# Patient Record
Sex: MALE | Race: WHITE | NOT HISPANIC OR LATINO
[De-identification: names, ages, dates, MRNs, and addresses within clinical notes are randomized per-mention and may not be internally consistent; named-entity substitution may affect disease eponyms.]

---

## 2017-05-05 ENCOUNTER — APPOINTMENT (OUTPATIENT)
Dept: CARDIOLOGY | Facility: CLINIC | Age: 82
End: 2017-05-05

## 2018-04-03 ENCOUNTER — OTHER (OUTPATIENT)
Age: 83
End: 2018-04-03

## 2018-10-05 ENCOUNTER — INPATIENT (INPATIENT)
Facility: HOSPITAL | Age: 83
LOS: 0 days | Discharge: HOME | End: 2018-10-06
Attending: INTERNAL MEDICINE | Admitting: INTERNAL MEDICINE

## 2018-10-05 VITALS
DIASTOLIC BLOOD PRESSURE: 67 MMHG | HEART RATE: 62 BPM | TEMPERATURE: 98 F | OXYGEN SATURATION: 97 % | SYSTOLIC BLOOD PRESSURE: 130 MMHG | RESPIRATION RATE: 18 BRPM

## 2018-10-05 DIAGNOSIS — J34.2 DEVIATED NASAL SEPTUM: Chronic | ICD-10-CM

## 2018-10-05 DIAGNOSIS — Z90.49 ACQUIRED ABSENCE OF OTHER SPECIFIED PARTS OF DIGESTIVE TRACT: Chronic | ICD-10-CM

## 2018-10-05 DIAGNOSIS — Z98.89 OTHER SPECIFIED POSTPROCEDURAL STATES: Chronic | ICD-10-CM

## 2018-10-05 LAB
ALBUMIN SERPL ELPH-MCNC: 3.3 G/DL — LOW (ref 3.5–5.2)
ALP SERPL-CCNC: 70 U/L — SIGNIFICANT CHANGE UP (ref 30–115)
ALT FLD-CCNC: 20 U/L — SIGNIFICANT CHANGE UP (ref 0–41)
ANION GAP SERPL CALC-SCNC: 12 MMOL/L — SIGNIFICANT CHANGE UP (ref 7–14)
APTT BLD: 32.1 SEC — SIGNIFICANT CHANGE UP (ref 27–39.2)
AST SERPL-CCNC: 38 U/L — SIGNIFICANT CHANGE UP (ref 0–41)
BASOPHILS # BLD AUTO: 0.03 K/UL — SIGNIFICANT CHANGE UP (ref 0–0.2)
BASOPHILS NFR BLD AUTO: 0.6 % — SIGNIFICANT CHANGE UP (ref 0–1)
BILIRUB SERPL-MCNC: 0.7 MG/DL — SIGNIFICANT CHANGE UP (ref 0.2–1.2)
BUN SERPL-MCNC: 16 MG/DL — SIGNIFICANT CHANGE UP (ref 10–20)
CALCIUM SERPL-MCNC: 8.9 MG/DL — SIGNIFICANT CHANGE UP (ref 8.5–10.1)
CHLORIDE SERPL-SCNC: 108 MMOL/L — SIGNIFICANT CHANGE UP (ref 98–110)
CK SERPL-CCNC: 81 U/L — SIGNIFICANT CHANGE UP (ref 0–225)
CK SERPL-CCNC: 89 U/L — SIGNIFICANT CHANGE UP (ref 0–225)
CO2 SERPL-SCNC: 24 MMOL/L — SIGNIFICANT CHANGE UP (ref 17–32)
CREAT SERPL-MCNC: 1.1 MG/DL — SIGNIFICANT CHANGE UP (ref 0.7–1.5)
EOSINOPHIL # BLD AUTO: 0.09 K/UL — SIGNIFICANT CHANGE UP (ref 0–0.7)
EOSINOPHIL NFR BLD AUTO: 1.8 % — SIGNIFICANT CHANGE UP (ref 0–8)
GLUCOSE SERPL-MCNC: 129 MG/DL — HIGH (ref 70–99)
HCT VFR BLD CALC: 37.8 % — LOW (ref 42–52)
HGB BLD-MCNC: 11.7 G/DL — LOW (ref 14–18)
IMM GRANULOCYTES NFR BLD AUTO: 0.2 % — SIGNIFICANT CHANGE UP (ref 0.1–0.3)
INR BLD: 1.22 RATIO — SIGNIFICANT CHANGE UP (ref 0.65–1.3)
LYMPHOCYTES # BLD AUTO: 1 K/UL — LOW (ref 1.2–3.4)
LYMPHOCYTES # BLD AUTO: 19.8 % — LOW (ref 20.5–51.1)
MAGNESIUM SERPL-MCNC: 2 MG/DL — SIGNIFICANT CHANGE UP (ref 1.8–2.4)
MCHC RBC-ENTMCNC: 27.7 PG — SIGNIFICANT CHANGE UP (ref 27–31)
MCHC RBC-ENTMCNC: 31 G/DL — LOW (ref 32–37)
MCV RBC AUTO: 89.4 FL — SIGNIFICANT CHANGE UP (ref 80–94)
MONOCYTES # BLD AUTO: 0.58 K/UL — SIGNIFICANT CHANGE UP (ref 0.1–0.6)
MONOCYTES NFR BLD AUTO: 11.5 % — HIGH (ref 1.7–9.3)
NEUTROPHILS # BLD AUTO: 3.34 K/UL — SIGNIFICANT CHANGE UP (ref 1.4–6.5)
NEUTROPHILS NFR BLD AUTO: 66.1 % — SIGNIFICANT CHANGE UP (ref 42.2–75.2)
NRBC # BLD: 0 /100 WBCS — SIGNIFICANT CHANGE UP (ref 0–0)
NT-PROBNP SERPL-SCNC: 1265 PG/ML — HIGH (ref 0–300)
PLATELET # BLD AUTO: 162 K/UL — SIGNIFICANT CHANGE UP (ref 130–400)
POTASSIUM SERPL-MCNC: 5 MMOL/L — SIGNIFICANT CHANGE UP (ref 3.5–5)
POTASSIUM SERPL-SCNC: 5 MMOL/L — SIGNIFICANT CHANGE UP (ref 3.5–5)
PROT SERPL-MCNC: 5.7 G/DL — LOW (ref 6–8)
PROTHROM AB SERPL-ACNC: 13.1 SEC — HIGH (ref 9.95–12.87)
RBC # BLD: 4.23 M/UL — LOW (ref 4.7–6.1)
RBC # FLD: 15 % — HIGH (ref 11.5–14.5)
SODIUM SERPL-SCNC: 144 MMOL/L — SIGNIFICANT CHANGE UP (ref 135–146)
TROPONIN T SERPL-MCNC: 0.01 NG/ML — SIGNIFICANT CHANGE UP
TROPONIN T SERPL-MCNC: 0.02 NG/ML — HIGH
WBC # BLD: 5.05 K/UL — SIGNIFICANT CHANGE UP (ref 4.8–10.8)
WBC # FLD AUTO: 5.05 K/UL — SIGNIFICANT CHANGE UP (ref 4.8–10.8)

## 2018-10-05 RX ORDER — ASPIRIN/CALCIUM CARB/MAGNESIUM 324 MG
325 TABLET ORAL DAILY
Qty: 0 | Refills: 0 | Status: DISCONTINUED | OUTPATIENT
Start: 2018-10-05 | End: 2018-10-06

## 2018-10-05 RX ORDER — METHOCARBAMOL 500 MG/1
1000 TABLET, FILM COATED ORAL EVERY 8 HOURS
Qty: 0 | Refills: 0 | Status: DISCONTINUED | OUTPATIENT
Start: 2018-10-05 | End: 2018-10-06

## 2018-10-05 RX ORDER — FERROUS SULFATE 325(65) MG
325 TABLET ORAL DAILY
Qty: 0 | Refills: 0 | Status: DISCONTINUED | OUTPATIENT
Start: 2018-10-05 | End: 2018-10-06

## 2018-10-05 RX ORDER — METOPROLOL TARTRATE 50 MG
25 TABLET ORAL DAILY
Qty: 0 | Refills: 0 | Status: DISCONTINUED | OUTPATIENT
Start: 2018-10-05 | End: 2018-10-06

## 2018-10-05 RX ORDER — LANOLIN ALCOHOL/MO/W.PET/CERES
5 CREAM (GRAM) TOPICAL ONCE
Qty: 0 | Refills: 0 | Status: COMPLETED | OUTPATIENT
Start: 2018-10-05 | End: 2018-10-05

## 2018-10-05 RX ORDER — ENOXAPARIN SODIUM 100 MG/ML
40 INJECTION SUBCUTANEOUS DAILY
Qty: 0 | Refills: 0 | Status: DISCONTINUED | OUTPATIENT
Start: 2018-10-05 | End: 2018-10-06

## 2018-10-05 RX ORDER — DONEPEZIL HYDROCHLORIDE 10 MG/1
10 TABLET, FILM COATED ORAL AT BEDTIME
Qty: 0 | Refills: 0 | Status: DISCONTINUED | OUTPATIENT
Start: 2018-10-05 | End: 2018-10-06

## 2018-10-05 RX ORDER — SPIRONOLACTONE 25 MG/1
25 TABLET, FILM COATED ORAL DAILY
Qty: 0 | Refills: 0 | Status: DISCONTINUED | OUTPATIENT
Start: 2018-10-05 | End: 2018-10-06

## 2018-10-05 RX ORDER — LOSARTAN POTASSIUM 100 MG/1
100 TABLET, FILM COATED ORAL DAILY
Qty: 0 | Refills: 0 | Status: DISCONTINUED | OUTPATIENT
Start: 2018-10-05 | End: 2018-10-06

## 2018-10-05 RX ORDER — FUROSEMIDE 40 MG
40 TABLET ORAL ONCE
Qty: 0 | Refills: 0 | Status: COMPLETED | OUTPATIENT
Start: 2018-10-05 | End: 2018-10-05

## 2018-10-05 RX ORDER — MIRTAZAPINE 45 MG/1
7.5 TABLET, ORALLY DISINTEGRATING ORAL AT BEDTIME
Qty: 0 | Refills: 0 | Status: DISCONTINUED | OUTPATIENT
Start: 2018-10-05 | End: 2018-10-06

## 2018-10-05 RX ORDER — FUROSEMIDE 40 MG
40 TABLET ORAL DAILY
Qty: 0 | Refills: 0 | Status: DISCONTINUED | OUTPATIENT
Start: 2018-10-05 | End: 2018-10-06

## 2018-10-05 RX ORDER — OXYCODONE AND ACETAMINOPHEN 5; 325 MG/1; MG/1
2 TABLET ORAL EVERY 6 HOURS
Qty: 0 | Refills: 0 | Status: DISCONTINUED | OUTPATIENT
Start: 2018-10-05 | End: 2018-10-06

## 2018-10-05 RX ADMIN — Medication 40 MILLIGRAM(S): at 17:03

## 2018-10-05 RX ADMIN — DONEPEZIL HYDROCHLORIDE 10 MILLIGRAM(S): 10 TABLET, FILM COATED ORAL at 21:39

## 2018-10-05 RX ADMIN — METHOCARBAMOL 1000 MILLIGRAM(S): 500 TABLET, FILM COATED ORAL at 21:39

## 2018-10-05 RX ADMIN — Medication 5 MILLIGRAM(S): at 23:58

## 2018-10-05 RX ADMIN — MIRTAZAPINE 7.5 MILLIGRAM(S): 45 TABLET, ORALLY DISINTEGRATING ORAL at 21:35

## 2018-10-05 NOTE — H&P ADULT - NSHPPHYSICALEXAM_GEN_ALL_CORE
T(C): 36.4 (10-05-18 @ 12:21), Max: 36.4 (10-05-18 @ 12:21)  HR: 61 (10-05-18 @ 16:16) (61 - 62)  BP: 128/74 (10-05-18 @ 16:16) (128/74 - 130/67)  RR: 18 (10-05-18 @ 12:21) (18 - 18)  SpO2: 100% (10-05-18 @ 16:16) (97% - 100%)    PHYSICAL EXAM:  GENERAL: NAD, well-developed  HEAD:  Atraumatic, Normocephalic  EYES: EOMI, PERRLA, conjunctiva and sclera clear  NECK: Supple, No JVD  CHEST/LUNG: minimal left basal crackles.   HEART: Regular rate and rhythm; No murmurs, rubs, or gallops  ABDOMEN: Soft, Nontender, Nondistended; Bowel sounds present  EXTREMITIES:  2+ Peripheral Pulses, No clubbing, cyanosis, or edema, sequale of trauma on the lft forearm ( skin abrasion)  PSYCH: AAOx3  NEUROLOGY: non-focal  SKIN: No rashes or lesions

## 2018-10-05 NOTE — H&P ADULT - PSH
Deviated nasal septum    History of appendectomy    History of cholecystectomy    History of knee surgery

## 2018-10-05 NOTE — ED PROVIDER NOTE - PLAN OF CARE
Plan; Monitor, EKG, CXR, labs, pt reports will get copies of what was done at [place] for review, will continue to monitor and reassess.

## 2018-10-05 NOTE — H&P ADULT - ATTENDING COMMENTS
Pt seen and examined independently of resident, agree with above history, physical exam, assessment and plan  Addendum    1-chest pain  looks atypical  likely Musculoskeletal  r/o ACS  cardio Dr Kaur    2- A fib s/p watchman procedure  cont ASA and metoprolol    3-CHF exacernbation  start IV lasix  cont aldactone  Echo  daily I/O    4- liver cirrhosis  on lasix/aldactone    5- HTN  stable on current meds

## 2018-10-05 NOTE — ED PROVIDER NOTE - PHYSICAL EXAMINATION
Vital Signs: I have reviewed the initial vital signs.  Constitutional: WDWN in nad.  HEAD: No signs of basilar skull fracture.  Integumentary: No rash. No lacerations, abrasions, ecchymoses or swelling.  EYES: No periorbital swelling/ecchymoses. PERRL, EOM intact. No nystagmus.  ENT: MMM. No rhinorrhea/otorrhea. No septal hematoma. No mastoid ecchymoses.  NECK: Supple, non-tender, no spinous tenderness to neck. No palpable shelves or step-offs.  BACK: No spinous tenderness. No palpable shelves or step-offs.  Cardiovascular: RRR, radial pulses 2/4 b/l. No pain to palpation to chest wall.   Respiratory: BS present b/l, ctabl, no wheezing or crackles, good air exchange, poor resp effort and excursion, no accessory muscle use, no stridor. Pain to palpation to L anterior rib over the 45 ribs where pt reports he has a fracture. No crepitus. No Sub/q emphysema.  Gastrointestinal: BS present throughout all 4 quadrants, soft, nd, nt no rebound tenderness or guarding, no cvat.  Musculoskeletal: FROM, no edema, no hip pain to palpation. No short leg. No internal or external rotation of LE.  Neurologic: GCS 15. AAOx3, motor 5/5 and sensation intact throughout upper and lowe ext, CN II-XII intact, No facial droop or slurring of speech. (-) Pronator No dysmteria w. ftn or rapid alternating fine movements. No focal deficits. Vital Signs: I have reviewed the initial vital signs.  Constitutional: WDWN in nad.  HEAD: No signs of basilar skull fracture.  Integumentary: No rash. No lacerations, abrasions, ecchymoses or swelling.  EYES: No periorbital swelling/ecchymoses. PERRL, EOM intact. No nystagmus.  ENT: MMM. No rhinorrhea/otorrhea. No septal hematoma. No mastoid ecchymoses.  NECK: Supple, non-tender, no spinous tenderness to neck. No palpable shelves or step-offs.  BACK: No spinous tenderness. No palpable shelves or step-offs.  Cardiovascular: RRR, radial pulses 2/4 b/l. No pain to palpation to chest wall.   Respiratory: BS present b/l, crackles to lung bases, poor air exchange, poor resp effort and excursion, no accessory muscle use, no stridor. Pain to palpation to L anterior rib over the 45 ribs where pt reports he has a fracture. No crepitus. No Sub/q emphysema.  Gastrointestinal: BS present throughout all 4 quadrants, soft, nd, nt no rebound tenderness or guarding, no cvat.  Musculoskeletal: FROM, no edema, no hip pain to palpation. No short leg. No internal or external rotation of LE.  Neurologic: GCS 15. AAOx3, motor 5/5 and sensation intact throughout upper and lowe ext, CN II-XII intact, No facial droop or slurring of speech. (-) Pronator No dysmteria w. ftn or rapid alternating fine movements. No focal deficits.

## 2018-10-05 NOTE — H&P ADULT - NSHPREVIEWOFSYSTEMS_GEN_ALL_CORE
REVIEW OF SYSTEMS:    CONSTITUTIONAL: No weakness, fevers or chills  EYES/ENT: No visual changes;  No vertigo or throat pain   NECK: No pain or stiffness  RESPIRATORY: No cough, wheezing, hemoptysis; +  shortness of breath  CARDIOVASCULAR: + chest pain . no palpitations  GASTROINTESTINAL: No abdominal or epigastric pain. No nausea, vomiting, or hematemesis; No diarrhea or constipation. No melena or hematochezia.  GENITOURINARY: No dysuria, frequency or hematuria  NEUROLOGICAL: No numbness or weakness  SKIN: No itching, burning, rashes, or lesions   All other review of systems is negative unless indicated above.

## 2018-10-05 NOTE — ED ADULT NURSE NOTE - ED STAT RN HANDOFF DETAILS
pt resting in bed at this time no complaints of pain given melatonin to help aide sleep, safety and comfort maintained report given to arti wilson will cont to monitor until pt is brought to unit tele status maintained.

## 2018-10-05 NOTE — ED PROVIDER NOTE - NS ED ROS FT
(+) Chest pain. no loc,on baby aspiring, recalls all events prior to and after fall. (+) Chest pain.  denies fever, chills, n/v, sob, pleuritic chest pain, palpitations, diaphoresis, cough, chest wall pain, blurry vision/visual changes, neck pain/stiffness, back pain, abd pain,, hip pain, weakness, numbness/tingling, HA/LH/dizziness, lacerations, abrasions, ecchymoses, or swelling. GCS15. (+) Chest pain (+) SOB. no loc, recalls all events prior to and after fall.   denies fever, chills, n/v, pleuritic chest pain, palpitations, diaphoresis, cough, chest wall pain, blurry vision/visual changes, neck pain/stiffness, back pain, abd pain,, hip pain, weakness, numbness/tingling, HA/LH/dizziness, lacerations, abrasions, ecchymoses, or swelling. GCS15.

## 2018-10-05 NOTE — H&P ADULT - FAMILY HISTORY
Mother  Still living? No  Family history of diabetes mellitus, Age at diagnosis: Age Unknown     Sibling  Still living? No  Family history of throat cancer, Age at diagnosis: Age Unknown

## 2018-10-05 NOTE — H&P ADULT - NSHPLABSRESULTS_GEN_ALL_CORE
11.7   5.05  )-----------( 162      ( 05 Oct 2018 12:57 )             37.8   05 Oct 2018 12:57    144    |  108    |  16     ----------------------------<  129    5.0     |  24     |  1.1      Ca    8.9        05 Oct 2018 12:57  Mg     2.0       05 Oct 2018 12:57    TPro  5.7    /  Alb  3.3    /  TBili  0.7    /  DBili  x      /  AST  38     /  ALT  20     /  AlkPhos  70     05 Oct 2018 12:57    Serum Pro-Brain Natriuretic Peptide: 1265 pg/mL (10.05.18 @ 12:57)      < from: Xray Chest 1 View-PORTABLE IMMEDIATE (10.05.18 @ 14:45) >    Cardiomegaly. No acute pulmonary findings.    < end of copied text >    [facility]:    ct cervical spine: negative for any fracture    ct chest: interstitial lung disease, non specific  rt pleural effusion  trace pericardial effusion  possible non displaced fracture of the left 6 th rib    left shoulder xray: negative for fracture    JL 2016: ef 55-65%  rt Systolic heart failure  PFO 11.7   5.05  )-----------( 162      ( 05 Oct 2018 12:57 )             37.8   05 Oct 2018 12:57    144    |  108    |  16     ----------------------------<  129    5.0     |  24     |  1.1      Ca    8.9        05 Oct 2018 12:57  Mg     2.0       05 Oct 2018 12:57    TPro  5.7    /  Alb  3.3    /  TBili  0.7    /  DBili  x      /  AST  38     /  ALT  20     /  AlkPhos  70     05 Oct 2018 12:57    Serum Pro-Brain Natriuretic Peptide: 1265 pg/mL (10.05.18 @ 12:57)    ekg: paced rythm    Troponin T, Serum: 0.01: Hemolyzed. Interpret with caution ng/mL (10.05.18 @ 12:57)        < from: Xray Chest 1 View-PORTABLE IMMEDIATE (10.05.18 @ 14:45) >    Cardiomegaly. No acute pulmonary findings.    < end of copied text >    [facility] are [facility]:    ct cervical spine: negative for any fracture    ct chest: interstitial lung disease, non specific  rt pleural effusion  trace pericardial effusion  possible non displaced fracture of the left 6 th rib    left shoulder xray: negative for fracture    JL 2016: ef 55-65%  rt Systolic heart failure, trace pericardial effusion  PFO

## 2018-10-05 NOTE — CONSULT NOTE ADULT - ATTENDING COMMENTS
Patient seen and examined, case discussed with cardiology fellow. CAD, s/p ICD but clinically stable. Non-cardiac chest pain.    Recommend:  Continue treatment.  D/c telemetry.  Can d/c home with pain medications.

## 2018-10-05 NOTE — CONSULT NOTE ADULT - SUBJECTIVE AND OBJECTIVE BOX
HPI:  A 83 y/o m w/ pmhx of CAD s/p mutiple PCI , ischemic CM s/p PM/ AICD ,htn, DL, anxiety/depression, , afib s/p watchman implant, , liver cirrhosis with esophageal varices, history of pericardial effusion,   presents with bilateral belt like chest pain mainly on the left side radiating to b/l shoulders has been intermittent since september 9th s/p fall, but this morning woke up with pain at its worst, 7/10 in severity, at rest, stabbing  in character, worsened with breathing and movement ,  associated with sob. Patient  reports was in [place] on september 8th, was raining  slipped in [facility], fell on his left side ,mainly left side of the chest, seen in ed at [place] and had multiple ct scans done as well as imaging of L upper extremity, due to L shoulder pain, which were negative except for possible  L 6th rib fracture and trace pericardial effusion.   Patient denies any chest pain prior to the fall, is able to go 2 flights of stairs without SOB or chest pain, Patient denies any cough, no orthopnea or PND. No fever, chills, n/v, palpitations, diaphoresis, edema, calf pain/swelling/erythema, or weakness.  In the ED patient was given IV Lasix for assumed overload (05 Oct 2018 17:15)      PAST MEDICAL & SURGICAL HISTORY  Chronic right-sided heart failure  Depression  Liver cirrhosis, alcoholic  Presence of cardiac pacemaker  Borderline diabetes mellitus  HTN (hypertension)  Afib  History of cholecystectomy  History of appendectomy  History of knee surgery  Deviated nasal septum      FAMILY HISTORY:  FAMILY HISTORY:  Family history of throat cancer (Sibling)  Family history of diabetes mellitus (Mother)      SOCIAL HISTORY:  []smoker  []Alcohol  []Drug    ALLERGIES:  No Known Allergies      MEDICATIONS:  MEDICATIONS  (STANDING):  aspirin 325 milliGRAM(s) Oral daily  donepezil 10 milliGRAM(s) Oral at bedtime  enoxaparin Injectable 40 milliGRAM(s) SubCutaneous daily  ferrous    sulfate 325 milliGRAM(s) Oral daily  furosemide    Tablet 40 milliGRAM(s) Oral daily  losartan 100 milliGRAM(s) Oral daily  methocarbamol 1000 milliGRAM(s) Oral every 8 hours  metoprolol succinate ER 25 milliGRAM(s) Oral daily  mirtazapine 7.5 milliGRAM(s) Oral at bedtime  spironolactone 25 milliGRAM(s) Oral daily    MEDICATIONS  (PRN):  oxyCODONE    5 mG/acetaminophen 325 mG 2 Tablet(s) Oral every 6 hours PRN Severe Pain (7 - 10)      HOME MEDICATIONS:  Home Medications:  aspirin 325 mg oral tablet: 1 tab(s) orally once a day (05 Oct 2018 17:32)  donepezil 10 mg oral tablet: 1 tab(s) orally once a day (at bedtime) (05 Oct 2018 17:32)  ferrous fumarate 325 mg (106 mg elemental iron) oral tablet: 1 tab(s) orally once a day (05 Oct 2018 17:32)  losartan 100 mg oral tablet: 1 tab(s) orally once a day (05 Oct 2018 17:32)  methocarbamol 500 mg oral tablet: 2 tab(s) orally every 8 hours (05 Oct 2018 17:32)  Metoprolol Succinate ER 25 mg oral tablet, extended release: 1 tab(s) orally once a day (05 Oct 2018 17:32)  mirtazapine 7.5 mg oral tablet: 1 tab(s) orally once a day (05 Oct 2018 17:32)  sildenafil 100 mg oral tablet: 1 tab(s) orally once a day (at bedtime), As Needed (05 Oct 2018 17:32)  spironolactone 25 mg oral tablet: 1 tab(s) orally once a day (05 Oct 2018 17:32)      VITALS:   T(F): 98.3 (10-05 @ 21:46), Max: 98.3 (10-05 @ 21:46)  HR: 60 (10-05 @ 21:46) (60 - 62)  BP: 117/65 (10-05 @ 21:46) (117/65 - 130/67)  BP(mean): --  RR: 18 (10-05 @ 21:46) (18 - 18)  SpO2: 98% (10-05 @ 21:46) (97% - 100%)    I&O's Summary      REVIEW OF SYSTEMS:  CONSTITUTIONAL: No weakness, fevers or chills  EYES/ENT: No visual changes;  No vertigo or throat pain   NECK: see HPI  RESPIRATORY: see HPI  CARDIOVASCULAR: see HPI  GASTROINTESTINAL: see HPI  GENITOURINARY: No dysuria, frequency or hematuria  NEUROLOGICAL: No numbness or weakness      PHYSICAL EXAM:  NEURO: patient is awake , alert and oriented  GEN: Not in acute distress  NECK: no thyroid enlargement, no JVD  LUNGS: mild bilateral basal crackles  CARDIOVASCULAR: S1/S2 present, RRR , no murmurs or rubs  ABD: Soft, non-tender, non-distended, +BS  EXT: No LESLIE    LABS:                        11.7   5.05  )-----------( 162      ( 05 Oct 2018 12:57 )             37.8     10-05    144  |  108  |  16  ----------------------------<  129<H>  5.0   |  24  |  1.1    Ca    8.9      05 Oct 2018 12:57  Mg     2.0     10-05    TPro  5.7<L>  /  Alb  3.3<L>  /  TBili  0.7  /  DBili  x   /  AST  38  /  ALT  20  /  AlkPhos  70  10-05    PT/INR - ( 05 Oct 2018 12:57 )   PT: 13.10 sec;   INR: 1.22 ratio         PTT - ( 05 Oct 2018 12:57 )  PTT:32.1 sec  Creatine Kinase, Serum: 81 U/L (10-05-18 @ 20:21)  Troponin T, Serum: 0.02 ng/mL <H> (10-05-18 @ 20:21)  Creatine Kinase, Serum: 89 U/L (10-05-18 @ 12:57)  Troponin T, Serum: 0.01 ng/mL (10-05-18 @ 12:57)    CARDIAC MARKERS ( 05 Oct 2018 20:21 )  x     / 0.02 ng/mL / 81 U/L / x     / x      CARDIAC MARKERS ( 05 Oct 2018 12:57 )  x     / 0.01 ng/mL / 89 U/L / x     / x          Serum Pro-Brain Natriuretic Peptide: 1265 pg/mL (10-05-18 @ 12:57)      RADIOLOGY:  -CXR: < from: Xray Chest 1 View-PORTABLE IMMEDIATE (10.05.18 @ 14:45) >  Impression:      Cardiomegaly. No acute pulmonary findings.    < end of copied text >    -TTE:< from: Transthoracic Echocardiogram (03.03.16 @ 09:29) >  Conclusions:  1. Moderate mitral regurgitation.  2. Severely dilated left atrium.  LA volume index = 104  cc/m2.  3. Left ventricular enlargement.  4. Normal left ventricular systolic function. No segmental  wall motion abnormalities. Septal motion is consistent with  RV pacing.    < end of copied text >      ECG:  AV paced rythm

## 2018-10-05 NOTE — H&P ADULT - PMH
Afib    Borderline diabetes mellitus    Chronic right-sided heart failure    Depression    HTN (hypertension)    Liver cirrhosis, alcoholic    Presence of cardiac pacemaker

## 2018-10-05 NOTE — ED PROVIDER NOTE - OBJECTIVE STATEMENT
A 85 y/o m w/ pmhx of htn, dld, anxiety, chf, afib s/p watchman implant, AICD, presents with mid -sternal chest pain radiating to b/l shoulders has been intermittent since september 9th s/p fall, but this morning woke up with pain at its worst, there at rest, not with palpation. pt reports was in [place] on september 8th, was raining  slipped in [facility], seen in ed at [place] and head multiple ct scans done as well as imaging of L upper extremity, due to L shoulder pain, which were negative except for L 5th rib fracture. [facility] and has been told no intervention, pain control. pt reports chest pain is different from the rib pain he experienced as it was severe in onset and at rest. No fever, chills, n/v, sob, palpitations, diaphoresis, cough, edema, calf pain/swelling/erythema, or weakness. ex-smoker. A 85 y/o m w/ pmhx of htn, dld, anxiety, chf, afib s/p watchman implant, AICD, presents with mid -sternal chest pain radiating to b/l shoulders has been intermittent since september 9th s/p fall, but this morning woke up with pain at its worst, there at rest, not with palpation. associated with sob. pt reports was in [place] on september 8th, was raining  slipped in [facility], seen in ed at [place] and head multiple ct scans done as well as imaging of L upper extremity, due to L shoulder pain, which were negative except for L 5th rib fracture. [facility] and has been told no intervention, pain control. pt reports chest pain is different from the rib pain he experienced as it was severe in onset and at rest. No fever, chills, n/v, palpitations, diaphoresis, cough, edema, calf pain/swelling/erythema, or weakness. ex-smoker. A 83 y/o m w/ pmhx of htn, dld, anxiety, chf, afib s/p watchman implant, AICD, presents with mid -sternal chest pain radiating to b/l shoulders has been intermittent since september 9th s/p fall, but this morning woke up with pain at its worst, there at rest, not with palpation. associated with sob. pt reports was in [place] on september 8th, was raining  slipped in [facility], seen in ed at [place] and head multiple ct scans done as well as imaging of L upper extremity, due to L shoulder pain, which were negative except for L 5th rib fracture and trace pericardial effusion. [facility] and has been told no intervention, pain control. pt reports chest pain is different from the rib pain he experienced as it was severe in onset and at rest. No fever, chills, n/v, palpitations, diaphoresis, cough, edema, calf pain/swelling/erythema, or weakness. ex-smoker.

## 2018-10-05 NOTE — ED ADULT NURSE REASSESSMENT NOTE - NS ED NURSE REASSESS COMMENT FT1
Pt reassessed A.O times 4 Vs retaken stable on cardiac monitor denies no pain no SOB no N.V ambulate steady ,pt did eat 100% of dinner admitted to telemetry ,will continue to monitor .

## 2018-10-05 NOTE — ED PROVIDER NOTE - CARE PLAN
Assessment and plan of treatment:	Plan; Monitor, EKG, CXR, labs, pt reports will get copies of what was done at [place] for review, will continue to monitor and reassess. Principal Discharge DX:	Chest pain  Assessment and plan of treatment:	Plan; Monitor, EKG, CXR, labs, pt reports will get copies of what was done at [place] for review, will continue to monitor and reassess.  Secondary Diagnosis:	CHF (congestive heart failure)

## 2018-10-05 NOTE — ED PROVIDER NOTE - MEDICAL DECISION MAKING DETAILS
pt  and family at bedside aware of all labs and imaging, results discussed, given incentive spirometry, lasix given in ed, medical admitting team aware of pt and admission as well.

## 2018-10-05 NOTE — H&P ADULT - NSHPSOCIALHISTORY_GEN_ALL_CORE
x- smoker ( stopped 40 years ago)  history of alcohol abuse, social alcohol now  no history of drug abuse

## 2018-10-05 NOTE — CONSULT NOTE ADULT - ASSESSMENT
A 83 y/o m w/ pmhx of CAD s/p mutiple PCI , ischemic CM s/p PM/ AICDhtn, DL, anxiety/depression, , afib s/p watchman implant, , liver cirrhosis with esophageal varices, history of pericardial effusion,   presents with bilateral belt like chest pain mainly on the left side radiating to b/l shoulders has been intermittent since september 9th s/p fall, but this morning woke up with pain at its worst, 7/10 in severity, at rest, stabbing  in character, worsened with breathing and movement.    Non anginal chest pain : no evidence of ACS , r/o musceloskeletal pain  CAD s/p mutiple PCI  ICM s/p PM/AICD  Afib s/p watchman     admit to tele   check 2d echo to r/o pericardial effusion from the trauma  pain control   c/w ASA, BB  no evidence of volume overload , c/w home dose of lasix/aldactone ( for cirrhosis)  will follow

## 2018-10-05 NOTE — ED PROVIDER NOTE - PROGRESS NOTE DETAILS
pt has imaging done at [place], does not want to repeat it at this time, obtaining results for home to be reviewed, will continue to monitor and reassess. pt obtained copies of imagign done at [facility] included ct head, neck, chest and L shoulder xray, ct chest without contrast showed small pericardial effusion which pt was aware of, pleural effusions (pt with history of chf) and possible L sixth rib fracture (pt given incentive spirometry in ed as has not been using one) with proper use. imaging cd given to radiology tech to try to scan, copies of results scanned into chart by . pt obtained copies of imaging done at [place] included ct head, neck, chest and L shoulder xray, ct chest without contrast showed small pericardial effusion which pt was aware of, pleural effusions (pt with history of chf) and possible L sixth rib fracture (pt given incentive spirometry in ed as has not been using one) with proper use. imaging cd given to radiology tech to try to scan, copies of results scanned into chart by . imaging uploded. no chest pain at this time, pt took 325 mg of aspirin pta, xray and results reviewed, bnp 1265, in system last was 3/2/16 and was 1019, cxr cardiomegaly - lasix to be given, pt aware of plan for admission and agrees, will discuss with medical admitting team imaging uploded on [employer] accessions number [id number]. no chest pain at this time, pt took 325 mg of aspirin pta, xray and results reviewed, bnp 1265, in system last was 3/2/16 and was 1019, cxr cardiomegaly - lasix to be given, pt aware of plan for admission and agrees, will discuss with medical admitting team imaging uploaded on [employer] accessions number [id number]. no chest pain at this time, pt took 325 mg of aspirin pta, xray and results reviewed, bnp 1265, in system last was 3/2/16 and was 1019, cxr cardiomegaly - lasix to be given, pt aware of plan for admission and agrees, will discuss with medical admitting team ZULEYMA Giang aware of pt and admission to TELE. ZULEYMA Giang aware of pt, imaging and results done as outpt and scanned, and admission to TELE. Dr. Baires aware of pt.

## 2018-10-05 NOTE — H&P ADULT - HISTORY OF PRESENT ILLNESS
A 83 y/o m w/ pmhx of htn, dld, anxiety/depression, chf, afib s/p watchman implant, pace maker, liver cirrhosis with esophageal varices, history of pericardial effusion,   presents with bilateral belt like chest pain chest pain mainly on the left side radiating to b/l shoulders has been intermittent since september 9th s/p fall, but this morning woke up with pain at its worst, 7/10 in severity, there at rest, stabbing  in character, worsened with breathing and movement , partially relieved by NSAID cream,  not with palpation, associated with sob. Patient  reports was in [place] on september 8th, was raining  slipped in [facility], fell on his left side ,mainly left side of the chest, seen in ed at [place] and had multiple ct scans done as well as imaging of L upper extremity, due to L shoulder pain, which were negative except for possible  L 6th rib fracture and trace pericardial effusion. [facility] and has been told no intervention, pain control. pt reports chest pain is different from the rib pain he experienced as it was severe in onset and at rest. Patient denies any chest pain prior to the fall, is able to go 2 flights of stairs without SOB or chest pain, Patient denies any cough, no orthopnea or PND. No fever, chills, n/v, palpitations, diaphoresis, edema, calf pain/swelling/erythema, or weakness. Patient complains of weight loss around 60 pounds in the last year, associated with poor intake.  In the ED patient was given IV Lasix for assumed overload

## 2018-10-05 NOTE — H&P ADULT - ASSESSMENT
A 85 y/o m w/ pmhx of htn, dld, anxiety/depression, chf, afib s/p watchman implant, pace maker, liver cirrhosis with esophageal varices, history of pericardial effusion,   presents with bilateral belt like chest pain chest pain mainly on the left side radiating to b/l shoulders has been intermittent since september 9th s/p fall, but this morning woke up with pain at its worst. Pain worsens by movement and deep breath.    #chest pain/shoulder pain:  mostly musculoskeletal s/p fall  pain control/muscle relaxant  r/o other less likely causes given his risk factors  check 2nd set of cardiac enzymes    #SOB/RT pleural effusion/ pericardial effusion on outpatient CT scan:  looks mildly hypervolemic on exam  continue with Lasix and aldactone( patient is on this ratio cause of liver cirrhosis)  check 2 d echo  consult cardiology dr pierre  accurate intake/out put  daily body weight    #liver cirrhosis:   alcoholic/compensated  continue with Lasix and aldactone  endoscopy on 2016 showing varices /portal gastropathy  f/u with GI as out patient A 85 y/o m w/ pmhx of htn, dld, anxiety/depression, chf, afib s/p watchman implant, pace maker, liver cirrhosis with esophageal varices, history of pericardial effusion,   presents with bilateral belt like chest pain chest pain mainly on the left side radiating to b/l shoulders has been intermittent since september 9th s/p fall, but this morning woke up with pain at its worst. Pain worsens by movement and deep breath.    #chest pain/shoulder pain:  mostly musculoskeletal s/p fall  pain control/muscle relaxant  r/o other less likely causes given his risk factors  check 2nd set of cardiac enzymes    #SOB/RT pleural effusion/ pericardial effusion on outpatient CT scan:  looks mildly hypervolemic on exam  continue with Lasix and aldactone( patient is on this ratio cause of liver cirrhosis)  check 2 d echo  consult cardiology dr pierre  accurate intake/out put  daily body weight    #liver cirrhosis:   alcoholic/compensated  continue with Lasix and aldactone  endoscopy on 2016 showing varices /portal gastropathy  f/u with GI as out patient     #atrial fibrillation s/p watchman   continue with aspirin 325  rate controlled on metoprolol    #htn: blood pressure controlled   continue with losartan and metoprolol, Lasix and aldactone    #depression: continue with mirtazepine   stable now, no active signs of depression    #weight loss/decrease appetite:  check TSH  last colono 2014 single polyp/diverticulosis  ct chest negative for malignancy 2018    #DVT prophylaxis: Lovenox    #full code    #from home A 83 y/o m w/ pmhx of htn, dld, anxiety/depression, chf, afib s/p watchman implant, pace maker, liver cirrhosis with esophageal varices, history of pericardial effusion,   presents with bilateral belt like chest pain chest pain mainly on the left side radiating to b/l shoulders has been intermittent since september 9th s/p fall, but this morning woke up with pain at its worst. Pain worsens by movement and deep breath.    #chest pain/shoulder pain:  mostly musculoskeletal s/p fall  pain control/muscle relaxant  r/o other less likely causes given his risk factors  check 2nd set of cardiac enzymes    #SOB/RT pleural effusion/ pericardial effusion on outpatient CT scan:  looks mildly hypervolemic on exam  continue with Lasix and aldactone( patient is on this ratio cause of liver cirrhosis)  check 2 d echo  consult cardiology dr pierre  accurate intake/out put  daily body weight    #interstitial lung disease: stable, patient is saturating well at room air  f/u with pulmonary as outpatient    #liver cirrhosis:   alcoholic/compensated  continue with Lasix and aldactone  endoscopy on 2016 showing varices /portal gastropathy  f/u with GI as out patient     #atrial fibrillation s/p watchman   continue with aspirin 325  rate controlled on metoprolol    #htn: blood pressure controlled   continue with losartan and metoprolol, Lasix and aldactone    #depression: continue with mirtazepine   stable now, no active signs of depression    #weight loss/decrease appetite:  check TSH  last colono 2014 single polyp/diverticulosis  ct chest negative for malignancy 2018    #DVT prophylaxis: Lovenox    #full code    #from home

## 2018-10-06 ENCOUNTER — TRANSCRIPTION ENCOUNTER (OUTPATIENT)
Age: 83
End: 2018-10-06

## 2018-10-06 VITALS — WEIGHT: 191.36 LBS

## 2018-10-06 LAB
ANION GAP SERPL CALC-SCNC: 11 MMOL/L — SIGNIFICANT CHANGE UP (ref 7–14)
BUN SERPL-MCNC: 21 MG/DL — HIGH (ref 10–20)
CALCIUM SERPL-MCNC: 9.1 MG/DL — SIGNIFICANT CHANGE UP (ref 8.5–10.1)
CHLORIDE SERPL-SCNC: 104 MMOL/L — SIGNIFICANT CHANGE UP (ref 98–110)
CK SERPL-CCNC: 62 U/L — SIGNIFICANT CHANGE UP (ref 0–225)
CO2 SERPL-SCNC: 28 MMOL/L — SIGNIFICANT CHANGE UP (ref 17–32)
CREAT SERPL-MCNC: 1 MG/DL — SIGNIFICANT CHANGE UP (ref 0.7–1.5)
GLUCOSE SERPL-MCNC: 120 MG/DL — HIGH (ref 70–99)
HCT VFR BLD CALC: 36.1 % — LOW (ref 42–52)
HGB BLD-MCNC: 11.6 G/DL — LOW (ref 14–18)
MCHC RBC-ENTMCNC: 28.4 PG — SIGNIFICANT CHANGE UP (ref 27–31)
MCHC RBC-ENTMCNC: 32.1 G/DL — SIGNIFICANT CHANGE UP (ref 32–37)
MCV RBC AUTO: 88.3 FL — SIGNIFICANT CHANGE UP (ref 80–94)
NRBC # BLD: 0 /100 WBCS — SIGNIFICANT CHANGE UP (ref 0–0)
PLATELET # BLD AUTO: 164 K/UL — SIGNIFICANT CHANGE UP (ref 130–400)
POTASSIUM SERPL-MCNC: 4 MMOL/L — SIGNIFICANT CHANGE UP (ref 3.5–5)
POTASSIUM SERPL-SCNC: 4 MMOL/L — SIGNIFICANT CHANGE UP (ref 3.5–5)
RBC # BLD: 4.09 M/UL — LOW (ref 4.7–6.1)
RBC # FLD: 14.8 % — HIGH (ref 11.5–14.5)
SODIUM SERPL-SCNC: 143 MMOL/L — SIGNIFICANT CHANGE UP (ref 135–146)
TROPONIN T SERPL-MCNC: 0.02 NG/ML — HIGH
WBC # BLD: 4.82 K/UL — SIGNIFICANT CHANGE UP (ref 4.8–10.8)
WBC # FLD AUTO: 4.82 K/UL — SIGNIFICANT CHANGE UP (ref 4.8–10.8)

## 2018-10-06 RX ORDER — METHOCARBAMOL 500 MG/1
2 TABLET, FILM COATED ORAL
Qty: 30 | Refills: 0 | OUTPATIENT
Start: 2018-10-06 | End: 2018-10-10

## 2018-10-06 RX ORDER — INFLUENZA VIRUS VACCINE 15; 15; 15; 15 UG/.5ML; UG/.5ML; UG/.5ML; UG/.5ML
0.5 SUSPENSION INTRAMUSCULAR ONCE
Qty: 0 | Refills: 0 | Status: COMPLETED | OUTPATIENT
Start: 2018-10-06 | End: 2018-10-06

## 2018-10-06 RX ADMIN — METHOCARBAMOL 1000 MILLIGRAM(S): 500 TABLET, FILM COATED ORAL at 06:24

## 2018-10-06 RX ADMIN — LOSARTAN POTASSIUM 100 MILLIGRAM(S): 100 TABLET, FILM COATED ORAL at 06:23

## 2018-10-06 RX ADMIN — Medication 325 MILLIGRAM(S): at 12:02

## 2018-10-06 RX ADMIN — SPIRONOLACTONE 25 MILLIGRAM(S): 25 TABLET, FILM COATED ORAL at 06:23

## 2018-10-06 RX ADMIN — METHOCARBAMOL 1000 MILLIGRAM(S): 500 TABLET, FILM COATED ORAL at 13:41

## 2018-10-06 RX ADMIN — ENOXAPARIN SODIUM 40 MILLIGRAM(S): 100 INJECTION SUBCUTANEOUS at 12:01

## 2018-10-06 RX ADMIN — Medication 25 MILLIGRAM(S): at 06:23

## 2018-10-06 NOTE — DISCHARGE NOTE ADULT - ADDITIONAL INSTRUCTIONS
Please follow up with Dr. Crum with in two weeks of discharge from the hospital. Follow with Cardiologist Dr Perez with in one to two weeks of discharge.

## 2018-10-06 NOTE — DISCHARGE NOTE ADULT - PATIENT PORTAL LINK FT
You can access the [facility] Patient Portal, offered by [facility], by registering with the following website: [email or web address]

## 2018-10-06 NOTE — DISCHARGE NOTE ADULT - INSTRUCTIONS
Please Limit daily sodium intake to 1.5 grams daily or 1/2 tea spoon of salt. Avoid foods high in cholesterol. Please make a concerted effort to stay hydrated at all times.

## 2018-10-06 NOTE — DISCHARGE NOTE ADULT - PROVIDER TOKENS
TOKEN:'[id number]',FREE:[LAST:[Ana],FIRST:[Ramsey CHAMBERLAIN) Cardiologist],PHONE:[phone]],FAX:[(   [phone]    -],ADDRESS:[[address] [place] [address] [address] [place], [place] [id number]]]

## 2018-10-06 NOTE — DISCHARGE NOTE ADULT - NS AS DC FOLLOWUP STROKE INST
Influenza vaccination (VIS Pub Date: August 7, 2015)/Smoking Cessation/Stroke (includes: TIA/SAH/ICH/Ischemic Stroke)

## 2018-10-06 NOTE — CHART NOTE - NSCHARTNOTEFT_GEN_A_CORE
<<<RESIDENT DISCHARGE NOTE>>>     WILLIAM ARAIZA  MRN-[id number]  A 83 y/o m w/ pmhx of CAD s/p mutiple PCI , ischemic CM s/p PM/ AICDhtn, DL, anxiety/depression, , afib s/p watchman implant, , liver cirrhosis with esophageal varices, history of pericardial effusion,   presents with bilateral belt like chest pain mainly on the left side radiating to b/l shoulders has been intermittent since september 9th s/p fall, but this morning woke up with pain at its worst, 7/10 in severity, at rest, stabbing  in character, worsened with breathing and movement. CT chest possible trace pericardial effusion, rt pleural effusion and possible non displaced fracture of the left 6 th rib. He is now hemodynamically stable with out signs of infection. He has follow up appointments with his PCP and Cardiologist. Cleared by cardio for discharge.      VITAL SIGNS:  T(F): 96.7 (10-06-18 @ 13:18), Max: 98.7 (10-06-18 @ 06:36)  HR: 60 (10-06-18 @ 13:18)  BP: 91/57 (10-06-18 @ 13:18)  SpO2: 98% (10-05-18 @ 21:46)      PHYSICAL EXAMINATION:  General: NAD  Head & Neck: NORA  Pulmonary: CTA  Cardiovascular: Hemodynamically stable   Gastrointestinal/Abdomen & Pelvis: soft non tender  Neurologic/Motor: non focal    TEST RESULTS:                        11.6   4.82  )-----------( 164      ( 06 Oct 2018 06:52 )             36.1       10-06    143  |  104  |  21<H>  ----------------------------<  120<H>  4.0   |  28  |  1.0    Ca    9.1      06 Oct 2018 06:52  Mg     2.0     10-05    TPro  5.7<L>  /  Alb  3.3<L>  /  TBili  0.7  /  DBili  x   /  AST  38  /  ALT  20  /  AlkPhos  70  10-05      FINAL DISCHARGE INTERVIEW:  Resident(s) Present: (Name:_____Samina Sommer ________), RN Present: (Name:  ___Nataly Caraballo________)    DISCHARGE MEDICATION RECONCILIATION  reviewed with Attending (Name:_____Dr. العراقي______)    DISPOSITION:   [  X] Home,    [  ] Home with Visiting Nursing Services,   [    ]  SNF/ NH,    [   ] Acute Rehab (4A),   [   ] Other (Specify:_________)

## 2018-10-06 NOTE — PROGRESS NOTE ADULT - SUBJECTIVE AND OBJECTIVE BOX
INTERVAL HPI/OVERNIGHT EVENTS:  HPI  A 83 y/o m w/ pmhx of htn, dld, anxiety/depression, chf, afib s/p watchman implant, pace maker, liver cirrhosis with esophageal varices, history of pericardial effusion,   presents with bilateral belt like chest pain chest pain mainly on the left side radiating to b/l shoulders has been intermittent since september 9th s/p fall, but this morning woke up with pain at its worst, 7/10 in severity, there at rest, stabbing  in character, worsened with breathing and movement , partially relieved by NSAID cream,  not with palpation, associated with sob. Patient  reports was in [place] on september 8th, was raining  slipped in [facility], fell on his left side ,mainly left side of the chest, seen in ed at [facility] and had multiple ct scans done as well as imaging of L upper extremity, due to L shoulder pain, which were negative except for possible  L 6th rib fracture and trace pericardial effusion. [facility] and has been told no intervention, pain control. pt reports chest pain is different from the rib pain he experienced as it was severe in onset and at rest. Patient denies any chest pain prior to the fall, is able to go 2 flights of stairs without SOB or chest pain, Patient denies any cough, no orthopnea or PND. No fever, chills, n/v, palpitations, diaphoresis, edema, calf pain/swelling/erythema, or weakness. Patient complains of weight loss around 60 pounds in the last year, associated with poor intake.  In the ED patient was given IV Lasix for assumed overload    CC    Pt seen and examined for follow up of Chest pain and chf exacerbation  feels better now  no sob  chest pain better      REVIEW OF SYSTEMS:  CONSTITUTIONAL: No fever, weight loss, or fatigue  EYES: No eye pain, visual disturbances, or discharge  ENMT:  No difficulty hearing, tinnitus, vertigo; No sinus or throat pain  NECK: No pain or stiffness  BREASTS: No pain, masses, or nipple discharge  RESPIRATORY: No cough, wheezing, chills or hemoptysis; No shortness of breath  CARDIOVASCULAR: No chest pain, palpitations, dizziness, or leg swelling  GASTROINTESTINAL: No abdominal or epigastric pain.   GENITOURINARY: No dysuria, frequency, hematuria, or incontinence  NEUROLOGICAL: No headaches, memory loss, loss of strength, numbness, or tremors  SKIN: No itching, burning, rashes, or lesions   LYMPH NODES: No enlarged glands  ENDOCRINE: No heat or cold intolerance; No hair loss  MUSCULOSKELETAL: No joint pain or swelling; No muscle, back, or extremity pain  PSYCHIATRIC: No depression, anxiety, mood swings, or difficulty sleeping  HEME/LYMPH: No easy bruising, or bleeding gums  ALLERY AND IMMUNOLOGIC: No hives or eczema    VITALS:  T(F): 96.7, Max: 98.7 (10-06-18 @ 06:36)  HR: 60  BP: 91/57  RR: 18  SpO2: 98%    PHYSICAL EXAM:  GENERAL: NAD  HEAD:  Atraumatic, Normocephalic  EYES: EOMI, PERRLA, conjunctiva and sclera clear  ENMT: No tonsillar erythema, exudates, or enlargement; Moist mucous membranes, Good dentition, No lesions  NECK: Supple, No JVD, Normal thyroid  NERVOUS SYSTEM:  Alert & Oriented X3,   CHEST/LUNG: decraese breath sounds at bases  HEART: Regular rate and rhythm; No murmurs, rubs, or gallops  ABDOMEN: Soft, Nontender, Nondistended; Bowel sounds present  EXTREMITIES:  2+ Peripheral Pulses, No clubbing, cyanosis, or edema  LYMPH: No lymphadenopathy noted  SKIN: No rashes or lesions      LABS:    10-06    143  |  104  |  21<H>  ----------------------------<  120<H>  4.0   |  28  |  1.0    Ca    9.1      06 Oct 2018 06:52  Mg     2.0     10-05    TPro  5.7<L>  /  Alb  3.3<L>  /  TBili  0.7  /  DBili  x   /  AST  38  /  ALT  20  /  AlkPhos  70  10-05                          11.6   4.82  )-----------( 164      ( 06 Oct 2018 06:52 )             36.1           RADIOLOGY & ADDITIONAL TESTS:    Imaging Personally Reviewed:  [ ] YES  [ ] NO    MEDICATIONS:     MEDICATIONS  (STANDING):  aspirin 325 milliGRAM(s) Oral daily  donepezil 10 milliGRAM(s) Oral at bedtime  enoxaparin Injectable 40 milliGRAM(s) SubCutaneous daily  ferrous    sulfate 325 milliGRAM(s) Oral daily  furosemide    Tablet 40 milliGRAM(s) Oral daily  losartan 100 milliGRAM(s) Oral daily  methocarbamol 1000 milliGRAM(s) Oral every 8 hours  metoprolol succinate ER 25 milliGRAM(s) Oral daily  mirtazapine 7.5 milliGRAM(s) Oral at bedtime  spironolactone 25 milliGRAM(s) Oral daily    MEDICATIONS  (PRN):  oxyCODONE    5 mG/acetaminophen 325 mG 2 Tablet(s) Oral every 6 hours PRN Severe Pain (7 - 10)

## 2018-10-06 NOTE — DISCHARGE NOTE ADULT - MEDICATION SUMMARY - MEDICATIONS TO TAKE
I will START or STAY ON the medications listed below when I get home from the hospital:    sildenafil 100 mg oral tablet  -- 1 tab(s) by mouth once a day (at bedtime), As Needed  -- Indication: For Pulmonary HTN    spironolactone 25 mg oral tablet  -- 1 tab(s) by mouth once a day  -- Indication: For HTH    aspirin 325 mg oral tablet  -- 1 tab(s) by mouth once a day  -- Indication: For Heart Health     losartan 100 mg oral tablet  -- 1 tab(s) by mouth once a day  -- Indication: For HTN    mirtazapine 7.5 mg oral tablet  -- 1 tab(s) by mouth once a day  -- Indication: For Psychiatric Health     Metoprolol Succinate ER 25 mg oral tablet, extended release  -- 1 tab(s) by mouth once a day  -- Indication: For Afib    donepezil 10 mg oral tablet  -- 1 tab(s) by mouth once a day (at bedtime)  -- Indication: For Cognitive Health     furosemide 40 mg oral tablet  -- 1 tab(s) by mouth once a day  -- Indication: For HTN    ferrous fumarate 325 mg (106 mg elemental iron) oral tablet  -- 1 tab(s) by mouth once a day  -- Indication: For Anemia    methocarbamol 500 mg oral tablet  -- 2 tab(s) by mouth every 8 hours  -- Indication: For Back Pain

## 2018-10-06 NOTE — DISCHARGE NOTE ADULT - HOSPITAL COURSE
A 83 y/o m w/ pmhx of CAD s/p mutiple PCI , ischemic CM s/p PM/ AICDhtn, DL, anxiety/depression, , afib s/p watchman implant, , liver cirrhosis with esophageal varices, history of pericardial effusion,   presents with bilateral belt like chest pain mainly on the left side radiating to b/l shoulders has been intermittent since september 9th s/p fall, but this morning woke up with pain at its worst, 7/10 in severity, at rest, stabbing  in character, worsened with breathing and movement. CT chest possible trace pericardial effusion, rt pleural effusion and possible non displaced fracture of the left 6 th rib. He is now hemodynamically stable with out signs of infection. He has follow up appointments with his PCP and Cardiologist. Cleared by cardio for discharge.

## 2018-10-06 NOTE — PROGRESS NOTE ADULT - ASSESSMENT
1-chest pain/shoulder pain  looks atypical  likely Musculoskeletal  seen by cardio  cleared for discharge      2- A fib s/p watchman procedure  cont ASA and metoprolol    3-CHF exacernbation  switch to PO lasix now  walking with no sob  edema better  cont aldactone      4- liver cirrhosis  on lasix/aldactone    5- HTN  stable on current meds     get Left shoulder x ray  if negative than discharge to follow up as an out pt    discussed with House staff and Pt and his family    med rec reviewed with house staff  time spent on discharge 35 minutes

## 2018-10-06 NOTE — DISCHARGE NOTE ADULT - PLAN OF CARE
Evaluation and Therapy You were evaluated for chest pain after a fall. EKG, Chest X ray, Cardiac protiens all were in normal limits.  CT chest showed possible trace pericardial effusion. Cardiologist Dr. Mccoy  cleared you for discharge home with out any intervention at this time. Please seek medical attention if you experience sudden shortness of breath, increased chest highness, that is only relieved by siting down and worsened by exertion.  Please avoid Tylenol for pain. After the fall you were found to have possible non displaced fracture of the left 6 th rib on CT. Please follow up with your PCP as out patient for further care. Continue taking pain meds as needed. There with be no further inpatient intervention at this point.

## 2018-10-06 NOTE — DISCHARGE NOTE ADULT - CARE PLAN
Principal Discharge DX:	Chest pain  Goal:	Evaluation and Therapy  Assessment and plan of treatment:	You were evaluated for chest pain after a fall. EKG, Chest X ray, Cardiac protiens all were in normal limits.  CT chest showed possible trace pericardial effusion. Cardiologist Dr. Mccoy  cleared you for discharge home with out any intervention at this time. Please seek medical attention if you experience sudden shortness of breath, increased chest highness, that is only relieved by siting down and worsened by exertion.  Please avoid Tylenol for pain.  Secondary Diagnosis:	Fall  Goal:	Evaluation and Therapy  Assessment and plan of treatment:	After the fall you were found to have possible non displaced fracture of the left 6 th rib on CT. Please follow up with your PCP as out patient for further care. Continue taking pain meds as needed. There with be no further inpatient intervention at this point.

## 2018-10-12 DIAGNOSIS — Z83.3 FAMILY HISTORY OF DIABETES MELLITUS: ICD-10-CM

## 2018-10-12 DIAGNOSIS — R07.9 CHEST PAIN, UNSPECIFIED: ICD-10-CM

## 2018-10-12 DIAGNOSIS — F41.9 ANXIETY DISORDER, UNSPECIFIED: ICD-10-CM
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
## 2018-10-12 DIAGNOSIS — I11.0 HYPERTENSIVE HEART DISEASE WITH HEART FAILURE: ICD-10-CM

## 2018-10-12 DIAGNOSIS — Z95.810 PRESENCE OF AUTOMATIC (IMPLANTABLE) CARDIAC DEFIBRILLATOR: ICD-10-CM

## 2018-10-12 DIAGNOSIS — I25.5 ISCHEMIC CARDIOMYOPATHY: ICD-10-CM

## 2018-10-12 DIAGNOSIS — I48.91 UNSPECIFIED ATRIAL FIBRILLATION: ICD-10-CM

## 2018-10-12 DIAGNOSIS — J84.9 INTERSTITIAL PULMONARY DISEASE, UNSPECIFIED: ICD-10-CM

## 2018-10-12 DIAGNOSIS — E87.70 FLUID OVERLOAD, UNSPECIFIED: ICD-10-CM

## 2018-10-12 DIAGNOSIS — Z87.891 PERSONAL HISTORY OF NICOTINE DEPENDENCE: ICD-10-CM

## 2018-10-12 DIAGNOSIS — E78.5 HYPERLIPIDEMIA, UNSPECIFIED: ICD-10-CM

## 2018-10-12 DIAGNOSIS — Z28.21 IMMUNIZATION NOT CARRIED OUT BECAUSE OF PATIENT REFUSAL: ICD-10-CM

## 2018-10-12 DIAGNOSIS — Z79.82 LONG TERM (CURRENT) USE OF ASPIRIN: ICD-10-CM

## 2018-10-12 DIAGNOSIS — I25.10 ATHEROSCLEROTIC HEART DISEASE OF NATIVE CORONARY ARTERY WITHOUT ANGINA PECTORIS: ICD-10-CM

## 2018-10-12 DIAGNOSIS — R63.4 ABNORMAL WEIGHT LOSS: ICD-10-CM

## 2018-10-12 DIAGNOSIS — S22.32XA FRACTURE OF ONE RIB, LEFT SIDE, INITIAL ENCOUNTER FOR CLOSED FRACTURE: ICD-10-CM

## 2018-10-12 DIAGNOSIS — F32.9 MAJOR DEPRESSIVE DISORDER, SINGLE EPISODE, UNSPECIFIED: ICD-10-CM

## 2018-10-12 DIAGNOSIS — Z80.8 FAMILY HISTORY OF MALIGNANT NEOPLASM OF OTHER ORGANS OR SYSTEMS: ICD-10-CM

## 2018-10-12 DIAGNOSIS — K74.60 UNSPECIFIED CIRRHOSIS OF LIVER: ICD-10-CM

## 2018-10-12 DIAGNOSIS — Z95.5 PRESENCE OF CORONARY ANGIOPLASTY IMPLANT AND GRAFT: ICD-10-CM
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
## 2018-10-12 DIAGNOSIS — R07.89 OTHER CHEST PAIN: ICD-10-CM

## 2018-10-12 DIAGNOSIS — I50.812 CHRONIC RIGHT HEART FAILURE: ICD-10-CM

## 2018-10-12 DIAGNOSIS — Z79.01 LONG TERM (CURRENT) USE OF ANTICOAGULANTS: ICD-10-CM

## 2018-10-12 DIAGNOSIS — R73.03 PREDIABETES: ICD-10-CM

## 2018-10-24 PROBLEM — F32.9 MAJOR DEPRESSIVE DISORDER, SINGLE EPISODE, UNSPECIFIED: Chronic | Status: ACTIVE | Noted: 2018-10-05

## 2018-10-24 PROBLEM — I50.812 CHRONIC RIGHT HEART FAILURE: Chronic | Status: ACTIVE | Noted: 2018-10-05

## 2018-10-24 PROBLEM — K70.30 ALCOHOLIC CIRRHOSIS OF LIVER WITHOUT ASCITES: Chronic | Status: ACTIVE | Noted: 2018-10-05

## 2018-11-02 ENCOUNTER — APPOINTMENT (OUTPATIENT)
Dept: CARDIOLOGY | Facility: CLINIC | Age: 83
End: 2018-11-02

## 2018-11-02 VITALS
BODY MASS INDEX: 27.49 KG/M2 | HEART RATE: 60 BPM | WEIGHT: 192 LBS | DIASTOLIC BLOOD PRESSURE: 63 MMHG | HEIGHT: 70 IN | SYSTOLIC BLOOD PRESSURE: 112 MMHG

## 2018-11-02 RX ORDER — UBIDECARENONE 30 MG
30 CAPSULE ORAL DAILY
Refills: 0 | Status: ACTIVE | COMMUNITY

## 2019-01-23 ENCOUNTER — OUTPATIENT (OUTPATIENT)
Dept: OUTPATIENT SERVICES | Facility: HOSPITAL | Age: 84
LOS: 1 days | Discharge: HOME | End: 2019-01-23

## 2019-01-23 DIAGNOSIS — M25.551 PAIN IN RIGHT HIP: ICD-10-CM

## 2019-01-23 DIAGNOSIS — M79.673 PAIN IN UNSPECIFIED FOOT: ICD-10-CM

## 2019-01-23 DIAGNOSIS — J34.2 DEVIATED NASAL SEPTUM: Chronic | ICD-10-CM

## 2019-01-23 DIAGNOSIS — M54.5 LOW BACK PAIN: ICD-10-CM

## 2019-01-23 DIAGNOSIS — M25.552 PAIN IN LEFT HIP: ICD-10-CM

## 2019-01-23 DIAGNOSIS — Z90.49 ACQUIRED ABSENCE OF OTHER SPECIFIED PARTS OF DIGESTIVE TRACT: Chronic | ICD-10-CM

## 2019-01-23 DIAGNOSIS — Z98.89 OTHER SPECIFIED POSTPROCEDURAL STATES: Chronic | ICD-10-CM

## 2019-02-22 ENCOUNTER — APPOINTMENT (OUTPATIENT)
Dept: CARDIOLOGY | Facility: CLINIC | Age: 84
End: 2019-02-22

## 2019-02-22 VITALS — BODY MASS INDEX: 27.26 KG/M2 | WEIGHT: 190 LBS | SYSTOLIC BLOOD PRESSURE: 120 MMHG | DIASTOLIC BLOOD PRESSURE: 70 MMHG

## 2019-02-22 DIAGNOSIS — Z86.79 PERSONAL HISTORY OF OTHER DISEASES OF THE CIRCULATORY SYSTEM: ICD-10-CM

## 2019-02-22 DIAGNOSIS — Z95.810 PRESENCE OF AUTOMATIC (IMPLANTABLE) CARDIAC DEFIBRILLATOR: ICD-10-CM

## 2019-02-22 RX ORDER — METHOCARBAMOL 750 MG/1
750 TABLET, FILM COATED ORAL
Refills: 0 | Status: COMPLETED | COMMUNITY
End: 2019-02-22

## 2019-02-22 RX ORDER — TRAMADOL HYDROCHLORIDE 50 MG/1
50 TABLET, COATED ORAL DAILY
Refills: 0 | Status: COMPLETED | COMMUNITY
End: 2019-02-22

## 2019-02-22 NOTE — HISTORY OF PRESENT ILLNESS
[SOB] : dyspnea [Syncope] : no syncope [Erythema at Site] : no erythema at device site [Swelling at Site] : no swelling at device site [de-identified] : Patient c/o SOB for few months. Pt had  echo done that showed normal LV fucntion

## 2019-02-22 NOTE — REASON FOR VISIT
[FreeTextEntry1] : Scheduled report indicated no arrhythmias. Pacing (AP/)  at lower rate 60 bpm.  CP 1/1/18

## 2019-02-22 NOTE — PROCEDURE
[Complete Heart Block] : complete heart block [ICD] : Implantable cardioverter-defibrillator [VVIR] : VVIR [Charge Time: ___ sec] : charge time was [unfilled] seconds [Longevity: ___ months] : The estimated remaining battery life is [unfilled] months [Lead Imp:  ___ohms] : lead impedance was [unfilled] ohms [Sensing Amplitude ___mv] : sensing amplitude was [unfilled] mv [___V @] : [unfilled] V [___ ms] : [unfilled] ms [Pace ___ %] : Pace [unfilled]% [de-identified] : [facility]  [de-identified] : [id number] [de-identified] : 16 Jun 2010 [de-identified] : 60 [de-identified] : No episodes. Normal dual chamber ICD function. Patient monitor on and transmitting. RRT on and stable.

## 2019-02-22 NOTE — ASSESSMENT
[FreeTextEntry1] : SOB - ? etiology\par - iumpoved; normal EF\par \par AF permanent s/p watchman\par - cont ASA

## 2019-04-23 ENCOUNTER — INPATIENT (INPATIENT)
Facility: HOSPITAL | Age: 84
LOS: 5 days | Discharge: HOME | End: 2019-04-29
Attending: INTERNAL MEDICINE | Admitting: INTERNAL MEDICINE
Payer: MEDICARE

## 2019-04-23 VITALS
SYSTOLIC BLOOD PRESSURE: 134 MMHG | HEART RATE: 65 BPM | DIASTOLIC BLOOD PRESSURE: 68 MMHG | OXYGEN SATURATION: 97 % | TEMPERATURE: 98 F | RESPIRATION RATE: 18 BRPM

## 2019-04-23 DIAGNOSIS — J34.2 DEVIATED NASAL SEPTUM: Chronic | ICD-10-CM

## 2019-04-23 DIAGNOSIS — Z98.89 OTHER SPECIFIED POSTPROCEDURAL STATES: Chronic | ICD-10-CM

## 2019-04-23 DIAGNOSIS — Z90.49 ACQUIRED ABSENCE OF OTHER SPECIFIED PARTS OF DIGESTIVE TRACT: Chronic | ICD-10-CM

## 2019-04-23 LAB
ALBUMIN SERPL ELPH-MCNC: 3.6 G/DL — SIGNIFICANT CHANGE UP (ref 3.5–5.2)
ALP SERPL-CCNC: 63 U/L — SIGNIFICANT CHANGE UP (ref 30–115)
ALT FLD-CCNC: 19 U/L — SIGNIFICANT CHANGE UP (ref 0–41)
AMMONIA BLD-MCNC: 36 UMOL/L — SIGNIFICANT CHANGE UP (ref 11–55)
ANION GAP SERPL CALC-SCNC: 11 MMOL/L — SIGNIFICANT CHANGE UP (ref 7–14)
APPEARANCE UR: ABNORMAL
APTT BLD: 32.5 SEC — SIGNIFICANT CHANGE UP (ref 27–39.2)
AST SERPL-CCNC: 26 U/L — SIGNIFICANT CHANGE UP (ref 0–41)
BASOPHILS # BLD AUTO: 0.01 K/UL — SIGNIFICANT CHANGE UP (ref 0–0.2)
BASOPHILS NFR BLD AUTO: 0.2 % — SIGNIFICANT CHANGE UP (ref 0–1)
BILIRUB SERPL-MCNC: 1 MG/DL — SIGNIFICANT CHANGE UP (ref 0.2–1.2)
BILIRUB UR-MCNC: NEGATIVE — SIGNIFICANT CHANGE UP
BUN SERPL-MCNC: 19 MG/DL — SIGNIFICANT CHANGE UP (ref 10–20)
CALCIUM SERPL-MCNC: 9.4 MG/DL — SIGNIFICANT CHANGE UP (ref 8.5–10.1)
CHLORIDE SERPL-SCNC: 109 MMOL/L — SIGNIFICANT CHANGE UP (ref 98–110)
CO2 SERPL-SCNC: 20 MMOL/L — SIGNIFICANT CHANGE UP (ref 17–32)
COLOR SPEC: YELLOW — SIGNIFICANT CHANGE UP
CREAT SERPL-MCNC: 1.1 MG/DL — SIGNIFICANT CHANGE UP (ref 0.7–1.5)
DIFF PNL FLD: NEGATIVE — SIGNIFICANT CHANGE UP
EOSINOPHIL # BLD AUTO: 0.06 K/UL — SIGNIFICANT CHANGE UP (ref 0–0.7)
EOSINOPHIL NFR BLD AUTO: 1 % — SIGNIFICANT CHANGE UP (ref 0–8)
GLUCOSE SERPL-MCNC: 114 MG/DL — HIGH (ref 70–99)
GLUCOSE UR QL: NEGATIVE MG/DL — SIGNIFICANT CHANGE UP
HCT VFR BLD CALC: 31.4 % — LOW (ref 42–52)
HGB BLD-MCNC: 9.8 G/DL — LOW (ref 14–18)
IMM GRANULOCYTES NFR BLD AUTO: 0.7 % — HIGH (ref 0.1–0.3)
INR BLD: 1.21 RATIO — SIGNIFICANT CHANGE UP (ref 0.65–1.3)
KETONES UR-MCNC: NEGATIVE — SIGNIFICANT CHANGE UP
LACTATE SERPL-SCNC: 0.9 MMOL/L — SIGNIFICANT CHANGE UP (ref 0.5–2.2)
LEUKOCYTE ESTERASE UR-ACNC: NEGATIVE — SIGNIFICANT CHANGE UP
LYMPHOCYTES # BLD AUTO: 0.79 K/UL — LOW (ref 1.2–3.4)
LYMPHOCYTES # BLD AUTO: 13.3 % — LOW (ref 20.5–51.1)
MAGNESIUM SERPL-MCNC: 1.9 MG/DL — SIGNIFICANT CHANGE UP (ref 1.8–2.4)
MCHC RBC-ENTMCNC: 27.5 PG — SIGNIFICANT CHANGE UP (ref 27–31)
MCHC RBC-ENTMCNC: 31.2 G/DL — LOW (ref 32–37)
MCV RBC AUTO: 88 FL — SIGNIFICANT CHANGE UP (ref 80–94)
MONOCYTES # BLD AUTO: 0.82 K/UL — HIGH (ref 0.1–0.6)
MONOCYTES NFR BLD AUTO: 13.9 % — HIGH (ref 1.7–9.3)
NEUTROPHILS # BLD AUTO: 4.2 K/UL — SIGNIFICANT CHANGE UP (ref 1.4–6.5)
NEUTROPHILS NFR BLD AUTO: 70.9 % — SIGNIFICANT CHANGE UP (ref 42.2–75.2)
NITRITE UR-MCNC: NEGATIVE — SIGNIFICANT CHANGE UP
NRBC # BLD: 0 /100 WBCS — SIGNIFICANT CHANGE UP (ref 0–0)
PH UR: 6.5 — SIGNIFICANT CHANGE UP (ref 5–8)
PLATELET # BLD AUTO: 166 K/UL — SIGNIFICANT CHANGE UP (ref 130–400)
POTASSIUM SERPL-MCNC: 4.1 MMOL/L — SIGNIFICANT CHANGE UP (ref 3.5–5)
POTASSIUM SERPL-SCNC: 4.1 MMOL/L — SIGNIFICANT CHANGE UP (ref 3.5–5)
PROT SERPL-MCNC: 6.4 G/DL — SIGNIFICANT CHANGE UP (ref 6–8)
PROT UR-MCNC: ABNORMAL MG/DL
PROTHROM AB SERPL-ACNC: 13.9 SEC — HIGH (ref 9.95–12.87)
RBC # BLD: 3.57 M/UL — LOW (ref 4.7–6.1)
RBC # FLD: 15.3 % — HIGH (ref 11.5–14.5)
SODIUM SERPL-SCNC: 140 MMOL/L — SIGNIFICANT CHANGE UP (ref 135–146)
SP GR SPEC: 1.01 — SIGNIFICANT CHANGE UP (ref 1.01–1.03)
TROPONIN T SERPL-MCNC: <0.01 NG/ML — SIGNIFICANT CHANGE UP
UROBILINOGEN FLD QL: 0.2 MG/DL — SIGNIFICANT CHANGE UP (ref 0.2–0.2)
WBC # BLD: 5.92 K/UL — SIGNIFICANT CHANGE UP (ref 4.8–10.8)
WBC # FLD AUTO: 5.92 K/UL — SIGNIFICANT CHANGE UP (ref 4.8–10.8)

## 2019-04-23 PROCEDURE — 71046 X-RAY EXAM CHEST 2 VIEWS: CPT | Mod: 26

## 2019-04-23 PROCEDURE — 99285 EMERGENCY DEPT VISIT HI MDM: CPT

## 2019-04-23 NOTE — ED PROVIDER NOTE - CLINICAL SUMMARY MEDICAL DECISION MAKING FREE TEXT BOX
Pt presents with comprehension difficulties leading to confused conversation and responses to questions that started sometime >6 hours prior to arrival and eval.  Pt here with son.  Initial head CT with no ICH.  Evaluated by neurology and TELE was recommended.  Neuro will continue to follow.  Possible CVA.  Infectious workup for metabolic encephalopathy was unremarkable.

## 2019-04-23 NOTE — ED ADULT TRIAGE NOTE - CHIEF COMPLAINT QUOTE
as per wife,, patient is having periods of confusion since yesterday. is not making sense with what he is saying. patient is alert and oriented in triage.

## 2019-04-23 NOTE — ED ADULT NURSE NOTE - NURSING NEURO ORIENTATION
oriented to person, place and time/but making unusal statemnets and talking about things out of context

## 2019-04-23 NOTE — ED PROVIDER NOTE - OBJECTIVE STATEMENT
83 y/o M here because he has been confused and making unusual statements since waking up from a nap at 2pm.  Pt is here with his son who is giving the history. Pt is alert and oriented to person, place, and year, but is making statements such as, "the water on the right side of my body is different than the left side of my body" - which is not normal for him.  Pt was last acting at baseline around 11am when he went to lay down and sleep.  Pt denies any pain.  No recent illness.  PMH pacer/AICD, htn, hchol. 83 y/o M here because he has been confused and making unusual statements since waking up from a nap at 2pm.  Pt is here with his son who is giving the history. Pt is alert and oriented to person, place, and year, but is making statements such as, "the water on the right side of my body is different than the left side of my body" - which is not normal for him.  Pt was last acting at baseline around 11am when he went to lay down and sleep.  Pt denies any pain.  No recent illness.  PMH pacer/AICD due to h/o CHB, htn, hchol, h/o afib

## 2019-04-23 NOTE — ED PROVIDER NOTE - PROGRESS NOTE DETAILS
neurology consulted, d/w JOSE D Montanez. Neurology aware that all results are back.  Pt still pending neurology c/s. d/w JOSE D Montanez - recommend admission to TELE for continued care due to comprehension issues noted on her exam.

## 2019-04-23 NOTE — ED PROVIDER NOTE - PHYSICAL EXAMINATION
Physical Examination:   VITAL SIGNS: I have reviewed vital signs.  CONSTITUTIONAL: well appearing, NAD.   SKIN: Skin exam is warm and dry.  No rashes  HEAD: Normocephalic; atraumatic.  EYES: PERRL, EOM intact; conjunctiva and sclera clear.  ENT: No nasal discharge; airway clear.  NECK: Supple; non tender.  No nuchal rigidity.  CARD: S1, S2 reg  RESP: CTAB. No wheezes, rales or rhonchi.  ABD: soft; non-distended; non-tender  EXT: Normal ROM. No edema.  NEURO: Alert, oriented to person, place, year. +confused statements, but oriented. grossly unremarkable. No focal deficits. Ambulatory with steady gait.  PSYCH: Cooperative, appropriate.

## 2019-04-24 DIAGNOSIS — Z95.818 PRESENCE OF OTHER CARDIAC IMPLANTS AND GRAFTS: Chronic | ICD-10-CM

## 2019-04-24 DIAGNOSIS — Z95.810 PRESENCE OF AUTOMATIC (IMPLANTABLE) CARDIAC DEFIBRILLATOR: Chronic | ICD-10-CM

## 2019-04-24 LAB
AMMONIA BLD-MCNC: 30 UMOL/L — SIGNIFICANT CHANGE UP (ref 11–55)
BACTERIA # UR AUTO: ABNORMAL /HPF
EPI CELLS # UR: ABNORMAL /HPF
GLUCOSE BLDC GLUCOMTR-MCNC: 166 MG/DL — HIGH (ref 70–99)
TROPONIN T SERPL-MCNC: <0.01 NG/ML — SIGNIFICANT CHANGE UP

## 2019-04-24 PROCEDURE — 93010 ELECTROCARDIOGRAM REPORT: CPT

## 2019-04-24 PROCEDURE — 93306 TTE W/DOPPLER COMPLETE: CPT | Mod: 26

## 2019-04-24 PROCEDURE — 93880 EXTRACRANIAL BILAT STUDY: CPT | Mod: 26

## 2019-04-24 PROCEDURE — 70450 CT HEAD/BRAIN W/O DYE: CPT | Mod: 26

## 2019-04-24 RX ORDER — SPIRONOLACTONE 25 MG/1
1 TABLET, FILM COATED ORAL
Qty: 0 | Refills: 0 | COMMUNITY

## 2019-04-24 RX ORDER — ASPIRIN/CALCIUM CARB/MAGNESIUM 324 MG
325 TABLET ORAL DAILY
Qty: 0 | Refills: 0 | Status: DISCONTINUED | OUTPATIENT
Start: 2019-04-24 | End: 2019-04-29

## 2019-04-24 RX ORDER — ASPIRIN/CALCIUM CARB/MAGNESIUM 324 MG
325 TABLET ORAL ONCE
Qty: 0 | Refills: 0 | Status: COMPLETED | OUTPATIENT
Start: 2019-04-24 | End: 2019-04-24

## 2019-04-24 RX ORDER — ACETAMINOPHEN 500 MG
650 TABLET ORAL EVERY 6 HOURS
Qty: 0 | Refills: 0 | Status: DISCONTINUED | OUTPATIENT
Start: 2019-04-24 | End: 2019-04-29

## 2019-04-24 RX ORDER — MIRTAZAPINE 45 MG/1
1 TABLET, ORALLY DISINTEGRATING ORAL
Qty: 0 | Refills: 0 | COMMUNITY

## 2019-04-24 RX ORDER — SPIRONOLACTONE 25 MG/1
50 TABLET, FILM COATED ORAL DAILY
Qty: 0 | Refills: 0 | Status: DISCONTINUED | OUTPATIENT
Start: 2019-04-24 | End: 2019-04-29

## 2019-04-24 RX ORDER — METOPROLOL TARTRATE 50 MG
1 TABLET ORAL
Qty: 0 | Refills: 0 | COMMUNITY

## 2019-04-24 RX ORDER — FUROSEMIDE 40 MG
40 TABLET ORAL DAILY
Qty: 0 | Refills: 0 | Status: DISCONTINUED | OUTPATIENT
Start: 2019-04-24 | End: 2019-04-29

## 2019-04-24 RX ORDER — LOSARTAN POTASSIUM 100 MG/1
100 TABLET, FILM COATED ORAL DAILY
Qty: 0 | Refills: 0 | Status: DISCONTINUED | OUTPATIENT
Start: 2019-04-24 | End: 2019-04-29

## 2019-04-24 RX ORDER — METOPROLOL TARTRATE 50 MG
25 TABLET ORAL DAILY
Qty: 0 | Refills: 0 | Status: DISCONTINUED | OUTPATIENT
Start: 2019-04-24 | End: 2019-04-29

## 2019-04-24 RX ORDER — MIRTAZAPINE 45 MG/1
30 TABLET, ORALLY DISINTEGRATING ORAL AT BEDTIME
Qty: 0 | Refills: 0 | Status: DISCONTINUED | OUTPATIENT
Start: 2019-04-24 | End: 2019-04-24

## 2019-04-24 RX ORDER — ATORVASTATIN CALCIUM 80 MG/1
80 TABLET, FILM COATED ORAL AT BEDTIME
Qty: 0 | Refills: 0 | Status: DISCONTINUED | OUTPATIENT
Start: 2019-04-24 | End: 2019-04-29

## 2019-04-24 RX ORDER — FERROUS SULFATE 325(65) MG
325 TABLET ORAL DAILY
Qty: 0 | Refills: 0 | Status: DISCONTINUED | OUTPATIENT
Start: 2019-04-24 | End: 2019-04-29

## 2019-04-24 RX ORDER — ENOXAPARIN SODIUM 100 MG/ML
40 INJECTION SUBCUTANEOUS EVERY 24 HOURS
Qty: 0 | Refills: 0 | Status: DISCONTINUED | OUTPATIENT
Start: 2019-04-24 | End: 2019-04-29

## 2019-04-24 RX ORDER — CHLORHEXIDINE GLUCONATE 213 G/1000ML
1 SOLUTION TOPICAL
Qty: 0 | Refills: 0 | Status: DISCONTINUED | OUTPATIENT
Start: 2019-04-24 | End: 2019-04-29

## 2019-04-24 RX ORDER — DONEPEZIL HYDROCHLORIDE 10 MG/1
10 TABLET, FILM COATED ORAL AT BEDTIME
Qty: 0 | Refills: 0 | Status: DISCONTINUED | OUTPATIENT
Start: 2019-04-24 | End: 2019-04-24

## 2019-04-24 RX ADMIN — ATORVASTATIN CALCIUM 80 MILLIGRAM(S): 80 TABLET, FILM COATED ORAL at 21:35

## 2019-04-24 RX ADMIN — Medication 325 MILLIGRAM(S): at 03:21

## 2019-04-24 RX ADMIN — Medication 325 MILLIGRAM(S): at 13:19

## 2019-04-24 RX ADMIN — LOSARTAN POTASSIUM 100 MILLIGRAM(S): 100 TABLET, FILM COATED ORAL at 17:18

## 2019-04-24 RX ADMIN — SPIRONOLACTONE 50 MILLIGRAM(S): 25 TABLET, FILM COATED ORAL at 17:19

## 2019-04-24 RX ADMIN — ENOXAPARIN SODIUM 40 MILLIGRAM(S): 100 INJECTION SUBCUTANEOUS at 13:19

## 2019-04-24 RX ADMIN — Medication 40 MILLIGRAM(S): at 17:19

## 2019-04-24 RX ADMIN — Medication 25 MILLIGRAM(S): at 21:35

## 2019-04-24 NOTE — H&P ADULT - ATTENDING COMMENTS
pt seen and examined independently, I have read and agree with above exam and poa  wife at bedside, discussion at length  aicd/watchmens  alcoholic cirrhosis      confusion  tia/cva vs metabolic encephalopathy  tele  mri brain/mra  neuro   eeg  carotid duplex  2decho/bubble study  ?loop recorder

## 2019-04-24 NOTE — CONSULT NOTE ADULT - SUBJECTIVE AND OBJECTIVE BOX
CC: Confusion        HPI: Patient unable to give history , hx was obtained from son at bedside  85 y/o right handed male with PMH of PPM /AICD  HTN  , High chol, afib  presented for acute onset confusion since 3 pm yesterday. As per son patient was having a usual day except for few minor complaints , he then went to sleep at 11am and woke up at 3pm and was talking out of context , and was making unusual statements when asked questions. Patient is fully functional at baseline, he drives and takes care of all his daily routine activities. Here during interview patient is making statements  such as, "the water on the right side of my body is different than the left side of my body". Patient does answer some questions appropriately during interview but he has difficulty comprehending most of the time. Family denies any recent fall /trauma, n/v headache, focal weakness numbness dizziness or visual deficits.    Home Medications:  aspirin 325 mg oral tablet: q 24  donepezil 10 mg oral tablet: q hs  ferrous fumarate 325 mg (106 mg elemental iron) oral tablet: q 24  losartan 100 mg oral tablet: q24  methocarbamol 500 mg oral tablet: 2 tabs q 8  Metoprolol Succinate ER 25 mg oral tablet, extended release: q 24  mirtazapine 7.5 mg oral tablet: q 24  sildenafil 100 mg oral tablet: 1tab q 24  spironolactone 25 mg oral tablet: q 24    Social history  Remote h/o smoking  denies alcohol and drug use        Neuro exam  Orientation: oriented to person, has difficulty with his age and , Unable to tell me the place, cannot give history, able to repeat but jumbles up words , recognizes son and name him, he has poor attention and frequently interrupts me in between exam and interview, speech fluent with poor comprehension problems with multistep commands  Cranial Nerves: visual fields full to confrontation, pupils equal round and reactive to light, extraocular motion intact, facial sensation intact symmetrically, face symmetrical, hearing was intact bilaterally, tongue and palate midline, head turning and shoulder shrug symmetric and there was no tongue deviation with protrusion.   Motor: 5/5 throughout/ no drift             No abnormal muscle tone or bulk             There is not tremors or rigidity  Sensory exam: intact and symmetric  Coordination:. no dysmetria or limb ataxia, has problems with MANUEL  Deep tendon reflexes: 2+/4 UPPER                                   0/4 Patellar and ankle reflex  gait: deferred       NIHSS:  2   lOC 0   Questions 1   commands   0  vf o  gaze 0  facial 0  motor  0  sensory  0  speech 0  language  1 (receptive aphasia)  extinction  0    mRS ASELINE 0  mRs current 2    Allergies    No Known Allergies    Intolerances      MEDICATIONS  (STANDING):    MEDICATIONS  (PRN):      LABS:                        9.8    5.92  )-----------( 166      ( 2019 22:20 )             31.4         140  |  109  |  19  ----------------------------<  114<H>  4.1   |  20  |  1.1    Ca    9.4      2019 22:20  Mg     1.9         TPro  6.4  /  Alb  3.6  /  TBili  1.0  /  DBili  x   /  AST  26  /  ALT  19  /  AlkPhos  63      PT/INR - ( 2019 22:20 )   PT: 13.90 sec;   INR: 1.21 ratio         PTT - ( 2019 22:20 )  PTT:32.5 sec        Neuro Imaging:  < from: CT Head No Cont (19 @ 00:33) >  FINDINGS:    The ventricles and cerebral sulci are age-appropriate.    Chronic infarction of the leftfrontal lobe (series 3, image 19).    There is no evidence of acute intracranial hemorrhage, mass effect or   midline shift.    There are patchy subcortical white matter hypodensities consistent with   mild age-related chronic microvascular changes.    There is no fracture to the calvarium. Mastoid air cells are well aerated   bilaterally. Partially imaged right maxillary sinus mucous retention cyst.      IMPRESSION:    No CT evidence of acute intracranial pathology.     Chronic microvascular ischemic changes and chronic left frontal lobe   infarction.        EEG:     Echo:   Carotid Doppler: N/A  Telemetry: CC: Confusion        HPI: Patient unable to give history , hx was obtained from son at bedside  83 y/o right handed male with PMH of PPM /AICD  HTN  , High chol, afib  presented for acute onset confusion since 3 pm yesterday. As per son patient was having a usual day except for few minor complaints , he then went to sleep at 11am and woke up at 3pm and was talking out of context , and was making unusual statements when asked questions. Patient is fully functional at baseline, he drives and takes care of all his daily routine activities. Here during interview patient is making statements  such as, "the water on the right side of my body is different than the left side of my body". Patient does answer some questions appropriately during interview but he has difficulty comprehending most of the time. Family denies any recent fall /trauma, n/v headache, focal weakness numbness dizziness or visual deficits.    Home Medications:  aspirin 325 mg oral tablet: q 24  donepezil 10 mg oral tablet: q hs  ferrous fumarate 325 mg (106 mg elemental iron) oral tablet: q 24  losartan 100 mg oral tablet: q24  methocarbamol 500 mg oral tablet: 2 tabs q 8  Metoprolol Succinate ER 25 mg oral tablet, extended release: q 24  mirtazapine 7.5 mg oral tablet: q 24  sildenafil 100 mg oral tablet: 1tab q 24  spironolactone 25 mg oral tablet: q 24    Social history  Remote h/o smoking  denies alcohol and drug use        Neuro exam  Orientation: oriented to person, has difficulty with his age and , Unable to tell me the place, cannot give history, able to repeat but jumbles up words , recognizes son and names him, he has poor attention and frequently interrupts me in between exam and interview, speech fluent with poor comprehension problems with multistep commands  Cranial Nerves: visual fields full to confrontation, pupils equal round and reactive to light, extraocular motion intact, facial sensation intact symmetrically, face symmetrical, hearing was intact bilaterally, tongue and palate midline, head turning and shoulder shrug symmetric and there was no tongue deviation with protrusion.   Motor: 5/5 throughout/ no drift             No abnormal muscle tone or bulk             There is not tremors or rigidity  Sensory exam: intact and symmetric  Coordination:. no dysmetria or limb ataxia, has problems with MANUEL  Deep tendon reflexes: 2+/4 UPPER                                   0/4 Patellar and ankle reflex  gait: deferred       NIHSS:  2   lOC 0   Questions 1   commands   0  vf o  gaze 0  facial 0  motor  0  sensory  0  speech 0  language  1 (receptive aphasia)  extinction  0    mRS ASELINE 0  mRs current 2    Allergies    No Known Allergies    Intolerances      MEDICATIONS  (STANDING):    MEDICATIONS  (PRN):      LABS:                        9.8    5.92  )-----------( 166      ( 2019 22:20 )             31.4         140  |  109  |  19  ----------------------------<  114<H>  4.1   |  20  |  1.1    Ca    9.4      2019 22:20  Mg     1.9         TPro  6.4  /  Alb  3.6  /  TBili  1.0  /  DBili  x   /  AST  26  /  ALT  19  /  AlkPhos  63      PT/INR - ( 2019 22:20 )   PT: 13.90 sec;   INR: 1.21 ratio         PTT - ( 2019 22:20 )  PTT:32.5 sec        Neuro Imaging:  < from: CT Head No Cont (19 @ 00:33) >  FINDINGS:    The ventricles and cerebral sulci are age-appropriate.    Chronic infarction of the leftfrontal lobe (series 3, image 19).    There is no evidence of acute intracranial hemorrhage, mass effect or   midline shift.    There are patchy subcortical white matter hypodensities consistent with   mild age-related chronic microvascular changes.    There is no fracture to the calvarium. Mastoid air cells are well aerated   bilaterally. Partially imaged right maxillary sinus mucous retention cyst.      IMPRESSION:    No CT evidence of acute intracranial pathology.     Chronic microvascular ischemic changes and chronic left frontal lobe   infarction.        EEG:     Echo:   Carotid Doppler: N/A  Telemetry: CC: Confusion        HPI: Patient unable to give history , hx was obtained from son at bedside  85 y/o right handed male with PMH of PPM /AICD  HTN  , High chol, afib (not on AC) presented for acute onset confusion since 3 pm yesterday. As per son patient was having a usual day except for few minor complaints , he then went to sleep at 11am and woke up at 3pm and was talking out of context , and was making unusual statements when asked questions. Patient is fully functional at baseline, he drives and takes care of all his daily routine activities. Here during interview patient is making statements  such as, "the water on the right side of my body is different than the left side of my body". Patient does answer some questions appropriately during interview but he has difficulty comprehending most of the time. Family denies any recent fall /trauma, n/v headache, focal weakness numbness dizziness or visual deficits.    Home Medications:  aspirin 325 mg oral tablet: q 24  donepezil 10 mg oral tablet: q hs  ferrous fumarate 325 mg (106 mg elemental iron) oral tablet: q 24  losartan 100 mg oral tablet: q24  methocarbamol 500 mg oral tablet: 2 tabs q 8  Metoprolol Succinate ER 25 mg oral tablet, extended release: q 24  mirtazapine 7.5 mg oral tablet: q 24  sildenafil 100 mg oral tablet: 1tab q 24  spironolactone 25 mg oral tablet: q 24    Social history  Remote h/o smoking  denies alcohol and drug use        Neuro exam  Orientation: oriented to person, has difficulty with his age and , Unable to tell me the place, cannot give history, able to repeat but jumbles up words , recognizes son and names him, he has poor attention and frequently interrupts me in between exam and interview, speech fluent with poor comprehension problems with multistep commands  Cranial Nerves: visual fields full to confrontation, pupils equal round and reactive to light, extraocular motion intact, facial sensation intact symmetrically, face symmetrical, hearing was intact bilaterally, tongue and palate midline, head turning and shoulder shrug symmetric and there was no tongue deviation with protrusion.   Motor: 5/5 throughout/ no drift             No abnormal muscle tone or bulk             There is not tremors or rigidity  Sensory exam: intact and symmetric  Coordination:. no dysmetria or limb ataxia, has problems with MANUEL  Deep tendon reflexes: 2+/4 UPPER                                   0/4 Patellar and ankle reflex  gait: deferred       NIHSS:  2   lOC 0   Questions 1   commands   0  vf o  gaze 0  facial 0  motor  0  sensory  0  speech 0  language  1 (receptive aphasia)  extinction  0    mRS ASELINE 0  mRs current 2    Allergies    No Known Allergies    Intolerances      MEDICATIONS  (STANDING):    MEDICATIONS  (PRN):      LABS:                        9.8    5.92  )-----------( 166      ( 2019 22:20 )             31.4         140  |  109  |  19  ----------------------------<  114<H>  4.1   |  20  |  1.1    Ca    9.4      2019 22:20  Mg     1.9         TPro  6.4  /  Alb  3.6  /  TBili  1.0  /  DBili  x   /  AST  26  /  ALT  19  /  AlkPhos  63      PT/INR - ( 2019 22:20 )   PT: 13.90 sec;   INR: 1.21 ratio         PTT - ( 2019 22:20 )  PTT:32.5 sec        Neuro Imaging:  < from: CT Head No Cont (19 @ 00:33) >  FINDINGS:    The ventricles and cerebral sulci are age-appropriate.    Chronic infarction of the leftfrontal lobe (series 3, image 19).    There is no evidence of acute intracranial hemorrhage, mass effect or   midline shift.    There are patchy subcortical white matter hypodensities consistent with   mild age-related chronic microvascular changes.    There is no fracture to the calvarium. Mastoid air cells are well aerated   bilaterally. Partially imaged right maxillary sinus mucous retention cyst.      IMPRESSION:    No CT evidence of acute intracranial pathology.     Chronic microvascular ischemic changes and chronic left frontal lobe   infarction.        EEG:     Echo:   Carotid Doppler: N/A  Telemetry:

## 2019-04-24 NOTE — H&P ADULT - NSICDXPASTSURGICALHX_GEN_ALL_CORE_FT
PAST SURGICAL HISTORY:  AICD (automatic cardioverter/defibrillator) present     Deviated nasal septum     History of appendectomy     History of cholecystectomy     History of knee surgery     Presence of Watchman left atrial appendage closure device

## 2019-04-24 NOTE — H&P ADULT - NSICDXPASTMEDICALHX_GEN_ALL_CORE_FT
PAST MEDICAL HISTORY:  Afib     Borderline diabetes mellitus     Chronic right-sided heart failure     Depression     HTN (hypertension)     Liver cirrhosis, alcoholic     Mild dementia

## 2019-04-24 NOTE — H&P ADULT - NSHPSOCIALHISTORY_GEN_ALL_CORE
Marital Status:  (  x )    (   ) Single    (   )    (  )   Occupation: Previous  service   Lives with: (  ) alone  (  ) children   ( x ) spouse   (  ) parents  (  ) other  Recent Travel: none     Substance Use (street drugs): ( x ) never used  (  ) other:  Tobacco Usage:  (   ) never smoked   ( x  ) former smoker   (   ) current smoker: Quit 40 years ago, 20 pack year history   Alcohol Usage: previous history of alcohol abuse, stopped drinking ~6 years ago

## 2019-04-24 NOTE — SWALLOW BEDSIDE ASSESSMENT ADULT - SLP PERTINENT HISTORY OF CURRENT PROBLEM
84M with adm diagnosis of increased confusion. -CT for acute pathology. pmhx of A-Fib s/p Watchman and AICD (not on A/C) Cirrhosis (Previous EtOH absue, sober for years now), CVA without residual symptoms, DM II, HTN, Mild Dementia, PTSD

## 2019-04-24 NOTE — CONSULT NOTE ADULT - SUBJECTIVE AND OBJECTIVE BOX
HPI:  84M with pmhx of A-Fib s/p Watchman and AICD (not on A/C) Cirrhosis (Previous EtOH absue, sober for years now), CVA without residual symptoms, DM II, HTN, Mild Dementia, PTSD, Iron Deficiency anemia (no source identified after extensive work ups), Chronic Lower back pain secondary to Arthritis and DJD presents from home for increasing confusion and strange behavior. Patient was in his normal state of health prior yesterday. As per wife patient awoke and was mild confused but progressed throughout the day. Patient was also going to the bathroom frequently but not urinating, also stated the shower was not working correctly and arguing with sons. Patient was also having nonsensical speech. Son decided to call EMS to bring him to the hospital. Wife denied the patient had experienced CP, palpitations, n/v/d, fever, recent illness, LOC, or falls.   During Interview with patient remains confused. Will answer questions appropriately but often times will have tangential unrelated speech or difficulty comprehending. He was confused as to why he was in the hospital and wanted to leave during interview. Patient stated that he drove himself to the hospital and came because he had chest pain, left sided, radiating to left arm, not associated with SOB, palpitations, diaphoresis, n/v, or dizziness. He was unable to describe or rate the pain.    In Ed: T: 97.4        HR: 66          BP: 144/80          RR: 18, 98%  CTH Negative for acute changes   Neuro Consulted for Confusion but no code stroke called: NIHSS was 2 at presentation (2019 08:36)      PAST MEDICAL & SURGICAL HISTORY:  Mild dementia  Chronic right-sided heart failure  Depression  Liver cirrhosis, alcoholic  Borderline diabetes mellitus  HTN (hypertension)  Afib  Presence of Watchman left atrial appendage closure device  AICD (automatic cardioverter/defibrillator) present  History of cholecystectomy  History of appendectomy  History of knee surgery  Deviated nasal septum      Hospital Course:    TODAY'S SUBJECTIVE & REVIEW OF SYMPTOMS:     Constitutional WNL   Cardio WNL   Resp WNL   GI WNL  Heme WNL  Endo WNL  Skin WNL  MSK Weakness  Neuro WNL  Cognitive WNL  Psych WNL      MEDICATIONS  (STANDING):  aspirin 325 milliGRAM(s) Oral daily  atorvastatin 80 milliGRAM(s) Oral at bedtime  chlorhexidine 4% Liquid 1 Application(s) Topical <User Schedule>  enoxaparin Injectable 40 milliGRAM(s) SubCutaneous every 24 hours  ferrous    sulfate 325 milliGRAM(s) Oral daily  furosemide    Tablet 40 milliGRAM(s) Oral daily  losartan 100 milliGRAM(s) Oral daily  metoprolol succinate ER 25 milliGRAM(s) Oral daily  spironolactone 50 milliGRAM(s) Oral daily    MEDICATIONS  (PRN):  acetaminophen   Tablet .. 650 milliGRAM(s) Oral every 6 hours PRN Mild Pain (1 - 3)      FAMILY HISTORY:  Family history of throat cancer (Sibling)  Family history of diabetes mellitus (Mother)      Allergies    No Known Allergies    Intolerances        SOCIAL HISTORY:    [  ] Etoh  [  ] Smoking  [  ] Substance abuse     Home Environment:  [  ] Home Alone  [x  ] Lives with Family  [  ] Home Health Aid    Dwelling:  [  ] Apartment  [x  ] Private House  [  ] Adult Home  [  ] Skilled Nursing Facility      [  ] Short Term  [  ] Long Term  [x  ] Stairs       Elevator [  ]    FUNCTIONAL STATUS PTA: (Check all that apply)  Ambulation: [ x  ]Independent    [  ] Dependent     [  ] Non-Ambulatory  Assistive Device: [  ] SA Cane  [  ]  Q Cane  [  ] Walker  [  ]  Wheelchair  ADL : [x  ] Independent  [  ]  Dependent       Vital Signs Last 24 Hrs  T(C): 36.1 (2019 07:38), Max: 36.7 (2019 19:00)  T(F): 97 (2019 07:38), Max: 98 (2019 19:00)  HR: 80 (2019 07:38) (65 - 80)  BP: 140/78 (2019 07:38) (119/67 - 144/80)  BP(mean): --  RR: 18 (2019 07:38) (18 - 18)  SpO2: 98% (2019 07:38) (97% - 98%)      PHYSICAL EXAM: Alert & Oriented X3  GENERAL: NAD, well-groomed, well-developed  HEAD:  Atraumatic, Normocephalic  CHEST/LUNG: Clear   HEART: S1S2+  ABDOMEN: Soft, Nontender  EXTREMITIES:  no calf tenderness    NERVOUS SYSTEM:  Cranial Nerves 2-12 intact [  ] Abnormal  [  ]  ROM: WFL all extremities [x  ]  Abnormal [  ]  Motor Strength: WFL all extremities  [ x ]  Abnormal [  ]  Sensation: intact to light touch [x  ] Abnormal [  ]  Reflexes: Symmetric [  ]  Abnormal [  ]    FUNCTIONAL STATUS:  Bed Mobility: Independent [  ]  Supervision [  ]  Needs Assistance [x  ]  N/A [  ]  Transfers: Independent [  ]  Supervision [  ]  Needs Assistance [x  ]  N/A [  ]   Ambulation: Independent [  ]  Supervision [  ]  Needs Assistance [x  ]  N/A [  ]  ADL: Independent [  ] Requires Assistance [  ] N/A [  ]      LABS:                        9.8    5.92  )-----------( 166      ( 2019 22:20 )             31.4         140  |  109  |  19  ----------------------------<  114<H>  4.1   |  20  |  1.1    Ca    9.4      2019 22:20  Mg     1.9         TPro  6.4  /  Alb  3.6  /  TBili  1.0  /  DBili  x   /  AST  26  /  ALT  19  /  AlkPhos  63      PT/INR - ( 2019 22:20 )   PT: 13.90 sec;   INR: 1.21 ratio         PTT - ( 2019 22:20 )  PTT:32.5 sec  Urinalysis Basic - ( 2019 22:29 )    Color: Yellow / Appearance: Cloudy / S.010 / pH: x  Gluc: x / Ketone: Negative  / Bili: Negative / Urobili: 0.2 mg/dL   Blood: x / Protein: Trace mg/dL / Nitrite: Negative   Leuk Esterase: Negative / RBC: x / WBC x   Sq Epi: x / Non Sq Epi: Occasional /HPF / Bacteria: Few /HPF        RADIOLOGY & ADDITIONAL STUDIES:    Assesment:

## 2019-04-24 NOTE — SWALLOW BEDSIDE ASSESSMENT ADULT - COMMENTS
dysphagia evaluation conducted bedside. pt received alert, responsive, +confusion and tangential speech. normal breath patterns, on room air. pt's wife present during assessment. no c/o pain. +toleration with no overt s/s of aspiration vs penetration

## 2019-04-24 NOTE — H&P ADULT - NSHPLABSRESULTS_GEN_ALL_CORE
9.8    5.92  )-----------( 166      ( 2019 22:20 )             31.4           140  |  109  |  19  ----------------------------<  114<H>  4.1   |  20  |  1.1    Ca    9.4      2019 22:20  Mg     1.9         TPro  6.4  /  Alb  3.6  /  TBili  1.0  /  DBili  x   /  AST  26  /  ALT  19  /  AlkPhos  63                Urinalysis Basic - ( 2019 22:29 )    Color: Yellow / Appearance: Cloudy / S.010 / pH: x  Gluc: x / Ketone: Negative  / Bili: Negative / Urobili: 0.2 mg/dL   Blood: x / Protein: Trace mg/dL / Nitrite: Negative   Leuk Esterase: Negative / RBC: x / WBC x   Sq Epi: x / Non Sq Epi: Occasional /HPF / Bacteria: Few /HPF        PT/INR - ( 2019 22:20 )   PT: 13.90 sec;   INR: 1.21 ratio         PTT - ( 2019 22:20 )  PTT:32.5 sec    Lactate Trend   @ 22:20 Lactate:0.9       CARDIAC MARKERS ( 2019 22:20 )  x     / <0.01 ng/mL / x     / x     / x

## 2019-04-24 NOTE — CONSULT NOTE ADULT - ATTENDING COMMENTS
Patient seen yesterday in ER.  Neurologic exam does not show focality.  Suspect toxic metabolic encephalopathy. Patient seen yesterday in ER.  Neurologic exam does not show focality however acute confusion could be delerium on top of dementia.  Acute mental status changes also could be related to stroke not visible on CT.  Patient has pacemaker and cannot undergo MRI.  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 enlarged basilar tip.    Recommend routine EEG.  Recommend CT angiogram head and neck.  R/o metabolic causes of confusion.  Maximal medical therapy for stroke prevention - continue antiplatelet therapy and tx hyperlipidemia.

## 2019-04-24 NOTE — H&P ADULT - NSICDXFAMILYHX_GEN_ALL_CORE_FT
FAMILY HISTORY:  Mother  Still living? No  Family history of diabetes mellitus, Age at diagnosis: Age Unknown    Sibling  Still living? No  Family history of throat cancer, Age at diagnosis: Age Unknown

## 2019-04-24 NOTE — H&P ADULT - HISTORY OF PRESENT ILLNESS
84M with pmhx of A-Fib s/p Watchman and AICD (not on A/C) Cirrhosis (Previous EtOH absue, sober for years now), CVA without residual symptoms, DM II, HTN, Mild Dementia, PTSD, Iron Deficiency anemia (no source identified after extensive work ups), Chronic Lower back pain secondary to Arthritis and DJD presents from home for increasing confusion and strange behavior. Patient was in his normal state of health prior yesterday. As per wife patient awoke and was mild confused but progressed throughout the day. Patient was also going to the bathroom frequently but not urinating, also stated the shower was not working correctly and arguing with sons. Patient was also having nonsensical speech. Son decided to call EMS to bring him to the hospital. Wife denied the patient had experienced CP, palpitations, n/v/d, fever, recent illness, LOC, or falls.   During Interview with patient remains confused. Will answer questions appropriately but often times will have tangential unrelated speech or difficulty comprehending. He was confused as to why he was in the hospital and wanted to leave during interview. Patient stated that he drove himself to the hospital and came because he had chest pain, left sided, radiating to left arm, not associated with SOB, palpitations, diaphoresis, n/v, or dizziness. He was unable to describe or rate the pain.    In Ed: T: 97.4        HR: 66          BP: 144/80          RR: 18, 98%  CTH Negative for acute changes   Neuro Consulted for Confusion but no code stroke called: NIHSS was 2 at presentation

## 2019-04-24 NOTE — H&P ADULT - NSHPPHYSICALEXAM_GEN_ALL_CORE
GENERAL: NAD, elderly, thin, Non-toxic, stated age   HEAD:  Atraumatic, Normocephalic  EYES: EOMI, Sclera White   NECK: Supple, No JVD  CHEST/LUNG: Clear to auscultation bilaterally; No wheezing, rhonchi, or crackles  HEART: Regular rate and rhythm; s1, s2, No murmurs, rubs, or gallops  ABDOMEN: Soft, Nontender, Nondistended; Bowel sounds present, No rebound or guarding noted   EXTREMITIES:  No lower extremity edema or calf tenderness to palpation.  No clubbing or cyanosis  PSYCH: AAOxx2 (Name, location, date was incorrect). Tangential speech, difficulty comprehending, will follow commands but may frequently needed coaching   NEUROLOGY: CN II-XII intact, mild ataxia with L finger to nose, R side normal, no gross sensory deficits, no pronator drift, no asterixis  SKIN: No rashes or lesions

## 2019-04-24 NOTE — H&P ADULT - ASSESSMENT
84M with pmhx of A-Fib s/p Watchman and AICD (not on A/C) Cirrhosis (Previous EtOH abuse sober for years now), CVA without residual symptoms, DM II, HTN, Mild Dementia, PTSD, Iron Deficiency anemia (no source identified after extensive work ups), Chronic Lower back pain secondary to Arthritis and DJD presents from home for increasing confusion and strange behavior.       #Confusion: unclear etiology at this time (Stroke vs Toxic metabolic encephalopathy vs Progressive dementia )  Neuro Following: wants to r/o stroke and admit to tele for observation   Will check TSH, B12, Folate, and Ammonia (hx of cirrhosis)  Holding tramadol   UA negative, follow up urine and blood cultures   May need MRI brain to r/o Stroke  Continue with home , will start lipitor 80   REEG, carotid duplex, and Echo with bubble study   PT/Rehab   Speech and Swallow     #A-Fib: Currently ventricularly paced   No A/C s/p watchman   Cont metoprolol     #DM II: Diet controlled at home  Trend FSG will start basal bolus insulin if >180    #HTN: Cont losartan     #PTSD: Cont Mirtazepine     #JEREMY : no clinical evidence of bleeding  Continue Iron     #GI PPX: Not indicated  #DVT ppx: Lovenox  Full code   Diet: DASH carb consistent   Activity: As tolerated

## 2019-04-24 NOTE — PATIENT PROFILE ADULT - NSPROHMSYMPCOND_GEN_A_NUR
pt is poor historian and says he is fine and takes care of everything at home but is not specific to what

## 2019-04-25 LAB
ALBUMIN SERPL ELPH-MCNC: 3.5 G/DL — SIGNIFICANT CHANGE UP (ref 3.5–5.2)
ALP SERPL-CCNC: 59 U/L — SIGNIFICANT CHANGE UP (ref 30–115)
ALT FLD-CCNC: 16 U/L — SIGNIFICANT CHANGE UP (ref 0–41)
ANION GAP SERPL CALC-SCNC: 11 MMOL/L — SIGNIFICANT CHANGE UP (ref 7–14)
AST SERPL-CCNC: 27 U/L — SIGNIFICANT CHANGE UP (ref 0–41)
BASOPHILS # BLD AUTO: 0.01 K/UL — SIGNIFICANT CHANGE UP (ref 0–0.2)
BASOPHILS NFR BLD AUTO: 0.2 % — SIGNIFICANT CHANGE UP (ref 0–1)
BILIRUB SERPL-MCNC: 0.9 MG/DL — SIGNIFICANT CHANGE UP (ref 0.2–1.2)
BUN SERPL-MCNC: 20 MG/DL — SIGNIFICANT CHANGE UP (ref 10–20)
CALCIUM SERPL-MCNC: 8.9 MG/DL — SIGNIFICANT CHANGE UP (ref 8.5–10.1)
CHLORIDE SERPL-SCNC: 107 MMOL/L — SIGNIFICANT CHANGE UP (ref 98–110)
CO2 SERPL-SCNC: 23 MMOL/L — SIGNIFICANT CHANGE UP (ref 17–32)
CREAT SERPL-MCNC: 1.2 MG/DL — SIGNIFICANT CHANGE UP (ref 0.7–1.5)
CULTURE RESULTS: SIGNIFICANT CHANGE UP
EOSINOPHIL # BLD AUTO: 0.12 K/UL — SIGNIFICANT CHANGE UP (ref 0–0.7)
EOSINOPHIL NFR BLD AUTO: 2.3 % — SIGNIFICANT CHANGE UP (ref 0–8)
FOLATE SERPL-MCNC: 14.3 NG/ML — SIGNIFICANT CHANGE UP
GLUCOSE BLDC GLUCOMTR-MCNC: 146 MG/DL — HIGH (ref 70–99)
GLUCOSE BLDC GLUCOMTR-MCNC: 90 MG/DL — SIGNIFICANT CHANGE UP (ref 70–99)
GLUCOSE SERPL-MCNC: 89 MG/DL — SIGNIFICANT CHANGE UP (ref 70–99)
HCT VFR BLD CALC: 31.4 % — LOW (ref 42–52)
HGB BLD-MCNC: 9.6 G/DL — LOW (ref 14–18)
IMM GRANULOCYTES NFR BLD AUTO: 0.4 % — HIGH (ref 0.1–0.3)
LYMPHOCYTES # BLD AUTO: 0.49 K/UL — LOW (ref 1.2–3.4)
LYMPHOCYTES # BLD AUTO: 9.6 % — LOW (ref 20.5–51.1)
MAGNESIUM SERPL-MCNC: 1.8 MG/DL — SIGNIFICANT CHANGE UP (ref 1.8–2.4)
MCHC RBC-ENTMCNC: 26.8 PG — LOW (ref 27–31)
MCHC RBC-ENTMCNC: 30.6 G/DL — LOW (ref 32–37)
MCV RBC AUTO: 87.7 FL — SIGNIFICANT CHANGE UP (ref 80–94)
MONOCYTES # BLD AUTO: 0.75 K/UL — HIGH (ref 0.1–0.6)
MONOCYTES NFR BLD AUTO: 14.6 % — HIGH (ref 1.7–9.3)
NEUTROPHILS # BLD AUTO: 3.73 K/UL — SIGNIFICANT CHANGE UP (ref 1.4–6.5)
NEUTROPHILS NFR BLD AUTO: 72.9 % — SIGNIFICANT CHANGE UP (ref 42.2–75.2)
NRBC # BLD: 0 /100 WBCS — SIGNIFICANT CHANGE UP (ref 0–0)
PLATELET # BLD AUTO: 153 K/UL — SIGNIFICANT CHANGE UP (ref 130–400)
POTASSIUM SERPL-MCNC: 3.3 MMOL/L — LOW (ref 3.5–5)
POTASSIUM SERPL-SCNC: 3.3 MMOL/L — LOW (ref 3.5–5)
PROT SERPL-MCNC: 6 G/DL — SIGNIFICANT CHANGE UP (ref 6–8)
RBC # BLD: 3.58 M/UL — LOW (ref 4.7–6.1)
RBC # FLD: 15.4 % — HIGH (ref 11.5–14.5)
SODIUM SERPL-SCNC: 141 MMOL/L — SIGNIFICANT CHANGE UP (ref 135–146)
SPECIMEN SOURCE: SIGNIFICANT CHANGE UP
TSH SERPL-MCNC: 0.65 UIU/ML — SIGNIFICANT CHANGE UP (ref 0.27–4.2)
VIT B12 SERPL-MCNC: 1820 PG/ML — HIGH (ref 232–1245)
WBC # BLD: 5.12 K/UL — SIGNIFICANT CHANGE UP (ref 4.8–10.8)
WBC # FLD AUTO: 5.12 K/UL — SIGNIFICANT CHANGE UP (ref 4.8–10.8)

## 2019-04-25 RX ORDER — POTASSIUM CHLORIDE 20 MEQ
40 PACKET (EA) ORAL ONCE
Qty: 0 | Refills: 0 | Status: COMPLETED | OUTPATIENT
Start: 2019-04-25 | End: 2019-04-25

## 2019-04-25 RX ADMIN — Medication 40 MILLIGRAM(S): at 06:17

## 2019-04-25 RX ADMIN — LOSARTAN POTASSIUM 100 MILLIGRAM(S): 100 TABLET, FILM COATED ORAL at 06:17

## 2019-04-25 RX ADMIN — ATORVASTATIN CALCIUM 80 MILLIGRAM(S): 80 TABLET, FILM COATED ORAL at 22:07

## 2019-04-25 RX ADMIN — Medication 40 MILLIEQUIVALENT(S): at 12:54

## 2019-04-25 RX ADMIN — Medication 325 MILLIGRAM(S): at 12:55

## 2019-04-25 RX ADMIN — ENOXAPARIN SODIUM 40 MILLIGRAM(S): 100 INJECTION SUBCUTANEOUS at 12:55

## 2019-04-25 RX ADMIN — Medication 25 MILLIGRAM(S): at 06:16

## 2019-04-25 RX ADMIN — SPIRONOLACTONE 50 MILLIGRAM(S): 25 TABLET, FILM COATED ORAL at 06:16

## 2019-04-25 NOTE — PROGRESS NOTE ADULT - ASSESSMENT
WILLIAM ARAIZA 84y Male  MRN#: [id number]   CODE STATUS:full code       SUBJECTIVE  Patient is a 84y old Male who presents with a chief complaint of Confusion (2019 13:21)  Currently admitted to medicine with the primary diagnosis of AMS  Today is hospital day 1d, and this morning he is resting comfortably in bed and reports no overnight events.       OBJECTIVE  PAST MEDICAL & SURGICAL HISTORY  Mild dementia  Chronic right-sided heart failure  Depression  Liver cirrhosis, alcoholic  Borderline diabetes mellitus  HTN (hypertension)  Afib  Presence of Watchman left atrial appendage closure device  AICD (automatic cardioverter/defibrillator) present  History of cholecystectomy  History of appendectomy  History of knee surgery  Deviated nasal septum    ALLERGIES:  No Known Allergies    MEDICATIONS:  STANDING MEDICATIONS  aspirin 325 milliGRAM(s) Oral daily  atorvastatin 80 milliGRAM(s) Oral at bedtime  chlorhexidine 4% Liquid 1 Application(s) Topical <User Schedule>  enoxaparin Injectable 40 milliGRAM(s) SubCutaneous every 24 hours  ferrous    sulfate 325 milliGRAM(s) Oral daily  furosemide    Tablet 40 milliGRAM(s) Oral daily  losartan 100 milliGRAM(s) Oral daily  metoprolol succinate ER 25 milliGRAM(s) Oral daily  spironolactone 50 milliGRAM(s) Oral daily    PRN MEDICATIONS  acetaminophen   Tablet .. 650 milliGRAM(s) Oral every 6 hours PRN      VITAL SIGNS: Last 24 Hours  T(C): 36.1 (2019 13:08), Max: 36.3 (2019 23:00)  T(F): 96.9 (2019 13:08), Max: 97.3 (2019 23:00)  HR: 60 (2019 13:08) (60 - 66)  BP: 148/70 (2019 13:08) (148/70 - 167/78)  BP(mean): --  RR: 18 (2019 13:08) (17 - 18)  SpO2: 98% (2019 21:36) (98% - 98%)    LABS:                        9.6    5.12  )-----------( 153      ( 2019 06:18 )             31.4     04-    141  |  107  |  20  ----------------------------<  89  3.3<L>   |  23  |  1.2    Ca    8.9      2019 06:18  Mg     1.8         TPro  6.0  /  Alb  3.5  /  TBili  0.9  /  DBili  x   /  AST  27  /  ALT  16  /  AlkPhos  59      PT/INR - ( 2019 22:20 )   PT: 13.90 sec;   INR: 1.21 ratio         PTT - ( 2019 22:20 )  PTT:32.5 sec  Urinalysis Basic - ( 2019 22:29 )    Color: Yellow / Appearance: Cloudy / S.010 / pH: x  Gluc: x / Ketone: Negative  / Bili: Negative / Urobili: 0.2 mg/dL   Blood: x / Protein: Trace mg/dL / Nitrite: Negative   Leuk Esterase: Negative / RBC: x / WBC x   Sq Epi: x / Non Sq Epi: Occasional /HPF / Bacteria: Few /HPF        Troponin T, Serum: <0.01 ng/mL (19 @ 17:43)      Culture - Urine (collected 2019 22:29)  Source: .Urine Clean Catch (Midstream)  Final Report (2019 12:48):    <10,000 CFU/mL Normal Urogenital Mayelni      CARDIAC MARKERS ( 2019 17:43 )  x     / <0.01 ng/mL / x     / x     / x      CARDIAC MARKERS ( 2019 22:20 )  x     / <0.01 ng/mL / x     / x     / x        RADIOLOGY:    PHYSICAL EXAM:    GENERAL: NAD, elderly, thin, Non-toxic, stated age   	HEAD:  Atraumatic, Normocephalic  	EYES: EOMI, Sclera White   	NECK: Supple, No JVD  	CHEST/LUNG: Clear to auscultation bilaterally; No wheezing, rhonchi, or crackles  	HEART: Regular rate and rhythm; s1, s2, No murmurs, rubs, or gallops  	ABDOMEN: Soft, Nontender, Nondistended; Bowel sounds present, No rebound or guarding noted   	EXTREMITIES:  No lower extremity edema or calf tenderness to palpation.  No clubbing or cyanosis  	PSYCH: AAOxx2 (Name, location, date was incorrect). Tangential speech, difficulty comprehending, will follow commands but may frequently needed coaching   	NEUROLOGY: CN II-XII intact, mild ataxia with L finger to nose, R side normal, no gross sensory deficits, no pronator drift, no asterixis  SKIN: No rashes or lesions    ASSESSMENT & PLAN    84M with pmhx of A-Fib s/p Watchman and AICD (not on A/C) Cirrhosis (Previous EtOH abuse sober for years now), CVA without residual symptoms, DM II, HTN, Mild Dementia, PTSD, Iron Deficiency anemia (no source identified after extensive work ups), Chronic Lower back pain secondary to Arthritis and DJD presents from home for increasing confusion and strange behavior.       #Confusion: unclear etiology (Stroke vs Toxic metabolic encephalopathy vs Progressive dementia )  - neuro following; admit to tele for observation   Will check TSH, B12, Folate, and Ammonia (hx of cirrhosis)  Holding tramadol   UA negative, follow up urine and blood cultures   need MRI brain to r/o Stroke, needs to know wither PPM is MRI compatable or not.   Continue with home , lipitor 80 added  REEG was done, pending read   carotid duplex bilateral 40 % stenosis  Echo with bubble study unremarkable    PT/Rehab   Speech and Swallow eval    #A-Fib: Currently ventricularly paced   No A/C s/p watchman   Cont metoprolol     #DM II: Diet controlled at home  Trend FSG will start basal bolus insulin if >180    #HTN: Cont losartan     #PTSD: Cont Mirtazepine     #JEREMY : no clinical evidence of bleeding  Continue Iron     #GI PPX: Not indicated  #DVT ppx: Lovenox  Full code   Diet: DASH carb consistent   Activity: As tolerated

## 2019-04-25 NOTE — CHART NOTE - NSCHARTNOTEFT_GEN_A_CORE
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 enlarged basilar tip.  Recommend CT angiogram head and neck to r/o large vessel stenosis and basilar tip aneurysm.

## 2019-04-25 NOTE — PHYSICAL THERAPY INITIAL EVALUATION ADULT - GENERAL OBSERVATIONS, REHAB EVAL
10:35 Pt encountered semifowler in bed in NAD. As per Ayleen TORRES requiring assistance with hygiene prior to OOB. Baljinder PCA aware. Will f/u after that. Pt educated on goals for PT today and in agreement.
13:30 Pt on EEG monitor. Will f/u with PT.
15:05-15:23 Pt encountered sleeping in bed in NAD, spouse at bedside.

## 2019-04-26 ENCOUNTER — TRANSCRIPTION ENCOUNTER (OUTPATIENT)
Age: 84
End: 2019-04-26

## 2019-04-26 LAB
ANION GAP SERPL CALC-SCNC: 12 MMOL/L — SIGNIFICANT CHANGE UP (ref 7–14)
BASOPHILS # BLD AUTO: 0.01 K/UL — SIGNIFICANT CHANGE UP (ref 0–0.2)
BASOPHILS NFR BLD AUTO: 0.2 % — SIGNIFICANT CHANGE UP (ref 0–1)
BUN SERPL-MCNC: 18 MG/DL — SIGNIFICANT CHANGE UP (ref 10–20)
CALCIUM SERPL-MCNC: 9.4 MG/DL — SIGNIFICANT CHANGE UP (ref 8.5–10.1)
CHLORIDE SERPL-SCNC: 106 MMOL/L — SIGNIFICANT CHANGE UP (ref 98–110)
CO2 SERPL-SCNC: 24 MMOL/L — SIGNIFICANT CHANGE UP (ref 17–32)
CREAT SERPL-MCNC: 1.1 MG/DL — SIGNIFICANT CHANGE UP (ref 0.7–1.5)
EOSINOPHIL # BLD AUTO: 0.13 K/UL — SIGNIFICANT CHANGE UP (ref 0–0.7)
EOSINOPHIL NFR BLD AUTO: 2.6 % — SIGNIFICANT CHANGE UP (ref 0–8)
GLUCOSE BLDC GLUCOMTR-MCNC: 100 MG/DL — HIGH (ref 70–99)
GLUCOSE BLDC GLUCOMTR-MCNC: 118 MG/DL — HIGH (ref 70–99)
GLUCOSE BLDC GLUCOMTR-MCNC: 121 MG/DL — HIGH (ref 70–99)
GLUCOSE BLDC GLUCOMTR-MCNC: 143 MG/DL — HIGH (ref 70–99)
GLUCOSE BLDC GLUCOMTR-MCNC: 198 MG/DL — HIGH (ref 70–99)
GLUCOSE SERPL-MCNC: 109 MG/DL — HIGH (ref 70–99)
HCT VFR BLD CALC: 32.8 % — LOW (ref 42–52)
HGB BLD-MCNC: 10.2 G/DL — LOW (ref 14–18)
IMM GRANULOCYTES NFR BLD AUTO: 0.2 % — SIGNIFICANT CHANGE UP (ref 0.1–0.3)
LYMPHOCYTES # BLD AUTO: 0.73 K/UL — LOW (ref 1.2–3.4)
LYMPHOCYTES # BLD AUTO: 14.7 % — LOW (ref 20.5–51.1)
MCHC RBC-ENTMCNC: 26.8 PG — LOW (ref 27–31)
MCHC RBC-ENTMCNC: 31.1 G/DL — LOW (ref 32–37)
MCV RBC AUTO: 86.3 FL — SIGNIFICANT CHANGE UP (ref 80–94)
MONOCYTES # BLD AUTO: 0.84 K/UL — HIGH (ref 0.1–0.6)
MONOCYTES NFR BLD AUTO: 16.9 % — HIGH (ref 1.7–9.3)
NEUTROPHILS # BLD AUTO: 3.25 K/UL — SIGNIFICANT CHANGE UP (ref 1.4–6.5)
NEUTROPHILS NFR BLD AUTO: 65.4 % — SIGNIFICANT CHANGE UP (ref 42.2–75.2)
NRBC # BLD: 0 /100 WBCS — SIGNIFICANT CHANGE UP (ref 0–0)
PLATELET # BLD AUTO: 175 K/UL — SIGNIFICANT CHANGE UP (ref 130–400)
POTASSIUM SERPL-MCNC: 3.7 MMOL/L — SIGNIFICANT CHANGE UP (ref 3.5–5)
POTASSIUM SERPL-SCNC: 3.7 MMOL/L — SIGNIFICANT CHANGE UP (ref 3.5–5)
RBC # BLD: 3.8 M/UL — LOW (ref 4.7–6.1)
RBC # FLD: 15.4 % — HIGH (ref 11.5–14.5)
SODIUM SERPL-SCNC: 142 MMOL/L — SIGNIFICANT CHANGE UP (ref 135–146)
WBC # BLD: 4.97 K/UL — SIGNIFICANT CHANGE UP (ref 4.8–10.8)
WBC # FLD AUTO: 4.97 K/UL — SIGNIFICANT CHANGE UP (ref 4.8–10.8)

## 2019-04-26 PROCEDURE — 99222 1ST HOSP IP/OBS MODERATE 55: CPT

## 2019-04-26 PROCEDURE — 70498 CT ANGIOGRAPHY NECK: CPT | Mod: 26

## 2019-04-26 PROCEDURE — 70496 CT ANGIOGRAPHY HEAD: CPT | Mod: 26

## 2019-04-26 RX ORDER — CLOPIDOGREL BISULFATE 75 MG/1
75 TABLET, FILM COATED ORAL DAILY
Qty: 0 | Refills: 0 | Status: DISCONTINUED | OUTPATIENT
Start: 2019-04-26 | End: 2019-04-29

## 2019-04-26 RX ORDER — DONEPEZIL HYDROCHLORIDE 10 MG/1
10 TABLET, FILM COATED ORAL AT BEDTIME
Qty: 0 | Refills: 0 | Status: DISCONTINUED | OUTPATIENT
Start: 2019-04-26 | End: 2019-04-29

## 2019-04-26 RX ADMIN — CLOPIDOGREL BISULFATE 75 MILLIGRAM(S): 75 TABLET, FILM COATED ORAL at 18:45

## 2019-04-26 RX ADMIN — LOSARTAN POTASSIUM 100 MILLIGRAM(S): 100 TABLET, FILM COATED ORAL at 06:09

## 2019-04-26 RX ADMIN — Medication 25 MILLIGRAM(S): at 06:09

## 2019-04-26 RX ADMIN — Medication 325 MILLIGRAM(S): at 11:28

## 2019-04-26 RX ADMIN — ENOXAPARIN SODIUM 40 MILLIGRAM(S): 100 INJECTION SUBCUTANEOUS at 11:28

## 2019-04-26 RX ADMIN — CHLORHEXIDINE GLUCONATE 1 APPLICATION(S): 213 SOLUTION TOPICAL at 06:08

## 2019-04-26 RX ADMIN — ATORVASTATIN CALCIUM 80 MILLIGRAM(S): 80 TABLET, FILM COATED ORAL at 21:39

## 2019-04-26 RX ADMIN — DONEPEZIL HYDROCHLORIDE 10 MILLIGRAM(S): 10 TABLET, FILM COATED ORAL at 21:39

## 2019-04-26 RX ADMIN — Medication 325 MILLIGRAM(S): at 11:27

## 2019-04-26 RX ADMIN — Medication 40 MILLIGRAM(S): at 06:09

## 2019-04-26 RX ADMIN — SPIRONOLACTONE 50 MILLIGRAM(S): 25 TABLET, FILM COATED ORAL at 06:09

## 2019-04-26 NOTE — CONSULT NOTE ADULT - SUBJECTIVE AND OBJECTIVE BOX
Patient is a 84y old  Male who presents with a chief complaint of Confusion (26 Apr 2019 14:44)        HPI:  84M with pmhx of A-Fib s/p Watchman and AICD (not on A/C) Cirrhosis (Previous EtOH absue, sober for years now), CVA without residual symptoms, DM II, HTN, Mild Dementia, PTSD, Iron Deficiency anemia (no source identified after extensive work ups), Chronic Lower back pain secondary to Arthritis and DJD presents from home for increasing confusion and strange behavior. Patient was in his normal state of health prior yesterday. As per wife patient awoke and was mild confused but progressed throughout the day. Patient was also going to the bathroom frequently but not urinating, also stated the shower was not working correctly and arguing with sons. Patient was also having nonsensical speech. Son decided to call EMS to bring him to the hospital. Wife denied the patient had experienced CP, palpitations, n/v/d, fever, recent illness, LOC, or falls.   During Interview with patient remains confused. Will answer questions appropriately but often times will have tangential unrelated speech or difficulty comprehending. He was confused as to why he was in the hospital and wanted to leave during interview. Patient stated that he drove himself to the hospital and came because he had chest pain, left sided, radiating to left arm, not associated with SOB, palpitations, diaphoresis, n/v, or dizziness. He was unable to describe or rate the pain.  In Ed: T: 97.4        HR: 66          BP: 144/80          RR: 18, 98%  CTH Negative for acute changes   Neuro Consulted for Confusion but no code stroke called: NIHSS was 2 at presentation (24 Apr 2019 08:36)    Electrophysiology:  Pt is well known to Dr Do. Was recently seen in the office for reegular follow up on 4/22/19.  No complanins at that time  ICD was interrogated:  Device Check The patient is  pacemaker dependent.   Underlying Rhythm: complete heart block.   Device Type: Implantable cardioverter-defibrillator   Pacemaker/ICD : [employer]   Model: E110. Date of Implant: 16 Jun 2010.   Mode: VVIR. Rate: 60.   Battery Status: charge time was 9.8 sec seconds The estimated remaining battery life is 2 Years months.   Atrial: lead impedance was 539 ohms. sensing amplitude was A-Fib mv. Pacing Threshold: A-Fib V @.   Rt Ventricle:. lead impedance was 366 ohms. sensing amplitude was Paced @ 30 mv. Pacing Threshold: 1.4 V @ 0.5 ms.   Single Chamber: Pace V-Paced=99%%   Comments:. No episodes. Normal dual chamber ICD function. Patient monitor on and transmitting. RRT on and stable.     Patient was admitted to [facility] on 4/24 with above symptoms. CT head was negative, patient is awaiting  CTA.  Echo was positive for extensive valvular disease.  Summary:   1. Left ventricular ejection fraction, by visual estimation, is 45 to   50%.   2. Moderately increased LV wall thickness.   3. Moderately enlarged right ventricle.   4. Severe mitral valve regurgitation.   5. Moderate-severe tricuspid regurgitation.   6. Sclerotic aortic valve with decreased opening.   7. Estimated pulmonary artery systolic pressure is 61.1 mmHg assuming a   right atrial pressure of 10 mmHg, which is consistent with severe   pulmonary hypertension.   8. Mitral valve mean gradient is 1.8 mmHg consistent with normal mitral   stenosis.        REVIEW OF SYSTEMS    [ ] A ten-point review of systems was otherwise negative except as noted.  [ ] Due to altered mental status/intubation, subjective information were not able to be obtained from the patient. History was obtained, to the extent possible, from review of the chart and collateral sources of information.      PAST MEDICAL & SURGICAL HISTORY:  Mild dementia  Chronic right-sided heart failure  Depression  Liver cirrhosis, alcoholic  Borderline diabetes mellitus  HTN (hypertension)  Afib  Presence of Watchman left atrial appendage closure device  AICD (automatic cardioverter/defibrillator) present  History of cholecystectomy  History of appendectomy  History of knee surgery  Deviated nasal septum      Home Medications:  aspirin 325 mg oral tablet: 1 tab(s) orally once a day (24 Apr 2019 09:10)  donepezil 10 mg oral tablet: 1 tab(s) orally once a day (at bedtime) (24 Apr 2019 09:10)  ferrous fumarate 325 mg (106 mg elemental iron) oral tablet: 1 tab(s) orally once a day (24 Apr 2019 09:10)  losartan 100 mg oral tablet: 1 tab(s) orally once a day (24 Apr 2019 09:10)  metoprolol succinate 25 mg oral tablet, extended release: 1 tab(s) orally once a day (24 Apr 2019 09:10)  mirtazapine 30 mg oral tablet: 1 tab(s) orally once a day (at bedtime) (24 Apr 2019 09:10)  spironolactone 50 mg oral tablet: 1 tab(s) orally once a day (24 Apr 2019 09:10)  traMADol 50 mg oral tablet: 1 tab(s) orally every 6 hours, As Needed (24 Apr 2019 09:10)      Allergies:    No Known Allergies      PREVIOUS DIAGNOSTIC TESTING:      ECHO  FINDINGS:    STRESS  FINDINGS:    CATHETERIZATION  FINDINGS:    ELECTROPHYSIOLOGY STUDY  FINDINGS:    CAROTID ULTRASOUND:  FINDINGS    VENOUS DUPLEX SCAN:  FINDINGS:    CHEST CT PULMONARY ANGIO with IV Contrast:  FINDINGS:    FAMILY HISTORY:  Family history of throat cancer (Sibling)  Family history of diabetes mellitus (Mother)      SOCIAL HISTORY:    CIGARETTES:    ALCOHOL:      MEDICATIONS  (STANDING):  aspirin 325 milliGRAM(s) Oral daily  atorvastatin 80 milliGRAM(s) Oral at bedtime  chlorhexidine 4% Liquid 1 Application(s) Topical <User Schedule>  enoxaparin Injectable 40 milliGRAM(s) SubCutaneous every 24 hours  ferrous    sulfate 325 milliGRAM(s) Oral daily  furosemide    Tablet 40 milliGRAM(s) Oral daily  losartan 100 milliGRAM(s) Oral daily  metoprolol succinate ER 25 milliGRAM(s) Oral daily  spironolactone 50 milliGRAM(s) Oral daily    MEDICATIONS  (PRN):  acetaminophen   Tablet .. 650 milliGRAM(s) Oral every 6 hours PRN Mild Pain (1 - 3)      Vital Signs Last 24 Hrs  T(C): 36.8 (26 Apr 2019 13:14), Max: 36.8 (26 Apr 2019 13:14)  T(F): 98.3 (26 Apr 2019 13:14), Max: 98.3 (26 Apr 2019 13:14)  HR: 60 (26 Apr 2019 13:14) (60 - 60)  BP: 113/58 (26 Apr 2019 13:14) (113/58 - 131/69)  BP(mean): --  RR: 18 (26 Apr 2019 13:14) (18 - 18)  SpO2: --    PHYSICAL EXAM:    GENERAL: In no apparent distress, well nourished, and hydrated.  HEAD:  Atraumatic, Normocephalic  EYES: EOMI, PERRLA, conjunctiva and sclera clear  ENMT: No tonsillar erythema, exudates, or enlargements; ist mucous membranes, Good dentition, No lesions  NECK: Supple and normal thyroid.  No JVD or carotid bruit.  Carotid pulse is 2+ bilaterally.  HEART: Regular rate and rhythm; No murmurs, rubs, or gallops.  PULMONARY: Clear to auscultation and perfusion.  No rales, wheezing, or rhonchi bilaterally.  ABDOMEN: Soft, Nontender, Nondistended; Bowel sounds present  EXTREMITIES:  2+ Peripheral Pulses, No clubbing, cyanosis, or edema  LYMPH: No lymphadenopathy noted  NEUROLOGICAL: Grossly nonfocal      INTERPRETATION OF TELEMETRY:    ECG:    I&O's Detail    25 Apr 2019 07:01  -  26 Apr 2019 07:00  --------------------------------------------------------  IN:  Total IN: 0 mL    OUT:    Voided: 1025 mL  Total OUT: 1025 mL    Total NET: -1025 mL      26 Apr 2019 07:01  -  26 Apr 2019 16:16  --------------------------------------------------------  IN:  Total IN: 0 mL    OUT:    Voided: 3 mL  Total OUT: 3 mL    Total NET: -3 mL          LABS:                        10.2   4.97  )-----------( 175      ( 26 Apr 2019 06:22 )             32.8     04-26    142  |  106  |  18  ----------------------------<  109<H>  3.7   |  24  |  1.1    Ca    9.4      26 Apr 2019 06:22  Mg     1.8     04-25    TPro  6.0  /  Alb  3.5  /  TBili  0.9  /  DBili  x   /  AST  27  /  ALT  16  /  AlkPhos  59  04-25    CARDIAC MARKERS ( 24 Apr 2019 17:43 )  x     / <0.01 ng/mL / x     / x     / x              BNP  I&O's Detail    25 Apr 2019 07:01  -  26 Apr 2019 07:00  --------------------------------------------------------  IN:  Total IN: 0 mL    OUT:    Voided: 1025 mL  Total OUT: 1025 mL    Total NET: -1025 mL      26 Apr 2019 07:01  -  26 Apr 2019 16:16  --------------------------------------------------------  IN:  Total IN: 0 mL    OUT:    Voided: 3 mL  Total OUT: 3 mL    Total NET: -3 mL        Daily     Daily     RADIOLOGY & ADDITIONAL STUDIES: Patient is a 84y old  Male who presents with a chief complaint of Confusion (26 Apr 2019 14:44)        HPI:  84M with pmhx of A-Fib s/p Watchman and AICD (not on A/C) Cirrhosis (Previous EtOH absue, sober for years now), CVA without residual symptoms, DM II, HTN, Mild Dementia, PTSD, Iron Deficiency anemia (no source identified after extensive work ups), Chronic Lower back pain secondary to Arthritis and DJD presents from home for increasing confusion and strange behavior. Patient was in his normal state of health prior yesterday. As per wife patient awoke and was mild confused but progressed throughout the day. Patient was also going to the bathroom frequently but not urinating, also stated the shower was not working correctly and arguing with sons. Patient was also having nonsensical speech. Son decided to call EMS to bring him to the hospital. Wife denied the patient had experienced CP, palpitations, n/v/d, fever, recent illness, LOC, or falls.   During Interview with patient remains confused. Will answer questions appropriately but often times will have tangential unrelated speech or difficulty comprehending. He was confused as to why he was in the hospital and wanted to leave during interview. Patient stated that he drove himself to the hospital and came because he had chest pain, left sided, radiating to left arm, not associated with SOB, palpitations, diaphoresis, n/v, or dizziness. He was unable to describe or rate the pain.  In Ed: T: 97.4        HR: 66          BP: 144/80          RR: 18, 98%  CTH Negative for acute changes   Neuro Consulted for Confusion but no code stroke called: NIHSS was 2 at presentation (24 Apr 2019 08:36)    Electrophysiology:  Pt is well known to Dr Do. Was recently seen in the office for reegular follow up on 4/22/19.  No complanins at that time  ICD was interrogated:  Device Check The patient is  pacemaker dependent.   Underlying Rhythm: complete heart block.   Device Type: Implantable cardioverter-defibrillator   Pacemaker/ICD : [employer].   Model: E110. Date of Implant: 16 Jun 2010.   Mode: VVIR. Rate: 60.   Battery Status: charge time was 9.8 sec seconds The estimated remaining battery life is 2 Years months.   Atrial: lead impedance was 539 ohms. sensing amplitude was A-Fib mv. Pacing Threshold: A-Fib V @.   Rt Ventricle:. lead impedance was 366 ohms. sensing amplitude was Paced @ 30 mv. Pacing Threshold: 1.4 V @ 0.5 ms.   Single Chamber: Pace V-Paced=99%%   Comments:. No episodes. Normal dual chamber ICD function. Patient monitor on and transmitting. RRT on and stable.     Patient was admitted to [facility] on 4/24 with above symptoms. CT head was negative, patient is awaiting  CTA.  Echo was positive for extensive valvular disease.  Summary:   1. Left ventricular ejection fraction, by visual estimation, is 45 to   50%.   2. Moderately increased LV wall thickness.   3. Moderately enlarged right ventricle.   4. Severe mitral valve regurgitation.   5. Moderate-severe tricuspid regurgitation.   6. Sclerotic aortic valve with decreased opening.   7. Estimated pulmonary artery systolic pressure is 61.1 mmHg assuming a   right atrial pressure of 10 mmHg, which is consistent with severe   pulmonary hypertension.   8. Mitral valve mean gradient is 1.8 mmHg consistent with normal mitral   stenosis.    Since admission, symptoms resolved. Denies palpitations, lightheadedness (except above), SOB, chest discomfort      REVIEW OF SYSTEMS    [x] A ten-point review of systems was otherwise negative except as noted.        PAST MEDICAL & SURGICAL HISTORY:  Mild dementia  Chronic right-sided heart failure  Depression  Liver cirrhosis, alcoholic  Borderline diabetes mellitus  HTN (hypertension)  Afib  Presence of Watchman left atrial appendage closure device  AICD (automatic cardioverter/defibrillator) present  History of cholecystectomy  History of appendectomy  History of knee surgery  Deviated nasal septum      Home Medications:  aspirin 325 mg oral tablet: 1 tab(s) orally once a day (24 Apr 2019 09:10)  donepezil 10 mg oral tablet: 1 tab(s) orally once a day (at bedtime) (24 Apr 2019 09:10)  ferrous fumarate 325 mg (106 mg elemental iron) oral tablet: 1 tab(s) orally once a day (24 Apr 2019 09:10)  losartan 100 mg oral tablet: 1 tab(s) orally once a day (24 Apr 2019 09:10)  metoprolol succinate 25 mg oral tablet, extended release: 1 tab(s) orally once a day (24 Apr 2019 09:10)  mirtazapine 30 mg oral tablet: 1 tab(s) orally once a day (at bedtime) (24 Apr 2019 09:10)  spironolactone 50 mg oral tablet: 1 tab(s) orally once a day (24 Apr 2019 09:10)  traMADol 50 mg oral tablet: 1 tab(s) orally every 6 hours, As Needed (24 Apr 2019 09:10)      Allergies:    No Known Allergies      PREVIOUS DIAGNOSTIC TESTING:      ECHO  FINDINGS: as above    STRESS  FINDINGS:    CATHETERIZATION  FINDINGS:    ELECTROPHYSIOLOGY STUDY  FINDINGS:    CAROTID ULTRASOUND:  FINDINGS    VENOUS DUPLEX SCAN:  FINDINGS:    CHEST CT PULMONARY ANGIO with IV Contrast:  FINDINGS:    FAMILY HISTORY:  Family history of throat cancer (Sibling)  Family history of diabetes mellitus (Mother)      SOCIAL HISTORY:    CIGARETTES: d/c 20yrs ago    ALCOHOL: d/c 6yrs ago    MEDICATIONS  (STANDING):  aspirin 325 milliGRAM(s) Oral daily  atorvastatin 80 milliGRAM(s) Oral at bedtime  chlorhexidine 4% Liquid 1 Application(s) Topical <User Schedule>  enoxaparin Injectable 40 milliGRAM(s) SubCutaneous every 24 hours  ferrous    sulfate 325 milliGRAM(s) Oral daily  furosemide    Tablet 40 milliGRAM(s) Oral daily  losartan 100 milliGRAM(s) Oral daily  metoprolol succinate ER 25 milliGRAM(s) Oral daily  spironolactone 50 milliGRAM(s) Oral daily    MEDICATIONS  (PRN):  acetaminophen   Tablet .. 650 milliGRAM(s) Oral every 6 hours PRN Mild Pain (1 - 3)      Vital Signs Last 24 Hrs  T(C): 36.8 (26 Apr 2019 13:14), Max: 36.8 (26 Apr 2019 13:14)  T(F): 98.3 (26 Apr 2019 13:14), Max: 98.3 (26 Apr 2019 13:14)  HR: 60 (26 Apr 2019 13:14) (60 - 60)  BP: 113/58 (26 Apr 2019 13:14) (113/58 - 131/69)  BP(mean): --  RR: 18 (26 Apr 2019 13:14) (18 - 18)  SpO2: --    PHYSICAL EXAM:    GENERAL: In no apparent distress, well nourished, and hydrated.  HEAD:  Atraumatic, Normocephalic  EYES: EOMI, PERRLA, conjunctiva and sclera clear  NECK: Supple and normal thyroid.  No JVD or carotid bruit.  Carotid pulse is 2+ bilaterally.  HEART: Regular rate and rhythm; 3/6 SM at LSP to apex, No rubs, or gallops.  PULMONARY: Clear to auscultation and perfusion.  No rales, wheezing, or rhonchi bilaterally.  ABDOMEN: Soft, Nontender, Nondistended; Bowel sounds present  EXTREMITIES:  2+ Peripheral Pulses, No clubbing, cyanosis, or edema  LYMPH: No lymphadenopathy noted  NEUROLOGICAL: Grossly nonfocal      INTERPRETATION OF TELEMETRY:    ECG:  < from: 12 Lead ECG (04.24.19 @ 01:40) >  Diagnosis Line Ventricular-paced rhythm  Abnormal ECG    < end of copied text >      I&O's Detail    25 Apr 2019 07:01  -  26 Apr 2019 07:00  --------------------------------------------------------  IN:  Total IN: 0 mL    OUT:    Voided: 1025 mL  Total OUT: 1025 mL    Total NET: -1025 mL      26 Apr 2019 07:01  -  26 Apr 2019 16:16  --------------------------------------------------------  IN:  Total IN: 0 mL    OUT:    Voided: 3 mL  Total OUT: 3 mL    Total NET: -3 mL          LABS:                        10.2   4.97  )-----------( 175      ( 26 Apr 2019 06:22 )             32.8     04-26    142  |  106  |  18  ----------------------------<  109<H>  3.7   |  24  |  1.1    Ca    9.4      26 Apr 2019 06:22  Mg     1.8     04-25    TPro  6.0  /  Alb  3.5  /  TBili  0.9  /  DBili  x   /  AST  27  /  ALT  16  /  AlkPhos  59  04-25    CARDIAC MARKERS ( 24 Apr 2019 17:43 )  x     / <0.01 ng/mL / x     / x     / x              BNP  I&O's Detail    25 Apr 2019 07:01  -  26 Apr 2019 07:00  --------------------------------------------------------  IN:  Total IN: 0 mL    OUT:    Voided: 1025 mL  Total OUT: 1025 mL    Total NET: -1025 mL      26 Apr 2019 07:01  -  26 Apr 2019 16:16  --------------------------------------------------------  IN:  Total IN: 0 mL    OUT:    Voided: 3 mL  Total OUT: 3 mL    Total NET: -3 mL        Daily     Daily     < from: CT Head No Cont (04.24.19 @ 00:33) >  No CT evidence of acute intracranial pathology.     Chronic microvascular ischemic changes and chronic left frontal lobe   infarction.    < end of copied text >    < from: VA Duplex CarotidMassiel (04.24.19 @ 10:20) >  20-39% stenosis of the right internal carotid artery.    20-39% stenosis of the left internal carotid artery.    < end of copied text >    RADIOLOGY & ADDITIONAL STUDIES: Patient is a 84y old  Male who presents with a chief complaint of Confusion (26 Apr 2019 14:44)        HPI:  84M with pmhx of A-Fib s/p Watchman and AICD (not on A/C) Cirrhosis (Previous EtOH absue, sober for years now), CVA without residual symptoms, DM II, HTN, Mild Dementia, PTSD, Iron Deficiency anemia (no source identified after extensive work ups), Chronic Lower back pain secondary to Arthritis and DJD presents from home for increasing confusion and strange behavior. Patient was in his normal state of health prior yesterday. As per wife patient awoke and was mild confused but progressed throughout the day. Patient was also going to the bathroom frequently but not urinating, also stated the shower was not working correctly and arguing with sons. Patient was also having nonsensical speech. Son decided to call EMS to bring him to the hospital. Wife denied the patient had experienced CP, palpitations, n/v/d, fever, recent illness, LOC, or falls.   During Interview with patient remains confused. Will answer questions appropriately but often times will have tangential unrelated speech or difficulty comprehending. He was confused as to why he was in the hospital and wanted to leave during interview. Patient stated that he drove himself to the hospital and came because he had chest pain, left sided, radiating to left arm, not associated with SOB, palpitations, diaphoresis, n/v, or dizziness. He was unable to describe or rate the pain.  In Ed: T: 97.4        HR: 66          BP: 144/80          RR: 18, 98%  CTH Negative for acute changes   Neuro Consulted for Confusion but no code stroke called: NIHSS was 2 at presentation (24 Apr 2019 08:36)    Electrophysiology:  Pt is well known to Dr Do. Was recently seen in the office for reegular follow up on 4/22/19.  No complanins at that time  ICD was interrogated:  Device Check The patient is  pacemaker dependent.   Underlying Rhythm: complete heart block.   Device Type: Implantable cardioverter-defibrillator   Pacemaker/ICD : [employer].   Model: E110. Date of Implant: 16 Jun 2010.   Mode: VVIR. Rate: 60.   Battery Status: charge time was 9.8 sec seconds The estimated remaining battery life is 2 Years months.   Atrial: lead impedance was 539 ohms. sensing amplitude was A-Fib mv. Pacing Threshold: A-Fib V @.   Rt Ventricle:. lead impedance was 366 ohms. sensing amplitude was Paced @ 30 mv. Pacing Threshold: 1.4 V @ 0.5 ms.   Single Chamber: Pace V-Paced=99%%   Comments:. No episodes. Normal dual chamber ICD function. Patient monitor on and transmitting. RRT on and stable.     Patient was admitted to [facility] on 4/24 with above symptoms. CT head was negative, patient is awaiting  CTA.  Echo was positive for extensive valvular disease.  Summary:   1. Left ventricular ejection fraction, by visual estimation, is 45 to   50%.   2. Moderately increased LV wall thickness.   3. Moderately enlarged right ventricle.   4. Severe mitral valve regurgitation.   5. Moderate-severe tricuspid regurgitation.   6. Sclerotic aortic valve with decreased opening.   7. Estimated pulmonary artery systolic pressure is 61.1 mmHg assuming a   right atrial pressure of 10 mmHg, which is consistent with severe   pulmonary hypertension.   8. Mitral valve mean gradient is 1.8 mmHg consistent with normal mitral   stenosis.    Since admission, symptoms resolved. Denies palpitations, lightheadedness (except above), SOB, chest discomfort    Prior Echo  < from: Transthoracic Echocardiogram (03.03.16 @ 09:29) >  1. Moderate mitral regurgitation.  2. Severely dilated left atrium.  LA volume index = 104  cc/m2.  3. Left ventricular enlargement.  4. Normal left ventricular systolic function. No segmental  wall motion abnormalities. Septal motion is consistent with  RV pacing.  5. Severe right atrial enlargement.  6. Right ventricular enlargementwith decreased right  ventricular systolic function.  7. Estimated pulmonary artery systolic pressure equals 38  mm Hg, assuming right atrial pressure equals 8 mm Hg,  consistent with borderline pulmonary pressures.    < end of copied text >      REVIEW OF SYSTEMS    [x] A ten-point review of systems was otherwise negative except as noted.        PAST MEDICAL & SURGICAL HISTORY:  Mild dementia  Chronic right-sided heart failure  Depression  Liver cirrhosis, alcoholic  Borderline diabetes mellitus  HTN (hypertension)  Afib  Presence of Watchman left atrial appendage closure device  AICD (automatic cardioverter/defibrillator) present  History of cholecystectomy  History of appendectomy  History of knee surgery  Deviated nasal septum      Home Medications:  aspirin 325 mg oral tablet: 1 tab(s) orally once a day (24 Apr 2019 09:10)  donepezil 10 mg oral tablet: 1 tab(s) orally once a day (at bedtime) (24 Apr 2019 09:10)  ferrous fumarate 325 mg (106 mg elemental iron) oral tablet: 1 tab(s) orally once a day (24 Apr 2019 09:10)  losartan 100 mg oral tablet: 1 tab(s) orally once a day (24 Apr 2019 09:10)  metoprolol succinate 25 mg oral tablet, extended release: 1 tab(s) orally once a day (24 Apr 2019 09:10)  mirtazapine 30 mg oral tablet: 1 tab(s) orally once a day (at bedtime) (24 Apr 2019 09:10)  spironolactone 50 mg oral tablet: 1 tab(s) orally once a day (24 Apr 2019 09:10)  traMADol 50 mg oral tablet: 1 tab(s) orally every 6 hours, As Needed (24 Apr 2019 09:10)      Allergies:    No Known Allergies      PREVIOUS DIAGNOSTIC TESTING:      ECHO  FINDINGS: as above    STRESS  FINDINGS:    CATHETERIZATION  FINDINGS:    ELECTROPHYSIOLOGY STUDY  FINDINGS:    CAROTID ULTRASOUND:  FINDINGS    VENOUS DUPLEX SCAN:  FINDINGS:    CHEST CT PULMONARY ANGIO with IV Contrast:  FINDINGS:    FAMILY HISTORY:  Family history of throat cancer (Sibling)  Family history of diabetes mellitus (Mother)      SOCIAL HISTORY:    CIGARETTES: d/c 20yrs ago    ALCOHOL: d/c 6yrs ago    MEDICATIONS  (STANDING):  aspirin 325 milliGRAM(s) Oral daily  atorvastatin 80 milliGRAM(s) Oral at bedtime  chlorhexidine 4% Liquid 1 Application(s) Topical <User Schedule>  enoxaparin Injectable 40 milliGRAM(s) SubCutaneous every 24 hours  ferrous    sulfate 325 milliGRAM(s) Oral daily  furosemide    Tablet 40 milliGRAM(s) Oral daily  losartan 100 milliGRAM(s) Oral daily  metoprolol succinate ER 25 milliGRAM(s) Oral daily  spironolactone 50 milliGRAM(s) Oral daily    MEDICATIONS  (PRN):  acetaminophen   Tablet .. 650 milliGRAM(s) Oral every 6 hours PRN Mild Pain (1 - 3)      Vital Signs Last 24 Hrs  T(C): 36.8 (26 Apr 2019 13:14), Max: 36.8 (26 Apr 2019 13:14)  T(F): 98.3 (26 Apr 2019 13:14), Max: 98.3 (26 Apr 2019 13:14)  HR: 60 (26 Apr 2019 13:14) (60 - 60)  BP: 113/58 (26 Apr 2019 13:14) (113/58 - 131/69)  BP(mean): --  RR: 18 (26 Apr 2019 13:14) (18 - 18)  SpO2: --    PHYSICAL EXAM:    GENERAL: In no apparent distress, well nourished, and hydrated.  HEAD:  Atraumatic, Normocephalic  EYES: EOMI, PERRLA, conjunctiva and sclera clear  NECK: Supple and normal thyroid.  No JVD or carotid bruit.  Carotid pulse is 2+ bilaterally.  HEART: Regular rate and rhythm; 3/6 SM at LSP to apex, No rubs, or gallops.  PULMONARY: Clear to auscultation and perfusion.  No rales, wheezing, or rhonchi bilaterally.  ABDOMEN: Soft, Nontender, Nondistended; Bowel sounds present  EXTREMITIES:  2+ Peripheral Pulses, No clubbing, cyanosis, or edema  LYMPH: No lymphadenopathy noted  NEUROLOGICAL: Grossly nonfocal      INTERPRETATION OF TELEMETRY:    ECG:  < from: 12 Lead ECG (04.24.19 @ 01:40) >  Diagnosis Line Ventricular-paced rhythm  Abnormal ECG    < end of copied text >      I&O's Detail    25 Apr 2019 07:01  -  26 Apr 2019 07:00  --------------------------------------------------------  IN:  Total IN: 0 mL    OUT:    Voided: 1025 mL  Total OUT: 1025 mL    Total NET: -1025 mL      26 Apr 2019 07:01  -  26 Apr 2019 16:16  --------------------------------------------------------  IN:  Total IN: 0 mL    OUT:    Voided: 3 mL  Total OUT: 3 mL    Total NET: -3 mL          LABS:                        10.2   4.97  )-----------( 175      ( 26 Apr 2019 06:22 )             32.8     04-26    142  |  106  |  18  ----------------------------<  109<H>  3.7   |  24  |  1.1    Ca    9.4      26 Apr 2019 06:22  Mg     1.8     04-25    TPro  6.0  /  Alb  3.5  /  TBili  0.9  /  DBili  x   /  AST  27  /  ALT  16  /  AlkPhos  59  04-25    CARDIAC MARKERS ( 24 Apr 2019 17:43 )  x     / <0.01 ng/mL / x     / x     / x              BNP  I&O's Detail    25 Apr 2019 07:01  -  26 Apr 2019 07:00  --------------------------------------------------------  IN:  Total IN: 0 mL    OUT:    Voided: 1025 mL  Total OUT: 1025 mL    Total NET: -1025 mL      26 Apr 2019 07:01  -  26 Apr 2019 16:16  --------------------------------------------------------  IN:  Total IN: 0 mL    OUT:    Voided: 3 mL  Total OUT: 3 mL    Total NET: -3 mL        Daily     Daily     < from: CT Head No Cont (04.24.19 @ 00:33) >  No CT evidence of acute intracranial pathology.     Chronic microvascular ischemic changes and chronic left frontal lobe   infarction.    < end of copied text >    < from: VA Duplex Carotid, [place] (04.24.19 @ 10:20) >  20-39% stenosis of the right internal carotid artery.    20-39% stenosis of the left internal carotid artery.    < end of copied text >    RADIOLOGY & ADDITIONAL STUDIES:

## 2019-04-26 NOTE — DISCHARGE NOTE PROVIDER - NSDCCPCAREPLAN_GEN_ALL_CORE_FT
PRINCIPAL DISCHARGE DIAGNOSIS  Diagnosis: Neurological deficit present  Assessment and Plan of Treatment: Please continue medications as above, and follow up with Dr. Huston within 2 weeks of discharge.      SECONDARY DISCHARGE DIAGNOSES  Diagnosis: Diabetes mellitus  Assessment and Plan of Treatment: Please continue medications as above and eat a low sugar, low carbohydrate diet.    Diagnosis: Atrial fibrillation  Assessment and Plan of Treatment: Please follow upwith Dr. Do within 3-4 weeks. Please continue medications as abaove.

## 2019-04-26 NOTE — DISCHARGE NOTE PROVIDER - CARE PROVIDERS DIRECT ADDRESSES
[email or web address]

## 2019-04-26 NOTE — PROGRESS NOTE ADULT - ASSESSMENT
confusion  tia/cva vs metabolic encephalopathy    asa/statin  carotid duplex  eeg  ct angio  eps dr zuñiga  disc ussed with son

## 2019-04-26 NOTE — DISCHARGE NOTE PROVIDER - HOSPITAL COURSE
84M with pmhx of A-Fib s/p Watchman and AICD (not on A/C) Cirrhosis (Previous EtOH abuse sober for years now), CVA without residual symptoms, DM II, HTN, Mild Dementia, PTSD, Iron Deficiency anemia (no source identified after extensive work ups), Chronic Lower back pain secondary to Arthritis and DJD presents from home for increasing confusion and strange behavior.             #) Confusion possibly secondary to stroke    -tx to stroke unit per neuro.    -ppm not MRI compatible    -CT H chronic microvascular changes and chornic L frontal lobe infarction.     -f/u CT head repeat, CTA H&N    -TSH 0.65 , B12 1820 , Folate 14.3 , and Ammonia 30 (hx of cirrhosis) all wnl    -Holding tramadol     -UA negative, UCx and BCx negative    -Continue with home     -c/w plavis    -resume donepezil    -c/w lipitor 80    -REEG borderline generalized slowing.     -Echo with severe MR, severe pulm htn     -PT/Rehab     Speech and Swallow eval        #)A-Fib: Currently ventricularly paced     -No A/C s/p watchman     -Cont metoprolol     -f/u EP    -possible JL on Monday to r/o intracardiac thrombus        #)DM II: Diet controlled at home    -Hemoglobin A1C, Whole Blood: 6.4:        #)HTN    -Cont losartan         #)PTSD    -Cont Mirtazepine         #)JEREMY    - no clinical evidence of bleeding    -Continue Iron         #GI PPX: Not indicated    #DVT ppx: Lovenox    Full code     Diet: DASH carb consistent dysphagia 3    Activity: As tolerated 84M with pmhx of A-Fib s/p Watchman and AICD (not on A/C) Cirrhosis (Previous EtOH abuse sober for years now), CVA without residual symptoms, DM II, HTN, Mild Dementia, PTSD, Iron Deficiency anemia (no source identified after extensive work ups), Chronic Lower back pain secondary to Arthritis and DJD presents from home for increasing confusion and strange behavior. Confusion was possibly secondary to stroke. Patient was evaluated by neuro and transferred to stroke unit forworkup. His ppm was not MRI compatible. CT H showed chronic microvascular changes and chornic L frontal lobe infarction,and was repeated, along with CTA H&N. TSH 0.65 , B12 1820 , Folate 14.3 , and Ammonia 30 (hx of cirrhosis) all wnl. REEG borderline generalized slowing. Echo with severe MR, severe pulm htn. Due to hx of A-Fib s/p watchman, with possible new stroke,EP was consulted. 84M with pmhx of A-Fib s/p Watchman and AICD (not on A/C) Cirrhosis (Previous EtOH abuse sober for years now), CVA without residual symptoms, DM II, HTN, Mild Dementia, PTSD, Iron Deficiency anemia (no source identified after extensive work ups), Chronic Lower back pain secondary to Arthritis and DJD presents from home for increasing confusion and strange behavior. Confusion was possibly secondary to stroke. Patient was evaluated by neuro and transferred to stroke unit forworkup. His ppm was not MRI compatible. CT H showed chronic microvascular changes and chornic L frontal lobe infarction,and was repeated (stable), along with CTA H&N (normal). TSH 0.65 , B12 1820 , Folate 14.3 , and Ammonia 30 (hx of cirrhosis) all wnl. REEG borderline generalized slowing. Echo with severe MR, severe pulm htn. Due to hx of A-Fib s/p watchman, with possible new stroke,EP was consulted. Recommended JL to evaluate watchman, saw Watchman appendage occluder device in place. No evidence of thrombus or residual pilo-device flow. Cleared for discharge, will follow up with neurology and cardiology after discharge.

## 2019-04-26 NOTE — PROGRESS NOTE ADULT - SUBJECTIVE AND OBJECTIVE BOX
Patient was seen and examined. Spoke with RN. Chart reviewed.  comf, wife at bedside, extensive discussion,    No events overnight.  Vital Signs Last 24 Hrs  T(F): 97.1 (26 Apr 2019 05:15), Max: 97.9 (25 Apr 2019 21:47)  HR: 60 (26 Apr 2019 05:15) (60 - 60)  BP: 131/69 (26 Apr 2019 05:15) (130/69 - 148/70)  SpO2: --  MEDICATIONS  (STANDING):  aspirin 325 milliGRAM(s) Oral daily  atorvastatin 80 milliGRAM(s) Oral at bedtime  chlorhexidine 4% Liquid 1 Application(s) Topical <User Schedule>  enoxaparin Injectable 40 milliGRAM(s) SubCutaneous every 24 hours  ferrous    sulfate 325 milliGRAM(s) Oral daily  furosemide    Tablet 40 milliGRAM(s) Oral daily  losartan 100 milliGRAM(s) Oral daily  metoprolol succinate ER 25 milliGRAM(s) Oral daily  spironolactone 50 milliGRAM(s) Oral daily    MEDICATIONS  (PRN):  acetaminophen   Tablet .. 650 milliGRAM(s) Oral every 6 hours PRN Mild Pain (1 - 3)    Labs:                        10.2   4.97  )-----------( 175      ( 26 Apr 2019 06:22 )             32.8                         9.6    5.12  )-----------( 153      ( 25 Apr 2019 06:18 )             31.4     26 Apr 2019 06:22    142    |  106    |  18     ----------------------------<  109    3.7     |  24     |  1.1    25 Apr 2019 06:18    141    |  107    |  20     ----------------------------<  89     3.3     |  23     |  1.2      Ca    9.4        26 Apr 2019 06:22  Ca    8.9        25 Apr 2019 06:18  Mg     1.8       25 Apr 2019 06:18    TPro  6.0    /  Alb  3.5    /  TBili  0.9    /  DBili  x      /  AST  27     /  ALT  16     /  AlkPhos  59     25 Apr 2019 06:18          Culture - Blood (collected 24 Apr 2019 17:43)  Source: .Blood None  Preliminary Report (26 Apr 2019 02:04):    No growth to date.    Culture - Urine (collected 23 Apr 2019 22:29)  Source: .Urine Clean Catch (Midstream)  Final Report (25 Apr 2019 12:48):    <10,000 CFU/mL Normal Urogenital Mayelin        Radiology:    General: comfortable, NAD  Neurology: A&Ox1 nonfocal  Head:  Normocephalic, atraumatic  ENT:  Mucosa moist, no ulcerations  Neck:  Supple, no JVD,   Skin: no breakdowns (as per RN)  Resp: CTA B/L  CV: RRR, S1S2,   GI: Soft, NT, bowel sounds  MS: No edema, + peripheral pulses, FROM all 4 extremity

## 2019-04-26 NOTE — PROGRESS NOTE ADULT - SUBJECTIVE AND OBJECTIVE BOX
Neurology Progress Note    Interval History:    Pt seen today in f/u for acute stroke symptoms.  He has atrial fibrillation history and prior placement of watchman device.  CT head did not confirm acute stroke.  He had trouble with speech/language and some facial asymmetry.  Symptoms have improved but not fully resolved.    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 enlarged basilar tip.    Vital Signs Last 24 Hrs  T(C): 36.8 (26 Apr 2019 13:14), Max: 36.8 (26 Apr 2019 13:14)  T(F): 98.3 (26 Apr 2019 13:14), Max: 98.3 (26 Apr 2019 13:14)  HR: 60 (26 Apr 2019 13:14) (60 - 60)  BP: 113/58 (26 Apr 2019 13:14) (113/58 - 131/69)  BP(mean): --  RR: 18 (26 Apr 2019 13:14) (18 - 18)  SpO2: --    Neurological Exam:   Patient has mild memory loss and was previously treated with donepezil.  Mild facial asymmetry is present.  Patient did well for me today with naming, mild difficulty with scene description.  Extremities are strong.    Home Medications:  aspirin 325 mg oral tablet: 1 tab(s) orally once a day (24 Apr 2019 09:10)  donepezil 10 mg oral tablet: 1 tab(s) orally once a day (at bedtime) (24 Apr 2019 09:10)  ferrous fumarate 325 mg (106 mg elemental iron) oral tablet: 1 tab(s) orally once a day (24 Apr 2019 09:10)  losartan 100 mg oral tablet: 1 tab(s) orally once a day (24 Apr 2019 09:10)  metoprolol succinate 25 mg oral tablet, extended release: 1 tab(s) orally once a day (24 Apr 2019 09:10)  mirtazapine 30 mg oral tablet: 1 tab(s) orally once a day (at bedtime) (24 Apr 2019 09:10)  spironolactone 50 mg oral tablet: 1 tab(s) orally once a day (24 Apr 2019 09:10)  traMADol 50 mg oral tablet: 1 tab(s) orally every 6 hours, As Needed (24 Apr 2019 09:10)    MEDICATIONS  (STANDING):  aspirin 325 milliGRAM(s) Oral daily  atorvastatin 80 milliGRAM(s) Oral at bedtime  chlorhexidine 4% Liquid 1 Application(s) Topical <User Schedule>  enoxaparin Injectable 40 milliGRAM(s) SubCutaneous every 24 hours  ferrous    sulfate 325 milliGRAM(s) Oral daily  furosemide    Tablet 40 milliGRAM(s) Oral daily  losartan 100 milliGRAM(s) Oral daily  metoprolol succinate ER 25 milliGRAM(s) Oral daily  spironolactone 50 milliGRAM(s) Oral daily      Labs:  CBC Full  -  ( 26 Apr 2019 06:22 )  WBC Count : 4.97 K/uL  RBC Count : 3.80 M/uL  Hemoglobin : 10.2 g/dL  Hematocrit : 32.8 %  Platelet Count - Automated : 175 K/uL  Mean Cell Volume : 86.3 fL  Mean Cell Hemoglobin : 26.8 pg  Mean Cell Hemoglobin Concentration : 31.1 g/dL  Auto Neutrophil # : 3.25 K/uL  Auto Lymphocyte # : 0.73 K/uL  Auto Monocyte # : 0.84 K/uL  Auto Eosinophil # : 0.13 K/uL  Auto Basophil # : 0.01 K/uL  Auto Neutrophil % : 65.4 %  Auto Lymphocyte % : 14.7 %  Auto Monocyte % : 16.9 %  Auto Eosinophil % : 2.6 %  Auto Basophil % : 0.2 %    04-26    142  |  106  |  18  ----------------------------<  109<H>  3.7   |  24  |  1.1    Ca    9.4      26 Apr 2019 06:22  Mg     1.8     04-25    TPro  6.0  /  Alb  3.5  /  TBili  0.9  /  DBili  x   /  AST  27  /  ALT  16  /  AlkPhos  59  04-25    LIVER FUNCTIONS - ( 25 Apr 2019 06:18 )  Alb: 3.5 g/dL / Pro: 6.0 g/dL / ALK PHOS: 59 U/L / ALT: 16 U/L / AST: 27 U/L / GGT: x           < from: Transthoracic Echocardiogram (04.24.19 @ 11:14) >   1. Left ventricular ejection fraction, by visual estimation, is 45 to   50%.   2. Moderately increased LV wall thickness.   3. Moderately enlarged right ventricle.  4. Severe mitral valve regurgitation.   5. Moderate-severe tricuspid regurgitation.   6. Sclerotic aortic valve with decreased opening.   7. Estimated pulmonary artery systolic pressure is 61.1 mmHg assuming a   right atrial pressure of 10 mmHg, which is consistent with severe   pulmonary hypertension.   8. Mitral valve mean gradient is 1.8 mmHg consistent with normal mitral   stenosis.    < end of copied text >    < from: VA Duplex Carotid, Bilat (04.24.19 @ 10:20) >  The right vertebral arterial flow was antegrade.    The left vertebral arterial flow was antegrade.    Impression:    20-39% stenosis of the right internal carotid artery.    20-39% stenosis of the left internal carotid artery.    < end of copied text >      Assessment:  This is a 84y Male w/ h/o mild dementia experiencing acute stroke symptoms though no acute findings seen on head CT.  Due to pacemaker MRI not performed.  Has ? enlargement of basilar tip on head CT so needs f/u with CT angiogram head and neck.  Stroke may be embolic from aortic arch or even peripheral veins, though no bubble study  reported to confirm a shunt.  Intracranial thrombotic disease is also possible.    Plan:  #1.  Continue aspirin and plavix.  #2.  Resume donepezil 10 mg/day.  #3.  CT angiogram head and neck.  #4.  Echo with bubble study.  #5.  Speech therapy. Neurology Progress Note    Interval History:    Pt seen today in f/u for acute stroke symptoms.  He has atrial fibrillation history and prior placement of watchman device.  CT head did not confirm acute stroke.  He had trouble with speech/language and some facial asymmetry.  Symptoms have improved but not fully resolved.    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 enlarged basilar tip.    Vital Signs Last 24 Hrs  T(C): 36.8 (26 Apr 2019 13:14), Max: 36.8 (26 Apr 2019 13:14)  T(F): 98.3 (26 Apr 2019 13:14), Max: 98.3 (26 Apr 2019 13:14)  HR: 60 (26 Apr 2019 13:14) (60 - 60)  BP: 113/58 (26 Apr 2019 13:14) (113/58 - 131/69)  BP(mean): --  RR: 18 (26 Apr 2019 13:14) (18 - 18)  SpO2: --    Neurological Exam:   Patient has mild memory loss and was previously treated with donepezil.  +/- Mild facial asymmetry is present.  Patient did well for me today with naming, mild difficulty with scene description.  Extremities are strong.    Home Medications:  aspirin 325 mg oral tablet: 1 tab(s) orally once a day (24 Apr 2019 09:10)  donepezil 10 mg oral tablet: 1 tab(s) orally once a day (at bedtime) (24 Apr 2019 09:10)  ferrous fumarate 325 mg (106 mg elemental iron) oral tablet: 1 tab(s) orally once a day (24 Apr 2019 09:10)  losartan 100 mg oral tablet: 1 tab(s) orally once a day (24 Apr 2019 09:10)  metoprolol succinate 25 mg oral tablet, extended release: 1 tab(s) orally once a day (24 Apr 2019 09:10)  mirtazapine 30 mg oral tablet: 1 tab(s) orally once a day (at bedtime) (24 Apr 2019 09:10)  spironolactone 50 mg oral tablet: 1 tab(s) orally once a day (24 Apr 2019 09:10)  traMADol 50 mg oral tablet: 1 tab(s) orally every 6 hours, As Needed (24 Apr 2019 09:10)    MEDICATIONS  (STANDING):  aspirin 325 milliGRAM(s) Oral daily  atorvastatin 80 milliGRAM(s) Oral at bedtime  chlorhexidine 4% Liquid 1 Application(s) Topical <User Schedule>  enoxaparin Injectable 40 milliGRAM(s) SubCutaneous every 24 hours  ferrous    sulfate 325 milliGRAM(s) Oral daily  furosemide    Tablet 40 milliGRAM(s) Oral daily  losartan 100 milliGRAM(s) Oral daily  metoprolol succinate ER 25 milliGRAM(s) Oral daily  spironolactone 50 milliGRAM(s) Oral daily      Labs:  CBC Full  -  ( 26 Apr 2019 06:22 )  WBC Count : 4.97 K/uL  RBC Count : 3.80 M/uL  Hemoglobin : 10.2 g/dL  Hematocrit : 32.8 %  Platelet Count - Automated : 175 K/uL  Mean Cell Volume : 86.3 fL  Mean Cell Hemoglobin : 26.8 pg  Mean Cell Hemoglobin Concentration : 31.1 g/dL  Auto Neutrophil # : 3.25 K/uL  Auto Lymphocyte # : 0.73 K/uL  Auto Monocyte # : 0.84 K/uL  Auto Eosinophil # : 0.13 K/uL  Auto Basophil # : 0.01 K/uL  Auto Neutrophil % : 65.4 %  Auto Lymphocyte % : 14.7 %  Auto Monocyte % : 16.9 %  Auto Eosinophil % : 2.6 %  Auto Basophil % : 0.2 %    04-26    142  |  106  |  18  ----------------------------<  109<H>  3.7   |  24  |  1.1    Ca    9.4      26 Apr 2019 06:22  Mg     1.8     04-25    TPro  6.0  /  Alb  3.5  /  TBili  0.9  /  DBili  x   /  AST  27  /  ALT  16  /  AlkPhos  59  04-25    LIVER FUNCTIONS - ( 25 Apr 2019 06:18 )  Alb: 3.5 g/dL / Pro: 6.0 g/dL / ALK PHOS: 59 U/L / ALT: 16 U/L / AST: 27 U/L / GGT: x           < from: Transthoracic Echocardiogram (04.24.19 @ 11:14) >   1. Left ventricular ejection fraction, by visual estimation, is 45 to   50%.   2. Moderately increased LV wall thickness.   3. Moderately enlarged right ventricle.  4. Severe mitral valve regurgitation.   5. Moderate-severe tricuspid regurgitation.   6. Sclerotic aortic valve with decreased opening.   7. Estimated pulmonary artery systolic pressure is 61.1 mmHg assuming a   right atrial pressure of 10 mmHg, which is consistent with severe   pulmonary hypertension.   8. Mitral valve mean gradient is 1.8 mmHg consistent with normal mitral   stenosis.    < end of copied text >    < from: VA Duplex Carotid, Bilat (04.24.19 @ 10:20) >  The right vertebral arterial flow was antegrade.    The left vertebral arterial flow was antegrade.    Impression:    20-39% stenosis of the right internal carotid artery.    20-39% stenosis of the left internal carotid artery.    < end of copied text >    < from: EEG (04.25.19 @ 14:00) >    IMPRESSIONS  This is a borderline abnormal routine EEG due to the presence of   borderline generalized slowing      CLINICAL CORRELATION  Consistent feet borderline diffuse cerebral electrophysiological   dysfunction secondary to none specific cause    < end of copied text >    Assessment:  This is a 84y Male w/ h/o mild dementia experiencing acute stroke symptoms though no acute findings seen on head CT.  Due to pacemaker MRI not performed.  Has ? enlargement of basilar tip on head CT so needs f/u with CT angiogram head and neck.  Stroke may be embolic from aortic arch or even peripheral veins, though no bubble study  reported to confirm a shunt.  Intracranial thrombotic disease is also possible.  Another possibility would be medication side effect for example from ultram or restoril.    Plan:  #1.  Continue aspirin and plavix.  #2.  Resume donepezil 10 mg/day.  #3.  CT angiogram head and neck.  #4.  Echo with bubble study.  #5.  Speech therapy.

## 2019-04-26 NOTE — CONSULT NOTE ADULT - ASSESSMENT
83 y/o right handed male with PMH of PPM /AICD  HTN  , High chol, afib  presented for acute onset confusion since 3 pm yesterday. Patient is fully functional at baseline, he drives and takes care of all his daily routine activities. Patient noted to have receptive aphasia. NIHSS 2  mRs 2.     R/O   1. CVA  2. Toxic metabolic encephalopathy  3. seizure    Plan  Admit to telemetry  Repeat hct  Start ASA 81 MG and Lipitor 80 mg q 24  ECHO  Carotid duplex studies  REEG  lipid profile, Hbic  speech swallow eval  pt/rehab
IMPRESSION: Rehab  of gait dysfunction      PRECAUTIONS: [  ] Cardiac  [  ] Respiratory  [  ] Seizures [  ] Contact Isolation  [  ] Droplet Isolation  [  ] Other    Weight Bearing Status:     RECOMMENDATION:    Out of Bed to Chair     DVT/Decubiti Prophylaxis    REHAB PLAN:     [ x  ] Bedside P/T 3-5 times a week   [   ]   Bedside O/T  2-3 times a week             [   ] No Rehab Therapy Indicated                   [   ]  Speech Therapy   Conditioning/ROM                                    ADL  Bed Mobility                                               Conditioning/ROM  Transfers                                                     Bed Mobility  Sitting /Standing Balance                         Transfers                                        Gait Training                                               Sitting/Standing Balance  Stair Training [   ]Applicable                    Home equipment Eval                                                                        Splinting  [   ] Only      GOALS:   ADL   [   ]   Independent                    Transfers  [ x  ] Independent                          Ambulation  [  x ] Independent     [  x  ] With device                            [   ]  CG                                                         [   ]  CG                                                                  [   ] CG                            [    ] Min A                                                   [   ] Min A                                                              [   ] Min  A          DISCHARGE PLAN:   [   ]  Good candidate for Intensive Rehabilitation/Hospital based                                             Will tolerate 3hrs Intensive Rehab Daily                                       [    ]  Short Term Rehab in Skilled Nursing Facility                                       [  x  ]  Home with Outpatient or  services                                         [    ]  Possible Candidate for Intensive Hospital based Rehab
85yo Male with AFib, s/p Watchman placement, ICD, prior CVA, admitted with CVA symptoms    Plan:  Watchman in place, needs JL to confirm position  Con't ASA and plavix  Maintain electrolytes K>4.0 Mg >2.0  recent ICD interrogation above  ICD is NOT MRI compatible  con't management per neurology team  will follow  D/W attending

## 2019-04-26 NOTE — DISCHARGE NOTE PROVIDER - CARE PROVIDER_API CALL
Geovani Huston)  Neurology  [address], [address]  [place] [address]  Phone: [phone]  Fax: [phone]  Follow Up Time:     Boni Do; CHACORTA)  Cardiac Electrophysiology  [facility]  [place] [address]  Phone: [phone]  Fax: [phone]  Follow Up Time:

## 2019-04-26 NOTE — PROGRESS NOTE ADULT - ASSESSMENT
SUBJECTIVE:    Patient is a 84y old Male who presents with a chief complaint of Confusion (26 Apr 2019 16:15)  Currently admitted to medicine with the primary diagnosis of Neurological deficit present  Today is hospital day 2d. This morning he is resting comfortably in bed and reports no new issues or overnight events.   Pt notes resason for admission was urinary incontinence. Notes some dysuria. UA was negative.   PAST MEDICAL & SURGICAL HISTORY  Mild dementia  Chronic right-sided heart failure  Depression  Liver cirrhosis, alcoholic  Borderline diabetes mellitus  HTN (hypertension)  Afib  Presence of Watchman left atrial appendage closure device  AICD (automatic cardioverter/defibrillator) present  History of cholecystectomy  History of appendectomy  History of knee surgery  Deviated nasal septum    SOCIAL HISTORY:  Negative for smoking/alcohol/drug use.     ALLERGIES:  No Known Allergies    MEDICATIONS:  STANDING MEDICATIONS  aspirin 325 milliGRAM(s) Oral daily  atorvastatin 80 milliGRAM(s) Oral at bedtime  chlorhexidine 4% Liquid 1 Application(s) Topical <User Schedule>  enoxaparin Injectable 40 milliGRAM(s) SubCutaneous every 24 hours  ferrous    sulfate 325 milliGRAM(s) Oral daily  furosemide    Tablet 40 milliGRAM(s) Oral daily  losartan 100 milliGRAM(s) Oral daily  metoprolol succinate ER 25 milliGRAM(s) Oral daily  spironolactone 50 milliGRAM(s) Oral daily    PRN MEDICATIONS  acetaminophen   Tablet .. 650 milliGRAM(s) Oral every 6 hours PRN    VITALS:   T(F): 98.3  HR: 60  BP: 113/58  RR: 18  SpO2: --    LABS:                        10.2   4.97  )-----------( 175      ( 26 Apr 2019 06:22 )             32.8     04-26    142  |  106  |  18  ----------------------------<  109<H>  3.7   |  24  |  1.1    Ca    9.4      26 Apr 2019 06:22  Mg     1.8     04-25    TPro  6.0  /  Alb  3.5  /  TBili  0.9  /  DBili  x   /  AST  27  /  ALT  16  /  AlkPhos  59  04-25              Culture - Blood (collected 24 Apr 2019 17:43)  Source: .Blood None  Preliminary Report (26 Apr 2019 02:04):    No growth to date.    Culture - Urine (collected 23 Apr 2019 22:29)  Source: .Urine Clean Catch (Midstream)  Final Report (25 Apr 2019 12:48):    <10,000 CFU/mL Normal Urogenital Mayelin          RADIOLOGY:  < from: Transthoracic Echocardiogram (04.24.19 @ 11:14) >  Summary:   1. Left ventricular ejection fraction, by visual estimation, is 45 to   50%.   2. Moderately increased LV wall thickness.   3. Moderately enlarged right ventricle.  4. Severe mitral valve regurgitation.   5. Moderate-severe tricuspid regurgitation.   6. Sclerotic aortic valve with decreased opening.   7. Estimated pulmonary artery systolic pressure is 61.1 mmHg assuming a   right atrial pressure of 10 mmHg, which is consistent with severe   pulmonary hypertension.   8. Mitral valve mean gradient is 1.8 mmHg consistent with normal mitral   stenosis.    < end of copied text >  < from: VA Duplex Carotid, Bilat (04.24.19 @ 10:20) >    Impression:    20-39% stenosis of the right internal carotid artery.    20-39% stenosis of the left internal carotid artery.    < end of copied text >  	  < from: CT Head No Cont (04.24.19 @ 00:33) >    IMPRESSION:    No CT evidence of acute intracranial pathology.     Chronic microvascular ischemic changes and chronic left frontal lobe   infarction.        < end of copied text >    PHYSICAL EXAM:  GEN: No acute distress  LUNGS: Clear to auscultation bilaterally   HEART: Regular  ABD: Soft, non-tender, non-distended.  EXT: No edema legs  NEURO: AAOX3, NIHSS 0, No drift.       84M with pmhx of A-Fib s/p Watchman and AICD (not on A/C) Cirrhosis (Previous EtOH abuse sober for years now), CVA without residual symptoms, DM II, HTN, Mild Dementia, PTSD, Iron Deficiency anemia (no source identified after extensive work ups), Chronic Lower back pain secondary to Arthritis and DJD presents from home for increasing confusion and strange behavior.       #) Confusion possibly secondary to stroke  -tx to stroke unit per neuro.  -ppm not MRI compatible  -CT H chronic microvascular changes and chornic L frontal lobe infarction.   -f/u CT head repeat, CTA H&N  -TSH 0.65 , B12 1820 , Folate 14.3 , and Ammonia 30 (hx of cirrhosis) all wnl  -Holding tramadol   -UA negative, UCx and BCx negative  -Continue with home   -c/w plavis  -resume donepezil  -c/w lipitor 80  -REEG borderline generalized slowing.   -Echo with severe MR, severe pulm htn   -PT/Rehab   Speech and Swallow eval    #)A-Fib: Currently ventricularly paced   -No A/C s/p watchman   -Cont metoprolol   -f/u EP  -possible JL on Monday to r/o intracardiac thrombus    #)DM II: Diet controlled at home  -Hemoglobin A1C, Whole Blood: 6.4:    #)HTN  -Cont losartan     #)PTSD  -Cont Mirtazepine     #)JEREMY  - no clinical evidence of bleeding  -Continue Iron     #GI PPX: Not indicated  #DVT ppx: Lovenox  Full code   Diet: DASH carb consistent dysphagia 3  Activity: As tolerated

## 2019-04-27 LAB
ANION GAP SERPL CALC-SCNC: 13 MMOL/L — SIGNIFICANT CHANGE UP (ref 7–14)
BASOPHILS # BLD AUTO: 0.02 K/UL — SIGNIFICANT CHANGE UP (ref 0–0.2)
BASOPHILS NFR BLD AUTO: 0.4 % — SIGNIFICANT CHANGE UP (ref 0–1)
BUN SERPL-MCNC: 16 MG/DL — SIGNIFICANT CHANGE UP (ref 10–20)
CALCIUM SERPL-MCNC: 9.7 MG/DL — SIGNIFICANT CHANGE UP (ref 8.5–10.1)
CHLORIDE SERPL-SCNC: 104 MMOL/L — SIGNIFICANT CHANGE UP (ref 98–110)
CHOLEST SERPL-MCNC: 142 MG/DL — SIGNIFICANT CHANGE UP (ref 100–200)
CO2 SERPL-SCNC: 26 MMOL/L — SIGNIFICANT CHANGE UP (ref 17–32)
CREAT SERPL-MCNC: 1.2 MG/DL — SIGNIFICANT CHANGE UP (ref 0.7–1.5)
EOSINOPHIL # BLD AUTO: 0.22 K/UL — SIGNIFICANT CHANGE UP (ref 0–0.7)
EOSINOPHIL NFR BLD AUTO: 4.1 % — SIGNIFICANT CHANGE UP (ref 0–8)
GLUCOSE BLDC GLUCOMTR-MCNC: 104 MG/DL — HIGH (ref 70–99)
GLUCOSE BLDC GLUCOMTR-MCNC: 114 MG/DL — HIGH (ref 70–99)
GLUCOSE BLDC GLUCOMTR-MCNC: 125 MG/DL — HIGH (ref 70–99)
GLUCOSE BLDC GLUCOMTR-MCNC: 167 MG/DL — HIGH (ref 70–99)
GLUCOSE SERPL-MCNC: 102 MG/DL — HIGH (ref 70–99)
HCT VFR BLD CALC: 37.5 % — LOW (ref 42–52)
HDLC SERPL-MCNC: 55 MG/DL — SIGNIFICANT CHANGE UP
HGB BLD-MCNC: 11.5 G/DL — LOW (ref 14–18)
IMM GRANULOCYTES NFR BLD AUTO: 0.4 % — HIGH (ref 0.1–0.3)
LIPID PNL WITH DIRECT LDL SERPL: 85 MG/DL — SIGNIFICANT CHANGE UP (ref 4–129)
LYMPHOCYTES # BLD AUTO: 0.85 K/UL — LOW (ref 1.2–3.4)
LYMPHOCYTES # BLD AUTO: 15.7 % — LOW (ref 20.5–51.1)
MCHC RBC-ENTMCNC: 27.1 PG — SIGNIFICANT CHANGE UP (ref 27–31)
MCHC RBC-ENTMCNC: 30.7 G/DL — LOW (ref 32–37)
MCV RBC AUTO: 88.2 FL — SIGNIFICANT CHANGE UP (ref 80–94)
MONOCYTES # BLD AUTO: 0.78 K/UL — HIGH (ref 0.1–0.6)
MONOCYTES NFR BLD AUTO: 14.4 % — HIGH (ref 1.7–9.3)
NEUTROPHILS # BLD AUTO: 3.51 K/UL — SIGNIFICANT CHANGE UP (ref 1.4–6.5)
NEUTROPHILS NFR BLD AUTO: 65 % — SIGNIFICANT CHANGE UP (ref 42.2–75.2)
NRBC # BLD: 0 /100 WBCS — SIGNIFICANT CHANGE UP (ref 0–0)
PLATELET # BLD AUTO: 202 K/UL — SIGNIFICANT CHANGE UP (ref 130–400)
POTASSIUM SERPL-MCNC: 4.4 MMOL/L — SIGNIFICANT CHANGE UP (ref 3.5–5)
POTASSIUM SERPL-SCNC: 4.4 MMOL/L — SIGNIFICANT CHANGE UP (ref 3.5–5)
RBC # BLD: 4.25 M/UL — LOW (ref 4.7–6.1)
RBC # FLD: 15.6 % — HIGH (ref 11.5–14.5)
SODIUM SERPL-SCNC: 143 MMOL/L — SIGNIFICANT CHANGE UP (ref 135–146)
TOTAL CHOLESTEROL/HDL RATIO MEASUREMENT: 2.6 RATIO — LOW (ref 4–5.5)
TRIGL SERPL-MCNC: 51 MG/DL — SIGNIFICANT CHANGE UP (ref 10–149)
WBC # BLD: 5.4 K/UL — SIGNIFICANT CHANGE UP (ref 4.8–10.8)
WBC # FLD AUTO: 5.4 K/UL — SIGNIFICANT CHANGE UP (ref 4.8–10.8)

## 2019-04-27 RX ORDER — LOSARTAN POTASSIUM 100 MG/1
1 TABLET, FILM COATED ORAL
Qty: 0 | Refills: 0 | COMMUNITY

## 2019-04-27 RX ORDER — TRAMADOL HYDROCHLORIDE 50 MG/1
1 TABLET ORAL
Qty: 0 | Refills: 0 | COMMUNITY

## 2019-04-27 RX ORDER — CLOPIDOGREL BISULFATE 75 MG/1
1 TABLET, FILM COATED ORAL
Qty: 0 | Refills: 0 | DISCHARGE
Start: 2019-04-27

## 2019-04-27 RX ORDER — ATORVASTATIN CALCIUM 80 MG/1
1 TABLET, FILM COATED ORAL
Qty: 0 | Refills: 0 | COMMUNITY
Start: 2019-04-27

## 2019-04-27 RX ORDER — LOSARTAN POTASSIUM 100 MG/1
1 TABLET, FILM COATED ORAL
Qty: 0 | Refills: 0 | DISCHARGE
Start: 2019-04-27

## 2019-04-27 RX ADMIN — Medication 25 MILLIGRAM(S): at 06:43

## 2019-04-27 RX ADMIN — CHLORHEXIDINE GLUCONATE 1 APPLICATION(S): 213 SOLUTION TOPICAL at 06:43

## 2019-04-27 RX ADMIN — LOSARTAN POTASSIUM 100 MILLIGRAM(S): 100 TABLET, FILM COATED ORAL at 06:44

## 2019-04-27 RX ADMIN — Medication 325 MILLIGRAM(S): at 12:17

## 2019-04-27 RX ADMIN — CLOPIDOGREL BISULFATE 75 MILLIGRAM(S): 75 TABLET, FILM COATED ORAL at 12:17

## 2019-04-27 RX ADMIN — Medication 40 MILLIGRAM(S): at 06:44

## 2019-04-27 RX ADMIN — ATORVASTATIN CALCIUM 80 MILLIGRAM(S): 80 TABLET, FILM COATED ORAL at 21:03

## 2019-04-27 RX ADMIN — DONEPEZIL HYDROCHLORIDE 10 MILLIGRAM(S): 10 TABLET, FILM COATED ORAL at 21:03

## 2019-04-27 RX ADMIN — SPIRONOLACTONE 50 MILLIGRAM(S): 25 TABLET, FILM COATED ORAL at 06:44

## 2019-04-27 RX ADMIN — ENOXAPARIN SODIUM 40 MILLIGRAM(S): 100 INJECTION SUBCUTANEOUS at 14:56

## 2019-04-27 NOTE — PROGRESS NOTE ADULT - ASSESSMENT
ImP: This is a 84y Male w/ h/o mild dementia experiencing acute confusion and facial asymetry -  Due to pacemaker MRI not performed> Question if these symptoms have a cardia - embolic cause        Plan: TTE on monday  Continue Frequent Neurochecks   continue ASA, lipitor ImP: This is a 84y Male w/ h/o mild dementia experiencing acute confusion and facial asymetry -  Due to pacemaker MRI not performed> Question if these symptoms have a cardia - embolic cause    Plan:   JL on monday  Continue Neurochecks Q4 hr   continue ASA, lipitor   repeat HCT today since unable to get MRI  continue stroke unit monitoring for now

## 2019-04-27 NOTE — PROGRESS NOTE ADULT - SUBJECTIVE AND OBJECTIVE BOX
Patient was seen and examined. Spoke with RN. Chart reviewed.    No events overnight.  Vital Signs Last 24 Hrs  T(F): 97.1 (27 Apr 2019 11:13), Max: 98.3 (26 Apr 2019 21:47)  HR: 60 (27 Apr 2019 11:13) (60 - 60)  BP: 131/64 (27 Apr 2019 11:13) (109/63 - 132/68)  SpO2: 99% (27 Apr 2019 11:13) (97% - 99%)  MEDICATIONS  (STANDING):  aspirin 325 milliGRAM(s) Oral daily  atorvastatin 80 milliGRAM(s) Oral at bedtime  chlorhexidine 4% Liquid 1 Application(s) Topical <User Schedule>  clopidogrel Tablet 75 milliGRAM(s) Oral daily  donepezil 10 milliGRAM(s) Oral at bedtime  enoxaparin Injectable 40 milliGRAM(s) SubCutaneous every 24 hours  ferrous    sulfate 325 milliGRAM(s) Oral daily  furosemide    Tablet 40 milliGRAM(s) Oral daily  losartan 100 milliGRAM(s) Oral daily  metoprolol succinate ER 25 milliGRAM(s) Oral daily  spironolactone 50 milliGRAM(s) Oral daily    MEDICATIONS  (PRN):  acetaminophen   Tablet .. 650 milliGRAM(s) Oral every 6 hours PRN Mild Pain (1 - 3)    Labs:                        11.5   5.40  )-----------( 202      ( 27 Apr 2019 08:07 )             37.5                         10.2   4.97  )-----------( 175      ( 26 Apr 2019 06:22 )             32.8     27 Apr 2019 08:07    143    |  104    |  16     ----------------------------<  102    4.4     |  26     |  1.2    26 Apr 2019 06:22    142    |  106    |  18     ----------------------------<  109    3.7     |  24     |  1.1      Ca    9.7        27 Apr 2019 08:07  Ca    9.4        26 Apr 2019 06:22            Culture - Blood (collected 24 Apr 2019 17:43)  Source: .Blood None  Preliminary Report (26 Apr 2019 02:04):    No growth to date.        Radiology:    General: comfortable, NAD  Neurology: A&Ox3, nonfocal  Head:  Normocephalic, atraumatic  ENT:  Mucosa moist, no ulcerations  Neck:  Supple, no JVD,   Resp: CTA B/L  CV: RRR, S1S2,   GI: Soft, NT, bowel sounds  MS: No edema, + peripheral pulses, FROM all 4 extremity

## 2019-04-27 NOTE — PROGRESS NOTE ADULT - ASSESSMENT
confusion  tia/cva vs metabolic encephalopathy    asa/statin  carotid duplex  eeg  ct angio  eps dr zuñiga  discussed with son

## 2019-04-27 NOTE — PROGRESS NOTE ADULT - SUBJECTIVE AND OBJECTIVE BOX
Neurology Follow up note      Name  WILLIAM ARAIZA    HPI:  84M with pmhx of A-Fib s/p Watchman and AICD (not on A/C) Cirrhosis (Previous EtOH absue, sober for years now), CVA without residual symptoms, DM II, HTN, Mild Dementia, PTSD, Iron Deficiency anemia (no source identified after extensive work ups), Chronic Lower back pain secondary to Arthritis and DJD presents from home for increasing confusion and strange behavior. Patient was in his normal state of health prior yesterday. As per wife patient awoke and was mild confused but progressed throughout the day. Patient was also going to the bathroom frequently but not urinating, also stated the shower was not working correctly and arguing with sons. Patient was also having nonsensical speech. Son decided to call EMS to bring him to the hospital. Wife denied the patient had experienced CP, palpitations, n/v/d, fever, recent illness, LOC, or falls.   During Interview with patient remains confused. Will answer questions appropriately but often times will have tangential unrelated speech or difficulty comprehending. He was confused as to why he was in the hospital and wanted to leave during interview. Patient stated that he drove himself to the hospital and came because he had chest pain, left sided, radiating to left arm, not associated with SOB, palpitations, diaphoresis, n/v, or dizziness. He was unable to describe or rate the pain.    In Ed: T: 97.4        HR: 66          BP: 144/80          RR: 18, 98%  CTH Negative for acute changes   Neuro Consulted for Confusion but no code stroke called: NIHSS was 2 at presentation (24 Apr 2019 08:36)      Interval History - No issues overnight.          Vital Signs Last 24 Hrs  T(C): 35.9 (27 Apr 2019 05:41), Max: 36.8 (26 Apr 2019 13:14)  T(F): 96.7 (27 Apr 2019 05:41), Max: 98.3 (26 Apr 2019 13:14)  HR: 60 (27 Apr 2019 05:41) (60 - 60)  BP: 109/63 (27 Apr 2019 05:41) (109/63 - 131/72)  BP(mean): --  RR: 18 (27 Apr 2019 05:41) (18 - 18)  SpO2: 97% (27 Apr 2019 01:48) (97% - 97%)          Neurological Exam:   Mental status: Awake Alert Speech intact    Mild facial but smile is symetric ? nasolabial flattening mild  SEPULVEDA X 4   Sensory; grossly intact                    Medications  acetaminophen   Tablet .. 650 milliGRAM(s) Oral every 6 hours PRN  aspirin 325 milliGRAM(s) Oral daily  atorvastatin 80 milliGRAM(s) Oral at bedtime  chlorhexidine 4% Liquid 1 Application(s) Topical <User Schedule>  clopidogrel Tablet 75 milliGRAM(s) Oral daily  donepezil 10 milliGRAM(s) Oral at bedtime  enoxaparin Injectable 40 milliGRAM(s) SubCutaneous every 24 hours  ferrous    sulfate 325 milliGRAM(s) Oral daily  furosemide    Tablet 40 milliGRAM(s) Oral daily  losartan 100 milliGRAM(s) Oral daily  metoprolol succinate ER 25 milliGRAM(s) Oral daily  spironolactone 50 milliGRAM(s) Oral daily      Lab      Radiology Neurology Follow up note    Interval History - No issues overnight.  No complaints.  Asymptomatic.  Pt anxious to go home.  Saw Cardiology yesterday recommended JL for Watchman evaluation on Monday.      Vital Signs Last 24 Hrs  T(C): 35.9 (27 Apr 2019 05:41), Max: 36.8 (26 Apr 2019 13:14)  T(F): 96.7 (27 Apr 2019 05:41), Max: 98.3 (26 Apr 2019 13:14)  HR: 60 (27 Apr 2019 05:41) (60 - 60)  BP: 109/63 (27 Apr 2019 05:41) (109/63 - 131/72)  BP(mean): --  RR: 18 (27 Apr 2019 05:41) (18 - 18)  SpO2: 97% (27 Apr 2019 01:48) (97% - 97%)    Neurological Exam:   Mental status: Awake Alert Speech intact    Mild facial but smile is symetric ? nasolabial flattening mild  SEPULVEDA X 4, 5/5 no drift  Sensory; grossly intact    NIHSS 0    Medications  acetaminophen   Tablet .. 650 milliGRAM(s) Oral every 6 hours PRN  aspirin 325 milliGRAM(s) Oral daily  atorvastatin 80 milliGRAM(s) Oral at bedtime  chlorhexidine 4% Liquid 1 Application(s) Topical <User Schedule>  clopidogrel Tablet 75 milliGRAM(s) Oral daily  donepezil 10 milliGRAM(s) Oral at bedtime  enoxaparin Injectable 40 milliGRAM(s) SubCutaneous every 24 hours  ferrous    sulfate 325 milliGRAM(s) Oral daily  furosemide    Tablet 40 milliGRAM(s) Oral daily  losartan 100 milliGRAM(s) Oral daily  metoprolol succinate ER 25 milliGRAM(s) Oral daily  spironolactone 50 milliGRAM(s) Oral daily    < from: CT Angio Neck w/ IV Cont (04.26.19 @ 18:40) >  IMPRESSION:     No evidence of major vascular stenosis or occlusion.     DEBBIE CHAVEZ M.D., ATTENDING RADIOLOGIST  This document has been electronically signed. Apr 27 2019  6:58AM      < end of copied text >

## 2019-04-28 LAB
ANION GAP SERPL CALC-SCNC: 12 MMOL/L — SIGNIFICANT CHANGE UP (ref 7–14)
BASOPHILS # BLD AUTO: 0.02 K/UL — SIGNIFICANT CHANGE UP (ref 0–0.2)
BASOPHILS NFR BLD AUTO: 0.4 % — SIGNIFICANT CHANGE UP (ref 0–1)
BUN SERPL-MCNC: 23 MG/DL — HIGH (ref 10–20)
CALCIUM SERPL-MCNC: 9.9 MG/DL — SIGNIFICANT CHANGE UP (ref 8.5–10.1)
CHLORIDE SERPL-SCNC: 103 MMOL/L — SIGNIFICANT CHANGE UP (ref 98–110)
CO2 SERPL-SCNC: 23 MMOL/L — SIGNIFICANT CHANGE UP (ref 17–32)
CREAT SERPL-MCNC: 1.2 MG/DL — SIGNIFICANT CHANGE UP (ref 0.7–1.5)
EOSINOPHIL # BLD AUTO: 0.23 K/UL — SIGNIFICANT CHANGE UP (ref 0–0.7)
EOSINOPHIL NFR BLD AUTO: 4.1 % — SIGNIFICANT CHANGE UP (ref 0–8)
GLUCOSE BLDC GLUCOMTR-MCNC: 105 MG/DL — HIGH (ref 70–99)
GLUCOSE BLDC GLUCOMTR-MCNC: 122 MG/DL — HIGH (ref 70–99)
GLUCOSE BLDC GLUCOMTR-MCNC: 127 MG/DL — HIGH (ref 70–99)
GLUCOSE BLDC GLUCOMTR-MCNC: 142 MG/DL — HIGH (ref 70–99)
GLUCOSE SERPL-MCNC: 111 MG/DL — HIGH (ref 70–99)
HCT VFR BLD CALC: 34.1 % — LOW (ref 42–52)
HGB BLD-MCNC: 10.6 G/DL — LOW (ref 14–18)
IMM GRANULOCYTES NFR BLD AUTO: 0.4 % — HIGH (ref 0.1–0.3)
LYMPHOCYTES # BLD AUTO: 0.98 K/UL — LOW (ref 1.2–3.4)
LYMPHOCYTES # BLD AUTO: 17.4 % — LOW (ref 20.5–51.1)
MCHC RBC-ENTMCNC: 27 PG — SIGNIFICANT CHANGE UP (ref 27–31)
MCHC RBC-ENTMCNC: 31.1 G/DL — LOW (ref 32–37)
MCV RBC AUTO: 87 FL — SIGNIFICANT CHANGE UP (ref 80–94)
MONOCYTES # BLD AUTO: 0.78 K/UL — HIGH (ref 0.1–0.6)
MONOCYTES NFR BLD AUTO: 13.8 % — HIGH (ref 1.7–9.3)
NEUTROPHILS # BLD AUTO: 3.61 K/UL — SIGNIFICANT CHANGE UP (ref 1.4–6.5)
NEUTROPHILS NFR BLD AUTO: 63.9 % — SIGNIFICANT CHANGE UP (ref 42.2–75.2)
NRBC # BLD: 0 /100 WBCS — SIGNIFICANT CHANGE UP (ref 0–0)
PLATELET # BLD AUTO: 199 K/UL — SIGNIFICANT CHANGE UP (ref 130–400)
POTASSIUM SERPL-MCNC: 4.2 MMOL/L — SIGNIFICANT CHANGE UP (ref 3.5–5)
POTASSIUM SERPL-SCNC: 4.2 MMOL/L — SIGNIFICANT CHANGE UP (ref 3.5–5)
RBC # BLD: 3.92 M/UL — LOW (ref 4.7–6.1)
RBC # FLD: 15.8 % — HIGH (ref 11.5–14.5)
SODIUM SERPL-SCNC: 138 MMOL/L — SIGNIFICANT CHANGE UP (ref 135–146)
WBC # BLD: 5.64 K/UL — SIGNIFICANT CHANGE UP (ref 4.8–10.8)
WBC # FLD AUTO: 5.64 K/UL — SIGNIFICANT CHANGE UP (ref 4.8–10.8)

## 2019-04-28 PROCEDURE — 70450 CT HEAD/BRAIN W/O DYE: CPT | Mod: 26

## 2019-04-28 RX ORDER — SENNA PLUS 8.6 MG/1
2 TABLET ORAL AT BEDTIME
Qty: 0 | Refills: 0 | Status: DISCONTINUED | OUTPATIENT
Start: 2019-04-28 | End: 2019-04-29

## 2019-04-28 RX ORDER — DOCUSATE SODIUM 100 MG
100 CAPSULE ORAL THREE TIMES A DAY
Qty: 0 | Refills: 0 | Status: DISCONTINUED | OUTPATIENT
Start: 2019-04-28 | End: 2019-04-29

## 2019-04-28 RX ORDER — LANOLIN ALCOHOL/MO/W.PET/CERES
5 CREAM (GRAM) TOPICAL AT BEDTIME
Qty: 0 | Refills: 0 | Status: DISCONTINUED | OUTPATIENT
Start: 2019-04-28 | End: 2019-04-29

## 2019-04-28 RX ADMIN — Medication 5 MILLIGRAM(S): at 00:31

## 2019-04-28 RX ADMIN — Medication 40 MILLIGRAM(S): at 05:54

## 2019-04-28 RX ADMIN — ENOXAPARIN SODIUM 40 MILLIGRAM(S): 100 INJECTION SUBCUTANEOUS at 14:01

## 2019-04-28 RX ADMIN — SENNA PLUS 2 TABLET(S): 8.6 TABLET ORAL at 18:04

## 2019-04-28 RX ADMIN — Medication 100 MILLIGRAM(S): at 21:44

## 2019-04-28 RX ADMIN — CLOPIDOGREL BISULFATE 75 MILLIGRAM(S): 75 TABLET, FILM COATED ORAL at 11:28

## 2019-04-28 RX ADMIN — Medication 325 MILLIGRAM(S): at 11:27

## 2019-04-28 RX ADMIN — Medication 25 MILLIGRAM(S): at 05:54

## 2019-04-28 RX ADMIN — Medication 325 MILLIGRAM(S): at 11:28

## 2019-04-28 RX ADMIN — ATORVASTATIN CALCIUM 80 MILLIGRAM(S): 80 TABLET, FILM COATED ORAL at 21:44

## 2019-04-28 RX ADMIN — CHLORHEXIDINE GLUCONATE 1 APPLICATION(S): 213 SOLUTION TOPICAL at 05:54

## 2019-04-28 RX ADMIN — DONEPEZIL HYDROCHLORIDE 10 MILLIGRAM(S): 10 TABLET, FILM COATED ORAL at 21:44

## 2019-04-28 RX ADMIN — SPIRONOLACTONE 50 MILLIGRAM(S): 25 TABLET, FILM COATED ORAL at 11:29

## 2019-04-28 RX ADMIN — Medication 5 MILLIGRAM(S): at 21:45

## 2019-04-28 RX ADMIN — Medication 100 MILLIGRAM(S): at 14:01

## 2019-04-28 NOTE — PROGRESS NOTE ADULT - ASSESSMENT
>>> confusion resolved --- ct head negative chronic infarct   >>> atrial fibrillation paced --  >>>constipation   PLAN....   (JL monday )  fleet enema   resident notes read and aware of plan of care   d/w RN   >>>Mild dementia  >>>Chronic right-sided heart failure  >>>Depression  >>>Liver cirrhosis, alcoholic  >>>Presence of cardiac pacemaker  >>>AICD (automatic cardioverter/defibrillator) present  >>>Borderline diabetes mellitus  >>>HTN (hypertension)  >>>Afib  >>> Presence of Watchman left atrial appendage closure device  >>>AICD (automatic cardioverter/defibrillator) present  >>>AICD present, double chamber

## 2019-04-28 NOTE — PROGRESS NOTE ADULT - ASSESSMENT
This is a 84y Male w/ h/o mild dementia with acute confusion and facial asymetry which have resolved. Due to pacemaker MRI not performed.  Question if these symptoms have a cardia - embolic cause    Plan:   JL on monday  Continue Neurochecks Q4 hr   continue ASA, lipitor   repeat HCT today since unable to get MRI  continue stroke unit monitoring for now      Neuroattending note will follow This is a 84y Male w/ h/o mild dementia with acute confusion and facial asymetry which have resolved. Due to pacemaker MRI not performed.  Question if these symptoms have a cardioembolic cause with prior Watchman placement off anticoagulation.      Plan:   JL on monday  Continue Neurochecks Q4 hr   continue ASA, lipitor   continue stroke unit monitoring for now

## 2019-04-28 NOTE — PROGRESS NOTE ADULT - SUBJECTIVE AND OBJECTIVE BOX
WILLIAM ARAIZA    Chief Complaint:    Handed    HPI:  84M with pmhx of A-Fib s/p Watchman and AICD (not on A/C) Cirrhosis (Previous EtOH absue, sober for years now), CVA without residual symptoms, DM II, HTN, Mild Dementia, PTSD, Iron Deficiency anemia (no source identified after extensive work ups), Chronic Lower back pain secondary to Arthritis and DJD presents from home for increasing confusion and strange behavior. Patient was in his normal state of health prior yesterday. As per wife patient awoke and was mild confused but progressed throughout the day. Patient was also going to the bathroom frequently but not urinating, also stated the shower was not working correctly and arguing with sons. Patient was also having nonsensical speech. Son decided to call EMS to bring him to the hospital. Wife denied the patient had experienced CP, palpitations, n/v/d, fever, recent illness, LOC, or falls.   During Interview with patient remains confused. Will answer questions appropriately but often times will have tangential unrelated speech or difficulty comprehending. He was confused as to why he was in the hospital and wanted to leave during interview. Patient stated that he drove himself to the hospital and came because he had chest pain, left sided, radiating to left arm, not associated with SOB, palpitations, diaphoresis, n/v, or dizziness. He was unable to describe or rate the pain.    In Ed: T: 97.4        HR: 66          BP: 144/80          RR: 18, 98%  CTH Negative for acute changes   Neuro Consulted for Confusion but no code stroke called: NIHSS was 2 at presentation (24 Apr 2019 08:36)    Interval history: patient is in a stroke unit, he is aaox3, answers questions , follows commands. No acute overnight events, no events on tele. Awaiting cardiology for JL on Monday.      Relevant PMH:  [] Prior ischemic stroke/TIA  [] Afib  []CAD  []HTN  []DLD  []DM []PVD []Obesity [] Sedintary lifestyle []CHF  []CHERYLE  []Cancer Hx     Social History: [] Smoking []  Drug Use: []   Alcohol Use:   [] Other:      Possible Location of Stroke:    Possible Cause of Stroke:    Relevant Cerebral Imaging:    Relevant Cervicocerebral Imaging:  CT Angio Neck w/ IV Cont:   EXAM:  CT ANGIO NECK (W)AW IC        EXAM:  CT ANGIO BRAIN (W)AW IC            PROCEDURE DATE:  04/26/2019            INTERPRETATION:  Clinical History / Reason for exam: Possible stroke.   Altered mental status.    Technique: CT angiogram of the head and neck. CTA of the head and neck   was performed following the intravenous administration of 100 cc Optiray   320 (0 cc discarded) with coronal, sagittal and multiple 3-D MIP and   volume rendered reformats.    Comparison: None    Findings:     NECK:  The visualized aortic arch is widely patent. The great vessel origins are   widely patent.    The common, internal and external carotid arteries are patent.    The vertebral arteries are patent, dominant on the left.    HEAD:  The distal segments of the internal carotid arteries are patent. The   anterior and middle cerebral arteries are patent.    The distal segments of the vertebral arteries are patent. The basilar   artery is patent. The posterior cerebral arteries are patent.    OTHER:  Partially included right pleural effusion. Left chest wall pacemaker.   Right maxillary sinus polyp or retention cyst.     IMPRESSION:     No evidence of major vascular stenosis or occlusion.                   DEBBIE CHAVEZ M.D., ATTENDING RADIOLOGIST  This document has been electronically signed. Apr 27 2019  6:58AM             (04-26-19 @ 18:40)    CT Angio Head w/ IV Cont:   EXAM:  CT ANGIO NECK (W)AW IC        EXAM:  CT ANGIO BRAIN (W)AW IC            PROCEDURE DATE:  04/26/2019            INTERPRETATION:  Clinical History / Reason for exam: Possible stroke.   Altered mental status.    Technique: CT angiogram of the head and neck. CTA of the head and neck   was performed following the intravenous administration of 100 cc Optiray   320 (0 cc discarded) with coronal, sagittal and multiple 3-D MIP and   volume rendered reformats.    Comparison: None    Findings:     NECK:  The visualized aortic arch is widely patent. The great vessel origins are   widely patent.    The common, internal and external carotid arteries are patent.    The vertebral arteries are patent, dominant on the left.    HEAD:  The distal segments of the internal carotid arteries are patent. The   anterior and middle cerebral arteries are patent.    The distal segments of the vertebral arteries are patent. The basilar   artery is patent. The posterior cerebral arteries are patent.    OTHER:  Partially included right pleural effusion. Left chest wall pacemaker.   Right maxillary sinus polyp or retention cyst.     IMPRESSION:     No evidence of major vascular stenosis or occlusion.         DEBBIE CHAVEZ M.D., ATTENDING RADIOLOGIST  This document has been electronically signed. Apr 27 2019  6:58AM             (04-26-19 @ 18:22)        Relevant blood tests:  Direct LDL: 85 mg/dL [4 - 129] (04-27-19 @ 08:07)      Relevant cardiac rhythm monitoring:    Relevant Cardiac Structure:(TTE/JL +/-):[]No intracardiac thrombus/[] no vegetation/[]no [place]ia/EF:      Home Medications:  aspirin 325 mg oral tablet: 1 tab(s) orally once a day (24 Apr 2019 09:10)  atorvastatin 80 mg oral tablet: 1 tab(s) orally once a day (at bedtime) (27 Apr 2019 05:54)  clopidogrel 75 mg oral tablet: 1 tab(s) orally once a day (27 Apr 2019 05:54)  donepezil 10 mg oral tablet: 1 tab(s) orally once a day (at bedtime) (24 Apr 2019 09:10)  ferrous fumarate 325 mg (106 mg elemental iron) oral tablet: 1 tab(s) orally once a day (24 Apr 2019 09:10)  losartan 100 mg oral tablet: 1 tab(s) orally once a day (27 Apr 2019 05:54)  metoprolol succinate 25 mg oral tablet, extended release: 1 tab(s) orally once a day (24 Apr 2019 09:10)  mirtazapine 30 mg oral tablet: 1 tab(s) orally once a day (at bedtime) (24 Apr 2019 09:10)  spironolactone 50 mg oral tablet: 1 tab(s) orally once a day (24 Apr 2019 09:10)      MEDICATIONS  (STANDING):  aspirin 325 milliGRAM(s) Oral daily  atorvastatin 80 milliGRAM(s) Oral at bedtime  chlorhexidine 4% Liquid 1 Application(s) Topical <User Schedule>  clopidogrel Tablet 75 milliGRAM(s) Oral daily  donepezil 10 milliGRAM(s) Oral at bedtime  enoxaparin Injectable 40 milliGRAM(s) SubCutaneous every 24 hours  ferrous    sulfate 325 milliGRAM(s) Oral daily  furosemide    Tablet 40 milliGRAM(s) Oral daily  losartan 100 milliGRAM(s) Oral daily  melatonin 5 milliGRAM(s) Oral at bedtime  metoprolol succinate ER 25 milliGRAM(s) Oral daily  spironolactone 50 milliGRAM(s) Oral daily      PT/OT/Speech/Rehab/S&Swr:    Exam:    Vital Signs Last 24 Hrs  T(C): 36.3 (28 Apr 2019 05:25), Max: 36.5 (27 Apr 2019 18:27)  T(F): 97.3 (28 Apr 2019 05:25), Max: 97.7 (27 Apr 2019 18:27)  HR: 60 (28 Apr 2019 05:25) (60 - 60)  BP: 100/53 (28 Apr 2019 05:25) (100/53 - 132/68)  BP(mean): --  RR: 18 (28 Apr 2019 05:25) (18 - 20)  SpO2: 99% (27 Apr 2019 11:13) (99% - 99%)    NIHSS      LOC:       1a:  0   1b(Questions):  0         1c(Instructions):  0           Best Gaze:0  Visual:0  Motor:                 RUE:0     RLE:0     LUE: 0  LLE: 0    FACE:  0   Limb Ataxia:0  Sensory: 0      Language:   0    Dysarthria:   0       Extinction and Inattention:0    NIHSS on admission:          NIHSS yesterday:          NIHSS today:  0           m-RS:3 WILLIAM ARAIZA    Chief Complaint:    Handed    HPI:  84M with pmhx of A-Fib s/p Watchman and AICD (not on A/C) Cirrhosis (Previous EtOH absue, sober for years now), CVA without residual symptoms, DM II, HTN, Mild Dementia, PTSD, Iron Deficiency anemia (no source identified after extensive work ups), Chronic Lower back pain secondary to Arthritis and DJD presents from home for increasing confusion and strange behavior. Patient was in his normal state of health prior yesterday. As per wife patient awoke and was mild confused but progressed throughout the day. Patient was also going to the bathroom frequently but not urinating, also stated the shower was not working correctly and arguing with sons. Patient was also having nonsensical speech. Son decided to call EMS to bring him to the hospital. Wife denied the patient had experienced CP, palpitations, n/v/d, fever, recent illness, LOC, or falls.   During Interview with patient remains confused. Will answer questions appropriately but often times will have tangential unrelated speech or difficulty comprehending. He was confused as to why he was in the hospital and wanted to leave during interview. Patient stated that he drove himself to the hospital and came because he had chest pain, left sided, radiating to left arm, not associated with SOB, palpitations, diaphoresis, n/v, or dizziness. He was unable to describe or rate the pain.    In Ed: T: 97.4        HR: 66          BP: 144/80          RR: 18, 98%  CTH Negative for acute changes   Neuro Consulted for Confusion but no code stroke called: NIHSS was 2 at presentation (24 Apr 2019 08:36)    Interval history: patient is in a stroke unit, he is aaox3, answers questions , follows commands. No acute overnight events, no events on tele. Awaiting cardiology for JL on Monday.      Relevant PMH:  [] Prior ischemic stroke/TIA  [] Afib  []CAD  []HTN  []DLD  []DM []PVD []Obesity [] Sedintary lifestyle []CHF  []CHERYLE  []Cancer Hx     Social History: [] Smoking []  Drug Use: []   Alcohol Use:   [] Other:      Possible Location of Stroke:    Possible Cause of Stroke:    Relevant Cerebral Imaging:    Relevant Cervicocerebral Imaging:  CT Angio Neck w/ IV Cont:   EXAM:  CT ANGIO NECK (W)AW IC        EXAM:  CT ANGIO BRAIN (W)AW IC            PROCEDURE DATE:  04/26/2019            INTERPRETATION:  Clinical History / Reason for exam: Possible stroke.   Altered mental status.    Technique: CT angiogram of the head and neck. CTA of the head and neck   was performed following the intravenous administration of 100 cc Optiray   320 (0 cc discarded) with coronal, sagittal and multiple 3-D MIP and   volume rendered reformats.    Comparison: None    Findings:     NECK:  The visualized aortic arch is widely patent. The great vessel origins are   widely patent.    The common, internal and external carotid arteries are patent.    The vertebral arteries are patent, dominant on the left.    HEAD:  The distal segments of the internal carotid arteries are patent. The   anterior and middle cerebral arteries are patent.    The distal segments of the vertebral arteries are patent. The basilar   artery is patent. The posterior cerebral arteries are patent.    OTHER:  Partially included right pleural effusion. Left chest wall pacemaker.   Right maxillary sinus polyp or retention cyst.     IMPRESSION:     No evidence of major vascular stenosis or occlusion.                   DEBBIE CHAVEZ M.D., ATTENDING RADIOLOGIST  This document has been electronically signed. Apr 27 2019  6:58AM             (04-26-19 @ 18:40)    CT Angio Head w/ IV Cont:   EXAM:  CT ANGIO NECK (W)AW IC        EXAM:  CT ANGIO BRAIN (W)AW IC            PROCEDURE DATE:  04/26/2019            INTERPRETATION:  Clinical History / Reason for exam: Possible stroke.   Altered mental status.    Technique: CT angiogram of the head and neck. CTA of the head and neck   was performed following the intravenous administration of 100 cc Optiray   320 (0 cc discarded) with coronal, sagittal and multiple 3-D MIP and   volume rendered reformats.    Comparison: None    Findings:     NECK:  The visualized aortic arch is widely patent. The great vessel origins are   widely patent.    The common, internal and external carotid arteries are patent.    The vertebral arteries are patent, dominant on the left.    HEAD:  The distal segments of the internal carotid arteries are patent. The   anterior and middle cerebral arteries are patent.    The distal segments of the vertebral arteries are patent. The basilar   artery is patent. The posterior cerebral arteries are patent.    OTHER:  Partially included right pleural effusion. Left chest wall pacemaker.   Right maxillary sinus polyp or retention cyst.     IMPRESSION:     No evidence of major vascular stenosis or occlusion.         DEBBIE CHAVEZ M.D., ATTENDING RADIOLOGIST  This document has been electronically signed. Apr 27 2019  6:58AM       (04-26-19 @ 18:22)    < from: CT Head No Cont (04.28.19 @ 10:23) >  IMPRESSION:     1.  No evidence of acute intracranial hemorrhage or large territory   infarct. Stable exam since 4/24/2019.    2.  Stable chronic infarct in the left frontal lobe and mild chronic   microvascular changes.    DEBBIE CHAVEZ M.D., ATTENDING RADIOLOGIST  This document has been electronically signed. Apr 28 2019 11:31AM    < end of copied text >        Relevant blood tests:  Direct LDL: 85 mg/dL [4 - 129] (04-27-19 @ 08:07)      Relevant cardiac rhythm monitoring:    Relevant Cardiac Structure:(TTE/JL +/-):[]No intracardiac thrombus/[] no vegetation/[]no [place]ia/EF:      Home Medications:  aspirin 325 mg oral tablet: 1 tab(s) orally once a day (24 Apr 2019 09:10)  atorvastatin 80 mg oral tablet: 1 tab(s) orally once a day (at bedtime) (27 Apr 2019 05:54)  clopidogrel 75 mg oral tablet: 1 tab(s) orally once a day (27 Apr 2019 05:54)  donepezil 10 mg oral tablet: 1 tab(s) orally once a day (at bedtime) (24 Apr 2019 09:10)  ferrous fumarate 325 mg (106 mg elemental iron) oral tablet: 1 tab(s) orally once a day (24 Apr 2019 09:10)  losartan 100 mg oral tablet: 1 tab(s) orally once a day (27 Apr 2019 05:54)  metoprolol succinate 25 mg oral tablet, extended release: 1 tab(s) orally once a day (24 Apr 2019 09:10)  mirtazapine 30 mg oral tablet: 1 tab(s) orally once a day (at bedtime) (24 Apr 2019 09:10)  spironolactone 50 mg oral tablet: 1 tab(s) orally once a day (24 Apr 2019 09:10)      MEDICATIONS  (STANDING):  aspirin 325 milliGRAM(s) Oral daily  atorvastatin 80 milliGRAM(s) Oral at bedtime  chlorhexidine 4% Liquid 1 Application(s) Topical <User Schedule>  clopidogrel Tablet 75 milliGRAM(s) Oral daily  donepezil 10 milliGRAM(s) Oral at bedtime  enoxaparin Injectable 40 milliGRAM(s) SubCutaneous every 24 hours  ferrous    sulfate 325 milliGRAM(s) Oral daily  furosemide    Tablet 40 milliGRAM(s) Oral daily  losartan 100 milliGRAM(s) Oral daily  melatonin 5 milliGRAM(s) Oral at bedtime  metoprolol succinate ER 25 milliGRAM(s) Oral daily  spironolactone 50 milliGRAM(s) Oral daily      PT/OT/Speech/Rehab/S&Swr:    Exam:    Vital Signs Last 24 Hrs  T(C): 36.3 (28 Apr 2019 05:25), Max: 36.5 (27 Apr 2019 18:27)  T(F): 97.3 (28 Apr 2019 05:25), Max: 97.7 (27 Apr 2019 18:27)  HR: 60 (28 Apr 2019 05:25) (60 - 60)  BP: 100/53 (28 Apr 2019 05:25) (100/53 - 132/68)  BP(mean): --  RR: 18 (28 Apr 2019 05:25) (18 - 20)  SpO2: 99% (27 Apr 2019 11:13) (99% - 99%)    NIHSS      LOC:       1a:  0   1b(Questions):  0         1c(Instructions):  0           Best Gaze:0  Visual:0  Motor:                 RUE:0     RLE:0     LUE: 0  LLE: 0    FACE:  0   Limb Ataxia:0  Sensory: 0      Language:   0    Dysarthria:   0       Extinction and Inattention:0    NIHSS on admission:          NIHSS yesterday:          NIHSS today:  0           m-RS:3

## 2019-04-28 NOTE — PROGRESS NOTE ADULT - SUBJECTIVE AND OBJECTIVE BOX
SUBJECTIVE:    Patient is a 84y old Male who presents with a chief complaint of Confusion (28 Apr 2019 08:05)    Currently admitted to medicine with the primary diagnosis of Neurological deficit present     Today is hospital day 4d. This morning he is resting comfortably in bed  feeling well'  JL Monday  sitting in chair        PAST MEDICAL & SURGICAL HISTORY  Mild dementia  Chronic right-sided heart failure  Depression  Liver cirrhosis, alcoholic  Borderline diabetes mellitus  HTN (hypertension)  Afib  Presence of Watchman left atrial appendage closure device  AICD (automatic cardioverter/defibrillator) present  History of cholecystectomy  History of appendectomy  History of knee surgery  Deviated nasal septum    SOCIAL HISTORY:  Negative for smoking/alcohol/drug use.     ALLERGIES:  No Known Allergies    MEDICATIONS:  STANDING MEDICATIONS  aspirin 325 milliGRAM(s) Oral daily  atorvastatin 80 milliGRAM(s) Oral at bedtime  chlorhexidine 4% Liquid 1 Application(s) Topical <User Schedule>  clopidogrel Tablet 75 milliGRAM(s) Oral daily  donepezil 10 milliGRAM(s) Oral at bedtime  enoxaparin Injectable 40 milliGRAM(s) SubCutaneous every 24 hours  ferrous    sulfate 325 milliGRAM(s) Oral daily  furosemide    Tablet 40 milliGRAM(s) Oral daily  losartan 100 milliGRAM(s) Oral daily  melatonin 5 milliGRAM(s) Oral at bedtime  metoprolol succinate ER 25 milliGRAM(s) Oral daily  spironolactone 50 milliGRAM(s) Oral daily    PRN MEDICATIONS  acetaminophen   Tablet .. 650 milliGRAM(s) Oral every 6 hours PRN    VITALS:   T(F): 97.3  HR: 60  BP: 100/53  RR: 18  SpO2: 99%    LABS:                        10.6   5.64  )-----------( 199      ( 28 Apr 2019 04:38 )             34.1     04-28    138  |  103  |  23<H>  ----------------------------<  111<H>  4.2   |  23  |  1.2    Ca    9.9      28 Apr 2019 04:38                    RADIOLOGY:    PHYSICAL EXAM:  GEN: No acute distress  LUNGS: Clear to auscultation bilaterally   HEART: Regular  ABD: Soft, non-tender, non-distended.  EXT: NC/NC/NE/2+PP/SEPULVEDA/Skin Intact.   NEURO: AAOX3    Intravenous access:   NG tube:   Blanco Catheter:

## 2019-04-29 ENCOUNTER — TRANSCRIPTION ENCOUNTER (OUTPATIENT)
Age: 84
End: 2019-04-29

## 2019-04-29 VITALS
HEART RATE: 60 BPM | RESPIRATION RATE: 18 BRPM | SYSTOLIC BLOOD PRESSURE: 109 MMHG | DIASTOLIC BLOOD PRESSURE: 68 MMHG | TEMPERATURE: 97 F

## 2019-04-29 LAB
ANION GAP SERPL CALC-SCNC: 12 MMOL/L — SIGNIFICANT CHANGE UP (ref 7–14)
BASOPHILS # BLD AUTO: 0.03 K/UL — SIGNIFICANT CHANGE UP (ref 0–0.2)
BASOPHILS NFR BLD AUTO: 0.6 % — SIGNIFICANT CHANGE UP (ref 0–1)
BUN SERPL-MCNC: 25 MG/DL — HIGH (ref 10–20)
CALCIUM SERPL-MCNC: 9.4 MG/DL — SIGNIFICANT CHANGE UP (ref 8.5–10.1)
CHLORIDE SERPL-SCNC: 105 MMOL/L — SIGNIFICANT CHANGE UP (ref 98–110)
CO2 SERPL-SCNC: 23 MMOL/L — SIGNIFICANT CHANGE UP (ref 17–32)
CREAT SERPL-MCNC: 1.2 MG/DL — SIGNIFICANT CHANGE UP (ref 0.7–1.5)
EOSINOPHIL # BLD AUTO: 0.25 K/UL — SIGNIFICANT CHANGE UP (ref 0–0.7)
EOSINOPHIL NFR BLD AUTO: 5 % — SIGNIFICANT CHANGE UP (ref 0–8)
ESTIMATED AVERAGE GLUCOSE: 123 MG/DL — HIGH (ref 68–114)
GLUCOSE BLDC GLUCOMTR-MCNC: 105 MG/DL — HIGH (ref 70–99)
GLUCOSE SERPL-MCNC: 109 MG/DL — HIGH (ref 70–99)
HBA1C BLD-MCNC: 5.9 % — HIGH (ref 4–5.6)
HCT VFR BLD CALC: 34.5 % — LOW (ref 42–52)
HGB BLD-MCNC: 10.5 G/DL — LOW (ref 14–18)
IMM GRANULOCYTES NFR BLD AUTO: 0.6 % — HIGH (ref 0.1–0.3)
LYMPHOCYTES # BLD AUTO: 0.89 K/UL — LOW (ref 1.2–3.4)
LYMPHOCYTES # BLD AUTO: 17.9 % — LOW (ref 20.5–51.1)
MCHC RBC-ENTMCNC: 26.5 PG — LOW (ref 27–31)
MCHC RBC-ENTMCNC: 30.4 G/DL — LOW (ref 32–37)
MCV RBC AUTO: 87.1 FL — SIGNIFICANT CHANGE UP (ref 80–94)
MONOCYTES # BLD AUTO: 0.7 K/UL — HIGH (ref 0.1–0.6)
MONOCYTES NFR BLD AUTO: 14.1 % — HIGH (ref 1.7–9.3)
NEUTROPHILS # BLD AUTO: 3.08 K/UL — SIGNIFICANT CHANGE UP (ref 1.4–6.5)
NEUTROPHILS NFR BLD AUTO: 61.8 % — SIGNIFICANT CHANGE UP (ref 42.2–75.2)
NRBC # BLD: 0 /100 WBCS — SIGNIFICANT CHANGE UP (ref 0–0)
PLATELET # BLD AUTO: 199 K/UL — SIGNIFICANT CHANGE UP (ref 130–400)
POTASSIUM SERPL-MCNC: 4 MMOL/L — SIGNIFICANT CHANGE UP (ref 3.5–5)
POTASSIUM SERPL-SCNC: 4 MMOL/L — SIGNIFICANT CHANGE UP (ref 3.5–5)
RBC # BLD: 3.96 M/UL — LOW (ref 4.7–6.1)
RBC # FLD: 15.5 % — HIGH (ref 11.5–14.5)
SODIUM SERPL-SCNC: 140 MMOL/L — SIGNIFICANT CHANGE UP (ref 135–146)
WBC # BLD: 4.98 K/UL — SIGNIFICANT CHANGE UP (ref 4.8–10.8)
WBC # FLD AUTO: 4.98 K/UL — SIGNIFICANT CHANGE UP (ref 4.8–10.8)

## 2019-04-29 PROCEDURE — 93312 ECHO TRANSESOPHAGEAL: CPT | Mod: 26

## 2019-04-29 RX ORDER — ATORVASTATIN CALCIUM 80 MG/1
1 TABLET, FILM COATED ORAL
Qty: 30 | Refills: 0
Start: 2019-04-29 | End: 2019-05-28

## 2019-04-29 RX ADMIN — LOSARTAN POTASSIUM 100 MILLIGRAM(S): 100 TABLET, FILM COATED ORAL at 11:50

## 2019-04-29 RX ADMIN — Medication 25 MILLIGRAM(S): at 06:02

## 2019-04-29 RX ADMIN — CHLORHEXIDINE GLUCONATE 1 APPLICATION(S): 213 SOLUTION TOPICAL at 06:03

## 2019-04-29 RX ADMIN — Medication 40 MILLIGRAM(S): at 06:02

## 2019-04-29 RX ADMIN — SPIRONOLACTONE 50 MILLIGRAM(S): 25 TABLET, FILM COATED ORAL at 11:53

## 2019-04-29 RX ADMIN — Medication 325 MILLIGRAM(S): at 11:45

## 2019-04-29 RX ADMIN — CLOPIDOGREL BISULFATE 75 MILLIGRAM(S): 75 TABLET, FILM COATED ORAL at 11:45

## 2019-04-29 RX ADMIN — ENOXAPARIN SODIUM 40 MILLIGRAM(S): 100 INJECTION SUBCUTANEOUS at 13:42

## 2019-04-29 RX ADMIN — Medication 100 MILLIGRAM(S): at 06:03

## 2019-04-29 NOTE — OCCUPATIONAL THERAPY INITIAL EVALUATION ADULT - PLANNED THERAPY INTERVENTIONS, OT EVAL
transfer training/strengthening/ADL retraining/IADL retraining/balance training/bed mobility training

## 2019-04-29 NOTE — PRE-ANESTHESIA EVALUATION ADULT - NSANTHOSAYNRD_GEN_A_CORE
No. CHERYLE screening performed.  STOP BANG Legend: 0-2 = LOW Risk; 3-4 = INTERMEDIATE Risk; 5-8 = HIGH Risk

## 2019-04-29 NOTE — PROGRESS NOTE ADULT - ATTENDING COMMENTS
Patient seen and examined and case discussed on multidisciplinary rounds this AM.  Agree with above except as noted.  Patient having JL this morning.  No new complaints  NIHSS 0   mrankin 0    Plan as above

## 2019-04-29 NOTE — PROGRESS NOTE ADULT - ASSESSMENT
This is a 84y Male w/ h/o mild dementia with acute confusion and facial asymetry which have resolved. Due to pacemaker MRI not performed.  Question if these symptoms have a cardioembolic cause with prior Watchman placement off anticoagulation.      Plan:   JL on today  Continue Neurochecks Q4 hr   continue ASA, lipitor   Likely transfer to rehab post procedure This is a 84y Male w/ h/o mild dementia with acute confusion and facial asymetry which have resolved. Due to pacemaker MRI not performed.  Question if these symptoms have a cardioembolic cause with prior Watchman placement off anticoagulation.      Plan:   JL on today  Continue Neurochecks Q4 hr   continue ASA, plavix and lipitor   Likely transfer to rehab post procedure  No further stroke workup

## 2019-04-29 NOTE — DISCHARGE NOTE NURSING/CASE MANAGEMENT/SOCIAL WORK - NSDCDPATPORTLINK_GEN_ALL_CORE
You can access the [facility] Portal, offered by [facility], by registering with the following website: [email or web address]

## 2019-04-29 NOTE — PROCEDURE NOTE - GENERAL PROCEDURE DETAILS
Findings:  Watchman appendage occluder device in place. No evidence of thrombus or residual pilo-device flow. See full report for details.

## 2019-04-29 NOTE — PROGRESS NOTE ADULT - SUBJECTIVE AND OBJECTIVE BOX
Neurology Follow up note      Name  WILLIAM ARAIZA    HPI:  84M with pmhx of A-Fib s/p Watchman and AICD (not on A/C) Cirrhosis (Previous EtOH absue, sober for years now), CVA without residual symptoms, DM II, HTN, Mild Dementia, PTSD, Iron Deficiency anemia (no source identified after extensive work ups), Chronic Lower back pain secondary to Arthritis and DJD presents from home for increasing confusion and strange behavior. Patient was in his normal state of health prior yesterday. As per wife patient awoke and was mild confused but progressed throughout the day. Patient was also going to the bathroom frequently but not urinating, also stated the shower was not working correctly and arguing with sons. Patient was also having nonsensical speech. Son decided to call EMS to bring him to the hospital. Wife denied the patient had experienced CP, palpitations, n/v/d, fever, recent illness, LOC, or falls.   During Interview with patient remains confused. Will answer questions appropriately but often times will have tangential unrelated speech or difficulty comprehending. He was confused as to why he was in the hospital and wanted to leave during interview. Patient stated that he drove himself to the hospital and came because he had chest pain, left sided, radiating to left arm, not associated with SOB, palpitations, diaphoresis, n/v, or dizziness. He was unable to describe or rate the pain.    In Ed: T: 97.4        HR: 66          BP: 144/80          RR: 18, 98%  CTH Negative for acute changes   Neuro Consulted for Confusion but no code stroke called: NIHSS was 2 at presentation (24 Apr 2019 08:36)      Interval History - No issues overnight  awaiting JL today- NPO over midnight.          Vital Signs Last 24 Hrs  T(C): 35.6 (29 Apr 2019 05:12), Max: 36.5 (28 Apr 2019 18:34)  T(F): 96.1 (29 Apr 2019 05:12), Max: 97.7 (28 Apr 2019 18:34)  HR: 61 (29 Apr 2019 05:12) (60 - 61)  BP: 103/60 (29 Apr 2019 05:12) (100/59 - 119/68)  BP(mean): --  RR: 18 (29 Apr 2019 05:12) (16 - 20)  SpO2: 97% (28 Apr 2019 21:49) (97% - 97%)          Neurological Exam:   Mental status: Awakens Alert Orientedx 3  PEERLA Speech intact  motor SEPULVEDA X 4  Sensory; grossly intact  Sensory; grossly intact                Medications  acetaminophen   Tablet .. 650 milliGRAM(s) Oral every 6 hours PRN  aspirin 325 milliGRAM(s) Oral daily  atorvastatin 80 milliGRAM(s) Oral at bedtime  chlorhexidine 4% Liquid 1 Application(s) Topical <User Schedule>  clopidogrel Tablet 75 milliGRAM(s) Oral daily  docusate sodium 100 milliGRAM(s) Oral three times a day  donepezil 10 milliGRAM(s) Oral at bedtime  enoxaparin Injectable 40 milliGRAM(s) SubCutaneous every 24 hours  ferrous    sulfate 325 milliGRAM(s) Oral daily  furosemide    Tablet 40 milliGRAM(s) Oral daily  losartan 100 milliGRAM(s) Oral daily  melatonin 5 milliGRAM(s) Oral at bedtime  metoprolol succinate ER 25 milliGRAM(s) Oral daily  senna 2 Tablet(s) Oral at bedtime  spironolactone 50 milliGRAM(s) Oral daily      Lab      Radiology

## 2019-04-29 NOTE — DISCHARGE NOTE NURSING/CASE MANAGEMENT/SOCIAL WORK - NSDCPEPTSTRK_GEN_ALL_CORE
Stroke support groups for patients, families, and friends/Call 911 for stroke/Need for follow up after discharge/Stroke warning signs and symptoms/Signs and symptoms of stroke/Stroke education booklet/Prescribed medications/Risk factors for stroke

## 2019-04-29 NOTE — CHART NOTE - NSCHARTNOTEFT_GEN_A_CORE
<<<RESIDENT DISCHARGE NOTE>>>     WILLIAM ARAIZA  MRN-[id number]    VITAL SIGNS:  T(F): 97.3 (04-29-19 @ 14:23), Max: 97.7 (04-28-19 @ 18:34)  HR: 60 (04-29-19 @ 14:23)  BP: 109/68 (04-29-19 @ 14:23)  SpO2: 97% (04-28-19 @ 21:49)  Weight (kg): 84 (04-29-19 @ 08:05)  BMI (kg/m2): 28.2 (04-29-19 @ 08:05)    PHYSICAL EXAMINATION:  GEN: No acute distress  LUNGS: Clear to auscultation bilaterally   HEART: irregularly irregular  ABD: Soft, non-tender, non-distended. No suprapubic tenderness  EXT: No edema legs. Full ROM b/l   NEURO: AAOX3    TEST RESULTS:                        10.5   4.98  )-----------( 199      ( 29 Apr 2019 05:24 )             34.5       04-29    140  |  105  |  25<H>  ----------------------------<  109<H>  4.0   |  23  |  1.2    Ca    9.4      29 Apr 2019 05:24        FINAL DISCHARGE INTERVIEW:  Resident(s) Present: (Name:____Mariam_________)    DISCHARGE MEDICATION RECONCILIATION  reviewed with Attending (Name:_____Cece______)    DISPOSITION:   [ x ] Home,    [  ] Home with Visiting Nursing Services,   [    ]  SNF/ NH,    [   ] Acute Rehab (4A),   [   ] Other (Specify:_________)

## 2019-04-29 NOTE — CHART NOTE - NSCHARTNOTEFT_GEN_A_CORE
Pre-procedure Assessment:  Patient seen and examined. I agree with the history and physical which I have reviewed and noted any changes below.  04-29-19 @ 08:40

## 2019-04-29 NOTE — PROGRESS NOTE ADULT - SUBJECTIVE AND OBJECTIVE BOX
Patient was seen and examined. Spoke with RN. Chart reviewed.    No events overnight.  Vital Signs Last 24 Hrs  T(F): 97.3 (29 Apr 2019 14:23), Max: 97.7 (28 Apr 2019 18:34)  HR: 60 (29 Apr 2019 14:23) (60 - 61)  BP: 109/68 (29 Apr 2019 14:23) (102/62 - 127/70)  SpO2: 97% (28 Apr 2019 21:49) (97% - 97%)  MEDICATIONS  (STANDING):  aspirin 325 milliGRAM(s) Oral daily  atorvastatin 80 milliGRAM(s) Oral at bedtime  chlorhexidine 4% Liquid 1 Application(s) Topical <User Schedule>  clopidogrel Tablet 75 milliGRAM(s) Oral daily  docusate sodium 100 milliGRAM(s) Oral three times a day  donepezil 10 milliGRAM(s) Oral at bedtime  enoxaparin Injectable 40 milliGRAM(s) SubCutaneous every 24 hours  ferrous    sulfate 325 milliGRAM(s) Oral daily  furosemide    Tablet 40 milliGRAM(s) Oral daily  losartan 100 milliGRAM(s) Oral daily  melatonin 5 milliGRAM(s) Oral at bedtime  metoprolol succinate ER 25 milliGRAM(s) Oral daily  senna 2 Tablet(s) Oral at bedtime  spironolactone 50 milliGRAM(s) Oral daily    MEDICATIONS  (PRN):  acetaminophen   Tablet .. 650 milliGRAM(s) Oral every 6 hours PRN Mild Pain (1 - 3)    Labs:                        10.5   4.98  )-----------( 199      ( 29 Apr 2019 05:24 )             34.5                         10.6   5.64  )-----------( 199      ( 28 Apr 2019 04:38 )             34.1     29 Apr 2019 05:24    140    |  105    |  25     ----------------------------<  109    4.0     |  23     |  1.2    28 Apr 2019 04:38    138    |  103    |  23     ----------------------------<  111    4.2     |  23     |  1.2      Ca    9.4        29 Apr 2019 05:24  Ca    9.9        28 Apr 2019 04:38              Radiology:    General: comfortable, NAD  Neurology: A&Ox3, nonfocal  Head:  Normocephalic, atraumatic  ENT:  Mucosa moist, no ulcerations  Neck:  Supple, no JVD,   Skin: no breakdown  Resp: CTA B/L  CV: RRR, S1S2,   GI: Soft, NT, bowel sounds  MS: No edema, + peripheral pulses, FROM all 4 extremity

## 2019-04-30 LAB
CULTURE RESULTS: SIGNIFICANT CHANGE UP
SPECIMEN SOURCE: SIGNIFICANT CHANGE UP

## 2019-05-08 DIAGNOSIS — M19.90 UNSPECIFIED OSTEOARTHRITIS, UNSPECIFIED SITE: ICD-10-CM

## 2019-05-08 DIAGNOSIS — I48.91 UNSPECIFIED ATRIAL FIBRILLATION: ICD-10-CM

## 2019-05-08 DIAGNOSIS — R41.0 DISORIENTATION, UNSPECIFIED: ICD-10-CM

## 2019-05-08 DIAGNOSIS — M54.9 DORSALGIA, UNSPECIFIED: ICD-10-CM

## 2019-05-08 DIAGNOSIS — I11.0 HYPERTENSIVE HEART DISEASE WITH HEART FAILURE: ICD-10-CM

## 2019-05-08 DIAGNOSIS — K70.30 ALCOHOLIC CIRRHOSIS OF LIVER WITHOUT ASCITES: ICD-10-CM

## 2019-05-08 DIAGNOSIS — Z86.73 PERSONAL HISTORY OF TRANSIENT ISCHEMIC ATTACK (TIA), AND CEREBRAL INFARCTION WITHOUT RESIDUAL DEFICITS: ICD-10-CM

## 2019-05-08 DIAGNOSIS — Z90.49 ACQUIRED ABSENCE OF OTHER SPECIFIED PARTS OF DIGESTIVE TRACT: ICD-10-CM

## 2019-05-08 DIAGNOSIS — D50.9 IRON DEFICIENCY ANEMIA, UNSPECIFIED: ICD-10-CM

## 2019-05-08 DIAGNOSIS — Z95.810 PRESENCE OF AUTOMATIC (IMPLANTABLE) CARDIAC DEFIBRILLATOR: ICD-10-CM

## 2019-05-08 DIAGNOSIS — R56.9 UNSPECIFIED CONVULSIONS: ICD-10-CM

## 2019-05-08 DIAGNOSIS — F43.10 POST-TRAUMATIC STRESS DISORDER, UNSPECIFIED: ICD-10-CM

## 2019-05-08 DIAGNOSIS — G89.29 OTHER CHRONIC PAIN: ICD-10-CM

## 2019-05-08 DIAGNOSIS — E11.9 TYPE 2 DIABETES MELLITUS WITHOUT COMPLICATIONS: ICD-10-CM

## 2019-05-08 DIAGNOSIS — K59.00 CONSTIPATION, UNSPECIFIED: ICD-10-CM

## 2019-05-08 DIAGNOSIS — F32.9 MAJOR DEPRESSIVE DISORDER, SINGLE EPISODE, UNSPECIFIED: ICD-10-CM

## 2019-05-08 DIAGNOSIS — R29.702 NIHSS SCORE 2: ICD-10-CM

## 2019-05-08 DIAGNOSIS — F03.90 UNSPECIFIED DEMENTIA WITHOUT BEHAVIORAL DISTURBANCE: ICD-10-CM

## 2019-05-08 DIAGNOSIS — I50.812 CHRONIC RIGHT HEART FAILURE: ICD-10-CM

## 2019-05-08 DIAGNOSIS — Z79.82 LONG TERM (CURRENT) USE OF ASPIRIN: ICD-10-CM

## 2019-05-08 DIAGNOSIS — I63.9 CEREBRAL INFARCTION, UNSPECIFIED: ICD-10-CM

## 2019-05-08 DIAGNOSIS — Z87.891 PERSONAL HISTORY OF NICOTINE DEPENDENCE: ICD-10-CM

## 2019-05-08 DIAGNOSIS — I34.0 NONRHEUMATIC MITRAL (VALVE) INSUFFICIENCY: ICD-10-CM

## 2019-05-08 DIAGNOSIS — I27.20 PULMONARY HYPERTENSION, UNSPECIFIED: ICD-10-CM

## 2019-05-08 DIAGNOSIS — Z95.0 PRESENCE OF CARDIAC PACEMAKER: ICD-10-CM

## 2019-05-08 DIAGNOSIS — R29.818 OTHER SYMPTOMS AND SIGNS INVOLVING THE NERVOUS SYSTEM: ICD-10-CM

## 2019-05-08 DIAGNOSIS — I44.2 ATRIOVENTRICULAR BLOCK, COMPLETE: ICD-10-CM

## 2019-05-30 PROBLEM — F03.90 UNSPECIFIED DEMENTIA WITHOUT BEHAVIORAL DISTURBANCE: Chronic | Status: ACTIVE | Noted: 2019-04-24

## 2019-06-03 ENCOUNTER — APPOINTMENT (OUTPATIENT)
Dept: NEUROLOGY | Facility: CLINIC | Age: 84
End: 2019-06-03
Payer: MEDICARE

## 2019-06-03 VITALS
WEIGHT: 195 LBS | SYSTOLIC BLOOD PRESSURE: 135 MMHG | HEIGHT: 68 IN | DIASTOLIC BLOOD PRESSURE: 75 MMHG | HEART RATE: 65 BPM | BODY MASS INDEX: 29.55 KG/M2

## 2019-06-03 DIAGNOSIS — Z83.3 FAMILY HISTORY OF DIABETES MELLITUS: ICD-10-CM

## 2019-06-03 DIAGNOSIS — Z87.891 PERSONAL HISTORY OF NICOTINE DEPENDENCE: ICD-10-CM

## 2019-06-03 DIAGNOSIS — Z82.49 FAMILY HISTORY OF ISCHEMIC HEART DISEASE AND OTHER DISEASES OF THE CIRCULATORY SYSTEM: ICD-10-CM

## 2019-06-03 DIAGNOSIS — Z78.9 OTHER SPECIFIED HEALTH STATUS: ICD-10-CM

## 2019-06-03 PROCEDURE — 99214 OFFICE O/P EST MOD 30 MIN: CPT

## 2019-06-03 NOTE — HISTORY OF PRESENT ILLNESS
[FreeTextEntry1] : 84M with pmhx of A-Fib s/p Watchman and AICD (not on A/C) Cirrhosis (Previous EtOH absue, sober for years now), CVA without residual symptoms, DM II, HTN, Mild Dementia, PTSD, Iron Deficiency anemia (no source identified after extensive work ups), Chronic Lower back pain secondary to Arthritis and DJD presented to [facility] from home for increasing confusion and strange behavior. Patient was in his normal state of health 4/22/2019 and then was brought by wife to ED. As per wife patient awoke and was mild confused but progressed throughout the day. Patient was also going to the bathroom frequently but not urinating, also stated the shower was not working correctly and arguing with sons. Patient was also having nonsensical speech. Son decided to call EMS to bring him to the hospital. Wife denied the patient had experienced CP, palpitations, n/v/d, fever, recent illness, LOC, or falls.  Prior to this he was started on remeron and the dose was titrated up over the preceding week.\par \par Had an old stroke seen on CTH in ED which is similar to CTH from 2015.\par Had routine EEG in hospital which showed borderline slow PDR but otherwise no focal findings.  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

## 2019-06-03 NOTE — DISCUSSION/SUMMARY
[FreeTextEntry1] : Mr. Elizabeth is here for follow up of episode of confusion.  He was admitted for the stroke work up given finding of an old stroke.  Work up for stroke was negative.  Episode suspicious for medication side effect (remeron) vs. complex partial seizure.  More likely related to medication\par 1. Should continue antiplatelet and low dose statin\par 2. Can followup here as needed.\par 3. No further workup for this event is needed

## 2019-06-03 NOTE — PHYSICAL EXAM
[FreeTextEntry1] : Orientation: oriented to person, oriented to place and oriented to time. \par Attention: normal concentrating ability and visual attention was not decreased. \par Language: no difficulty naming common objects, no difficulty repeating a phrase, no difficulty writing a sentence, fluency intact, comprehension intact and reading intact. \par Fund of knowledge: displays adequate knowledge of personal past history. \par Cranial Nerves: visual acuity intact bilaterally, visual fields full to confrontation, pupils equal round and reactive to light, extraocular motion intact, facial sensation intact symmetrically, face symmetrical, hearing was intact bilaterally, tongue and palate midline, head turning and shoulder shrug symmetric and there was no tongue deviation with protrusion. \par Motor: muscle tone was normal in all four extremities, muscle strength was normal in all four extremities and normal bulk in all four extremities. \par Sensory exam: light touch, PP, Vibration was intact. except absent vibration in left knee but preserved and equal  in left and right ankle\par Coordination:. normal gait. balance was intact. there was no past-pointing. no tremor present. \par Deep tendon reflexes: \par 1+ in UE and 0 in LE\par Plantar responses normal on the right, normal on the left.\par \par NIHSS 0\par mrankin 0\par   \par

## 2019-06-13 ENCOUNTER — APPOINTMENT (OUTPATIENT)
Dept: CARDIOLOGY | Facility: CLINIC | Age: 84
End: 2019-06-13
Payer: MEDICARE

## 2019-06-13 VITALS — SYSTOLIC BLOOD PRESSURE: 120 MMHG | DIASTOLIC BLOOD PRESSURE: 70 MMHG

## 2019-06-13 DIAGNOSIS — E78.00 PURE HYPERCHOLESTEROLEMIA, UNSPECIFIED: ICD-10-CM

## 2019-06-13 PROCEDURE — 93282 PRGRMG EVAL IMPLANTABLE DFB: CPT

## 2019-06-13 PROCEDURE — 99213 OFFICE O/P EST LOW 20 MIN: CPT | Mod: 25

## 2019-06-18 NOTE — ASSESSMENT
[FreeTextEntry1] : SOB - ? etiology\par - improved; normal EF\par \par AF permanent s/p watchman\par - cont ASA

## 2019-06-18 NOTE — PROCEDURE
[ICD] : Implantable cardioverter-defibrillator [VVIR] : VVIR [Impedance: ___ Ohms] : current cell impedance is [unfilled] Ohms [Charge Time: ___ sec] : charge time was [unfilled] seconds [Longevity: ___ months] : The estimated remaining battery life is [unfilled] months [Lead Imp:  ___ohms] : lead impedance was [unfilled] ohms [Sensing Amplitude ___mv] : sensing amplitude was [unfilled] mv [___V @] : [unfilled] V [Pace ___ %] : Pace [unfilled]% [___ ms] : [unfilled] ms [de-identified] : V814 [de-identified] : 3rd Degree HB [de-identified] : [facility] [de-identified] : 60 [de-identified] : 06/16/2010 [de-identified] : No episodes. No changes made. Normal device function. Home monitor transmitting.

## 2019-06-18 NOTE — PHYSICAL EXAM
[General Appearance - Well Developed] : well developed [Well Groomed] : well groomed [Normal Appearance] : normal appearance [General Appearance - Well Nourished] : well nourished [No Deformities] : no deformities [General Appearance - In No Acute Distress] : no acute distress [Heart Rate And Rhythm] : heart rate and rhythm were normal [Murmurs] : no murmurs present [Heart Sounds] : normal S1 and S2 [Auscultation Breath Sounds / Voice Sounds] : lungs were clear to auscultation bilaterally [Exaggerated Use Of Accessory Muscles For Inspiration] : no accessory muscle use [Respiration, Rhythm And Depth] : normal respiratory rhythm and effort [Clean] : clean [Well-Healed] : well-healed [Dry] : dry [Abdomen Tenderness] : non-tender [Abdomen Soft] : soft [Nail Clubbing] : no clubbing of the fingernails [Abdomen Mass (___ Cm)] : no abdominal mass palpated [Petechial Hemorrhages (___cm)] : no petechial hemorrhages [Cyanosis, Localized] : no localized cyanosis [] : no ischemic changes

## 2019-06-18 NOTE — HISTORY OF PRESENT ILLNESS
[SOB] : no dyspnea [Syncope] : no syncope [Erythema at Site] : no erythema at device site [de-identified] : Patient c/o SOB for few months. Pt had  echo done that showed normal LV function\par \par Patient was admitted to hospital but  did not have TIA/CV [Swelling at Site] : no swelling at device site

## 2019-07-10 NOTE — ED ADULT NURSE NOTE - NS ED NURSE REPORT GIVEN DT
----- Message from Dayanna Mosher sent at 7/10/2019 12:10 PM CDT -----  Contact: Beryl pt  Type: Needs Medical Advice    Who Called:  Beryl  Best Call Back Number: [phone]  Additional Information: Pls call pt regarding orders for blood work before her surgery    
Left vm for patient parent to call back in order to discuss  
06-Oct-2018 00:22

## 2019-10-03 ENCOUNTER — APPOINTMENT (OUTPATIENT)
Dept: CARDIOLOGY | Facility: CLINIC | Age: 84
End: 2019-10-03
Payer: MEDICARE

## 2019-10-03 PROCEDURE — 93296 REM INTERROG EVL PM/IDS: CPT

## 2019-10-03 PROCEDURE — 93295 DEV INTERROG REMOTE 1/2/MLT: CPT

## 2019-12-12 ENCOUNTER — APPOINTMENT (OUTPATIENT)
Dept: CARDIOLOGY | Facility: CLINIC | Age: 84
End: 2019-12-12
Payer: MEDICARE

## 2019-12-12 VITALS
WEIGHT: 195 LBS | SYSTOLIC BLOOD PRESSURE: 110 MMHG | BODY MASS INDEX: 29.55 KG/M2 | DIASTOLIC BLOOD PRESSURE: 68 MMHG | HEIGHT: 68 IN | HEART RATE: 53 BPM

## 2019-12-12 PROCEDURE — 99213 OFFICE O/P EST LOW 20 MIN: CPT | Mod: 25

## 2019-12-12 PROCEDURE — 93282 PRGRMG EVAL IMPLANTABLE DFB: CPT

## 2019-12-23 RX ORDER — ATORVASTATIN CALCIUM 80 MG/1
80 TABLET, FILM COATED ORAL
Qty: 30 | Refills: 0 | Status: ACTIVE | COMMUNITY
Start: 2019-04-29

## 2019-12-23 NOTE — PHYSICAL EXAM
[Well Groomed] : well groomed [Normal Appearance] : normal appearance [General Appearance - Well Developed] : well developed [No Deformities] : no deformities [General Appearance - Well Nourished] : well nourished [Heart Rate And Rhythm] : heart rate and rhythm were normal [General Appearance - In No Acute Distress] : no acute distress [Heart Sounds] : normal S1 and S2 [Murmurs] : no murmurs present [Respiration, Rhythm And Depth] : normal respiratory rhythm and effort [Exaggerated Use Of Accessory Muscles For Inspiration] : no accessory muscle use [Clean] : clean [Auscultation Breath Sounds / Voice Sounds] : lungs were clear to auscultation bilaterally [Well-Healed] : well-healed [Abdomen Soft] : soft [Dry] : dry [Abdomen Tenderness] : non-tender [Nail Clubbing] : no clubbing of the fingernails [Abdomen Mass (___ Cm)] : no abdominal mass palpated [] : no ischemic changes [Cyanosis, Localized] : no localized cyanosis [Petechial Hemorrhages (___cm)] : no petechial hemorrhages

## 2019-12-23 NOTE — PROCEDURE
[Complete Heart Block] : complete heart block [ICD] : Implantable cardioverter-defibrillator [Lead Imp:  ___ohms] : lead impedance was [unfilled] ohms [Longevity: ___ months] : The estimated remaining battery life is [unfilled] months [VVIR] : VVIR [___V @] : [unfilled] V [Sensing Amplitude ___mv] : sensing amplitude was [unfilled] mv [Pace ___ %] : Pace [unfilled]% [___ ms] : [unfilled] ms [None] : none [de-identified] : T038 [de-identified] : [place] [de-identified] : 60 [de-identified] : [id number] [de-identified] : 06/16/2010 [de-identified] : NO EPISODES\par NORMAL DEVICE FUNCTION

## 2019-12-23 NOTE — HISTORY OF PRESENT ILLNESS
[SOB] : dyspnea [Syncope] : no syncope [Erythema at Site] : no erythema at device site [Swelling at Site] : no swelling at device site [de-identified] : Patient c/o SOB for few months. Pt had  echo done that showed normal LV fucntion

## 2020-01-01 ENCOUNTER — INPATIENT (INPATIENT)
Facility: HOSPITAL | Age: 85
LOS: 19 days | End: 2020-12-27
Attending: STUDENT IN AN ORGANIZED HEALTH CARE EDUCATION/TRAINING PROGRAM | Admitting: STUDENT IN AN ORGANIZED HEALTH CARE EDUCATION/TRAINING PROGRAM
Payer: MEDICARE

## 2020-01-01 ENCOUNTER — APPOINTMENT (OUTPATIENT)
Dept: CARDIOLOGY | Facility: CLINIC | Age: 85
End: 2020-01-01

## 2020-01-01 ENCOUNTER — APPOINTMENT (OUTPATIENT)
Dept: CARDIOLOGY | Facility: CLINIC | Age: 85
End: 2020-01-01
Payer: MEDICARE

## 2020-01-01 VITALS
TEMPERATURE: 97.3 F | WEIGHT: 182 LBS | DIASTOLIC BLOOD PRESSURE: 60 MMHG | BODY MASS INDEX: 27.58 KG/M2 | SYSTOLIC BLOOD PRESSURE: 120 MMHG | HEIGHT: 68 IN

## 2020-01-01 VITALS
DIASTOLIC BLOOD PRESSURE: 62 MMHG | TEMPERATURE: 97 F | RESPIRATION RATE: 18 BRPM | HEIGHT: 68 IN | HEART RATE: 58 BPM | SYSTOLIC BLOOD PRESSURE: 105 MMHG | OXYGEN SATURATION: 86 %

## 2020-01-01 VITALS
DIASTOLIC BLOOD PRESSURE: 28 MMHG | HEART RATE: 60 BPM | TEMPERATURE: 92 F | SYSTOLIC BLOOD PRESSURE: 61 MMHG | RESPIRATION RATE: 16 BRPM | OXYGEN SATURATION: 84 %

## 2020-01-01 DIAGNOSIS — I48.91 UNSPECIFIED ATRIAL FIBRILLATION: ICD-10-CM

## 2020-01-01 DIAGNOSIS — Z95.810 PRESENCE OF AUTOMATIC (IMPLANTABLE) CARDIAC DEFIBRILLATOR: Chronic | ICD-10-CM

## 2020-01-01 DIAGNOSIS — Z90.49 ACQUIRED ABSENCE OF OTHER SPECIFIED PARTS OF DIGESTIVE TRACT: Chronic | ICD-10-CM

## 2020-01-01 DIAGNOSIS — Z98.89 OTHER SPECIFIED POSTPROCEDURAL STATES: Chronic | ICD-10-CM

## 2020-01-01 DIAGNOSIS — I63.9 CEREBRAL INFARCTION, UNSPECIFIED: ICD-10-CM

## 2020-01-01 DIAGNOSIS — I10 ESSENTIAL (PRIMARY) HYPERTENSION: ICD-10-CM

## 2020-01-01 DIAGNOSIS — Z95.818 PRESENCE OF OTHER CARDIAC IMPLANTS AND GRAFTS: Chronic | ICD-10-CM

## 2020-01-01 DIAGNOSIS — I50.22 CHRONIC SYSTOLIC (CONGESTIVE) HEART FAILURE: ICD-10-CM

## 2020-01-01 DIAGNOSIS — R00.1 BRADYCARDIA, UNSPECIFIED: ICD-10-CM

## 2020-01-01 DIAGNOSIS — J34.2 DEVIATED NASAL SEPTUM: Chronic | ICD-10-CM

## 2020-01-01 LAB
24R-OH-CALCIDIOL SERPL-MCNC: 34 NG/ML — SIGNIFICANT CHANGE UP (ref 30–80)
A-TUMOR NECROSIS FACT SERPL-MCNC: 13.5 PG/ML — HIGH
ALBUMIN FLD-MCNC: 1.2 G/DL — SIGNIFICANT CHANGE UP
ALBUMIN SERPL ELPH-MCNC: 2 G/DL — LOW (ref 3.5–5.2)
ALBUMIN SERPL ELPH-MCNC: 2.2 G/DL — LOW (ref 3.5–5.2)
ALBUMIN SERPL ELPH-MCNC: 2.3 G/DL — LOW (ref 3.5–5.2)
ALBUMIN SERPL ELPH-MCNC: 2.4 G/DL — LOW (ref 3.5–5.2)
ALBUMIN SERPL ELPH-MCNC: 2.5 G/DL — LOW (ref 3.5–5.2)
ALBUMIN SERPL ELPH-MCNC: 2.5 G/DL — LOW (ref 3.5–5.2)
ALBUMIN SERPL ELPH-MCNC: 2.6 G/DL — LOW (ref 3.5–5.2)
ALBUMIN SERPL ELPH-MCNC: 2.7 G/DL — LOW (ref 3.5–5.2)
ALBUMIN SERPL ELPH-MCNC: 2.9 G/DL — LOW (ref 3.5–5.2)
ALBUMIN SERPL ELPH-MCNC: 2.9 G/DL — LOW (ref 3.5–5.2)
ALBUMIN SERPL ELPH-MCNC: 3.2 G/DL — LOW (ref 3.5–5.2)
ALBUMIN SERPL ELPH-MCNC: 3.3 G/DL — LOW (ref 3.5–5.2)
ALBUMIN SERPL ELPH-MCNC: 3.5 G/DL — SIGNIFICANT CHANGE UP (ref 3.5–5.2)
ALP SERPL-CCNC: 63 U/L — SIGNIFICANT CHANGE UP (ref 30–115)
ALP SERPL-CCNC: 64 U/L — SIGNIFICANT CHANGE UP (ref 30–115)
ALP SERPL-CCNC: 66 U/L — SIGNIFICANT CHANGE UP (ref 30–115)
ALP SERPL-CCNC: 67 U/L — SIGNIFICANT CHANGE UP (ref 30–115)
ALP SERPL-CCNC: 72 U/L — SIGNIFICANT CHANGE UP (ref 30–115)
ALP SERPL-CCNC: 73 U/L — SIGNIFICANT CHANGE UP (ref 30–115)
ALP SERPL-CCNC: 74 U/L — SIGNIFICANT CHANGE UP (ref 30–115)
ALP SERPL-CCNC: 76 U/L — SIGNIFICANT CHANGE UP (ref 30–115)
ALP SERPL-CCNC: 76 U/L — SIGNIFICANT CHANGE UP (ref 30–115)
ALP SERPL-CCNC: 77 U/L — SIGNIFICANT CHANGE UP (ref 30–115)
ALP SERPL-CCNC: 79 U/L — SIGNIFICANT CHANGE UP (ref 30–115)
ALP SERPL-CCNC: 79 U/L — SIGNIFICANT CHANGE UP (ref 30–115)
ALP SERPL-CCNC: 82 U/L — SIGNIFICANT CHANGE UP (ref 30–115)
ALP SERPL-CCNC: 83 U/L — SIGNIFICANT CHANGE UP (ref 30–115)
ALP SERPL-CCNC: 90 U/L — SIGNIFICANT CHANGE UP (ref 30–115)
ALT FLD-CCNC: 14 U/L — SIGNIFICANT CHANGE UP (ref 0–41)
ALT FLD-CCNC: 155 U/L — HIGH (ref 0–41)
ALT FLD-CCNC: 21 U/L — SIGNIFICANT CHANGE UP (ref 0–41)
ALT FLD-CCNC: 21 U/L — SIGNIFICANT CHANGE UP (ref 0–41)
ALT FLD-CCNC: 22 U/L — SIGNIFICANT CHANGE UP (ref 0–41)
ALT FLD-CCNC: 22 U/L — SIGNIFICANT CHANGE UP (ref 0–41)
ALT FLD-CCNC: 23 U/L — SIGNIFICANT CHANGE UP (ref 0–41)
ALT FLD-CCNC: 24 U/L — SIGNIFICANT CHANGE UP (ref 0–41)
ALT FLD-CCNC: 25 U/L — SIGNIFICANT CHANGE UP (ref 0–41)
ALT FLD-CCNC: 26 U/L — SIGNIFICANT CHANGE UP (ref 0–41)
ALT FLD-CCNC: 27 U/L — SIGNIFICANT CHANGE UP (ref 0–41)
ALT FLD-CCNC: 28 U/L — SIGNIFICANT CHANGE UP (ref 0–41)
ANION GAP SERPL CALC-SCNC: 10 MMOL/L — SIGNIFICANT CHANGE UP (ref 7–14)
ANION GAP SERPL CALC-SCNC: 10 MMOL/L — SIGNIFICANT CHANGE UP (ref 7–14)
ANION GAP SERPL CALC-SCNC: 11 MMOL/L — SIGNIFICANT CHANGE UP (ref 7–14)
ANION GAP SERPL CALC-SCNC: 12 MMOL/L — SIGNIFICANT CHANGE UP (ref 7–14)
ANION GAP SERPL CALC-SCNC: 12 MMOL/L — SIGNIFICANT CHANGE UP (ref 7–14)
ANION GAP SERPL CALC-SCNC: 13 MMOL/L — SIGNIFICANT CHANGE UP (ref 7–14)
ANION GAP SERPL CALC-SCNC: 14 MMOL/L — SIGNIFICANT CHANGE UP (ref 7–14)
ANION GAP SERPL CALC-SCNC: 22 MMOL/L — HIGH (ref 7–14)
ANION GAP SERPL CALC-SCNC: 22 MMOL/L — HIGH (ref 7–14)
ANION GAP SERPL CALC-SCNC: 3 MMOL/L — LOW (ref 7–14)
ANION GAP SERPL CALC-SCNC: 5 MMOL/L — LOW (ref 7–14)
ANION GAP SERPL CALC-SCNC: 6 MMOL/L — LOW (ref 7–14)
ANION GAP SERPL CALC-SCNC: 7 MMOL/L — SIGNIFICANT CHANGE UP (ref 7–14)
ANION GAP SERPL CALC-SCNC: 7 MMOL/L — SIGNIFICANT CHANGE UP (ref 7–14)
ANION GAP SERPL CALC-SCNC: 8 MMOL/L — SIGNIFICANT CHANGE UP (ref 7–14)
ANION GAP SERPL CALC-SCNC: 9 MMOL/L — SIGNIFICANT CHANGE UP (ref 7–14)
APPEARANCE UR: ABNORMAL
APTT BLD: 32.4 SEC — SIGNIFICANT CHANGE UP (ref 27–39.2)
APTT BLD: 33.8 SEC — SIGNIFICANT CHANGE UP (ref 27–39.2)
APTT BLD: 34.5 SEC — SIGNIFICANT CHANGE UP (ref 27–39.2)
APTT BLD: 35.2 SEC — SIGNIFICANT CHANGE UP (ref 27–39.2)
APTT BLD: 36.6 SEC — SIGNIFICANT CHANGE UP (ref 27–39.2)
APTT BLD: 38.1 SEC — SIGNIFICANT CHANGE UP (ref 27–39.2)
APTT BLD: 38.6 SEC — SIGNIFICANT CHANGE UP (ref 27–39.2)
APTT BLD: 39.9 SEC — HIGH (ref 27–39.2)
APTT BLD: 44.2 SEC — HIGH (ref 27–39.2)
AST SERPL-CCNC: 20 U/L — SIGNIFICANT CHANGE UP (ref 0–41)
AST SERPL-CCNC: 20 U/L — SIGNIFICANT CHANGE UP (ref 0–41)
AST SERPL-CCNC: 21 U/L — SIGNIFICANT CHANGE UP (ref 0–41)
AST SERPL-CCNC: 22 U/L — SIGNIFICANT CHANGE UP (ref 0–41)
AST SERPL-CCNC: 22 U/L — SIGNIFICANT CHANGE UP (ref 0–41)
AST SERPL-CCNC: 23 U/L — SIGNIFICANT CHANGE UP (ref 0–41)
AST SERPL-CCNC: 23 U/L — SIGNIFICANT CHANGE UP (ref 0–41)
AST SERPL-CCNC: 25 U/L — SIGNIFICANT CHANGE UP (ref 0–41)
AST SERPL-CCNC: 29 U/L — SIGNIFICANT CHANGE UP (ref 0–41)
AST SERPL-CCNC: 30 U/L — SIGNIFICANT CHANGE UP (ref 0–41)
AST SERPL-CCNC: 31 U/L — SIGNIFICANT CHANGE UP (ref 0–41)
AST SERPL-CCNC: 33 U/L — SIGNIFICANT CHANGE UP (ref 0–41)
AST SERPL-CCNC: 344 U/L — HIGH (ref 0–41)
AST SERPL-CCNC: 35 U/L — SIGNIFICANT CHANGE UP (ref 0–41)
AST SERPL-CCNC: 37 U/L — SIGNIFICANT CHANGE UP (ref 0–41)
AST SERPL-CCNC: 37 U/L — SIGNIFICANT CHANGE UP (ref 0–41)
AST SERPL-CCNC: 38 U/L — SIGNIFICANT CHANGE UP (ref 0–41)
AST SERPL-CCNC: 39 U/L — SIGNIFICANT CHANGE UP (ref 0–41)
AST SERPL-CCNC: 45 U/L — HIGH (ref 0–41)
AST SERPL-CCNC: 50 U/L — HIGH (ref 0–41)
B PERT IGG+IGM PNL SER: CLEAR — SIGNIFICANT CHANGE UP
BACTERIA # UR AUTO: NEGATIVE — SIGNIFICANT CHANGE UP
BASE EXCESS BLDA CALC-SCNC: 10.6 MMOL/L — HIGH (ref -2–2)
BASE EXCESS BLDA CALC-SCNC: 10.8 MMOL/L — HIGH (ref -2–2)
BASE EXCESS BLDA CALC-SCNC: 10.8 MMOL/L — HIGH (ref -2–2)
BASE EXCESS BLDA CALC-SCNC: 11.3 MMOL/L — HIGH (ref -2–2)
BASE EXCESS BLDA CALC-SCNC: 11.8 MMOL/L — HIGH (ref -2–2)
BASE EXCESS BLDA CALC-SCNC: 7.8 MMOL/L — HIGH (ref -2–2)
BASE EXCESS BLDA CALC-SCNC: 9.1 MMOL/L — HIGH (ref -2–2)
BASE EXCESS BLDV CALC-SCNC: -0.8 MMOL/L — SIGNIFICANT CHANGE UP (ref -2–2)
BASOPHILS # BLD AUTO: 0 K/UL — SIGNIFICANT CHANGE UP (ref 0–0.2)
BASOPHILS # BLD AUTO: 0.01 K/UL — SIGNIFICANT CHANGE UP (ref 0–0.2)
BASOPHILS # BLD AUTO: 0.07 K/UL — SIGNIFICANT CHANGE UP (ref 0–0.2)
BASOPHILS NFR BLD AUTO: 0 % — SIGNIFICANT CHANGE UP (ref 0–1)
BASOPHILS NFR BLD AUTO: 0.1 % — SIGNIFICANT CHANGE UP (ref 0–1)
BASOPHILS NFR BLD AUTO: 0.3 % — SIGNIFICANT CHANGE UP (ref 0–1)
BILIRUB SERPL-MCNC: 0.8 MG/DL — SIGNIFICANT CHANGE UP (ref 0.2–1.2)
BILIRUB SERPL-MCNC: 0.9 MG/DL — SIGNIFICANT CHANGE UP (ref 0.2–1.2)
BILIRUB SERPL-MCNC: 0.9 MG/DL — SIGNIFICANT CHANGE UP (ref 0.2–1.2)
BILIRUB SERPL-MCNC: 1.9 MG/DL — HIGH (ref 0.2–1.2)
BILIRUB SERPL-MCNC: 1.9 MG/DL — HIGH (ref 0.2–1.2)
BILIRUB SERPL-MCNC: 2 MG/DL — HIGH (ref 0.2–1.2)
BILIRUB SERPL-MCNC: 2.4 MG/DL — HIGH (ref 0.2–1.2)
BILIRUB SERPL-MCNC: 2.4 MG/DL — HIGH (ref 0.2–1.2)
BILIRUB SERPL-MCNC: 2.5 MG/DL — HIGH (ref 0.2–1.2)
BILIRUB SERPL-MCNC: 2.5 MG/DL — HIGH (ref 0.2–1.2)
BILIRUB SERPL-MCNC: 2.6 MG/DL — HIGH (ref 0.2–1.2)
BILIRUB SERPL-MCNC: 2.6 MG/DL — HIGH (ref 0.2–1.2)
BILIRUB SERPL-MCNC: 2.7 MG/DL — HIGH (ref 0.2–1.2)
BILIRUB SERPL-MCNC: 2.9 MG/DL — HIGH (ref 0.2–1.2)
BILIRUB SERPL-MCNC: 3 MG/DL — HIGH (ref 0.2–1.2)
BILIRUB SERPL-MCNC: 3.7 MG/DL — HIGH (ref 0.2–1.2)
BILIRUB SERPL-MCNC: 3.8 MG/DL — HIGH (ref 0.2–1.2)
BILIRUB SERPL-MCNC: 4.5 MG/DL — HIGH (ref 0.2–1.2)
BILIRUB UR-MCNC: NEGATIVE — SIGNIFICANT CHANGE UP
BLD GP AB SCN SERPL QL: SIGNIFICANT CHANGE UP
BUN SERPL-MCNC: 22 MG/DL — HIGH (ref 10–20)
BUN SERPL-MCNC: 23 MG/DL — HIGH (ref 10–20)
BUN SERPL-MCNC: 24 MG/DL — HIGH (ref 10–20)
BUN SERPL-MCNC: 30 MG/DL — HIGH (ref 10–20)
BUN SERPL-MCNC: 31 MG/DL — HIGH (ref 10–20)
BUN SERPL-MCNC: 34 MG/DL — HIGH (ref 10–20)
BUN SERPL-MCNC: 35 MG/DL — HIGH (ref 10–20)
BUN SERPL-MCNC: 38 MG/DL — HIGH (ref 10–20)
BUN SERPL-MCNC: 39 MG/DL — HIGH (ref 10–20)
BUN SERPL-MCNC: 42 MG/DL — HIGH (ref 10–20)
BUN SERPL-MCNC: 44 MG/DL — HIGH (ref 10–20)
BUN SERPL-MCNC: 44 MG/DL — HIGH (ref 10–20)
BUN SERPL-MCNC: 45 MG/DL — HIGH (ref 10–20)
BUN SERPL-MCNC: 45 MG/DL — HIGH (ref 10–20)
BUN SERPL-MCNC: 47 MG/DL — HIGH (ref 10–20)
BUN SERPL-MCNC: 48 MG/DL — HIGH (ref 10–20)
BUN SERPL-MCNC: 48 MG/DL — HIGH (ref 10–20)
BUN SERPL-MCNC: 50 MG/DL — HIGH (ref 10–20)
BUN SERPL-MCNC: 51 MG/DL — HIGH (ref 10–20)
BUN SERPL-MCNC: 52 MG/DL — HIGH (ref 10–20)
BUN SERPL-MCNC: 52 MG/DL — HIGH (ref 10–20)
BUN SERPL-MCNC: 56 MG/DL — HIGH (ref 10–20)
BUN SERPL-MCNC: 56 MG/DL — HIGH (ref 10–20)
BUN SERPL-MCNC: 61 MG/DL — CRITICAL HIGH (ref 10–20)
BUN SERPL-MCNC: 61 MG/DL — CRITICAL HIGH (ref 10–20)
BUN SERPL-MCNC: 74 MG/DL — CRITICAL HIGH (ref 10–20)
BUN SERPL-MCNC: 75 MG/DL — CRITICAL HIGH (ref 10–20)
BUN SERPL-MCNC: 96 MG/DL — CRITICAL HIGH (ref 10–20)
BUN SERPL-MCNC: 97 MG/DL — CRITICAL HIGH (ref 10–20)
CA-I SERPL-SCNC: 1.25 MMOL/L — SIGNIFICANT CHANGE UP (ref 1.12–1.3)
CALCIUM SERPL-MCNC: 10.1 MG/DL — SIGNIFICANT CHANGE UP (ref 8.5–10.1)
CALCIUM SERPL-MCNC: 8 MG/DL — LOW (ref 8.5–10.1)
CALCIUM SERPL-MCNC: 8 MG/DL — LOW (ref 8.5–10.1)
CALCIUM SERPL-MCNC: 8.1 MG/DL — LOW (ref 8.5–10.1)
CALCIUM SERPL-MCNC: 8.1 MG/DL — LOW (ref 8.5–10.1)
CALCIUM SERPL-MCNC: 8.3 MG/DL — LOW (ref 8.5–10.1)
CALCIUM SERPL-MCNC: 8.4 MG/DL — LOW (ref 8.5–10.1)
CALCIUM SERPL-MCNC: 8.6 MG/DL — SIGNIFICANT CHANGE UP (ref 8.5–10.1)
CALCIUM SERPL-MCNC: 8.7 MG/DL — SIGNIFICANT CHANGE UP (ref 8.5–10.1)
CALCIUM SERPL-MCNC: 8.8 MG/DL — SIGNIFICANT CHANGE UP (ref 8.5–10.1)
CALCIUM SERPL-MCNC: 8.9 MG/DL — SIGNIFICANT CHANGE UP (ref 8.5–10.1)
CALCIUM SERPL-MCNC: 9.1 MG/DL — SIGNIFICANT CHANGE UP (ref 8.5–10.1)
CALCIUM SERPL-MCNC: 9.2 MG/DL — SIGNIFICANT CHANGE UP (ref 8.5–10.1)
CALCIUM SERPL-MCNC: 9.2 MG/DL — SIGNIFICANT CHANGE UP (ref 8.5–10.1)
CALCIUM SERPL-MCNC: 9.3 MG/DL — SIGNIFICANT CHANGE UP (ref 8.5–10.1)
CALCIUM SERPL-MCNC: 9.7 MG/DL — SIGNIFICANT CHANGE UP (ref 8.5–10.1)
CHLORIDE SERPL-SCNC: 104 MMOL/L — SIGNIFICANT CHANGE UP (ref 98–110)
CHLORIDE SERPL-SCNC: 106 MMOL/L — SIGNIFICANT CHANGE UP (ref 98–110)
CHLORIDE SERPL-SCNC: 107 MMOL/L — SIGNIFICANT CHANGE UP (ref 98–110)
CHLORIDE SERPL-SCNC: 108 MMOL/L — SIGNIFICANT CHANGE UP (ref 98–110)
CHLORIDE SERPL-SCNC: 109 MMOL/L — SIGNIFICANT CHANGE UP (ref 98–110)
CHLORIDE SERPL-SCNC: 110 MMOL/L — SIGNIFICANT CHANGE UP (ref 98–110)
CHLORIDE SERPL-SCNC: 111 MMOL/L — HIGH (ref 98–110)
CHLORIDE SERPL-SCNC: 112 MMOL/L — HIGH (ref 98–110)
CHLORIDE SERPL-SCNC: 113 MMOL/L — HIGH (ref 98–110)
CHOLEST SERPL-MCNC: 86 MG/DL — SIGNIFICANT CHANGE UP
CK MB CFR SERPL CALC: 5.1 NG/ML — SIGNIFICANT CHANGE UP (ref 0.6–6.3)
CK SERPL-CCNC: 82 U/L — SIGNIFICANT CHANGE UP (ref 0–225)
CO2 SERPL-SCNC: 16 MMOL/L — LOW (ref 17–32)
CO2 SERPL-SCNC: 18 MMOL/L — SIGNIFICANT CHANGE UP (ref 17–32)
CO2 SERPL-SCNC: 18 MMOL/L — SIGNIFICANT CHANGE UP (ref 17–32)
CO2 SERPL-SCNC: 20 MMOL/L — SIGNIFICANT CHANGE UP (ref 17–32)
CO2 SERPL-SCNC: 23 MMOL/L — SIGNIFICANT CHANGE UP (ref 17–32)
CO2 SERPL-SCNC: 23 MMOL/L — SIGNIFICANT CHANGE UP (ref 17–32)
CO2 SERPL-SCNC: 24 MMOL/L — SIGNIFICANT CHANGE UP (ref 17–32)
CO2 SERPL-SCNC: 24 MMOL/L — SIGNIFICANT CHANGE UP (ref 17–32)
CO2 SERPL-SCNC: 25 MMOL/L — SIGNIFICANT CHANGE UP (ref 17–32)
CO2 SERPL-SCNC: 26 MMOL/L — SIGNIFICANT CHANGE UP (ref 17–32)
CO2 SERPL-SCNC: 27 MMOL/L — SIGNIFICANT CHANGE UP (ref 17–32)
CO2 SERPL-SCNC: 28 MMOL/L — SIGNIFICANT CHANGE UP (ref 17–32)
CO2 SERPL-SCNC: 29 MMOL/L — SIGNIFICANT CHANGE UP (ref 17–32)
CO2 SERPL-SCNC: 31 MMOL/L — SIGNIFICANT CHANGE UP (ref 17–32)
CO2 SERPL-SCNC: 32 MMOL/L — SIGNIFICANT CHANGE UP (ref 17–32)
CO2 SERPL-SCNC: 33 MMOL/L — HIGH (ref 17–32)
CO2 SERPL-SCNC: 35 MMOL/L — HIGH (ref 17–32)
COLOR FLD: YELLOW — SIGNIFICANT CHANGE UP
COLOR SPEC: YELLOW — SIGNIFICANT CHANGE UP
CREAT SERPL-MCNC: 0.8 MG/DL — SIGNIFICANT CHANGE UP (ref 0.7–1.5)
CREAT SERPL-MCNC: 0.9 MG/DL — SIGNIFICANT CHANGE UP (ref 0.7–1.5)
CREAT SERPL-MCNC: 1 MG/DL — SIGNIFICANT CHANGE UP (ref 0.7–1.5)
CREAT SERPL-MCNC: 1.1 MG/DL — SIGNIFICANT CHANGE UP (ref 0.7–1.5)
CREAT SERPL-MCNC: 1.2 MG/DL — SIGNIFICANT CHANGE UP (ref 0.7–1.5)
CREAT SERPL-MCNC: 1.3 MG/DL — SIGNIFICANT CHANGE UP (ref 0.7–1.5)
CREAT SERPL-MCNC: 1.4 MG/DL — SIGNIFICANT CHANGE UP (ref 0.7–1.5)
CREAT SERPL-MCNC: 1.5 MG/DL — SIGNIFICANT CHANGE UP (ref 0.7–1.5)
CREAT SERPL-MCNC: 1.5 MG/DL — SIGNIFICANT CHANGE UP (ref 0.7–1.5)
CREAT SERPL-MCNC: 3 MG/DL — HIGH (ref 0.7–1.5)
CREAT SERPL-MCNC: 3 MG/DL — HIGH (ref 0.7–1.5)
CRP SERPL-MCNC: 0.61 MG/DL — HIGH (ref 0–0.4)
CRP SERPL-MCNC: 0.62 MG/DL — HIGH (ref 0–0.4)
CRP SERPL-MCNC: 0.69 MG/DL — HIGH (ref 0–0.4)
CRP SERPL-MCNC: 0.76 MG/DL — HIGH (ref 0–0.4)
CRP SERPL-MCNC: 1.39 MG/DL — HIGH (ref 0–0.4)
CRP SERPL-MCNC: 1.7 MG/DL — HIGH (ref 0–0.4)
CRP SERPL-MCNC: 1.92 MG/DL — HIGH (ref 0–0.4)
CULTURE RESULTS: SIGNIFICANT CHANGE UP
CULTURE RESULTS: SIGNIFICANT CHANGE UP
D DIMER BLD IA.RAPID-MCNC: 456 NG/ML DDU — HIGH (ref 0–230)
D DIMER BLD IA.RAPID-MCNC: 517 NG/ML DDU — HIGH (ref 0–230)
D DIMER BLD IA.RAPID-MCNC: 555 NG/ML DDU — HIGH (ref 0–230)
D DIMER BLD IA.RAPID-MCNC: 587 NG/ML DDU — HIGH (ref 0–230)
D DIMER BLD IA.RAPID-MCNC: 695 NG/ML DDU — HIGH (ref 0–230)
D DIMER BLD IA.RAPID-MCNC: 785 NG/ML DDU — HIGH (ref 0–230)
D DIMER BLD IA.RAPID-MCNC: 807 NG/ML DDU — HIGH (ref 0–230)
DIFF PNL FLD: NEGATIVE — SIGNIFICANT CHANGE UP
EOSINOPHIL # BLD AUTO: 0 K/UL — SIGNIFICANT CHANGE UP (ref 0–0.7)
EOSINOPHIL # BLD AUTO: 0.01 K/UL — SIGNIFICANT CHANGE UP (ref 0–0.7)
EOSINOPHIL # BLD AUTO: 0.01 K/UL — SIGNIFICANT CHANGE UP (ref 0–0.7)
EOSINOPHIL # BLD AUTO: 0.02 K/UL — SIGNIFICANT CHANGE UP (ref 0–0.7)
EOSINOPHIL # BLD AUTO: 0.02 K/UL — SIGNIFICANT CHANGE UP (ref 0–0.7)
EOSINOPHIL NFR BLD AUTO: 0 % — SIGNIFICANT CHANGE UP (ref 0–8)
EOSINOPHIL NFR BLD AUTO: 0.1 % — SIGNIFICANT CHANGE UP (ref 0–8)
EOSINOPHIL NFR BLD AUTO: 0.2 % — SIGNIFICANT CHANGE UP (ref 0–8)
EPI CELLS # UR: 0 /HPF — SIGNIFICANT CHANGE UP (ref 0–5)
FERRITIN SERPL-MCNC: 101 NG/ML — SIGNIFICANT CHANGE UP (ref 30–400)
FERRITIN SERPL-MCNC: 107 NG/ML — SIGNIFICANT CHANGE UP (ref 30–400)
FERRITIN SERPL-MCNC: 187 NG/ML — SIGNIFICANT CHANGE UP (ref 30–400)
FERRITIN SERPL-MCNC: 54 NG/ML — SIGNIFICANT CHANGE UP (ref 30–400)
FERRITIN SERPL-MCNC: 56 NG/ML — SIGNIFICANT CHANGE UP (ref 30–400)
FERRITIN SERPL-MCNC: 81 NG/ML — SIGNIFICANT CHANGE UP (ref 30–400)
FERRITIN SERPL-MCNC: 98 NG/ML — SIGNIFICANT CHANGE UP (ref 30–400)
FIBRINOGEN PPP-MCNC: 337 MG/DL — SIGNIFICANT CHANGE UP (ref 204.4–570.6)
FLU A RESULT: NEGATIVE — SIGNIFICANT CHANGE UP
FLU A RESULT: NEGATIVE — SIGNIFICANT CHANGE UP
FLUAV AG NPH QL: NEGATIVE — SIGNIFICANT CHANGE UP
FLUBV AG NPH QL: NEGATIVE — SIGNIFICANT CHANGE UP
FLUID INTAKE SUBSTANCE CLASS: SIGNIFICANT CHANGE UP
FLUID SEGMENTED GRANULOCYTES: SIGNIFICANT CHANGE UP %
GAS PNL BLDA: SIGNIFICANT CHANGE UP
GAS PNL BLDV: 144 MMOL/L — SIGNIFICANT CHANGE UP (ref 136–145)
GAS PNL BLDV: SIGNIFICANT CHANGE UP
GLUCOSE BLDC GLUCOMTR-MCNC: 113 MG/DL — HIGH (ref 70–99)
GLUCOSE BLDC GLUCOMTR-MCNC: 123 MG/DL — HIGH (ref 70–99)
GLUCOSE BLDC GLUCOMTR-MCNC: 124 MG/DL — HIGH (ref 70–99)
GLUCOSE BLDC GLUCOMTR-MCNC: 125 MG/DL — HIGH (ref 70–99)
GLUCOSE BLDC GLUCOMTR-MCNC: 129 MG/DL — HIGH (ref 70–99)
GLUCOSE BLDC GLUCOMTR-MCNC: 130 MG/DL — HIGH (ref 70–99)
GLUCOSE BLDC GLUCOMTR-MCNC: 131 MG/DL — HIGH (ref 70–99)
GLUCOSE BLDC GLUCOMTR-MCNC: 136 MG/DL — HIGH (ref 70–99)
GLUCOSE BLDC GLUCOMTR-MCNC: 138 MG/DL — HIGH (ref 70–99)
GLUCOSE BLDC GLUCOMTR-MCNC: 139 MG/DL — HIGH (ref 70–99)
GLUCOSE BLDC GLUCOMTR-MCNC: 141 MG/DL — HIGH (ref 70–99)
GLUCOSE BLDC GLUCOMTR-MCNC: 141 MG/DL — HIGH (ref 70–99)
GLUCOSE BLDC GLUCOMTR-MCNC: 145 MG/DL — HIGH (ref 70–99)
GLUCOSE BLDC GLUCOMTR-MCNC: 150 MG/DL — HIGH (ref 70–99)
GLUCOSE BLDC GLUCOMTR-MCNC: 151 MG/DL — HIGH (ref 70–99)
GLUCOSE BLDC GLUCOMTR-MCNC: 153 MG/DL — HIGH (ref 70–99)
GLUCOSE BLDC GLUCOMTR-MCNC: 156 MG/DL — HIGH (ref 70–99)
GLUCOSE BLDC GLUCOMTR-MCNC: 157 MG/DL — HIGH (ref 70–99)
GLUCOSE BLDC GLUCOMTR-MCNC: 158 MG/DL — HIGH (ref 70–99)
GLUCOSE BLDC GLUCOMTR-MCNC: 159 MG/DL — HIGH (ref 70–99)
GLUCOSE BLDC GLUCOMTR-MCNC: 162 MG/DL — HIGH (ref 70–99)
GLUCOSE BLDC GLUCOMTR-MCNC: 169 MG/DL — HIGH (ref 70–99)
GLUCOSE BLDC GLUCOMTR-MCNC: 184 MG/DL — HIGH (ref 70–99)
GLUCOSE BLDC GLUCOMTR-MCNC: 191 MG/DL — HIGH (ref 70–99)
GLUCOSE BLDC GLUCOMTR-MCNC: 192 MG/DL — HIGH (ref 70–99)
GLUCOSE BLDC GLUCOMTR-MCNC: 207 MG/DL — HIGH (ref 70–99)
GLUCOSE BLDC GLUCOMTR-MCNC: 81 MG/DL — SIGNIFICANT CHANGE UP (ref 70–99)
GLUCOSE FLD-MCNC: 131 MG/DL — SIGNIFICANT CHANGE UP
GLUCOSE SERPL-MCNC: 110 MG/DL — HIGH (ref 70–99)
GLUCOSE SERPL-MCNC: 111 MG/DL — HIGH (ref 70–99)
GLUCOSE SERPL-MCNC: 118 MG/DL — HIGH (ref 70–99)
GLUCOSE SERPL-MCNC: 120 MG/DL — HIGH (ref 70–99)
GLUCOSE SERPL-MCNC: 126 MG/DL — HIGH (ref 70–99)
GLUCOSE SERPL-MCNC: 130 MG/DL — HIGH (ref 70–99)
GLUCOSE SERPL-MCNC: 132 MG/DL — HIGH (ref 70–99)
GLUCOSE SERPL-MCNC: 138 MG/DL — HIGH (ref 70–99)
GLUCOSE SERPL-MCNC: 139 MG/DL — HIGH (ref 70–99)
GLUCOSE SERPL-MCNC: 140 MG/DL — HIGH (ref 70–99)
GLUCOSE SERPL-MCNC: 142 MG/DL — HIGH (ref 70–99)
GLUCOSE SERPL-MCNC: 144 MG/DL — HIGH (ref 70–99)
GLUCOSE SERPL-MCNC: 145 MG/DL — HIGH (ref 70–99)
GLUCOSE SERPL-MCNC: 145 MG/DL — HIGH (ref 70–99)
GLUCOSE SERPL-MCNC: 151 MG/DL — HIGH (ref 70–99)
GLUCOSE SERPL-MCNC: 159 MG/DL — HIGH (ref 70–99)
GLUCOSE SERPL-MCNC: 159 MG/DL — HIGH (ref 70–99)
GLUCOSE SERPL-MCNC: 160 MG/DL — HIGH (ref 70–99)
GLUCOSE SERPL-MCNC: 176 MG/DL — HIGH (ref 70–99)
GLUCOSE SERPL-MCNC: 183 MG/DL — HIGH (ref 70–99)
GLUCOSE SERPL-MCNC: 183 MG/DL — HIGH (ref 70–99)
GLUCOSE SERPL-MCNC: 184 MG/DL — HIGH (ref 70–99)
GLUCOSE SERPL-MCNC: 190 MG/DL — HIGH (ref 70–99)
GLUCOSE SERPL-MCNC: 194 MG/DL — HIGH (ref 70–99)
GLUCOSE SERPL-MCNC: 202 MG/DL — HIGH (ref 70–99)
GLUCOSE SERPL-MCNC: 203 MG/DL — HIGH (ref 70–99)
GLUCOSE SERPL-MCNC: 204 MG/DL — HIGH (ref 70–99)
GLUCOSE SERPL-MCNC: 265 MG/DL — HIGH (ref 70–99)
GLUCOSE SERPL-MCNC: 275 MG/DL — HIGH (ref 70–99)
GLUCOSE SERPL-MCNC: 95 MG/DL — SIGNIFICANT CHANGE UP (ref 70–99)
GLUCOSE SERPL-MCNC: 97 MG/DL — SIGNIFICANT CHANGE UP (ref 70–99)
GLUCOSE UR QL: NEGATIVE — SIGNIFICANT CHANGE UP
GRAM STN FLD: SIGNIFICANT CHANGE UP
HCO3 BLDA-SCNC: 33 MMOL/L — HIGH (ref 21–29)
HCO3 BLDA-SCNC: 35 MMOL/L — HIGH (ref 21–29)
HCO3 BLDA-SCNC: 35 MMOL/L — HIGH (ref 21–29)
HCO3 BLDA-SCNC: 36 MMOL/L — HIGH (ref 21–29)
HCO3 BLDV-SCNC: 26 MMOL/L — SIGNIFICANT CHANGE UP (ref 22–29)
HCT VFR BLD CALC: 28.9 % — LOW (ref 42–52)
HCT VFR BLD CALC: 29.4 % — LOW (ref 42–52)
HCT VFR BLD CALC: 29.9 % — LOW (ref 42–52)
HCT VFR BLD CALC: 30.8 % — LOW (ref 42–52)
HCT VFR BLD CALC: 31.1 % — LOW (ref 42–52)
HCT VFR BLD CALC: 31.3 % — LOW (ref 42–52)
HCT VFR BLD CALC: 31.7 % — LOW (ref 42–52)
HCT VFR BLD CALC: 32.8 % — LOW (ref 42–52)
HCT VFR BLD CALC: 33 % — LOW (ref 42–52)
HCT VFR BLD CALC: 33.2 % — LOW (ref 42–52)
HCT VFR BLD CALC: 33.6 % — LOW (ref 42–52)
HCT VFR BLD CALC: 34 % — LOW (ref 42–52)
HCT VFR BLD CALC: 34.1 % — LOW (ref 42–52)
HCT VFR BLD CALC: 34.3 % — LOW (ref 42–52)
HCT VFR BLD CALC: 34.5 % — LOW (ref 42–52)
HCT VFR BLD CALC: 34.7 % — LOW (ref 42–52)
HCT VFR BLD CALC: 34.9 % — LOW (ref 42–52)
HCT VFR BLD CALC: 35 % — LOW (ref 42–52)
HCT VFR BLD CALC: 35.1 % — LOW (ref 42–52)
HCT VFR BLD CALC: 36.6 % — LOW (ref 42–52)
HCT VFR BLD CALC: 37 % — LOW (ref 42–52)
HCT VFR BLD CALC: 38.2 % — LOW (ref 42–52)
HCT VFR BLDA CALC: 34.2 % — SIGNIFICANT CHANGE UP (ref 34–44)
HDLC SERPL-MCNC: 26 MG/DL — LOW
HEPARIN-PF4 AB RESULT: <0.6 U/ML — SIGNIFICANT CHANGE UP (ref 0–0.9)
HEPARIN-PF4 AB RESULT: <0.6 U/ML — SIGNIFICANT CHANGE UP (ref 0–0.9)
HGB BLD CALC-MCNC: 11.2 G/DL — LOW (ref 14–18)
HGB BLD-MCNC: 10 G/DL — LOW (ref 14–18)
HGB BLD-MCNC: 10.3 G/DL — LOW (ref 14–18)
HGB BLD-MCNC: 10.3 G/DL — LOW (ref 14–18)
HGB BLD-MCNC: 10.4 G/DL — LOW (ref 14–18)
HGB BLD-MCNC: 10.4 G/DL — LOW (ref 14–18)
HGB BLD-MCNC: 10.5 G/DL — LOW (ref 14–18)
HGB BLD-MCNC: 10.6 G/DL — LOW (ref 14–18)
HGB BLD-MCNC: 10.6 G/DL — LOW (ref 14–18)
HGB BLD-MCNC: 10.7 G/DL — LOW (ref 14–18)
HGB BLD-MCNC: 11 G/DL — LOW (ref 14–18)
HGB BLD-MCNC: 11.2 G/DL — LOW (ref 14–18)
HGB BLD-MCNC: 8.6 G/DL — LOW (ref 14–18)
HGB BLD-MCNC: 9.1 G/DL — LOW (ref 14–18)
HGB BLD-MCNC: 9.1 G/DL — LOW (ref 14–18)
HGB BLD-MCNC: 9.2 G/DL — LOW (ref 14–18)
HGB BLD-MCNC: 9.3 G/DL — LOW (ref 14–18)
HGB BLD-MCNC: 9.5 G/DL — LOW (ref 14–18)
HGB BLD-MCNC: 9.7 G/DL — LOW (ref 14–18)
HGB BLD-MCNC: 9.8 G/DL — LOW (ref 14–18)
HGB BLD-MCNC: 9.8 G/DL — LOW (ref 14–18)
HOROWITZ INDEX BLDA+IHG-RTO: 100 — SIGNIFICANT CHANGE UP
HOROWITZ INDEX BLDA+IHG-RTO: 40 — SIGNIFICANT CHANGE UP
HOROWITZ INDEX BLDA+IHG-RTO: 40 — SIGNIFICANT CHANGE UP
HOROWITZ INDEX BLDA+IHG-RTO: 50 — SIGNIFICANT CHANGE UP
HOROWITZ INDEX BLDA+IHG-RTO: 50 — SIGNIFICANT CHANGE UP
HYALINE CASTS # UR AUTO: 5 /LPF — SIGNIFICANT CHANGE UP (ref 0–7)
IGA FLD-MCNC: 680 MG/DL — HIGH (ref 84–499)
IGG FLD-MCNC: 1724 MG/DL — HIGH (ref 610–1660)
IGM SERPL-MCNC: 116 MG/DL — SIGNIFICANT CHANGE UP (ref 35–242)
IL10 SERPL-MCNC: 5.5 PG/ML — HIGH
IL12 SERPL-MCNC: <1.9 PG/ML — SIGNIFICANT CHANGE UP
IL13 SERPL-MCNC: 13.7 PG/ML — HIGH
IL17A SERPL-MCNC: <1.4 PG/ML — SIGNIFICANT CHANGE UP
IL2 SERPL-MCNC: 3 PG/ML — HIGH
IL2 SERPL-MCNC: 6429.1 PG/ML — HIGH (ref 175.3–858.2)
IL4 SERPL-MCNC: <2.2 PG/ML — SIGNIFICANT CHANGE UP
IL6 SERPL-MCNC: 27.3 PG/ML — HIGH
IL8 SERPL-MCNC: 62.8 PG/ML — HIGH
IMM GRANULOCYTES NFR BLD AUTO: 0.4 % — HIGH (ref 0.1–0.3)
IMM GRANULOCYTES NFR BLD AUTO: 0.5 % — HIGH (ref 0.1–0.3)
IMM GRANULOCYTES NFR BLD AUTO: 0.6 % — HIGH (ref 0.1–0.3)
IMM GRANULOCYTES NFR BLD AUTO: 0.7 % — HIGH (ref 0.1–0.3)
IMM GRANULOCYTES NFR BLD AUTO: 0.7 % — HIGH (ref 0.1–0.3)
IMM GRANULOCYTES NFR BLD AUTO: 0.9 % — HIGH (ref 0.1–0.3)
IMM GRANULOCYTES NFR BLD AUTO: 1 % — HIGH (ref 0.1–0.3)
IMM GRANULOCYTES NFR BLD AUTO: 1 % — HIGH (ref 0.1–0.3)
IMM GRANULOCYTES NFR BLD AUTO: 1.5 % — HIGH (ref 0.1–0.3)
INR BLD: 1.27 RATIO — SIGNIFICANT CHANGE UP (ref 0.65–1.3)
INR BLD: 1.3 RATIO — SIGNIFICANT CHANGE UP (ref 0.65–1.3)
INR BLD: 1.31 RATIO — HIGH (ref 0.65–1.3)
INR BLD: 1.32 RATIO — HIGH (ref 0.65–1.3)
INR BLD: 1.34 RATIO — HIGH (ref 0.65–1.3)
INR BLD: 1.34 RATIO — HIGH (ref 0.65–1.3)
INR BLD: 1.36 RATIO — HIGH (ref 0.65–1.3)
INR BLD: 1.43 RATIO — HIGH (ref 0.65–1.3)
INR BLD: 1.49 RATIO — HIGH (ref 0.65–1.3)
INTERFERON GAMMA, BLOOD: <4.2 PG/ML — SIGNIFICANT CHANGE UP
INTERLEUKIN 1 BETA: <6.5 PG/ML — SIGNIFICANT CHANGE UP
INTERLEUKIN 5: <2.1 PG/ML — SIGNIFICANT CHANGE UP
KAPPA LC SER QL IFE: 15.05 MG/DL — HIGH (ref 0.33–1.94)
KAPPA/LAMBDA FREE LIGHT CHAIN RATIO, SERUM: 2.28 RATIO — HIGH (ref 0.26–1.65)
KETONES UR-MCNC: NEGATIVE — SIGNIFICANT CHANGE UP
LACTATE BLDV-MCNC: 1.6 MMOL/L — SIGNIFICANT CHANGE UP (ref 0.5–1.6)
LACTATE SERPL-SCNC: 1.7 MMOL/L — SIGNIFICANT CHANGE UP (ref 0.7–2)
LAMBDA LC SER QL IFE: 6.61 MG/DL — HIGH (ref 0.57–2.63)
LDH SERPL L TO P-CCNC: 107 U/L — SIGNIFICANT CHANGE UP
LDH SERPL L TO P-CCNC: 280 U/L — HIGH (ref 50–242)
LDH SERPL L TO P-CCNC: 289 U/L — HIGH (ref 50–242)
LDH SERPL L TO P-CCNC: 298 U/L — HIGH (ref 50–242)
LDH SERPL L TO P-CCNC: 322 U/L — HIGH (ref 50–242)
LDH SERPL L TO P-CCNC: 337 U/L — HIGH (ref 50–242)
LEUKOCYTE ESTERASE UR-ACNC: NEGATIVE — SIGNIFICANT CHANGE UP
LIDOCAIN IGE QN: 196 U/L — HIGH (ref 7–60)
LIPID PNL WITH DIRECT LDL SERPL: 41 MG/DL — SIGNIFICANT CHANGE UP
LYMPHOCYTES # BLD AUTO: 0.12 K/UL — LOW (ref 1.2–3.4)
LYMPHOCYTES # BLD AUTO: 0.13 K/UL — LOW (ref 1.2–3.4)
LYMPHOCYTES # BLD AUTO: 0.14 K/UL — LOW (ref 1.2–3.4)
LYMPHOCYTES # BLD AUTO: 0.14 K/UL — LOW (ref 1.2–3.4)
LYMPHOCYTES # BLD AUTO: 0.16 K/UL — LOW (ref 1.2–3.4)
LYMPHOCYTES # BLD AUTO: 0.17 K/UL — LOW (ref 1.2–3.4)
LYMPHOCYTES # BLD AUTO: 0.17 K/UL — LOW (ref 1.2–3.4)
LYMPHOCYTES # BLD AUTO: 0.19 K/UL — LOW (ref 1.2–3.4)
LYMPHOCYTES # BLD AUTO: 0.19 K/UL — LOW (ref 1.2–3.4)
LYMPHOCYTES # BLD AUTO: 0.2 K/UL — LOW (ref 1.2–3.4)
LYMPHOCYTES # BLD AUTO: 0.2 K/UL — LOW (ref 1.2–3.4)
LYMPHOCYTES # BLD AUTO: 0.21 K/UL — LOW (ref 1.2–3.4)
LYMPHOCYTES # BLD AUTO: 0.22 K/UL — LOW (ref 1.2–3.4)
LYMPHOCYTES # BLD AUTO: 0.25 K/UL — LOW (ref 1.2–3.4)
LYMPHOCYTES # BLD AUTO: 0.25 K/UL — LOW (ref 1.2–3.4)
LYMPHOCYTES # BLD AUTO: 0.26 K/UL — LOW (ref 1.2–3.4)
LYMPHOCYTES # BLD AUTO: 0.29 K/UL — LOW (ref 1.2–3.4)
LYMPHOCYTES # BLD AUTO: 0.35 K/UL — LOW (ref 1.2–3.4)
LYMPHOCYTES # BLD AUTO: 0.44 K/UL — LOW (ref 1.2–3.4)
LYMPHOCYTES # BLD AUTO: 0.9 % — LOW (ref 20.5–51.1)
LYMPHOCYTES # BLD AUTO: 1.6 % — LOW (ref 20.5–51.1)
LYMPHOCYTES # BLD AUTO: 1.7 % — LOW (ref 20.5–51.1)
LYMPHOCYTES # BLD AUTO: 1.8 % — LOW (ref 20.5–51.1)
LYMPHOCYTES # BLD AUTO: 1.9 % — LOW (ref 20.5–51.1)
LYMPHOCYTES # BLD AUTO: 2 % — LOW (ref 20.5–51.1)
LYMPHOCYTES # BLD AUTO: 2.1 % — LOW (ref 20.5–51.1)
LYMPHOCYTES # BLD AUTO: 2.2 % — LOW (ref 20.5–51.1)
LYMPHOCYTES # BLD AUTO: 2.2 % — LOW (ref 20.5–51.1)
LYMPHOCYTES # BLD AUTO: 2.6 % — LOW (ref 20.5–51.1)
LYMPHOCYTES # BLD AUTO: 3.1 % — LOW (ref 20.5–51.1)
LYMPHOCYTES # BLD AUTO: 6.4 % — LOW (ref 20.5–51.1)
LYMPHOCYTES # BLD AUTO: 7.1 % — LOW (ref 20.5–51.1)
LYMPHOCYTES # FLD: SIGNIFICANT CHANGE UP
MAGNESIUM SERPL-MCNC: 1.8 MG/DL — SIGNIFICANT CHANGE UP (ref 1.8–2.4)
MAGNESIUM SERPL-MCNC: 1.8 MG/DL — SIGNIFICANT CHANGE UP (ref 1.8–2.4)
MAGNESIUM SERPL-MCNC: 1.9 MG/DL — SIGNIFICANT CHANGE UP (ref 1.8–2.4)
MAGNESIUM SERPL-MCNC: 2 MG/DL — SIGNIFICANT CHANGE UP (ref 1.8–2.4)
MAGNESIUM SERPL-MCNC: 2.1 MG/DL — SIGNIFICANT CHANGE UP (ref 1.8–2.4)
MAGNESIUM SERPL-MCNC: 2.1 MG/DL — SIGNIFICANT CHANGE UP (ref 1.8–2.4)
MAGNESIUM SERPL-MCNC: 2.2 MG/DL — SIGNIFICANT CHANGE UP (ref 1.8–2.4)
MAGNESIUM SERPL-MCNC: 2.4 MG/DL — SIGNIFICANT CHANGE UP (ref 1.8–2.4)
MAGNESIUM SERPL-MCNC: 3.1 MG/DL — CRITICAL HIGH (ref 1.8–2.4)
MCHC RBC-ENTMCNC: 27 G/DL — LOW (ref 32–37)
MCHC RBC-ENTMCNC: 27.6 PG — SIGNIFICANT CHANGE UP (ref 27–31)
MCHC RBC-ENTMCNC: 27.8 PG — SIGNIFICANT CHANGE UP (ref 27–31)
MCHC RBC-ENTMCNC: 27.9 PG — SIGNIFICANT CHANGE UP (ref 27–31)
MCHC RBC-ENTMCNC: 28 PG — SIGNIFICANT CHANGE UP (ref 27–31)
MCHC RBC-ENTMCNC: 28.1 PG — SIGNIFICANT CHANGE UP (ref 27–31)
MCHC RBC-ENTMCNC: 28.3 PG — SIGNIFICANT CHANGE UP (ref 27–31)
MCHC RBC-ENTMCNC: 28.3 PG — SIGNIFICANT CHANGE UP (ref 27–31)
MCHC RBC-ENTMCNC: 28.4 PG — SIGNIFICANT CHANGE UP (ref 27–31)
MCHC RBC-ENTMCNC: 28.4 PG — SIGNIFICANT CHANGE UP (ref 27–31)
MCHC RBC-ENTMCNC: 28.5 PG — SIGNIFICANT CHANGE UP (ref 27–31)
MCHC RBC-ENTMCNC: 28.6 PG — SIGNIFICANT CHANGE UP (ref 27–31)
MCHC RBC-ENTMCNC: 28.6 PG — SIGNIFICANT CHANGE UP (ref 27–31)
MCHC RBC-ENTMCNC: 29 G/DL — LOW (ref 32–37)
MCHC RBC-ENTMCNC: 29 PG — SIGNIFICANT CHANGE UP (ref 27–31)
MCHC RBC-ENTMCNC: 29 PG — SIGNIFICANT CHANGE UP (ref 27–31)
MCHC RBC-ENTMCNC: 29.3 G/DL — LOW (ref 32–37)
MCHC RBC-ENTMCNC: 29.4 G/DL — LOW (ref 32–37)
MCHC RBC-ENTMCNC: 29.5 G/DL — LOW (ref 32–37)
MCHC RBC-ENTMCNC: 29.6 G/DL — LOW (ref 32–37)
MCHC RBC-ENTMCNC: 29.7 G/DL — LOW (ref 32–37)
MCHC RBC-ENTMCNC: 29.8 G/DL — LOW (ref 32–37)
MCHC RBC-ENTMCNC: 29.8 G/DL — LOW (ref 32–37)
MCHC RBC-ENTMCNC: 29.9 G/DL — LOW (ref 32–37)
MCHC RBC-ENTMCNC: 30 G/DL — LOW (ref 32–37)
MCHC RBC-ENTMCNC: 30 G/DL — LOW (ref 32–37)
MCHC RBC-ENTMCNC: 30.2 G/DL — LOW (ref 32–37)
MCHC RBC-ENTMCNC: 30.4 G/DL — LOW (ref 32–37)
MCHC RBC-ENTMCNC: 30.4 G/DL — LOW (ref 32–37)
MCHC RBC-ENTMCNC: 30.5 G/DL — LOW (ref 32–37)
MCHC RBC-ENTMCNC: 30.5 G/DL — LOW (ref 32–37)
MCHC RBC-ENTMCNC: 30.6 G/DL — LOW (ref 32–37)
MCHC RBC-ENTMCNC: 30.7 G/DL — LOW (ref 32–37)
MCHC RBC-ENTMCNC: 31 G/DL — LOW (ref 32–37)
MCV RBC AUTO: 107.5 FL — HIGH (ref 80–94)
MCV RBC AUTO: 91.4 FL — SIGNIFICANT CHANGE UP (ref 80–94)
MCV RBC AUTO: 91.6 FL — SIGNIFICANT CHANGE UP (ref 80–94)
MCV RBC AUTO: 91.7 FL — SIGNIFICANT CHANGE UP (ref 80–94)
MCV RBC AUTO: 91.9 FL — SIGNIFICANT CHANGE UP (ref 80–94)
MCV RBC AUTO: 92 FL — SIGNIFICANT CHANGE UP (ref 80–94)
MCV RBC AUTO: 92.1 FL — SIGNIFICANT CHANGE UP (ref 80–94)
MCV RBC AUTO: 92.5 FL — SIGNIFICANT CHANGE UP (ref 80–94)
MCV RBC AUTO: 93.4 FL — SIGNIFICANT CHANGE UP (ref 80–94)
MCV RBC AUTO: 93.7 FL — SIGNIFICANT CHANGE UP (ref 80–94)
MCV RBC AUTO: 94 FL — SIGNIFICANT CHANGE UP (ref 80–94)
MCV RBC AUTO: 94.3 FL — HIGH (ref 80–94)
MCV RBC AUTO: 94.5 FL — HIGH (ref 80–94)
MCV RBC AUTO: 94.5 FL — HIGH (ref 80–94)
MCV RBC AUTO: 94.8 FL — HIGH (ref 80–94)
MCV RBC AUTO: 95 FL — HIGH (ref 80–94)
MCV RBC AUTO: 95.4 FL — HIGH (ref 80–94)
MCV RBC AUTO: 95.5 FL — HIGH (ref 80–94)
MCV RBC AUTO: 95.5 FL — HIGH (ref 80–94)
MCV RBC AUTO: 97.1 FL — HIGH (ref 80–94)
MCV RBC AUTO: 97.3 FL — HIGH (ref 80–94)
MCV RBC AUTO: 97.5 FL — HIGH (ref 80–94)
MESOTHL CELL # FLD: SIGNIFICANT CHANGE UP %
MONOCYTES # BLD AUTO: 0.07 K/UL — LOW (ref 0.1–0.6)
MONOCYTES # BLD AUTO: 0.11 K/UL — SIGNIFICANT CHANGE UP (ref 0.1–0.6)
MONOCYTES # BLD AUTO: 0.82 K/UL — HIGH (ref 0.1–0.6)
MONOCYTES # BLD AUTO: 0.83 K/UL — HIGH (ref 0.1–0.6)
MONOCYTES # BLD AUTO: 0.84 K/UL — HIGH (ref 0.1–0.6)
MONOCYTES # BLD AUTO: 0.92 K/UL — HIGH (ref 0.1–0.6)
MONOCYTES # BLD AUTO: 0.92 K/UL — HIGH (ref 0.1–0.6)
MONOCYTES # BLD AUTO: 0.93 K/UL — HIGH (ref 0.1–0.6)
MONOCYTES # BLD AUTO: 0.95 K/UL — HIGH (ref 0.1–0.6)
MONOCYTES # BLD AUTO: 0.97 K/UL — HIGH (ref 0.1–0.6)
MONOCYTES # BLD AUTO: 1.04 K/UL — HIGH (ref 0.1–0.6)
MONOCYTES # BLD AUTO: 1.1 K/UL — HIGH (ref 0.1–0.6)
MONOCYTES # BLD AUTO: 1.14 K/UL — HIGH (ref 0.1–0.6)
MONOCYTES # BLD AUTO: 1.15 K/UL — HIGH (ref 0.1–0.6)
MONOCYTES # BLD AUTO: 1.15 K/UL — HIGH (ref 0.1–0.6)
MONOCYTES # BLD AUTO: 1.21 K/UL — HIGH (ref 0.1–0.6)
MONOCYTES # BLD AUTO: 1.32 K/UL — HIGH (ref 0.1–0.6)
MONOCYTES # BLD AUTO: 1.35 K/UL — HIGH (ref 0.1–0.6)
MONOCYTES # BLD AUTO: 1.37 K/UL — HIGH (ref 0.1–0.6)
MONOCYTES NFR BLD AUTO: 10.4 % — HIGH (ref 1.7–9.3)
MONOCYTES NFR BLD AUTO: 10.4 % — HIGH (ref 1.7–9.3)
MONOCYTES NFR BLD AUTO: 11.2 % — HIGH (ref 1.7–9.3)
MONOCYTES NFR BLD AUTO: 12.1 % — HIGH (ref 1.7–9.3)
MONOCYTES NFR BLD AUTO: 12.1 % — HIGH (ref 1.7–9.3)
MONOCYTES NFR BLD AUTO: 12.3 % — HIGH (ref 1.7–9.3)
MONOCYTES NFR BLD AUTO: 13.2 % — HIGH (ref 1.7–9.3)
MONOCYTES NFR BLD AUTO: 13.4 % — HIGH (ref 1.7–9.3)
MONOCYTES NFR BLD AUTO: 14.1 % — HIGH (ref 1.7–9.3)
MONOCYTES NFR BLD AUTO: 2.6 % — SIGNIFICANT CHANGE UP (ref 1.7–9.3)
MONOCYTES NFR BLD AUTO: 5.1 % — SIGNIFICANT CHANGE UP (ref 1.7–9.3)
MONOCYTES NFR BLD AUTO: 5.3 % — SIGNIFICANT CHANGE UP (ref 1.7–9.3)
MONOCYTES NFR BLD AUTO: 5.7 % — SIGNIFICANT CHANGE UP (ref 1.7–9.3)
MONOCYTES NFR BLD AUTO: 5.9 % — SIGNIFICANT CHANGE UP (ref 1.7–9.3)
MONOCYTES NFR BLD AUTO: 7.3 % — SIGNIFICANT CHANGE UP (ref 1.7–9.3)
MONOCYTES NFR BLD AUTO: 8.7 % — SIGNIFICANT CHANGE UP (ref 1.7–9.3)
MONOCYTES NFR BLD AUTO: 9.6 % — HIGH (ref 1.7–9.3)
MONOCYTES NFR BLD AUTO: 9.7 % — HIGH (ref 1.7–9.3)
MONOCYTES NFR BLD AUTO: 9.7 % — HIGH (ref 1.7–9.3)
MONOS+MACROS # FLD: SIGNIFICANT CHANGE UP %
MRSA PCR RESULT.: NEGATIVE — SIGNIFICANT CHANGE UP
NEUTROPHILS # BLD AUTO: 1.64 K/UL — SIGNIFICANT CHANGE UP (ref 1.4–6.5)
NEUTROPHILS # BLD AUTO: 10.15 K/UL — HIGH (ref 1.4–6.5)
NEUTROPHILS # BLD AUTO: 10.34 K/UL — HIGH (ref 1.4–6.5)
NEUTROPHILS # BLD AUTO: 12.79 K/UL — HIGH (ref 1.4–6.5)
NEUTROPHILS # BLD AUTO: 14.88 K/UL — HIGH (ref 1.4–6.5)
NEUTROPHILS # BLD AUTO: 16.18 K/UL — HIGH (ref 1.4–6.5)
NEUTROPHILS # BLD AUTO: 2.43 K/UL — SIGNIFICANT CHANGE UP (ref 1.4–6.5)
NEUTROPHILS # BLD AUTO: 20.51 K/UL — HIGH (ref 1.4–6.5)
NEUTROPHILS # BLD AUTO: 5.21 K/UL — SIGNIFICANT CHANGE UP (ref 1.4–6.5)
NEUTROPHILS # BLD AUTO: 6.74 K/UL — HIGH (ref 1.4–6.5)
NEUTROPHILS # BLD AUTO: 7.59 K/UL — HIGH (ref 1.4–6.5)
NEUTROPHILS # BLD AUTO: 7.6 K/UL — HIGH (ref 1.4–6.5)
NEUTROPHILS # BLD AUTO: 7.61 K/UL — HIGH (ref 1.4–6.5)
NEUTROPHILS # BLD AUTO: 7.97 K/UL — HIGH (ref 1.4–6.5)
NEUTROPHILS # BLD AUTO: 8.12 K/UL — HIGH (ref 1.4–6.5)
NEUTROPHILS # BLD AUTO: 8.25 K/UL — HIGH (ref 1.4–6.5)
NEUTROPHILS # BLD AUTO: 8.52 K/UL — HIGH (ref 1.4–6.5)
NEUTROPHILS # BLD AUTO: 9.57 K/UL — HIGH (ref 1.4–6.5)
NEUTROPHILS # BLD AUTO: 9.66 K/UL — HIGH (ref 1.4–6.5)
NEUTROPHILS NFR BLD AUTO: 83.1 % — HIGH (ref 42.2–75.2)
NEUTROPHILS NFR BLD AUTO: 83.6 % — HIGH (ref 42.2–75.2)
NEUTROPHILS NFR BLD AUTO: 84 % — HIGH (ref 42.2–75.2)
NEUTROPHILS NFR BLD AUTO: 85.2 % — HIGH (ref 42.2–75.2)
NEUTROPHILS NFR BLD AUTO: 85.4 % — HIGH (ref 42.2–75.2)
NEUTROPHILS NFR BLD AUTO: 85.7 % — HIGH (ref 42.2–75.2)
NEUTROPHILS NFR BLD AUTO: 86.1 % — HIGH (ref 42.2–75.2)
NEUTROPHILS NFR BLD AUTO: 86.8 % — HIGH (ref 42.2–75.2)
NEUTROPHILS NFR BLD AUTO: 86.8 % — HIGH (ref 42.2–75.2)
NEUTROPHILS NFR BLD AUTO: 87 % — HIGH (ref 42.2–75.2)
NEUTROPHILS NFR BLD AUTO: 87.2 % — HIGH (ref 42.2–75.2)
NEUTROPHILS NFR BLD AUTO: 87.2 % — HIGH (ref 42.2–75.2)
NEUTROPHILS NFR BLD AUTO: 87.7 % — HIGH (ref 42.2–75.2)
NEUTROPHILS NFR BLD AUTO: 88.5 % — HIGH (ref 42.2–75.2)
NEUTROPHILS NFR BLD AUTO: 88.9 % — HIGH (ref 42.2–75.2)
NEUTROPHILS NFR BLD AUTO: 89.8 % — HIGH (ref 42.2–75.2)
NEUTROPHILS NFR BLD AUTO: 91.1 % — HIGH (ref 42.2–75.2)
NEUTROPHILS NFR BLD AUTO: 91.9 % — HIGH (ref 42.2–75.2)
NEUTROPHILS NFR BLD AUTO: 92.3 % — HIGH (ref 42.2–75.2)
NITRITE UR-MCNC: NEGATIVE — SIGNIFICANT CHANGE UP
NON HDL CHOLESTEROL: 60 MG/DL — SIGNIFICANT CHANGE UP
NRBC # BLD: 0 /100 WBCS — SIGNIFICANT CHANGE UP (ref 0–0)
NRBC # BLD: 1 /100 WBCS — HIGH (ref 0–0)
NT-PROBNP SERPL-SCNC: 2707 PG/ML — HIGH (ref 0–300)
NT-PROBNP SERPL-SCNC: 4581 PG/ML — HIGH (ref 0–300)
NT-PROBNP SERPL-SCNC: 5587 PG/ML — HIGH (ref 0–300)
NT-PROBNP SERPL-SCNC: HIGH PG/ML (ref 0–300)
PCO2 BLDA: 44 MMHG — HIGH (ref 38–42)
PCO2 BLDA: 45 MMHG — HIGH (ref 38–42)
PCO2 BLDA: 46 MMHG — HIGH (ref 38–42)
PCO2 BLDA: 48 MMHG — HIGH (ref 38–42)
PCO2 BLDA: 50 MMHG — HIGH (ref 38–42)
PCO2 BLDA: 52 MMHG — HIGH (ref 38–42)
PCO2 BLDA: 53 MMHG — HIGH (ref 38–42)
PCO2 BLDV: 55 MMHG — HIGH (ref 41–51)
PF4 HEPARIN CMPLX AB SER-ACNC: NEGATIVE — SIGNIFICANT CHANGE UP
PF4 HEPARIN CMPLX AB SER-ACNC: NEGATIVE — SIGNIFICANT CHANGE UP
PH BLDA: 7.43 — HIGH (ref 7.38–7.42)
PH BLDA: 7.44 — HIGH (ref 7.38–7.42)
PH BLDA: 7.44 — HIGH (ref 7.38–7.42)
PH BLDA: 7.48 — HIGH (ref 7.38–7.42)
PH BLDA: 7.5 — HIGH (ref 7.38–7.42)
PH BLDA: 7.51 — HIGH (ref 7.38–7.42)
PH BLDA: 7.51 — HIGH (ref 7.38–7.42)
PH BLDV: 7.29 — SIGNIFICANT CHANGE UP (ref 7.26–7.43)
PH FLD: 7.7 — SIGNIFICANT CHANGE UP
PH UR: 5.5 — SIGNIFICANT CHANGE UP (ref 5–8)
PHOSPHATE SERPL-MCNC: 1.6 MG/DL — LOW (ref 2.1–4.9)
PHOSPHATE SERPL-MCNC: 1.7 MG/DL — LOW (ref 2.1–4.9)
PHOSPHATE SERPL-MCNC: 11.2 MG/DL — HIGH (ref 2.1–4.9)
PHOSPHATE SERPL-MCNC: 2.3 MG/DL — SIGNIFICANT CHANGE UP (ref 2.1–4.9)
PHOSPHATE SERPL-MCNC: 2.9 MG/DL — SIGNIFICANT CHANGE UP (ref 2.1–4.9)
PHOSPHATE SERPL-MCNC: 3.7 MG/DL — SIGNIFICANT CHANGE UP (ref 2.1–4.9)
PHOSPHATE SERPL-MCNC: 3.8 MG/DL — SIGNIFICANT CHANGE UP (ref 2.1–4.9)
PHOSPHATE SERPL-MCNC: 4.1 MG/DL — SIGNIFICANT CHANGE UP (ref 2.1–4.9)
PLATELET # BLD AUTO: 100 K/UL — LOW (ref 130–400)
PLATELET # BLD AUTO: 103 K/UL — LOW (ref 130–400)
PLATELET # BLD AUTO: 103 K/UL — LOW (ref 130–400)
PLATELET # BLD AUTO: 108 K/UL — LOW (ref 130–400)
PLATELET # BLD AUTO: 111 K/UL — LOW (ref 130–400)
PLATELET # BLD AUTO: 111 K/UL — LOW (ref 130–400)
PLATELET # BLD AUTO: 116 K/UL — LOW (ref 130–400)
PLATELET # BLD AUTO: 122 K/UL — LOW (ref 130–400)
PLATELET # BLD AUTO: 125 K/UL — LOW (ref 130–400)
PLATELET # BLD AUTO: 40 K/UL — LOW (ref 130–400)
PLATELET # BLD AUTO: 43 K/UL — LOW (ref 130–400)
PLATELET # BLD AUTO: 48 K/UL — LOW (ref 130–400)
PLATELET # BLD AUTO: 51 K/UL — LOW (ref 130–400)
PLATELET # BLD AUTO: 54 K/UL — LOW (ref 130–400)
PLATELET # BLD AUTO: 57 K/UL — LOW (ref 130–400)
PLATELET # BLD AUTO: 68 K/UL — LOW (ref 130–400)
PLATELET # BLD AUTO: 69 K/UL — LOW (ref 130–400)
PLATELET # BLD AUTO: 78 K/UL — LOW (ref 130–400)
PLATELET # BLD AUTO: 93 K/UL — LOW (ref 130–400)
PLATELET # BLD AUTO: 95 K/UL — LOW (ref 130–400)
PLATELET # BLD AUTO: 96 K/UL — LOW (ref 130–400)
PLATELET # BLD AUTO: 99 K/UL — LOW (ref 130–400)
PO2 BLDA: 120 MMHG — HIGH (ref 78–95)
PO2 BLDA: 188 MMHG — HIGH (ref 78–95)
PO2 BLDA: 79 MMHG — SIGNIFICANT CHANGE UP (ref 78–95)
PO2 BLDA: 82 MMHG — SIGNIFICANT CHANGE UP (ref 78–95)
PO2 BLDA: 83 MMHG — SIGNIFICANT CHANGE UP (ref 78–95)
PO2 BLDA: 96 MMHG — HIGH (ref 78–95)
PO2 BLDA: 96 MMHG — HIGH (ref 78–95)
PO2 BLDV: 36 MMHG — SIGNIFICANT CHANGE UP (ref 20–40)
POTASSIUM BLDV-SCNC: 3.9 MMOL/L — SIGNIFICANT CHANGE UP (ref 3.3–5.6)
POTASSIUM SERPL-MCNC: 3.4 MMOL/L — LOW (ref 3.5–5)
POTASSIUM SERPL-MCNC: 3.4 MMOL/L — LOW (ref 3.5–5)
POTASSIUM SERPL-MCNC: 3.5 MMOL/L — SIGNIFICANT CHANGE UP (ref 3.5–5)
POTASSIUM SERPL-MCNC: 3.7 MMOL/L — SIGNIFICANT CHANGE UP (ref 3.5–5)
POTASSIUM SERPL-MCNC: 3.8 MMOL/L — SIGNIFICANT CHANGE UP (ref 3.5–5)
POTASSIUM SERPL-MCNC: 3.9 MMOL/L — SIGNIFICANT CHANGE UP (ref 3.5–5)
POTASSIUM SERPL-MCNC: 4 MMOL/L — SIGNIFICANT CHANGE UP (ref 3.5–5)
POTASSIUM SERPL-MCNC: 4 MMOL/L — SIGNIFICANT CHANGE UP (ref 3.5–5)
POTASSIUM SERPL-MCNC: 4.1 MMOL/L — SIGNIFICANT CHANGE UP (ref 3.5–5)
POTASSIUM SERPL-MCNC: 4.2 MMOL/L — SIGNIFICANT CHANGE UP (ref 3.5–5)
POTASSIUM SERPL-MCNC: 4.2 MMOL/L — SIGNIFICANT CHANGE UP (ref 3.5–5)
POTASSIUM SERPL-MCNC: 4.4 MMOL/L — SIGNIFICANT CHANGE UP (ref 3.5–5)
POTASSIUM SERPL-MCNC: 4.5 MMOL/L — SIGNIFICANT CHANGE UP (ref 3.5–5)
POTASSIUM SERPL-MCNC: 4.7 MMOL/L — SIGNIFICANT CHANGE UP (ref 3.5–5)
POTASSIUM SERPL-MCNC: 4.7 MMOL/L — SIGNIFICANT CHANGE UP (ref 3.5–5)
POTASSIUM SERPL-MCNC: 4.9 MMOL/L — SIGNIFICANT CHANGE UP (ref 3.5–5)
POTASSIUM SERPL-MCNC: 6.6 MMOL/L — CRITICAL HIGH (ref 3.5–5)
POTASSIUM SERPL-MCNC: 6.6 MMOL/L — CRITICAL HIGH (ref 3.5–5)
POTASSIUM SERPL-SCNC: 3.4 MMOL/L — LOW (ref 3.5–5)
POTASSIUM SERPL-SCNC: 3.4 MMOL/L — LOW (ref 3.5–5)
POTASSIUM SERPL-SCNC: 3.5 MMOL/L — SIGNIFICANT CHANGE UP (ref 3.5–5)
POTASSIUM SERPL-SCNC: 3.7 MMOL/L — SIGNIFICANT CHANGE UP (ref 3.5–5)
POTASSIUM SERPL-SCNC: 3.8 MMOL/L — SIGNIFICANT CHANGE UP (ref 3.5–5)
POTASSIUM SERPL-SCNC: 3.9 MMOL/L — SIGNIFICANT CHANGE UP (ref 3.5–5)
POTASSIUM SERPL-SCNC: 4 MMOL/L — SIGNIFICANT CHANGE UP (ref 3.5–5)
POTASSIUM SERPL-SCNC: 4 MMOL/L — SIGNIFICANT CHANGE UP (ref 3.5–5)
POTASSIUM SERPL-SCNC: 4.1 MMOL/L — SIGNIFICANT CHANGE UP (ref 3.5–5)
POTASSIUM SERPL-SCNC: 4.2 MMOL/L — SIGNIFICANT CHANGE UP (ref 3.5–5)
POTASSIUM SERPL-SCNC: 4.2 MMOL/L — SIGNIFICANT CHANGE UP (ref 3.5–5)
POTASSIUM SERPL-SCNC: 4.4 MMOL/L — SIGNIFICANT CHANGE UP (ref 3.5–5)
POTASSIUM SERPL-SCNC: 4.5 MMOL/L — SIGNIFICANT CHANGE UP (ref 3.5–5)
POTASSIUM SERPL-SCNC: 4.7 MMOL/L — SIGNIFICANT CHANGE UP (ref 3.5–5)
POTASSIUM SERPL-SCNC: 4.7 MMOL/L — SIGNIFICANT CHANGE UP (ref 3.5–5)
POTASSIUM SERPL-SCNC: 4.9 MMOL/L — SIGNIFICANT CHANGE UP (ref 3.5–5)
POTASSIUM SERPL-SCNC: 6.6 MMOL/L — CRITICAL HIGH (ref 3.5–5)
POTASSIUM SERPL-SCNC: 6.6 MMOL/L — CRITICAL HIGH (ref 3.5–5)
PROCALCITONIN SERPL-MCNC: 0.06 NG/ML — SIGNIFICANT CHANGE UP (ref 0.02–0.1)
PROCALCITONIN SERPL-MCNC: 0.08 NG/ML — SIGNIFICANT CHANGE UP (ref 0.02–0.1)
PROCALCITONIN SERPL-MCNC: 0.08 NG/ML — SIGNIFICANT CHANGE UP (ref 0.02–0.1)
PROCALCITONIN SERPL-MCNC: 0.16 NG/ML — HIGH (ref 0.02–0.1)
PROT FLD-MCNC: 2.5 G/DL — SIGNIFICANT CHANGE UP
PROT SERPL-MCNC: 5.2 G/DL — LOW (ref 6–8)
PROT SERPL-MCNC: 5.3 G/DL — LOW (ref 6–8)
PROT SERPL-MCNC: 5.4 G/DL — LOW (ref 6–8)
PROT SERPL-MCNC: 5.5 G/DL — LOW (ref 6–8)
PROT SERPL-MCNC: 5.5 G/DL — LOW (ref 6–8)
PROT SERPL-MCNC: 5.6 G/DL — LOW (ref 6–8)
PROT SERPL-MCNC: 5.6 G/DL — LOW (ref 6–8)
PROT SERPL-MCNC: 5.7 G/DL — LOW (ref 6–8)
PROT SERPL-MCNC: 5.8 G/DL — LOW (ref 6–8)
PROT SERPL-MCNC: 5.8 G/DL — LOW (ref 6–8)
PROT SERPL-MCNC: 5.9 G/DL — LOW (ref 6–8)
PROT SERPL-MCNC: 5.9 G/DL — LOW (ref 6–8)
PROT SERPL-MCNC: 6 G/DL — SIGNIFICANT CHANGE UP (ref 6–8)
PROT SERPL-MCNC: 6.1 G/DL — SIGNIFICANT CHANGE UP (ref 6–8)
PROT SERPL-MCNC: 6.1 G/DL — SIGNIFICANT CHANGE UP (ref 6–8)
PROT SERPL-MCNC: 6.2 G/DL — SIGNIFICANT CHANGE UP (ref 6–8)
PROT SERPL-MCNC: 6.3 G/DL — SIGNIFICANT CHANGE UP (ref 6–8)
PROT SERPL-MCNC: 6.6 G/DL — SIGNIFICANT CHANGE UP (ref 6–8)
PROT UR-MCNC: SIGNIFICANT CHANGE UP
PROTHROM AB SERPL-ACNC: 14.6 SEC — HIGH (ref 9.95–12.87)
PROTHROM AB SERPL-ACNC: 14.9 SEC — HIGH (ref 9.95–12.87)
PROTHROM AB SERPL-ACNC: 15.1 SEC — HIGH (ref 9.95–12.87)
PROTHROM AB SERPL-ACNC: 15.2 SEC — HIGH (ref 9.95–12.87)
PROTHROM AB SERPL-ACNC: 15.4 SEC — HIGH (ref 9.95–12.87)
PROTHROM AB SERPL-ACNC: 15.4 SEC — HIGH (ref 9.95–12.87)
PROTHROM AB SERPL-ACNC: 15.6 SEC — HIGH (ref 9.95–12.87)
PROTHROM AB SERPL-ACNC: 16.5 SEC — HIGH (ref 9.95–12.87)
PROTHROM AB SERPL-ACNC: 17.1 SEC — HIGH (ref 9.95–12.87)
RBC # BLD: 2.97 M/UL — LOW (ref 4.7–6.1)
RBC # BLD: 3.18 M/UL — LOW (ref 4.7–6.1)
RBC # BLD: 3.21 M/UL — LOW (ref 4.7–6.1)
RBC # BLD: 3.21 M/UL — LOW (ref 4.7–6.1)
RBC # BLD: 3.26 M/UL — LOW (ref 4.7–6.1)
RBC # BLD: 3.29 M/UL — LOW (ref 4.7–6.1)
RBC # BLD: 3.32 M/UL — LOW (ref 4.7–6.1)
RBC # BLD: 3.35 M/UL — LOW (ref 4.7–6.1)
RBC # BLD: 3.46 M/UL — LOW (ref 4.7–6.1)
RBC # BLD: 3.46 M/UL — LOW (ref 4.7–6.1)
RBC # BLD: 3.52 M/UL — LOW (ref 4.7–6.1)
RBC # BLD: 3.52 M/UL — LOW (ref 4.7–6.1)
RBC # BLD: 3.6 M/UL — LOW (ref 4.7–6.1)
RBC # BLD: 3.7 M/UL — LOW (ref 4.7–6.1)
RBC # BLD: 3.71 M/UL — LOW (ref 4.7–6.1)
RBC # BLD: 3.72 M/UL — LOW (ref 4.7–6.1)
RBC # BLD: 3.77 M/UL — LOW (ref 4.7–6.1)
RBC # BLD: 3.77 M/UL — LOW (ref 4.7–6.1)
RBC # BLD: 3.8 M/UL — LOW (ref 4.7–6.1)
RBC # BLD: 3.82 M/UL — LOW (ref 4.7–6.1)
RBC # BLD: 3.95 M/UL — LOW (ref 4.7–6.1)
RBC # BLD: 4.02 M/UL — LOW (ref 4.7–6.1)
RBC # FLD: 16.5 % — HIGH (ref 11.5–14.5)
RBC # FLD: 16.6 % — HIGH (ref 11.5–14.5)
RBC # FLD: 16.6 % — HIGH (ref 11.5–14.5)
RBC # FLD: 16.7 % — HIGH (ref 11.5–14.5)
RBC # FLD: 16.8 % — HIGH (ref 11.5–14.5)
RBC # FLD: 17 % — HIGH (ref 11.5–14.5)
RBC # FLD: 17 % — HIGH (ref 11.5–14.5)
RBC # FLD: 17.1 % — HIGH (ref 11.5–14.5)
RBC # FLD: 17.1 % — HIGH (ref 11.5–14.5)
RBC # FLD: 17.2 % — HIGH (ref 11.5–14.5)
RBC # FLD: 17.3 % — HIGH (ref 11.5–14.5)
RBC # FLD: 17.4 % — HIGH (ref 11.5–14.5)
RBC # FLD: 17.8 % — HIGH (ref 11.5–14.5)
RBC # FLD: 18 % — HIGH (ref 11.5–14.5)
RBC # FLD: 18.5 % — HIGH (ref 11.5–14.5)
RBC # FLD: 18.9 % — HIGH (ref 11.5–14.5)
RBC # FLD: 20.2 % — HIGH (ref 11.5–14.5)
RBC CASTS # UR COMP ASSIST: 3 /HPF — SIGNIFICANT CHANGE UP (ref 0–4)
RCV VOL RI: 0 /UL — SIGNIFICANT CHANGE UP (ref 0–0)
RSV RESULT: NEGATIVE — SIGNIFICANT CHANGE UP
RSV RNA RESP QL NAA+PROBE: NEGATIVE — SIGNIFICANT CHANGE UP
SAO2 % BLDA: 96 % — SIGNIFICANT CHANGE UP (ref 92–96)
SAO2 % BLDA: 98 % — HIGH (ref 92–96)
SAO2 % BLDA: 98 % — HIGH (ref 92–96)
SAO2 % BLDA: 99 % — HIGH (ref 92–96)
SAO2 % BLDA: 99 % — HIGH (ref 92–96)
SAO2 % BLDV: 63 % — SIGNIFICANT CHANGE UP
SARS-COV-2 RNA SPEC QL NAA+PROBE: DETECTED
SODIUM SERPL-SCNC: 138 MMOL/L — SIGNIFICANT CHANGE UP (ref 135–146)
SODIUM SERPL-SCNC: 139 MMOL/L — SIGNIFICANT CHANGE UP (ref 135–146)
SODIUM SERPL-SCNC: 140 MMOL/L — SIGNIFICANT CHANGE UP (ref 135–146)
SODIUM SERPL-SCNC: 140 MMOL/L — SIGNIFICANT CHANGE UP (ref 135–146)
SODIUM SERPL-SCNC: 141 MMOL/L — SIGNIFICANT CHANGE UP (ref 135–146)
SODIUM SERPL-SCNC: 141 MMOL/L — SIGNIFICANT CHANGE UP (ref 135–146)
SODIUM SERPL-SCNC: 142 MMOL/L — SIGNIFICANT CHANGE UP (ref 135–146)
SODIUM SERPL-SCNC: 144 MMOL/L — SIGNIFICANT CHANGE UP (ref 135–146)
SODIUM SERPL-SCNC: 144 MMOL/L — SIGNIFICANT CHANGE UP (ref 135–146)
SODIUM SERPL-SCNC: 145 MMOL/L — SIGNIFICANT CHANGE UP (ref 135–146)
SODIUM SERPL-SCNC: 145 MMOL/L — SIGNIFICANT CHANGE UP (ref 135–146)
SODIUM SERPL-SCNC: 146 MMOL/L — SIGNIFICANT CHANGE UP (ref 135–146)
SODIUM SERPL-SCNC: 147 MMOL/L — HIGH (ref 135–146)
SODIUM SERPL-SCNC: 148 MMOL/L — HIGH (ref 135–146)
SODIUM SERPL-SCNC: 149 MMOL/L — HIGH (ref 135–146)
SODIUM SERPL-SCNC: 150 MMOL/L — HIGH (ref 135–146)
SODIUM SERPL-SCNC: 151 MMOL/L — HIGH (ref 135–146)
SODIUM SERPL-SCNC: 152 MMOL/L — HIGH (ref 135–146)
SODIUM SERPL-SCNC: 153 MMOL/L — HIGH (ref 135–146)
SP GR SPEC: 1.02 — SIGNIFICANT CHANGE UP (ref 1.01–1.03)
SPECIMEN SOURCE: SIGNIFICANT CHANGE UP
TOTAL NUCLEATED CELL COUNT, BODY FLUID: 156 /UL — SIGNIFICANT CHANGE UP
TRIGL SERPL-MCNC: 104 MG/DL — SIGNIFICANT CHANGE UP
TRIGL SERPL-MCNC: 89 MG/DL — SIGNIFICANT CHANGE UP
TROPONIN T SERPL-MCNC: 0.01 NG/ML — SIGNIFICANT CHANGE UP
TROPONIN T SERPL-MCNC: 0.03 NG/ML — CRITICAL HIGH
TROPONIN T SERPL-MCNC: 0.04 NG/ML — CRITICAL HIGH
TUBE TYPE: SIGNIFICANT CHANGE UP
UROBILINOGEN FLD QL: ABNORMAL
WBC # BLD: 1.88 K/UL — LOW (ref 4.8–10.8)
WBC # BLD: 11.01 K/UL — HIGH (ref 4.8–10.8)
WBC # BLD: 11.34 K/UL — HIGH (ref 4.8–10.8)
WBC # BLD: 11.79 K/UL — HIGH (ref 4.8–10.8)
WBC # BLD: 11.86 K/UL — HIGH (ref 4.8–10.8)
WBC # BLD: 11.93 K/UL — HIGH (ref 4.8–10.8)
WBC # BLD: 14.23 K/UL — HIGH (ref 4.8–10.8)
WBC # BLD: 16.12 K/UL — HIGH (ref 4.8–10.8)
WBC # BLD: 17.6 K/UL — HIGH (ref 4.8–10.8)
WBC # BLD: 2.69 K/UL — LOW (ref 4.8–10.8)
WBC # BLD: 22.5 K/UL — HIGH (ref 4.8–10.8)
WBC # BLD: 5.41 K/UL — SIGNIFICANT CHANGE UP (ref 4.8–10.8)
WBC # BLD: 6.23 K/UL — SIGNIFICANT CHANGE UP (ref 4.8–10.8)
WBC # BLD: 6.73 K/UL — SIGNIFICANT CHANGE UP (ref 4.8–10.8)
WBC # BLD: 7.86 K/UL — SIGNIFICANT CHANGE UP (ref 4.8–10.8)
WBC # BLD: 8.65 K/UL — SIGNIFICANT CHANGE UP (ref 4.8–10.8)
WBC # BLD: 8.91 K/UL — SIGNIFICANT CHANGE UP (ref 4.8–10.8)
WBC # BLD: 9.05 K/UL — SIGNIFICANT CHANGE UP (ref 4.8–10.8)
WBC # BLD: 9.36 K/UL — SIGNIFICANT CHANGE UP (ref 4.8–10.8)
WBC # BLD: 9.5 K/UL — SIGNIFICANT CHANGE UP (ref 4.8–10.8)
WBC # BLD: 9.58 K/UL — SIGNIFICANT CHANGE UP (ref 4.8–10.8)
WBC # BLD: 9.59 K/UL — SIGNIFICANT CHANGE UP (ref 4.8–10.8)
WBC # FLD AUTO: 1.88 K/UL — LOW (ref 4.8–10.8)
WBC # FLD AUTO: 11.01 K/UL — HIGH (ref 4.8–10.8)
WBC # FLD AUTO: 11.34 K/UL — HIGH (ref 4.8–10.8)
WBC # FLD AUTO: 11.79 K/UL — HIGH (ref 4.8–10.8)
WBC # FLD AUTO: 11.86 K/UL — HIGH (ref 4.8–10.8)
WBC # FLD AUTO: 11.93 K/UL — HIGH (ref 4.8–10.8)
WBC # FLD AUTO: 14.23 K/UL — HIGH (ref 4.8–10.8)
WBC # FLD AUTO: 16.12 K/UL — HIGH (ref 4.8–10.8)
WBC # FLD AUTO: 17.6 K/UL — HIGH (ref 4.8–10.8)
WBC # FLD AUTO: 2.69 K/UL — LOW (ref 4.8–10.8)
WBC # FLD AUTO: 22.5 K/UL — HIGH (ref 4.8–10.8)
WBC # FLD AUTO: 5.41 K/UL — SIGNIFICANT CHANGE UP (ref 4.8–10.8)
WBC # FLD AUTO: 6.23 K/UL — SIGNIFICANT CHANGE UP (ref 4.8–10.8)
WBC # FLD AUTO: 6.73 K/UL — SIGNIFICANT CHANGE UP (ref 4.8–10.8)
WBC # FLD AUTO: 7.86 K/UL — SIGNIFICANT CHANGE UP (ref 4.8–10.8)
WBC # FLD AUTO: 8.65 K/UL — SIGNIFICANT CHANGE UP (ref 4.8–10.8)
WBC # FLD AUTO: 8.91 K/UL — SIGNIFICANT CHANGE UP (ref 4.8–10.8)
WBC # FLD AUTO: 9.05 K/UL — SIGNIFICANT CHANGE UP (ref 4.8–10.8)
WBC # FLD AUTO: 9.36 K/UL — SIGNIFICANT CHANGE UP (ref 4.8–10.8)
WBC # FLD AUTO: 9.5 K/UL — SIGNIFICANT CHANGE UP (ref 4.8–10.8)
WBC # FLD AUTO: 9.58 K/UL — SIGNIFICANT CHANGE UP (ref 4.8–10.8)
WBC # FLD AUTO: 9.59 K/UL — SIGNIFICANT CHANGE UP (ref 4.8–10.8)
WBC UR QL: 1 /HPF — SIGNIFICANT CHANGE UP (ref 0–5)

## 2020-01-01 PROCEDURE — 71045 X-RAY EXAM CHEST 1 VIEW: CPT | Mod: 26

## 2020-01-01 PROCEDURE — 99232 SBSQ HOSP IP/OBS MODERATE 35: CPT

## 2020-01-01 PROCEDURE — 93280 PM DEVICE PROGR EVAL DUAL: CPT

## 2020-01-01 PROCEDURE — 99222 1ST HOSP IP/OBS MODERATE 55: CPT

## 2020-01-01 PROCEDURE — 99233 SBSQ HOSP IP/OBS HIGH 50: CPT

## 2020-01-01 PROCEDURE — 93010 ELECTROCARDIOGRAM REPORT: CPT

## 2020-01-01 PROCEDURE — 93295 DEV INTERROG REMOTE 1/2/MLT: CPT

## 2020-01-01 PROCEDURE — 93296 REM INTERROG EVL PM/IDS: CPT

## 2020-01-01 PROCEDURE — 71045 X-RAY EXAM CHEST 1 VIEW: CPT | Mod: 26,77

## 2020-01-01 PROCEDURE — 99213 OFFICE O/P EST LOW 20 MIN: CPT | Mod: 25

## 2020-01-01 PROCEDURE — 93970 EXTREMITY STUDY: CPT | Mod: 26

## 2020-01-01 PROCEDURE — 99358 PROLONG SERVICE W/O CONTACT: CPT

## 2020-01-01 PROCEDURE — 74177 CT ABD & PELVIS W/CONTRAST: CPT | Mod: 26

## 2020-01-01 PROCEDURE — 99221 1ST HOSP IP/OBS SF/LOW 40: CPT

## 2020-01-01 PROCEDURE — 71045 X-RAY EXAM CHEST 1 VIEW: CPT | Mod: 26,76

## 2020-01-01 PROCEDURE — 99239 HOSP IP/OBS DSCHRG MGMT >30: CPT

## 2020-01-01 PROCEDURE — 93306 TTE W/DOPPLER COMPLETE: CPT | Mod: 26

## 2020-01-01 PROCEDURE — 99497 ADVNCD CARE PLAN 30 MIN: CPT | Mod: 25

## 2020-01-01 PROCEDURE — 99285 EMERGENCY DEPT VISIT HI MDM: CPT

## 2020-01-01 PROCEDURE — 36573 INSJ PICC RS&I 5 YR+: CPT

## 2020-01-01 PROCEDURE — 99223 1ST HOSP IP/OBS HIGH 75: CPT

## 2020-01-01 RX ORDER — ENOXAPARIN SODIUM 100 MG/ML
40 INJECTION SUBCUTANEOUS DAILY
Refills: 0 | Status: DISCONTINUED | OUTPATIENT
Start: 2020-01-01 | End: 2020-01-01

## 2020-01-01 RX ORDER — MORPHINE SULFATE 50 MG/1
1 CAPSULE, EXTENDED RELEASE ORAL
Qty: 100 | Refills: 0 | Status: DISCONTINUED | OUTPATIENT
Start: 2020-01-01 | End: 2020-01-01

## 2020-01-01 RX ORDER — NOREPINEPHRINE BITARTRATE/D5W 8 MG/250ML
0.05 PLASTIC BAG, INJECTION (ML) INTRAVENOUS
Qty: 8 | Refills: 0 | Status: DISCONTINUED | OUTPATIENT
Start: 2020-01-01 | End: 2020-01-01

## 2020-01-01 RX ORDER — MIDAZOLAM HYDROCHLORIDE 1 MG/ML
0.02 INJECTION, SOLUTION INTRAMUSCULAR; INTRAVENOUS
Qty: 100 | Refills: 0 | Status: DISCONTINUED | OUTPATIENT
Start: 2020-01-01 | End: 2020-01-01

## 2020-01-01 RX ORDER — FAMOTIDINE 10 MG/ML
20 INJECTION INTRAVENOUS EVERY 12 HOURS
Refills: 0 | Status: DISCONTINUED | OUTPATIENT
Start: 2020-01-01 | End: 2020-01-01

## 2020-01-01 RX ORDER — IPRATROPIUM BROMIDE 0.2 MG/ML
1 SOLUTION, NON-ORAL INHALATION EVERY 6 HOURS
Refills: 0 | Status: DISCONTINUED | OUTPATIENT
Start: 2020-01-01 | End: 2020-01-01

## 2020-01-01 RX ORDER — SPIRONOLACTONE 50 MG/1
50 TABLET ORAL TWICE DAILY
Refills: 0 | Status: ACTIVE | COMMUNITY

## 2020-01-01 RX ORDER — MORPHINE SULFATE 50 MG/1
2 CAPSULE, EXTENDED RELEASE ORAL EVERY 6 HOURS
Refills: 0 | Status: DISCONTINUED | OUTPATIENT
Start: 2020-01-01 | End: 2020-01-01

## 2020-01-01 RX ORDER — SPIRONOLACTONE 25 MG/1
25 TABLET ORAL TWICE DAILY
Refills: 0 | Status: COMPLETED | COMMUNITY
End: 2020-01-01

## 2020-01-01 RX ORDER — DEXAMETHASONE 0.5 MG/5ML
4 ELIXIR ORAL DAILY
Refills: 0 | Status: COMPLETED | OUTPATIENT
Start: 2020-01-01 | End: 2020-01-01

## 2020-01-01 RX ORDER — METOPROLOL TARTRATE 50 MG
2.5 TABLET ORAL EVERY 12 HOURS
Refills: 0 | Status: DISCONTINUED | OUTPATIENT
Start: 2020-01-01 | End: 2020-01-01

## 2020-01-01 RX ORDER — FOLIC ACID 0.8 MG
1 TABLET ORAL DAILY
Refills: 0 | Status: DISCONTINUED | OUTPATIENT
Start: 2020-01-01 | End: 2020-01-01

## 2020-01-01 RX ORDER — METOPROLOL TARTRATE 50 MG
12.5 TABLET ORAL EVERY 12 HOURS
Refills: 0 | Status: DISCONTINUED | OUTPATIENT
Start: 2020-01-01 | End: 2020-01-01

## 2020-01-01 RX ORDER — ELECTROLYTE SOLUTION,INJ
1 VIAL (ML) INTRAVENOUS
Refills: 0 | Status: DISCONTINUED | OUTPATIENT
Start: 2020-01-01 | End: 2020-01-01

## 2020-01-01 RX ORDER — METHOCARBAMOL 750 MG/1
750 TABLET, FILM COATED ORAL EVERY 8 HOURS
Refills: 0 | Status: ACTIVE | COMMUNITY

## 2020-01-01 RX ORDER — FUROSEMIDE 40 MG
40 TABLET ORAL DAILY
Refills: 0 | Status: DISCONTINUED | OUTPATIENT
Start: 2020-01-01 | End: 2020-01-01

## 2020-01-01 RX ORDER — METOPROLOL TARTRATE 50 MG
12.5 TABLET ORAL
Refills: 0 | Status: DISCONTINUED | OUTPATIENT
Start: 2020-01-01 | End: 2020-01-01

## 2020-01-01 RX ORDER — FUROSEMIDE 40 MG
40 TABLET ORAL EVERY 12 HOURS
Refills: 0 | Status: DISCONTINUED | OUTPATIENT
Start: 2020-01-01 | End: 2020-01-01

## 2020-01-01 RX ORDER — SODIUM CHLORIDE 9 MG/ML
1000 INJECTION, SOLUTION INTRAVENOUS
Refills: 0 | Status: DISCONTINUED | OUTPATIENT
Start: 2020-01-01 | End: 2020-01-01

## 2020-01-01 RX ORDER — I.V. FAT EMULSION 20 G/100ML
0.68 EMULSION INTRAVENOUS
Qty: 50 | Refills: 0 | Status: DISCONTINUED | OUTPATIENT
Start: 2020-01-01 | End: 2020-01-01

## 2020-01-01 RX ORDER — FENTANYL CITRATE 50 UG/ML
0.5 INJECTION INTRAVENOUS
Qty: 2500 | Refills: 0 | Status: DISCONTINUED | OUTPATIENT
Start: 2020-01-01 | End: 2020-01-01

## 2020-01-01 RX ORDER — POLYETHYLENE GLYCOL 3350 17 G/17G
17 POWDER, FOR SOLUTION ORAL DAILY
Refills: 0 | Status: DISCONTINUED | OUTPATIENT
Start: 2020-01-01 | End: 2020-01-01

## 2020-01-01 RX ORDER — ASPIRIN/CALCIUM CARB/MAGNESIUM 324 MG
1 TABLET ORAL
Qty: 0 | Refills: 0 | DISCHARGE

## 2020-01-01 RX ORDER — UBIDECARENONE/VIT E ACET 100MG-5
CAPSULE ORAL
Refills: 0 | Status: ACTIVE | COMMUNITY

## 2020-01-01 RX ORDER — FUROSEMIDE 40 MG
40 TABLET ORAL ONCE
Refills: 0 | Status: COMPLETED | OUTPATIENT
Start: 2020-01-01 | End: 2020-01-01

## 2020-01-01 RX ORDER — DEXAMETHASONE 0.5 MG/5ML
6 ELIXIR ORAL DAILY
Refills: 0 | Status: DISCONTINUED | OUTPATIENT
Start: 2020-01-01 | End: 2020-01-01

## 2020-01-01 RX ORDER — POTASSIUM PHOSPHATE, MONOBASIC POTASSIUM PHOSPHATE, DIBASIC 236; 224 MG/ML; MG/ML
15 INJECTION, SOLUTION INTRAVENOUS ONCE
Refills: 0 | Status: COMPLETED | OUTPATIENT
Start: 2020-01-01 | End: 2020-01-01

## 2020-01-01 RX ORDER — THIAMINE MONONITRATE (VIT B1) 100 MG
250 TABLET ORAL DAILY
Refills: 0 | Status: DISCONTINUED | OUTPATIENT
Start: 2020-01-01 | End: 2020-01-01

## 2020-01-01 RX ORDER — SODIUM CHLORIDE 9 MG/ML
1000 INJECTION INTRAMUSCULAR; INTRAVENOUS; SUBCUTANEOUS ONCE
Refills: 0 | Status: COMPLETED | OUTPATIENT
Start: 2020-01-01 | End: 2020-01-01

## 2020-01-01 RX ORDER — FUROSEMIDE 40 MG
40 TABLET ORAL
Refills: 0 | Status: DISCONTINUED | OUTPATIENT
Start: 2020-01-01 | End: 2020-01-01

## 2020-01-01 RX ORDER — MAGNESIUM SULFATE 500 MG/ML
1 VIAL (ML) INJECTION ONCE
Refills: 0 | Status: COMPLETED | OUTPATIENT
Start: 2020-01-01 | End: 2020-01-01

## 2020-01-01 RX ORDER — METOPROLOL SUCCINATE 25 MG/1
25 TABLET, EXTENDED RELEASE ORAL DAILY
Refills: 3 | Status: ACTIVE | COMMUNITY

## 2020-01-01 RX ORDER — MORPHINE SULFATE 50 MG/1
2 CAPSULE, EXTENDED RELEASE ORAL EVERY 4 HOURS
Refills: 0 | Status: DISCONTINUED | OUTPATIENT
Start: 2020-01-01 | End: 2020-01-01

## 2020-01-01 RX ORDER — DONEPEZIL HYDROCHLORIDE 10 MG/1
10 TABLET ORAL DAILY
Refills: 0 | Status: COMPLETED | COMMUNITY
End: 2020-01-01

## 2020-01-01 RX ORDER — FAMOTIDINE 10 MG/ML
20 INJECTION INTRAVENOUS
Refills: 0 | Status: DISCONTINUED | OUTPATIENT
Start: 2020-01-01 | End: 2020-01-01

## 2020-01-01 RX ORDER — FUROSEMIDE 40 MG
20 TABLET ORAL ONCE
Refills: 0 | Status: COMPLETED | OUTPATIENT
Start: 2020-01-01 | End: 2020-01-01

## 2020-01-01 RX ORDER — ALBUTEROL 90 UG/1
2 AEROSOL, METERED ORAL EVERY 6 HOURS
Refills: 0 | Status: DISCONTINUED | OUTPATIENT
Start: 2020-01-01 | End: 2020-01-01

## 2020-01-01 RX ORDER — SODIUM CHLORIDE 9 MG/ML
250 INJECTION, SOLUTION INTRAVENOUS
Refills: 0 | Status: DISCONTINUED | OUTPATIENT
Start: 2020-01-01 | End: 2020-01-01

## 2020-01-01 RX ORDER — METOPROLOL TARTRATE 50 MG
1 TABLET ORAL
Qty: 0 | Refills: 0 | DISCHARGE

## 2020-01-01 RX ORDER — MIRTAZAPINE 7.5 MG/1
7.5 TABLET, FILM COATED ORAL
Refills: 0 | Status: COMPLETED | COMMUNITY
End: 2020-01-01

## 2020-01-01 RX ORDER — HALOPERIDOL DECANOATE 100 MG/ML
0.5 INJECTION INTRAMUSCULAR ONCE
Refills: 0 | Status: COMPLETED | OUTPATIENT
Start: 2020-01-01 | End: 2020-01-01

## 2020-01-01 RX ORDER — DEXMEDETOMIDINE HYDROCHLORIDE IN 0.9% SODIUM CHLORIDE 4 UG/ML
0.2 INJECTION INTRAVENOUS
Qty: 200 | Refills: 0 | Status: DISCONTINUED | OUTPATIENT
Start: 2020-01-01 | End: 2020-01-01

## 2020-01-01 RX ORDER — CEFEPIME 1 G/1
INJECTION, POWDER, FOR SOLUTION INTRAMUSCULAR; INTRAVENOUS
Refills: 0 | Status: DISCONTINUED | OUTPATIENT
Start: 2020-01-01 | End: 2020-01-01

## 2020-01-01 RX ORDER — SILDENAFIL CITRATE 100 MG/1
100 TABLET, FILM COATED ORAL
Qty: 20 | Refills: 5 | Status: ACTIVE | COMMUNITY

## 2020-01-01 RX ORDER — SPIRONOLACTONE 25 MG/1
1 TABLET, FILM COATED ORAL
Qty: 0 | Refills: 0 | DISCHARGE

## 2020-01-01 RX ORDER — REMDESIVIR 5 MG/ML
INJECTION INTRAVENOUS
Refills: 0 | Status: DISCONTINUED | OUTPATIENT
Start: 2020-01-01 | End: 2020-01-01

## 2020-01-01 RX ORDER — CHLORHEXIDINE GLUCONATE 213 G/1000ML
15 SOLUTION TOPICAL
Refills: 0 | Status: DISCONTINUED | OUTPATIENT
Start: 2020-01-01 | End: 2020-01-01

## 2020-01-01 RX ORDER — TRAZODONE HYDROCHLORIDE 50 MG/1
50 TABLET ORAL EVERY 6 HOURS
Refills: 0 | Status: ACTIVE | COMMUNITY

## 2020-01-01 RX ORDER — ATORVASTATIN CALCIUM 80 MG/1
80 TABLET, FILM COATED ORAL AT BEDTIME
Refills: 0 | Status: DISCONTINUED | OUTPATIENT
Start: 2020-01-01 | End: 2020-01-01

## 2020-01-01 RX ORDER — CEFEPIME 1 G/1
1000 INJECTION, POWDER, FOR SOLUTION INTRAMUSCULAR; INTRAVENOUS EVERY 12 HOURS
Refills: 0 | Status: DISCONTINUED | OUTPATIENT
Start: 2020-01-01 | End: 2020-01-01

## 2020-01-01 RX ORDER — FONDAPARINUX SODIUM 2.5 MG/.5ML
2.5 INJECTION, SOLUTION SUBCUTANEOUS DAILY
Refills: 0 | Status: DISCONTINUED | OUTPATIENT
Start: 2020-01-01 | End: 2020-01-01

## 2020-01-01 RX ORDER — MORPHINE SULFATE 50 MG/1
2 CAPSULE, EXTENDED RELEASE ORAL ONCE
Refills: 0 | Status: DISCONTINUED | OUTPATIENT
Start: 2020-01-01 | End: 2020-01-01

## 2020-01-01 RX ORDER — THIAMINE MONONITRATE (VIT B1) 100 MG
100 TABLET ORAL DAILY
Refills: 0 | Status: COMPLETED | OUTPATIENT
Start: 2020-01-01 | End: 2020-01-01

## 2020-01-01 RX ORDER — MORPHINE SULFATE 50 MG/1
4 CAPSULE, EXTENDED RELEASE ORAL EVERY 4 HOURS
Refills: 0 | Status: DISCONTINUED | OUTPATIENT
Start: 2020-01-01 | End: 2020-01-01

## 2020-01-01 RX ORDER — FUROSEMIDE 40 MG/1
40 TABLET ORAL DAILY
Qty: 30 | Refills: 4 | Status: ACTIVE | COMMUNITY

## 2020-01-01 RX ORDER — DEXMEDETOMIDINE HYDROCHLORIDE IN 0.9% SODIUM CHLORIDE 4 UG/ML
0.2 INJECTION INTRAVENOUS
Qty: 400 | Refills: 0 | Status: DISCONTINUED | OUTPATIENT
Start: 2020-01-01 | End: 2020-01-01

## 2020-01-01 RX ORDER — METOPROLOL TARTRATE 50 MG
25 TABLET ORAL DAILY
Refills: 0 | Status: DISCONTINUED | OUTPATIENT
Start: 2020-01-01 | End: 2020-01-01

## 2020-01-01 RX ORDER — HALOPERIDOL DECANOATE 100 MG/ML
2 INJECTION INTRAMUSCULAR ONCE
Refills: 0 | Status: COMPLETED | OUTPATIENT
Start: 2020-01-01 | End: 2020-01-01

## 2020-01-01 RX ORDER — OMEGA-3/DHA/EPA/FISH OIL 300-1000MG
1000 CAPSULE ORAL
Refills: 0 | Status: ACTIVE | COMMUNITY

## 2020-01-01 RX ORDER — ACETAZOLAMIDE 250 MG/1
500 TABLET ORAL ONCE
Refills: 0 | Status: COMPLETED | OUTPATIENT
Start: 2020-01-01 | End: 2020-01-01

## 2020-01-01 RX ORDER — REMDESIVIR 5 MG/ML
200 INJECTION INTRAVENOUS EVERY 24 HOURS
Refills: 0 | Status: COMPLETED | OUTPATIENT
Start: 2020-01-01 | End: 2020-01-01

## 2020-01-01 RX ORDER — DONEPEZIL HYDROCHLORIDE 10 MG/1
10 TABLET, FILM COATED ORAL AT BEDTIME
Refills: 0 | Status: DISCONTINUED | OUTPATIENT
Start: 2020-01-01 | End: 2020-01-01

## 2020-01-01 RX ORDER — CLOPIDOGREL BISULFATE 75 MG/1
75 TABLET, FILM COATED ORAL DAILY
Refills: 0 | Status: DISCONTINUED | OUTPATIENT
Start: 2020-01-01 | End: 2020-01-01

## 2020-01-01 RX ORDER — DEXAMETHASONE 0.5 MG/5ML
2 ELIXIR ORAL DAILY
Refills: 0 | Status: COMPLETED | OUTPATIENT
Start: 2020-01-01 | End: 2020-01-01

## 2020-01-01 RX ORDER — FUROSEMIDE 40 MG
60 TABLET ORAL ONCE
Refills: 0 | Status: COMPLETED | OUTPATIENT
Start: 2020-01-01 | End: 2020-01-01

## 2020-01-01 RX ORDER — DEXMEDETOMIDINE HYDROCHLORIDE IN 0.9% SODIUM CHLORIDE 4 UG/ML
0.03 INJECTION INTRAVENOUS
Qty: 200 | Refills: 0 | Status: DISCONTINUED | OUTPATIENT
Start: 2020-01-01 | End: 2020-01-01

## 2020-01-01 RX ORDER — POTASSIUM CHLORIDE 20 MEQ
20 PACKET (EA) ORAL
Refills: 0 | Status: COMPLETED | OUTPATIENT
Start: 2020-01-01 | End: 2020-01-01

## 2020-01-01 RX ORDER — ASPIRIN/CALCIUM CARB/MAGNESIUM 324 MG
325 TABLET ORAL DAILY
Refills: 0 | Status: DISCONTINUED | OUTPATIENT
Start: 2020-01-01 | End: 2020-01-01

## 2020-01-01 RX ORDER — ACETAMINOPHEN 500 MG
650 TABLET ORAL EVERY 6 HOURS
Refills: 0 | Status: DISCONTINUED | OUTPATIENT
Start: 2020-01-01 | End: 2020-01-01

## 2020-01-01 RX ORDER — SENNA PLUS 8.6 MG/1
2 TABLET ORAL AT BEDTIME
Refills: 0 | Status: DISCONTINUED | OUTPATIENT
Start: 2020-01-01 | End: 2020-01-01

## 2020-01-01 RX ORDER — PANTOPRAZOLE SODIUM 20 MG/1
40 TABLET, DELAYED RELEASE ORAL DAILY
Refills: 0 | Status: DISCONTINUED | OUTPATIENT
Start: 2020-01-01 | End: 2020-01-01

## 2020-01-01 RX ORDER — DONEPEZIL HYDROCHLORIDE 10 MG/1
1 TABLET, FILM COATED ORAL
Qty: 0 | Refills: 0 | DISCHARGE

## 2020-01-01 RX ORDER — CHLORHEXIDINE GLUCONATE 213 G/1000ML
1 SOLUTION TOPICAL
Refills: 0 | Status: DISCONTINUED | OUTPATIENT
Start: 2020-01-01 | End: 2020-01-01

## 2020-01-01 RX ORDER — FERROUS FUMARATE 350(115)MG
1 TABLET ORAL
Qty: 0 | Refills: 0 | DISCHARGE

## 2020-01-01 RX ORDER — MIRTAZAPINE 45 MG/1
1 TABLET, ORALLY DISINTEGRATING ORAL
Qty: 0 | Refills: 0 | DISCHARGE

## 2020-01-01 RX ORDER — MORPHINE SULFATE 50 MG/1
5 CAPSULE, EXTENDED RELEASE ORAL
Refills: 0 | Status: DISCONTINUED | OUTPATIENT
Start: 2020-01-01 | End: 2020-01-01

## 2020-01-01 RX ADMIN — SODIUM CHLORIDE 50 MILLILITER(S): 9 INJECTION, SOLUTION INTRAVENOUS at 05:38

## 2020-01-01 RX ADMIN — ALBUTEROL 2 PUFF(S): 90 AEROSOL, METERED ORAL at 12:08

## 2020-01-01 RX ADMIN — Medication 40 MILLIGRAM(S): at 06:45

## 2020-01-01 RX ADMIN — CEFEPIME 100 MILLIGRAM(S): 1 INJECTION, POWDER, FOR SOLUTION INTRAMUSCULAR; INTRAVENOUS at 06:07

## 2020-01-01 RX ADMIN — CEFEPIME 100 MILLIGRAM(S): 1 INJECTION, POWDER, FOR SOLUTION INTRAMUSCULAR; INTRAVENOUS at 17:39

## 2020-01-01 RX ADMIN — CLOPIDOGREL BISULFATE 75 MILLIGRAM(S): 75 TABLET, FILM COATED ORAL at 13:22

## 2020-01-01 RX ADMIN — Medication 2.5 MILLIGRAM(S): at 05:17

## 2020-01-01 RX ADMIN — Medication 40 MILLIGRAM(S): at 06:00

## 2020-01-01 RX ADMIN — CEFEPIME 100 MILLIGRAM(S): 1 INJECTION, POWDER, FOR SOLUTION INTRAMUSCULAR; INTRAVENOUS at 05:52

## 2020-01-01 RX ADMIN — Medication 6 MILLIGRAM(S): at 05:16

## 2020-01-01 RX ADMIN — Medication 25 MILLIGRAM(S): at 06:17

## 2020-01-01 RX ADMIN — Medication 6 MILLIGRAM(S): at 05:52

## 2020-01-01 RX ADMIN — Medication 40 MILLIGRAM(S): at 09:27

## 2020-01-01 RX ADMIN — SODIUM CHLORIDE 60 MILLILITER(S): 9 INJECTION, SOLUTION INTRAVENOUS at 11:15

## 2020-01-01 RX ADMIN — CEFEPIME 100 MILLIGRAM(S): 1 INJECTION, POWDER, FOR SOLUTION INTRAMUSCULAR; INTRAVENOUS at 05:02

## 2020-01-01 RX ADMIN — FAMOTIDINE 20 MILLIGRAM(S): 10 INJECTION INTRAVENOUS at 17:30

## 2020-01-01 RX ADMIN — Medication 600 MILLIGRAM(S): at 02:08

## 2020-01-01 RX ADMIN — FAMOTIDINE 20 MILLIGRAM(S): 10 INJECTION INTRAVENOUS at 18:55

## 2020-01-01 RX ADMIN — CHLORHEXIDINE GLUCONATE 15 MILLILITER(S): 213 SOLUTION TOPICAL at 05:40

## 2020-01-01 RX ADMIN — FAMOTIDINE 20 MILLIGRAM(S): 10 INJECTION INTRAVENOUS at 17:33

## 2020-01-01 RX ADMIN — ENOXAPARIN SODIUM 40 MILLIGRAM(S): 100 INJECTION SUBCUTANEOUS at 11:54

## 2020-01-01 RX ADMIN — FENTANYL CITRATE 4 MICROGRAM(S)/KG/HR: 50 INJECTION INTRAVENOUS at 13:38

## 2020-01-01 RX ADMIN — MORPHINE SULFATE 2 MILLIGRAM(S): 50 CAPSULE, EXTENDED RELEASE ORAL at 12:14

## 2020-01-01 RX ADMIN — FAMOTIDINE 20 MILLIGRAM(S): 10 INJECTION INTRAVENOUS at 06:01

## 2020-01-01 RX ADMIN — Medication 6 MILLIGRAM(S): at 01:10

## 2020-01-01 RX ADMIN — CEFEPIME 100 MILLIGRAM(S): 1 INJECTION, POWDER, FOR SOLUTION INTRAMUSCULAR; INTRAVENOUS at 17:09

## 2020-01-01 RX ADMIN — SODIUM CHLORIDE 60 MILLILITER(S): 9 INJECTION, SOLUTION INTRAVENOUS at 06:33

## 2020-01-01 RX ADMIN — Medication 0.5 MILLIGRAM(S): at 09:47

## 2020-01-01 RX ADMIN — CHLORHEXIDINE GLUCONATE 1 APPLICATION(S): 213 SOLUTION TOPICAL at 06:12

## 2020-01-01 RX ADMIN — FAMOTIDINE 20 MILLIGRAM(S): 10 INJECTION INTRAVENOUS at 17:20

## 2020-01-01 RX ADMIN — Medication 6 MILLIGRAM(S): at 06:23

## 2020-01-01 RX ADMIN — CHLORHEXIDINE GLUCONATE 15 MILLILITER(S): 213 SOLUTION TOPICAL at 17:23

## 2020-01-01 RX ADMIN — MORPHINE SULFATE 2 MILLIGRAM(S): 50 CAPSULE, EXTENDED RELEASE ORAL at 01:45

## 2020-01-01 RX ADMIN — Medication 1 MILLIGRAM(S): at 13:59

## 2020-01-01 RX ADMIN — CEFEPIME 100 MILLIGRAM(S): 1 INJECTION, POWDER, FOR SOLUTION INTRAMUSCULAR; INTRAVENOUS at 06:24

## 2020-01-01 RX ADMIN — MORPHINE SULFATE 2 MILLIGRAM(S): 50 CAPSULE, EXTENDED RELEASE ORAL at 01:26

## 2020-01-01 RX ADMIN — Medication 600 MILLIGRAM(S): at 17:39

## 2020-01-01 RX ADMIN — ENOXAPARIN SODIUM 40 MILLIGRAM(S): 100 INJECTION SUBCUTANEOUS at 12:05

## 2020-01-01 RX ADMIN — FAMOTIDINE 20 MILLIGRAM(S): 10 INJECTION INTRAVENOUS at 05:28

## 2020-01-01 RX ADMIN — Medication 6 MILLIGRAM(S): at 06:17

## 2020-01-01 RX ADMIN — CEFEPIME 100 MILLIGRAM(S): 1 INJECTION, POWDER, FOR SOLUTION INTRAMUSCULAR; INTRAVENOUS at 17:28

## 2020-01-01 RX ADMIN — Medication 600 MILLIGRAM(S): at 06:00

## 2020-01-01 RX ADMIN — FAMOTIDINE 20 MILLIGRAM(S): 10 INJECTION INTRAVENOUS at 05:16

## 2020-01-01 RX ADMIN — Medication 1 PUFF(S): at 03:30

## 2020-01-01 RX ADMIN — FAMOTIDINE 20 MILLIGRAM(S): 10 INJECTION INTRAVENOUS at 06:17

## 2020-01-01 RX ADMIN — Medication 40 MILLIGRAM(S): at 05:15

## 2020-01-01 RX ADMIN — Medication 1 PUFF(S): at 14:02

## 2020-01-01 RX ADMIN — ENOXAPARIN SODIUM 40 MILLIGRAM(S): 100 INJECTION SUBCUTANEOUS at 11:48

## 2020-01-01 RX ADMIN — ALBUTEROL 2 PUFF(S): 90 AEROSOL, METERED ORAL at 20:37

## 2020-01-01 RX ADMIN — ALBUTEROL 2 PUFF(S): 90 AEROSOL, METERED ORAL at 23:45

## 2020-01-01 RX ADMIN — CEFEPIME 100 MILLIGRAM(S): 1 INJECTION, POWDER, FOR SOLUTION INTRAMUSCULAR; INTRAVENOUS at 21:01

## 2020-01-01 RX ADMIN — FONDAPARINUX SODIUM 2.5 MILLIGRAM(S): 2.5 INJECTION, SOLUTION SUBCUTANEOUS at 11:40

## 2020-01-01 RX ADMIN — CHLORHEXIDINE GLUCONATE 1 APPLICATION(S): 213 SOLUTION TOPICAL at 05:37

## 2020-01-01 RX ADMIN — FAMOTIDINE 20 MILLIGRAM(S): 10 INJECTION INTRAVENOUS at 05:39

## 2020-01-01 RX ADMIN — ALBUTEROL 2 PUFF(S): 90 AEROSOL, METERED ORAL at 05:38

## 2020-01-01 RX ADMIN — ALBUTEROL 2 PUFF(S): 90 AEROSOL, METERED ORAL at 22:03

## 2020-01-01 RX ADMIN — Medication 40 MILLIGRAM(S): at 17:08

## 2020-01-01 RX ADMIN — CHLORHEXIDINE GLUCONATE 15 MILLILITER(S): 213 SOLUTION TOPICAL at 17:24

## 2020-01-01 RX ADMIN — Medication 1 PUFF(S): at 14:04

## 2020-01-01 RX ADMIN — FAMOTIDINE 20 MILLIGRAM(S): 10 INJECTION INTRAVENOUS at 05:51

## 2020-01-01 RX ADMIN — ALBUTEROL 2 PUFF(S): 90 AEROSOL, METERED ORAL at 07:32

## 2020-01-01 RX ADMIN — FAMOTIDINE 20 MILLIGRAM(S): 10 INJECTION INTRAVENOUS at 06:23

## 2020-01-01 RX ADMIN — ATORVASTATIN CALCIUM 80 MILLIGRAM(S): 80 TABLET, FILM COATED ORAL at 22:25

## 2020-01-01 RX ADMIN — DONEPEZIL HYDROCHLORIDE 10 MILLIGRAM(S): 10 TABLET, FILM COATED ORAL at 21:01

## 2020-01-01 RX ADMIN — DONEPEZIL HYDROCHLORIDE 10 MILLIGRAM(S): 10 TABLET, FILM COATED ORAL at 21:45

## 2020-01-01 RX ADMIN — Medication 6 MILLIGRAM(S): at 05:03

## 2020-01-01 RX ADMIN — FAMOTIDINE 20 MILLIGRAM(S): 10 INJECTION INTRAVENOUS at 17:29

## 2020-01-01 RX ADMIN — Medication 2.5 MILLIGRAM(S): at 05:39

## 2020-01-01 RX ADMIN — Medication 6 MILLIGRAM(S): at 06:00

## 2020-01-01 RX ADMIN — Medication 100 MILLIGRAM(S): at 11:04

## 2020-01-01 RX ADMIN — Medication 40 MILLIGRAM(S): at 17:30

## 2020-01-01 RX ADMIN — CHLORHEXIDINE GLUCONATE 15 MILLILITER(S): 213 SOLUTION TOPICAL at 05:24

## 2020-01-01 RX ADMIN — HALOPERIDOL DECANOATE 0.5 MILLIGRAM(S): 100 INJECTION INTRAMUSCULAR at 23:02

## 2020-01-01 RX ADMIN — ALBUTEROL 2 PUFF(S): 90 AEROSOL, METERED ORAL at 21:59

## 2020-01-01 RX ADMIN — ALBUTEROL 2 PUFF(S): 90 AEROSOL, METERED ORAL at 01:10

## 2020-01-01 RX ADMIN — ENOXAPARIN SODIUM 40 MILLIGRAM(S): 100 INJECTION SUBCUTANEOUS at 11:04

## 2020-01-01 RX ADMIN — ATORVASTATIN CALCIUM 80 MILLIGRAM(S): 80 TABLET, FILM COATED ORAL at 21:25

## 2020-01-01 RX ADMIN — Medication 325 MILLIGRAM(S): at 12:05

## 2020-01-01 RX ADMIN — MORPHINE SULFATE 2 MILLIGRAM(S): 50 CAPSULE, EXTENDED RELEASE ORAL at 00:10

## 2020-01-01 RX ADMIN — Medication 325 MILLIGRAM(S): at 12:34

## 2020-01-01 RX ADMIN — Medication 4 MILLIGRAM(S): at 05:16

## 2020-01-01 RX ADMIN — Medication 60 MILLIGRAM(S): at 08:00

## 2020-01-01 RX ADMIN — CHLORHEXIDINE GLUCONATE 1 APPLICATION(S): 213 SOLUTION TOPICAL at 05:28

## 2020-01-01 RX ADMIN — ATORVASTATIN CALCIUM 80 MILLIGRAM(S): 80 TABLET, FILM COATED ORAL at 21:34

## 2020-01-01 RX ADMIN — Medication 7.5 MICROGRAM(S)/KG/MIN: at 22:46

## 2020-01-01 RX ADMIN — Medication 1 PUFF(S): at 23:45

## 2020-01-01 RX ADMIN — ENOXAPARIN SODIUM 40 MILLIGRAM(S): 100 INJECTION SUBCUTANEOUS at 11:20

## 2020-01-01 RX ADMIN — Medication 1 PUFF(S): at 11:00

## 2020-01-01 RX ADMIN — FAMOTIDINE 20 MILLIGRAM(S): 10 INJECTION INTRAVENOUS at 05:40

## 2020-01-01 RX ADMIN — Medication 1 PUFF(S): at 21:59

## 2020-01-01 RX ADMIN — CEFEPIME 100 MILLIGRAM(S): 1 INJECTION, POWDER, FOR SOLUTION INTRAMUSCULAR; INTRAVENOUS at 17:33

## 2020-01-01 RX ADMIN — ALBUTEROL 2 PUFF(S): 90 AEROSOL, METERED ORAL at 08:00

## 2020-01-01 RX ADMIN — I.V. FAT EMULSION 20.8 GM/KG/DAY: 20 EMULSION INTRAVENOUS at 22:06

## 2020-01-01 RX ADMIN — Medication 100 MILLIGRAM(S): at 11:36

## 2020-01-01 RX ADMIN — ATORVASTATIN CALCIUM 80 MILLIGRAM(S): 80 TABLET, FILM COATED ORAL at 21:54

## 2020-01-01 RX ADMIN — ALBUTEROL 2 PUFF(S): 90 AEROSOL, METERED ORAL at 20:52

## 2020-01-01 RX ADMIN — Medication 1 MILLIGRAM(S): at 12:05

## 2020-01-01 RX ADMIN — Medication 40 MILLIGRAM(S): at 10:33

## 2020-01-01 RX ADMIN — Medication 40 MILLIGRAM(S): at 05:25

## 2020-01-01 RX ADMIN — Medication 2.5 MILLIGRAM(S): at 17:21

## 2020-01-01 RX ADMIN — FAMOTIDINE 20 MILLIGRAM(S): 10 INJECTION INTRAVENOUS at 06:13

## 2020-01-01 RX ADMIN — ENOXAPARIN SODIUM 40 MILLIGRAM(S): 100 INJECTION SUBCUTANEOUS at 13:22

## 2020-01-01 RX ADMIN — ALBUTEROL 2 PUFF(S): 90 AEROSOL, METERED ORAL at 08:34

## 2020-01-01 RX ADMIN — CHLORHEXIDINE GLUCONATE 1 APPLICATION(S): 213 SOLUTION TOPICAL at 05:16

## 2020-01-01 RX ADMIN — ACETAZOLAMIDE 110 MILLIGRAM(S): 250 TABLET ORAL at 12:43

## 2020-01-01 RX ADMIN — FAMOTIDINE 20 MILLIGRAM(S): 10 INJECTION INTRAVENOUS at 05:15

## 2020-01-01 RX ADMIN — PANTOPRAZOLE SODIUM 40 MILLIGRAM(S): 20 TABLET, DELAYED RELEASE ORAL at 11:46

## 2020-01-01 RX ADMIN — CHLORHEXIDINE GLUCONATE 1 APPLICATION(S): 213 SOLUTION TOPICAL at 05:25

## 2020-01-01 RX ADMIN — REMDESIVIR 500 MILLIGRAM(S): 5 INJECTION INTRAVENOUS at 06:17

## 2020-01-01 RX ADMIN — FAMOTIDINE 20 MILLIGRAM(S): 10 INJECTION INTRAVENOUS at 18:12

## 2020-01-01 RX ADMIN — DEXMEDETOMIDINE HYDROCHLORIDE IN 0.9% SODIUM CHLORIDE 3.69 MICROGRAM(S)/KG/HR: 4 INJECTION INTRAVENOUS at 05:02

## 2020-01-01 RX ADMIN — FAMOTIDINE 20 MILLIGRAM(S): 10 INJECTION INTRAVENOUS at 17:02

## 2020-01-01 RX ADMIN — FAMOTIDINE 20 MILLIGRAM(S): 10 INJECTION INTRAVENOUS at 17:39

## 2020-01-01 RX ADMIN — ALBUTEROL 2 PUFF(S): 90 AEROSOL, METERED ORAL at 11:30

## 2020-01-01 RX ADMIN — CEFEPIME 100 MILLIGRAM(S): 1 INJECTION, POWDER, FOR SOLUTION INTRAMUSCULAR; INTRAVENOUS at 17:23

## 2020-01-01 RX ADMIN — Medication 2 MILLIGRAM(S): at 03:25

## 2020-01-01 RX ADMIN — Medication 4 MILLIGRAM(S): at 05:29

## 2020-01-01 RX ADMIN — SODIUM CHLORIDE 50 MILLILITER(S): 9 INJECTION, SOLUTION INTRAVENOUS at 20:49

## 2020-01-01 RX ADMIN — Medication 25 MILLIGRAM(S): at 06:45

## 2020-01-01 RX ADMIN — Medication 1 MILLIGRAM(S): at 13:21

## 2020-01-01 RX ADMIN — Medication 325 MILLIGRAM(S): at 11:48

## 2020-01-01 RX ADMIN — Medication 1 MILLIGRAM(S): at 11:05

## 2020-01-01 RX ADMIN — ENOXAPARIN SODIUM 40 MILLIGRAM(S): 100 INJECTION SUBCUTANEOUS at 11:34

## 2020-01-01 RX ADMIN — Medication 1 EACH: at 22:06

## 2020-01-01 RX ADMIN — ALBUTEROL 2 PUFF(S): 90 AEROSOL, METERED ORAL at 14:20

## 2020-01-01 RX ADMIN — Medication 1 EACH: at 21:33

## 2020-01-01 RX ADMIN — FAMOTIDINE 20 MILLIGRAM(S): 10 INJECTION INTRAVENOUS at 17:09

## 2020-01-01 RX ADMIN — Medication 1 PUFF(S): at 08:59

## 2020-01-01 RX ADMIN — HALOPERIDOL DECANOATE 2 MILLIGRAM(S): 100 INJECTION INTRAMUSCULAR at 15:15

## 2020-01-01 RX ADMIN — CHLORHEXIDINE GLUCONATE 15 MILLILITER(S): 213 SOLUTION TOPICAL at 17:28

## 2020-01-01 RX ADMIN — CHLORHEXIDINE GLUCONATE 1 APPLICATION(S): 213 SOLUTION TOPICAL at 06:45

## 2020-01-01 RX ADMIN — FAMOTIDINE 20 MILLIGRAM(S): 10 INJECTION INTRAVENOUS at 17:23

## 2020-01-01 RX ADMIN — CEFEPIME 100 MILLIGRAM(S): 1 INJECTION, POWDER, FOR SOLUTION INTRAMUSCULAR; INTRAVENOUS at 05:15

## 2020-01-01 RX ADMIN — FAMOTIDINE 20 MILLIGRAM(S): 10 INJECTION INTRAVENOUS at 17:27

## 2020-01-01 RX ADMIN — CHLORHEXIDINE GLUCONATE 1 APPLICATION(S): 213 SOLUTION TOPICAL at 05:55

## 2020-01-01 RX ADMIN — Medication 1 PUFF(S): at 22:02

## 2020-01-01 RX ADMIN — DEXMEDETOMIDINE HYDROCHLORIDE IN 0.9% SODIUM CHLORIDE 3.69 MICROGRAM(S)/KG/HR: 4 INJECTION INTRAVENOUS at 03:05

## 2020-01-01 RX ADMIN — ALBUTEROL 2 PUFF(S): 90 AEROSOL, METERED ORAL at 14:04

## 2020-01-01 RX ADMIN — MORPHINE SULFATE 2 MILLIGRAM(S): 50 CAPSULE, EXTENDED RELEASE ORAL at 12:09

## 2020-01-01 RX ADMIN — Medication 2.5 MILLIGRAM(S): at 17:02

## 2020-01-01 RX ADMIN — Medication 2.5 MILLIGRAM(S): at 06:14

## 2020-01-01 RX ADMIN — ENOXAPARIN SODIUM 40 MILLIGRAM(S): 100 INJECTION SUBCUTANEOUS at 11:47

## 2020-01-01 RX ADMIN — Medication 40 MILLIGRAM(S): at 06:26

## 2020-01-01 RX ADMIN — FAMOTIDINE 20 MILLIGRAM(S): 10 INJECTION INTRAVENOUS at 05:25

## 2020-01-01 RX ADMIN — ENOXAPARIN SODIUM 40 MILLIGRAM(S): 100 INJECTION SUBCUTANEOUS at 12:51

## 2020-01-01 RX ADMIN — Medication 325 MILLIGRAM(S): at 12:23

## 2020-01-01 RX ADMIN — FONDAPARINUX SODIUM 2.5 MILLIGRAM(S): 2.5 INJECTION, SOLUTION SUBCUTANEOUS at 12:09

## 2020-01-01 RX ADMIN — Medication 2 MILLIGRAM(S): at 06:13

## 2020-01-01 RX ADMIN — POTASSIUM PHOSPHATE, MONOBASIC POTASSIUM PHOSPHATE, DIBASIC 41.67 MILLIMOLE(S): 236; 224 INJECTION, SOLUTION INTRAVENOUS at 14:42

## 2020-01-01 RX ADMIN — ALBUTEROL 2 PUFF(S): 90 AEROSOL, METERED ORAL at 14:02

## 2020-01-01 RX ADMIN — ALBUTEROL 2 PUFF(S): 90 AEROSOL, METERED ORAL at 08:15

## 2020-01-01 RX ADMIN — Medication 100 GRAM(S): at 04:01

## 2020-01-01 RX ADMIN — Medication 325 MILLIGRAM(S): at 13:21

## 2020-01-01 RX ADMIN — ENOXAPARIN SODIUM 40 MILLIGRAM(S): 100 INJECTION SUBCUTANEOUS at 13:37

## 2020-01-01 RX ADMIN — Medication 1 PUFF(S): at 20:38

## 2020-01-01 RX ADMIN — CHLORHEXIDINE GLUCONATE 1 APPLICATION(S): 213 SOLUTION TOPICAL at 05:04

## 2020-01-01 RX ADMIN — ATORVASTATIN CALCIUM 80 MILLIGRAM(S): 80 TABLET, FILM COATED ORAL at 22:02

## 2020-01-01 RX ADMIN — Medication 12.5 MILLIGRAM(S): at 05:02

## 2020-01-01 RX ADMIN — ENOXAPARIN SODIUM 40 MILLIGRAM(S): 100 INJECTION SUBCUTANEOUS at 12:13

## 2020-01-01 RX ADMIN — MORPHINE SULFATE 1 MG/HR: 50 CAPSULE, EXTENDED RELEASE ORAL at 14:41

## 2020-01-01 RX ADMIN — ALBUTEROL 2 PUFF(S): 90 AEROSOL, METERED ORAL at 11:00

## 2020-01-01 RX ADMIN — CHLORHEXIDINE GLUCONATE 15 MILLILITER(S): 213 SOLUTION TOPICAL at 05:52

## 2020-01-01 RX ADMIN — Medication 25 MILLIGRAM(S): at 06:22

## 2020-01-01 RX ADMIN — FAMOTIDINE 20 MILLIGRAM(S): 10 INJECTION INTRAVENOUS at 06:55

## 2020-01-01 RX ADMIN — Medication 25 MILLIGRAM(S): at 06:00

## 2020-01-01 RX ADMIN — FAMOTIDINE 20 MILLIGRAM(S): 10 INJECTION INTRAVENOUS at 05:02

## 2020-01-01 RX ADMIN — Medication 0.5 MILLIGRAM(S): at 20:21

## 2020-01-01 RX ADMIN — Medication 40 MILLIGRAM(S): at 05:54

## 2020-01-01 RX ADMIN — CLOPIDOGREL BISULFATE 75 MILLIGRAM(S): 75 TABLET, FILM COATED ORAL at 12:13

## 2020-01-01 RX ADMIN — Medication 2 MILLIGRAM(S): at 06:29

## 2020-01-01 RX ADMIN — Medication 600 MILLIGRAM(S): at 06:22

## 2020-01-01 RX ADMIN — ENOXAPARIN SODIUM 40 MILLIGRAM(S): 100 INJECTION SUBCUTANEOUS at 12:23

## 2020-01-01 RX ADMIN — Medication 1 PUFF(S): at 14:01

## 2020-01-01 RX ADMIN — Medication 1 PUFF(S): at 12:08

## 2020-01-01 RX ADMIN — Medication 100 MILLIGRAM(S): at 13:21

## 2020-01-01 RX ADMIN — Medication 1 PUFF(S): at 08:35

## 2020-01-01 RX ADMIN — ENOXAPARIN SODIUM 40 MILLIGRAM(S): 100 INJECTION SUBCUTANEOUS at 12:18

## 2020-01-01 RX ADMIN — FAMOTIDINE 20 MILLIGRAM(S): 10 INJECTION INTRAVENOUS at 17:15

## 2020-01-01 RX ADMIN — FAMOTIDINE 20 MILLIGRAM(S): 10 INJECTION INTRAVENOUS at 05:52

## 2020-01-01 RX ADMIN — Medication 600 MILLIGRAM(S): at 17:33

## 2020-01-01 RX ADMIN — Medication 1 MILLIGRAM(S): at 12:10

## 2020-01-01 RX ADMIN — Medication 600 MILLIGRAM(S): at 17:25

## 2020-01-01 RX ADMIN — Medication 600 MILLIGRAM(S): at 17:30

## 2020-01-01 RX ADMIN — Medication 6 MILLIGRAM(S): at 05:40

## 2020-01-01 RX ADMIN — Medication 600 MILLIGRAM(S): at 06:17

## 2020-01-01 RX ADMIN — ENOXAPARIN SODIUM 40 MILLIGRAM(S): 100 INJECTION SUBCUTANEOUS at 12:35

## 2020-01-01 RX ADMIN — Medication 100 MILLIGRAM(S): at 12:05

## 2020-01-01 RX ADMIN — Medication 1 PUFF(S): at 11:29

## 2020-01-01 RX ADMIN — CEFEPIME 100 MILLIGRAM(S): 1 INJECTION, POWDER, FOR SOLUTION INTRAMUSCULAR; INTRAVENOUS at 06:23

## 2020-01-01 RX ADMIN — Medication 40 MILLIGRAM(S): at 11:04

## 2020-01-01 RX ADMIN — Medication 1 PUFF(S): at 08:00

## 2020-01-01 RX ADMIN — ATORVASTATIN CALCIUM 80 MILLIGRAM(S): 80 TABLET, FILM COATED ORAL at 21:46

## 2020-01-01 RX ADMIN — CHLORHEXIDINE GLUCONATE 1 APPLICATION(S): 213 SOLUTION TOPICAL at 05:41

## 2020-01-01 RX ADMIN — Medication 600 MILLIGRAM(S): at 05:15

## 2020-01-01 RX ADMIN — Medication 600 MILLIGRAM(S): at 05:16

## 2020-01-01 RX ADMIN — Medication 1 PUFF(S): at 20:52

## 2020-01-01 RX ADMIN — Medication 1 PUFF(S): at 05:38

## 2020-01-01 RX ADMIN — CEFEPIME 100 MILLIGRAM(S): 1 INJECTION, POWDER, FOR SOLUTION INTRAMUSCULAR; INTRAVENOUS at 05:28

## 2020-01-01 RX ADMIN — Medication 2.5 MILLIGRAM(S): at 17:11

## 2020-01-01 RX ADMIN — Medication 2.5 MILLIGRAM(S): at 05:54

## 2020-01-01 RX ADMIN — CHLORHEXIDINE GLUCONATE 15 MILLILITER(S): 213 SOLUTION TOPICAL at 17:09

## 2020-01-01 RX ADMIN — CHLORHEXIDINE GLUCONATE 1 APPLICATION(S): 213 SOLUTION TOPICAL at 05:51

## 2020-01-01 RX ADMIN — Medication 325 MILLIGRAM(S): at 11:46

## 2020-01-01 RX ADMIN — CHLORHEXIDINE GLUCONATE 1 APPLICATION(S): 213 SOLUTION TOPICAL at 05:39

## 2020-01-01 RX ADMIN — CHLORHEXIDINE GLUCONATE 1 APPLICATION(S): 213 SOLUTION TOPICAL at 05:35

## 2020-01-01 RX ADMIN — CEFEPIME 100 MILLIGRAM(S): 1 INJECTION, POWDER, FOR SOLUTION INTRAMUSCULAR; INTRAVENOUS at 17:29

## 2020-01-01 RX ADMIN — SODIUM CHLORIDE 1000 MILLILITER(S): 9 INJECTION INTRAMUSCULAR; INTRAVENOUS; SUBCUTANEOUS at 16:23

## 2020-01-01 RX ADMIN — ATORVASTATIN CALCIUM 80 MILLIGRAM(S): 80 TABLET, FILM COATED ORAL at 21:01

## 2020-01-01 RX ADMIN — DEXMEDETOMIDINE HYDROCHLORIDE IN 0.9% SODIUM CHLORIDE 3.69 MICROGRAM(S)/KG/HR: 4 INJECTION INTRAVENOUS at 21:54

## 2020-01-01 RX ADMIN — SODIUM CHLORIDE 1000 MILLILITER(S): 9 INJECTION INTRAMUSCULAR; INTRAVENOUS; SUBCUTANEOUS at 19:00

## 2020-01-01 RX ADMIN — FAMOTIDINE 20 MILLIGRAM(S): 10 INJECTION INTRAVENOUS at 17:25

## 2020-01-01 RX ADMIN — CHLORHEXIDINE GLUCONATE 15 MILLILITER(S): 213 SOLUTION TOPICAL at 05:02

## 2020-01-01 RX ADMIN — Medication 6 MILLIGRAM(S): at 05:15

## 2020-01-01 RX ADMIN — Medication 40 MILLIGRAM(S): at 06:14

## 2020-01-01 RX ADMIN — SODIUM CHLORIDE 100 MILLILITER(S): 9 INJECTION, SOLUTION INTRAVENOUS at 10:45

## 2020-01-01 RX ADMIN — ALBUTEROL 2 PUFF(S): 90 AEROSOL, METERED ORAL at 22:26

## 2020-01-01 RX ADMIN — Medication 6 MILLIGRAM(S): at 06:24

## 2020-01-01 RX ADMIN — CEFEPIME 100 MILLIGRAM(S): 1 INJECTION, POWDER, FOR SOLUTION INTRAMUSCULAR; INTRAVENOUS at 06:01

## 2020-01-01 RX ADMIN — MORPHINE SULFATE 2 MILLIGRAM(S): 50 CAPSULE, EXTENDED RELEASE ORAL at 05:17

## 2020-01-01 RX ADMIN — ALBUTEROL 2 PUFF(S): 90 AEROSOL, METERED ORAL at 08:58

## 2020-01-01 RX ADMIN — MIDAZOLAM HYDROCHLORIDE 1.6 MG/KG/HR: 1 INJECTION, SOLUTION INTRAMUSCULAR; INTRAVENOUS at 13:39

## 2020-01-01 RX ADMIN — FAMOTIDINE 20 MILLIGRAM(S): 10 INJECTION INTRAVENOUS at 06:42

## 2020-01-01 RX ADMIN — Medication 12.5 MILLIGRAM(S): at 17:27

## 2020-01-01 RX ADMIN — CHLORHEXIDINE GLUCONATE 1 APPLICATION(S): 213 SOLUTION TOPICAL at 05:52

## 2020-01-01 RX ADMIN — Medication 40 MILLIGRAM(S): at 05:40

## 2020-01-01 RX ADMIN — Medication 50 MILLIEQUIVALENT(S): at 14:54

## 2020-01-01 RX ADMIN — Medication 2 MILLIGRAM(S): at 02:17

## 2020-01-01 RX ADMIN — Medication 2.5 MILLIGRAM(S): at 05:37

## 2020-01-01 RX ADMIN — ALBUTEROL 2 PUFF(S): 90 AEROSOL, METERED ORAL at 13:07

## 2020-01-01 RX ADMIN — Medication 40 MILLIGRAM(S): at 13:10

## 2020-01-01 RX ADMIN — Medication 325 MILLIGRAM(S): at 12:10

## 2020-01-01 RX ADMIN — Medication 1 PUFF(S): at 08:15

## 2020-01-01 RX ADMIN — Medication 50 MILLIEQUIVALENT(S): at 17:00

## 2020-01-01 RX ADMIN — Medication 100 MILLIGRAM(S): at 13:37

## 2020-01-01 RX ADMIN — ALBUTEROL 2 PUFF(S): 90 AEROSOL, METERED ORAL at 03:30

## 2020-01-01 RX ADMIN — FAMOTIDINE 20 MILLIGRAM(S): 10 INJECTION INTRAVENOUS at 01:10

## 2020-01-01 RX ADMIN — Medication 40 MILLIGRAM(S): at 05:38

## 2020-01-01 RX ADMIN — Medication 2.5 MILLIGRAM(S): at 17:30

## 2020-01-01 RX ADMIN — Medication 325 MILLIGRAM(S): at 12:13

## 2020-01-01 RX ADMIN — Medication 20 MILLIGRAM(S): at 03:12

## 2020-01-01 RX ADMIN — Medication 25 MILLIGRAM(S): at 05:15

## 2020-01-01 RX ADMIN — FAMOTIDINE 20 MILLIGRAM(S): 10 INJECTION INTRAVENOUS at 17:31

## 2020-01-01 RX ADMIN — CHLORHEXIDINE GLUCONATE 1 APPLICATION(S): 213 SOLUTION TOPICAL at 05:03

## 2020-01-11 NOTE — PATIENT PROFILE ADULT - NSPROMUTINFOINDIVIDFT_GEN_A_NUR
Patient ambulated to the restroom with steady gait with stand by assistance. Then returned to room. Patient reports she does not want to get admitted and wants to go home. ED MD made aware. Patient will wait for urine result.       Frankie Hale RN  01/11/20 3778 none

## 2020-07-14 NOTE — ED ADULT NURSE NOTE - EXTENSIONS OF SELF_ADULT
Spironolactone Counseling: Patient advised regarding risks of diarrhea, abdominal pain, hyperkalemia, birth defects (for female patients), liver toxicity and renal toxicity. The patient may need blood work to monitor liver and kidney function and potassium levels while on therapy. The patient verbalized understanding of the proper use and possible adverse effects of spironolactone.  All of the patient's questions and concerns were addressed. Birth Control Pills Pregnancy And Lactation Text: This medication should be avoided if pregnant and for the first 30 days post-partum. Azithromycin Pregnancy And Lactation Text: This medication is considered safe during pregnancy and is also secreted in breast milk. High Dose Vitamin A Pregnancy And Lactation Text: High dose vitamin A therapy is contraindicated during pregnancy and breast feeding. Minocycline Pregnancy And Lactation Text: This medication is Pregnancy Category D and not consider safe during pregnancy. It is also excreted in breast milk. Spironolactone Pregnancy And Lactation Text: This medication can cause feminization of the male fetus and should be avoided during pregnancy. The active metabolite is also found in breast milk. Detail Level: Zone Topical Retinoid counseling:  Patient advised to apply a pea-sized amount only at bedtime and wait 30 minutes after washing their face before applying.  If too drying, patient may add a non-comedogenic moisturizer. The patient verbalized understanding of the proper use and possible adverse effects of retinoids.  All of the patient's questions and concerns were addressed. Use Enhanced Medication Counseling?: No Isotretinoin Counseling: Patient should get monthly blood tests, not donate blood, not drive at night if vision affected, not share medication, and not undergo elective surgery for 6 months after tx completed. Side effects reviewed, pt to contact office should one occur. None Benzoyl Peroxide Counseling: Patient counseled that medicine may cause skin irritation and bleach clothing.  In the event of skin irritation, the patient was advised to reduce the amount of the drug applied or use it less frequently.   The patient verbalized understanding of the proper use and possible adverse effects of benzoyl peroxide.  All of the patient's questions and concerns were addressed. Benzoyl Peroxide Pregnancy And Lactation Text: This medication is Pregnancy Category C. It is unknown if benzoyl peroxide is excreted in breast milk. Tazorac Pregnancy And Lactation Text: This medication is not safe during pregnancy. It is unknown if this medication is excreted in breast milk. Dapsone Counseling: I discussed with the patient the risks of dapsone including but not limited to hemolytic anemia, agranulocytosis, rashes, methemoglobinemia, kidney failure, peripheral neuropathy, headaches, GI upset, and liver toxicity.  Patients who start dapsone require monitoring including baseline LFTs and weekly CBCs for the first month, then every month thereafter.  The patient verbalized understanding of the proper use and possible adverse effects of dapsone.  All of the patient's questions and concerns were addressed. Minocycline Counseling: Patient advised regarding possible photosensitivity and discoloration of the teeth, skin, lips, tongue and gums.  Patient instructed to avoid sunlight, if possible.  When exposed to sunlight, patients should wear protective clothing, sunglasses, and sunscreen.  The patient was instructed to call the office immediately if the following severe adverse effects occur:  hearing changes, easy bruising/bleeding, severe headache, or vision changes.  The patient verbalized understanding of the proper use and possible adverse effects of minocycline.  All of the patient's questions and concerns were addressed. Tazorac Counseling:  Patient advised that medication is irritating and drying.  Patient may need to apply sparingly and wash off after an hour before eventually leaving it on overnight.  The patient verbalized understanding of the proper use and possible adverse effects of tazorac.  All of the patient's questions and concerns were addressed. Bactrim Counseling:  I discussed with the patient the risks of sulfa antibiotics including but not limited to GI upset, allergic reaction, drug rash, diarrhea, dizziness, photosensitivity, and yeast infections.  Rarely, more serious reactions can occur including but not limited to aplastic anemia, agranulocytosis, methemoglobinemia, blood dyscrasias, liver or kidney failure, lung infiltrates or desquamative/blistering drug rashes. Tetracycline Counseling: Patient counseled regarding possible photosensitivity and increased risk for sunburn.  Patient instructed to avoid sunlight, if possible.  When exposed to sunlight, patients should wear protective clothing, sunglasses, and sunscreen.  The patient was instructed to call the office immediately if the following severe adverse effects occur:  hearing changes, easy bruising/bleeding, severe headache, or vision changes.  The patient verbalized understanding of the proper use and possible adverse effects of tetracycline.  All of the patient's questions and concerns were addressed. Patient understands to avoid pregnancy while on therapy due to potential birth defects. Isotretinoin Pregnancy And Lactation Text: This medication is Pregnancy Category X and is considered extremely dangerous during pregnancy. It is unknown if it is excreted in breast milk. High Dose Vitamin A Counseling: Side effects reviewed, pt to contact office should one occur. Dapsone Pregnancy And Lactation Text: This medication is Pregnancy Category C and is not considered safe during pregnancy or breast feeding. Bactrim Pregnancy And Lactation Text: This medication is Pregnancy Category D and is known to cause fetal risk.  It is also excreted in breast milk. Topical Clindamycin Pregnancy And Lactation Text: This medication is Pregnancy Category B and is considered safe during pregnancy. It is unknown if it is excreted in breast milk. Erythromycin Counseling:  I discussed with the patient the risks of erythromycin including but not limited to GI upset, allergic reaction, drug rash, diarrhea, increase in liver enzymes, and yeast infections. Birth Control Pills Counseling: Birth Control Pill Counseling: I discussed with the patient the potential side effects of OCPs including but not limited to increased risk of stroke, heart attack, thrombophlebitis, deep venous thrombosis, hepatic adenomas, breast changes, GI upset, headaches, and depression.  The patient verbalized understanding of the proper use and possible adverse effects of OCPs. All of the patient's questions and concerns were addressed. Topical Sulfur Applications Counseling: Topical Sulfur Counseling: Patient counseled that this medication may cause skin irritation or allergic reactions.  In the event of skin irritation, the patient was advised to reduce the amount of the drug applied or use it less frequently.   The patient verbalized understanding of the proper use and possible adverse effects of topical sulfur application.  All of the patient's questions and concerns were addressed. Azithromycin Counseling:  I discussed with the patient the risks of azithromycin including but not limited to GI upset, allergic reaction, drug rash, diarrhea, and yeast infections. Erythromycin Pregnancy And Lactation Text: This medication is Pregnancy Category B and is considered safe during pregnancy. It is also excreted in breast milk. Topical Sulfur Applications Pregnancy And Lactation Text: This medication is Pregnancy Category C and has an unknown safety profile during pregnancy. It is unknown if this topical medication is excreted in breast milk. Doxycycline Pregnancy And Lactation Text: This medication is Pregnancy Category D and not consider safe during pregnancy. It is also excreted in breast milk but is considered safe for shorter treatment courses. Topical Clindamycin Counseling: Patient counseled that this medication may cause skin irritation or allergic reactions.  In the event of skin irritation, the patient was advised to reduce the amount of the drug applied or use it less frequently.   The patient verbalized understanding of the proper use and possible adverse effects of clindamycin.  All of the patient's questions and concerns were addressed. Doxycycline Counseling:  Patient counseled regarding possible photosensitivity and increased risk for sunburn.  Patient instructed to avoid sunlight, if possible.  When exposed to sunlight, patients should wear protective clothing, sunglasses, and sunscreen.  The patient was instructed to call the office immediately if the following severe adverse effects occur:  hearing changes, easy bruising/bleeding, severe headache, or vision changes.  The patient verbalized understanding of the proper use and possible adverse effects of doxycycline.  All of the patient's questions and concerns were addressed. Sarecycline Counseling: Patient advised regarding possible photosensitivity and discoloration of the teeth, skin, lips, tongue and gums.  Patient instructed to avoid sunlight, if possible.  When exposed to sunlight, patients should wear protective clothing, sunglasses, and sunscreen.  The patient was instructed to call the office immediately if the following severe adverse effects occur:  hearing changes, easy bruising/bleeding, severe headache, or vision changes.  The patient verbalized understanding of the proper use and possible adverse effects of sarecycline.  All of the patient's questions and concerns were addressed. Topical Retinoid Pregnancy And Lactation Text: This medication is Pregnancy Category C. It is unknown if this medication is excreted in breast milk. Detail Level: Detailed

## 2020-07-30 PROBLEM — R00.1 BRADYCARDIA: Status: ACTIVE | Noted: 2020-01-01

## 2020-07-30 NOTE — PROCEDURE
[Complete Heart Block] : complete heart block [ICD] : Implantable cardioverter-defibrillator [Longevity: ___ months] : The estimated remaining battery life is [unfilled] months [VVIR] : VVIR [Lead Imp:  ___ohms] : lead impedance was [unfilled] ohms [Sensing Amplitude ___mv] : sensing amplitude was [unfilled] mv [___V @] : [unfilled] V [___ ms] : [unfilled] ms [None] : none [Pace ___ %] : Pace [unfilled]% [de-identified] : G112 [de-identified] : [place] [de-identified] : 60 [de-identified] : 06/16/2010 [de-identified] : [id number] [de-identified] : NO EPISODES\par NORMAL DEVICE FUNCTION

## 2020-07-30 NOTE — HISTORY OF PRESENT ILLNESS
[SOB] : no dyspnea [Syncope] : no syncope [de-identified] : Patient c/o SOB for few months. Pt had  echo done that showed normal LV fucntion [Erythema at Site] : no erythema at device site [Swelling at Site] : no swelling at device site

## 2020-07-30 NOTE — PHYSICAL EXAM
[General Appearance - Well Developed] : well developed [Normal Appearance] : normal appearance [Well Groomed] : well groomed [General Appearance - Well Nourished] : well nourished [No Deformities] : no deformities [Heart Rate And Rhythm] : heart rate and rhythm were normal [General Appearance - In No Acute Distress] : no acute distress [Murmurs] : no murmurs present [Heart Sounds] : normal S1 and S2 [Respiration, Rhythm And Depth] : normal respiratory rhythm and effort [Exaggerated Use Of Accessory Muscles For Inspiration] : no accessory muscle use [Auscultation Breath Sounds / Voice Sounds] : lungs were clear to auscultation bilaterally [Clean] : clean [Dry] : dry [Well-Healed] : well-healed [Abdomen Soft] : soft [Abdomen Tenderness] : non-tender [Nail Clubbing] : no clubbing of the fingernails [Abdomen Mass (___ Cm)] : no abdominal mass palpated [Petechial Hemorrhages (___cm)] : no petechial hemorrhages [Cyanosis, Localized] : no localized cyanosis [] : no ischemic changes

## 2020-12-07 NOTE — ED ADULT NURSE NOTE - OBJECTIVE STATEMENT
Pt tested positive for covid 3 days ago. Pt c/o blue lips x 1 week. Pt denies SOB or fever. Pt also c/o abdominal pain. No nausea/ vomiting/diarrhea

## 2020-12-07 NOTE — ED ADULT NURSE NOTE - PMH
Afib    Borderline diabetes mellitus    Chronic right-sided heart failure    Depression    HTN (hypertension)    Liver cirrhosis, alcoholic    Mild dementia

## 2020-12-07 NOTE — H&P ADULT - NSHPPHYSICALEXAM_GEN_ALL_CORE
GENERAL: NAD, tired looking elderly M on 3-4L NC  HEENT:  Atraumatic, Normocephalic; EOMI, PERRLA, severely dry oral mucosa and lips, not significantly swollen lips or tongue, no current blue discoloration noted but seems darkened (pt states this is abnormal for him)  CHEST/LUNG: diffuse fine crackles louder in bases, dec air movement, scattered expiratory wheezing, +ve cough, desat to 88% when talking, inc work of breathing & tachypnea 24 when talking  HEART: Regular rate and rhythm; No murmurs appreciated  ABDOMEN: Soft, Nontender, Nondistended; Bowel sounds hypoactive  EXTREMITIES:  2+ Peripheral Pulses, 1+ peripheral pitting edema  PSYCH: AAOx3  NEUROLOGY: non-focal  SKIN: No rashes/lesions appreciated

## 2020-12-07 NOTE — GOALS OF CARE CONVERSATION - ADVANCED CARE PLANNING - CONVERSATION DETAILS
Patient is 87y/o with underlying cardiac disease now presenting with signs of acute COVID pneumonia requiring O2 support.  Discussed with patient the overall course of disease and risk for intubation/resuscitation. Patient's clinical understanding is poor, possibly due to dementia. Also called his wife and son and explained that while pt doesn't need intubation at this time he may need it in future.    Patient stated he wouldn't want to be intubated but was less sure about resuscitation so deferred decision to son/wife.  After speaking with son and explaining risk for a prolonged intubation, son decided to leave him full code.     Patient will remain full code for now. Family will continue to discuss amongst themselves as pt's condition develops.

## 2020-12-07 NOTE — H&P ADULT - HISTORY OF PRESENT ILLNESS
85y/o M with PMH of CHFrEF, Afib s/p watchman 2019 not on AC, HTN, hx of chronic anemia presents to ED with 'blue lips' and SOB. Pt was known COVID+ for 1 week and had increased sxs over past few days but can't really give details. He also states that for 1mo he hasn't felt well, general sense of 'malaise' and notes that his lips have been extremely chapped, mildly swollen, and 'blue'. He saw his PCP who didn't know what it was and suggested coming to ED. Denied CP, palpitations, leg pains, nausea/vomiting/diarrhea. Has some mild abd pain in LLL crampy. Pt overall is poor historian for his sxs.    In ED: T96.9, HR 58, /62, RR18, SpO2 86% on RA > 92% on 3L NC.  CT a/p no acute pathology but noted small L renal lesion & b/l effusions with signs of RH dysfcn.   Labs showed leukopenia/lymphopenia, thrombocytopenia. Trop 0.04, EKG ventricularly paced.  CXR diffuse infiltrates. 87y/o M with PMH of CHFrEF & severe MR/pulm HTN, Afib s/p watchman 2019 not on AC, HTN, hx of chronic anemia presents to ED with 'blue lips' and SOB. Pt was known COVID+ for 1 week and had increased sxs over past few days but can't really give details. He also states that for 1mo he hasn't felt well, general sense of 'malaise' and notes that his lips have been extremely chapped, mildly swollen, and 'blue'. He saw his PCP who didn't know what it was and suggested coming to ED. Denied CP, palpitations, leg pains, nausea/vomiting/diarrhea. Has some mild abd pain in LLL crampy. Pt overall is poor historian for his sxs.    In ED: T96.9, HR 58, /62, RR18, SpO2 86% on RA > 92% on 3L NC.  CT a/p no acute pathology but noted small L renal lesion & b/l effusions with signs of RH dysfcn.   Labs showed leukopenia/lymphopenia, thrombocytopenia. Trop 0.04, EKG ventricularly paced.  CXR diffuse infiltrates.

## 2020-12-07 NOTE — H&P ADULT - NSHPLABSRESULTS_GEN_ALL_CORE
< from: Transthoracic Echocardiogram (04.24.19 @ 11:14) >    Summary:   1. Left ventricular ejection fraction, by visual estimation, is 45 to   50%.   2. Moderately increased LV wall thickness.   3. Moderately enlarged right ventricle.  4. Severe mitral valve regurgitation.   5. Moderate-severe tricuspid regurgitation.   6. Sclerotic aortic valve with decreased opening.   7. Estimated pulmonary artery systolic pressure is 61.1 mmHg assuming a   right atrial pressure of 10 mmHg, which is consistent with severe   pulmonary hypertension.   8. Mitral valve mean gradient is 1.8 mmHg consistent with normal mitral   stenosis.    < end of copied text > < from: Transthoracic Echocardiogram (04.24.19 @ 11:14) >    Summary:   1. Left ventricular ejection fraction, by visual estimation, is 45 to   50%.   2. Moderately increased LV wall thickness.   3. Moderately enlarged right ventricle.  4. Severe mitral valve regurgitation.   5. Moderate-severe tricuspid regurgitation.   6. Sclerotic aortic valve with decreased opening.   7. Estimated pulmonary artery systolic pressure is 61.1 mmHg assuming a   right atrial pressure of 10 mmHg, which is consistent with severe   pulmonary hypertension.   8. Mitral valve mean gradient is 1.8 mmHg consistent with normal mitral   stenosis.    < end of copied text >    < from: CT Abdomen and Pelvis w/ IV Cont (12.07.20 @ 19:58) >    LOWER CHEST: The heart size is enlarged. There is reflux of contrast material into the inferior vena cava and hepatic veins compatible with right heart dysfunction. There is a large right and a small left pleural effusion. There is mild compression atelectasis at the lung bases. There is a small pericardial effusion.    HEPATIC: The liver has a somewhat nodular contour. There is no evidence of mass or bile duct dilatation.    BILIARY: Status post cholecystectomy    SPLEEN: Unremarkable.    PANCREAS: The pancreas is atrophic. No evidence of mass or pancreatitis.    ADRENAL GLANDS: Unremarkable.    KIDNEYS: There is symmetric bilateral renal enhancement. No evidence of hydronephrosis or calcified stones. Mild perinephric stranding is noted bilaterally. Several renal cysts are noted bilaterally. A 1 cm dense cortical lesion, too small to characterize, is noted in the posterior aspect of the lower pole of the left kidney. This may represent a small hyperdense cyst versus small solid lesion. Nonemergent ultrasound may be considered for further evaluation.    ABDOMINOPELVIC NODES: Unremarkable.    PELVIC ORGANS: No evidence of pelvic mass, lymphadenopathy, or fluid collection.    PERITONEUM/MESENTERY/BOWEL: A hiatus hernia is noted. No evidence of bowel obstruction, colitis, inflammatory process, or ascites within the abdomen or pelvis. No pneumoperitoneum. The appendix is normal in appearance.    BONES/SOFT TISSUES: Degenerative changes and scoliosis of the spine are noted. Surgical clips are noted in the region of the anterior abdominal wall in both inguinal regions.    OTHER: Aortoiliac calcifications and tortuosity are noted with no evidence of abdominal aortic aneurysm.      IMPRESSION:    No evidence of bowel obstruction or intra-abdominal or pelvic inflammatory process    < end of copied text >

## 2020-12-07 NOTE — ED PROVIDER NOTE - PHYSICAL EXAMINATION
Vital Signs: I have reviewed the initial vital signs.  Constitutional: well-nourished, appears stated age, no acute distress.  HEENT: Airway patent, protected.   CV: regular rate, regular rhythm, well-perfused extremities, 2+ b/l DP and radial pulses equal.  Lungs: BCTA, no increased WOB.  ABD: soft, mild LLQ ttp, ND, no guarding or rebound, no pulsatile mass, no hernias.   MSK: Neck supple, nontender, nl ROM, no stepoff. Chest nontender. Back nontender in TLS spine or to b/l bony structures or flanks. Ext nontender, nl rom, no deformity.   INTEG: Skin warm, dry, no rash.  NEURO: A&Ox3, moving all extremities, normal speech  PSYCH: Calm, cooperative, normal affect and interaction.

## 2020-12-07 NOTE — H&P ADULT - ATTENDING COMMENTS
Pt seen and examined at bedside this morning as he was about to undergo thoracentesis. Pt was not interested in conversation. I spoke with pt's wife to inquire about further history and relayed similar history as above. She has COVID-19 as well and has been isolating since 12/1/2020. Follow up pleural fluid studies (seems cardiac related). Will evenetually need echo. Start lasix. Obtain med list from PMD office. Pt seen and examined at bedside this morning as he was about to undergo thoracentesis. Pt was not interested in conversation. I spoke with pt's wife to inquire about further history and relayed similar history as above. She has COVID-19 as well and has been isolating since 12/1/2020.     SOB  - likely due to COVID-19 pneumonia with pleural effusion  - f/u pleural fluid studies (ldh, protein, gram stain, and albumin)  - c/w supplemental o2  - start lasix    COVID-19  - d-dimer 807  - remaining inflammatory markers (procalcitonin, LDH, ferritin) still pending  - ID consult appreciated-encourge prone position  - dexamethasone only  - c/w dvt px  - start GI px protonix    Troponemia  - c/w telemetry monitoring  - likely demand ischemia  - EKG shows paced rhythm    To confirm home meds with PMD's office.

## 2020-12-07 NOTE — ED PROVIDER NOTE - ATTENDING CONTRIBUTION TO CARE
86M PMH CHF AICD, HTN, HL, sent from pmd office for blue lips and hypoxia. pt states hes had covid since 11/20. reports 1 week of LLQ pain sharp constant nonradiating. no nvdc. no fever. +Cough and SOB. no cp. no dysuria, freq, hematuria. pt states his lips have been blue x 1 week and that's why he went to see pmd today.    sat 85% RA, up to mid 90s% on 3L NC    on exam, AFVSS, well albino nad, ncat, eomi, perrla, mmm, cyanosis, crackles bilat mild tachypnea, rrr nl s1s2 no mrg, abd soft +LLQ ttp, no rebound or rigidity, nd, aaox3, no focal deficits, no le edema or calf ttp,     a/p; COVID hypoxia cyanosis, LLQ pain, will do labs, ekg, cxr, ua, ct a/p, ivf pain control o2 NC re-eval

## 2020-12-07 NOTE — ED PROVIDER NOTE - OBJECTIVE STATEMENT
86M hx CHF s/p AICD, Afib not on A/C, HTN/anemia presents with hypoxia. Patient notes he went to his PMD, was sent to the ED for "blue lips." patient denies sob, notes he tested positive for COVID 3 days ago. Endorses abdominal pain x 1 week associated with mild nausea/vomiting but still able to eat and drink regularly, denies constipation/diarrhea, last BM was today.

## 2020-12-07 NOTE — ED ADULT NURSE NOTE - PSH
AICD (automatic cardioverter/defibrillator) present    Deviated nasal septum    History of appendectomy    History of cholecystectomy    History of knee surgery    Presence of Watchman left atrial appendage closure device

## 2020-12-07 NOTE — ED ADULT TRIAGE NOTE - CHIEF COMPLAINT QUOTE
"I tested positive for covid 3 days ago and I feel like my lips look more blue. I don't feel short of breathe. im having belly pain for a week. im eating and drinking ok"

## 2020-12-07 NOTE — H&P ADULT - ASSESSMENT
87y/o M with CHFrEF, severe MR/pulm HTN, Afib s/p watchman, HTN, hx of anemia presents with inc SOB with COVID 19 PNA.    *** MED REC NOT DONE - PT & FAMILY DOESN'T KNOW MEDS. CALLED [facility], NO RECENT MAINTENANCE MEDS.  *** WILL ORDER BASED ON PRIOR LIST BUT PLEASE CONFIRM MED LIST WITH DR PEREZ OFFICE IN AM    #Acute hypoxic respiratory failure   #COVID19+ pneumonia  - SXS start date: ~11/30   - hypoxemic to 86% on RA, on 4L NC  - lymphopenia/thrombocytopenia likely due to virus  - CXR b/l diffuse infiltrates  - tylenol PRN, inhalers q6h  - f/u procal, CRP, ferritin, D-dimer  - start decadron 6 q24h day 1/10  - start remdesivir 200 once, ID approval for follow-up doses  - ID consult     #Tropeniemia  - Trop 0.04, EKG no acute ST changes, no CP or sxs  - repeat trop @ 11:30  - monitor for now    #CHFrEF / Afib s/p watchman  - c/w metoprolol 25, ASA/plavix  - hold spironolactone/lasix until confirmed in AM    #Swollen? lips  - would hold losartan for now, samara as BP borderline    #HTN- hold losartan  #HLD- c/w atorvastatin 80  #Dementia- c/w donepezil     #MISC  - DVT ppx: lovenox  - GI ppx: pepcid  - Diet: DASH  - ActivitY: as tolerated  - Dispo: acute  - FULL CODE 87y/o M with CHFrEF, severe MR/pulm HTN, Afib s/p watchman, HTN, hx of anemia presents with inc SOB with COVID 19 PNA.    *** MED REC NOT DONE - PT & FAMILY DOESN'T KNOW MEDS. CALLED [facility], NO RECENT MAINTENANCE MEDS.  *** WILL ORDER BASED ON PRIOR LIST BUT PLEASE CONFIRM MED LIST WITH DR PEREZ OFFICE IN AM    #Acute hypoxic respiratory failure   #COVID19+ pneumonia  - SXS start date: ~11/30   - hypoxemic to 86% on RA, on 4L NC  - lymphopenia/thrombocytopenia likely due to virus  - CXR b/l diffuse infiltrates  - tylenol PRN, inhalers q6h  - f/u procal, CRP, ferritin, D-dimer  - start decadron 6 q24h day 1/10  - start remdesivir 200 once, ID approval for follow-up doses  - ID consult     #Tropeniemia  - Trop 0.04, EKG no acute ST changes, no CP or sxs  - repeat trop @ 11:30  - monitor for now    #CHFrEF / Afib s/p watchman  - CT shows b/l effusions, R heart reflux, small pericardial effusion  - c/w metoprolol 25, ASA/plavix  - repeat 2D echo  - hold spironolactone/lasix until confirmed in AM    #Swollen? lips  - would hold losartan for now, samara as BP borderline    #HTN- hold losartan  #HLD- c/w atorvastatin 80  #Dementia- c/w donepezil     #Incidental L renal lesion- outpt renal US follow-up    #MISC  - DVT ppx: lovenox  - GI ppx: pepcid  - Diet: DASH  - ActivitY: as tolerated  - Dispo: acute  - FULL CODE

## 2020-12-08 NOTE — CONSULT NOTE ADULT - SUBJECTIVE AND OBJECTIVE BOX
Patient is a 86y old  Male who presents with a chief complaint of COVID19+ (08 Dec 2020 09:17)      HPI:  85y/o M with PMH of CHFrEF & severe MR/pulm HTN, Afib s/p watchman 2019 not on AC, HTN, hx of chronic anemia presents to ED with 'blue lips' and SOB. Pt was known COVID+ for 1 week and had increased sxs over past few days but can't really give details. He also states that for 1mo he hasn't felt well, general sense of 'malaise' and notes that his lips have been extremely chapped, mildly swollen, and 'blue'. He saw his PCP who didn't know what it was and suggested coming to ED. Denied CP, palpitations, leg pains, nausea/vomiting/diarrhea. Has some mild abd pain in LLL crampy. Pt overall is poor historian for his sxs.    In ED: T96.9, HR 58, /62, RR18, SpO2 86% on RA > 92% on 3L NC.  CT a/p no acute pathology but noted small L renal lesion & b/l effusions with signs of RH dysfcn.   Labs showed leukopenia/lymphopenia, thrombocytopenia. Trop 0.04, EKG ventricularly paced.  CXR diffuse infiltrates. (07 Dec 2020 23:15)      PAST MEDICAL & SURGICAL HISTORY:  Mild dementia    Chronic right-sided heart failure    Depression    Liver cirrhosis, alcoholic    Borderline diabetes mellitus    HTN (hypertension)    Afib    Presence of Watchman left atrial appendage closure device    AICD (automatic cardioverter/defibrillator) present    History of cholecystectomy    History of appendectomy    History of knee surgery    Deviated nasal septum        SOCIAL HX:   Smoking      former smoker, quit 20 yrs ago, 1ppd for 20 years                   ETOH    denies                        Other denies illicit drug use, denies travel outside country in past 6 months, from home, [id number] with cane, retired, worked in sanitation    FAMILY HISTORY:  Family history of throat cancer (Sibling)    Family history of diabetes mellitus (Mother)    .  No cardiovascular or pulmonary family history     REVIEW OF SYSTEMS:    All ROS are negative exept per HPI       Allergies    No Known Allergies    Intolerances          PHYSICAL EXAM  Vital Signs Last 24 Hrs  T(C): 37.1 (08 Dec 2020 04:35), Max: 37.2 (07 Dec 2020 23:36)  T(F): 98.8 (08 Dec 2020 04:35), Max: 98.9 (07 Dec 2020 23:36)  HR: 60 (08 Dec 2020 09:34) (58 - 68)  BP: 107/64 (08 Dec 2020 09:34) (105/62 - 112/74)  BP(mean): --  RR: 18 (08 Dec 2020 08:25) (17 - 20)  SpO2: 95% (08 Dec 2020 08:25) (86% - 96%)    CONSTITUTIONAL:  Well nourished.  NAD    ENT:   Airway patent,   No thrush    EYES:   Clear bilaterally,   pupils equal,   round and reactive to light.    CARDIAC:   Normal rate,   regular rhythm.    no edema    RESPIRATORY:   On 3L NC, satting 95%  Inspection- normal chest wall symmetry  Palpation- normal chest wall expansion  Auscultation- decreased breath sounds on right  Bedside US- large right sided pleural effusion    GASTROINTESTINAL:  Abdomen soft, non-tender,   No guarding,   Positive BS    MUSCULOSKELETAL:   Range of motion is not limited,  No clubbing, cyanosis    NEUROLOGICAL:   AOx4  Follows commands  Moves all extremities  Alert and oriented   No motor deficits.    SKIN:   Skin normal color for race,   No evidence of rash.          LABS:                          10.3   1.88  )-----------( 99       ( 08 Dec 2020 06:38 )             35.1                                               12-08    141  |  110  |  23<H>  ----------------------------<  145<H>  4.7   |  24  |  1.2    Ca    8.9      08 Dec 2020 06:38  Mg     1.9     12-08    TPro  5.8<L>  /  Alb  3.2<L>  /  TBili  0.9  /  DBili  x   /  AST  35  /  ALT  14  /  AlkPhos  63  12-08      PT/INR - ( 08 Dec 2020 00:58 )   PT: 15.10 sec;   INR: 1.31 ratio         PTT - ( 08 Dec 2020 00:58 )  PTT:38.6 sec                                           CARDIAC MARKERS ( 08 Dec 2020 06:38 )  x     / 0.03 ng/mL / x     / x     / x      CARDIAC MARKERS ( 08 Dec 2020 00:58 )  x     / 0.03 ng/mL / x     / x     / x      CARDIAC MARKERS ( 07 Dec 2020 16:01 )  x     / 0.04 ng/mL / x     / x     / x                                                LIVER FUNCTIONS - ( 08 Dec 2020 06:38 )  Alb: 3.2 g/dL / Pro: 5.8 g/dL / ALK PHOS: 63 U/L / ALT: 14 U/L / AST: 35 U/L / GGT: x                                                                                                MEDICATIONS  (STANDING):  ALBUTerol    90 MICROgram(s) HFA Inhaler 2 Puff(s) Inhalation every 6 hours  aspirin 325 milliGRAM(s) Oral daily  atorvastatin 80 milliGRAM(s) Oral at bedtime  clopidogrel Tablet 75 milliGRAM(s) Oral daily  dexAMETHasone  Injectable 6 milliGRAM(s) IV Push daily  donepezil 10 milliGRAM(s) Oral at bedtime  enoxaparin Injectable 40 milliGRAM(s) SubCutaneous daily  famotidine    Tablet 20 milliGRAM(s) Oral two times a day  guaiFENesin  milliGRAM(s) Oral every 12 hours  ipratropium 17 MICROgram(s) HFA Inhaler 1 Puff(s) Inhalation every 6 hours  metoprolol succinate ER 25 milliGRAM(s) Oral daily    MEDICATIONS  (PRN):  acetaminophen   Tablet .. 650 milliGRAM(s) Oral every 6 hours PRN Temp greater or equal to 38C (100.4F)      X-Rays reviewed:    CXR interpreted by me: bilateral interstitial opacities, large right sided pleural effusion

## 2020-12-08 NOTE — CHART NOTE - NSCHARTNOTEFT_GEN_A_CORE
Spoke with Dr. ling at [phone]. patient is on furosemide 40mg qd. atorvastatin 80mg qd. losartan 100mg qd. metoprolol 25mg qd. gave clinical updates. also called the family, gave clinical updates

## 2020-12-08 NOTE — CONSULT NOTE ADULT - SUBJECTIVE AND OBJECTIVE BOX
WILLIAM ARAIZA  86y, Male  Allergy: No Known Allergies      All historical available data reviewed.    HPI:  87y/o M with PMH of CHFrEF & severe MR/pulm HTN, Afib s/p watchman 2019 not on AC, HTN, hx of chronic anemia presents to ED with 'blue lips' and SOB. Pt was known COVID+ for 1 week and had increased sxs over past few days but can't really give details. He also states that for 1mo he hasn't felt well, general sense of 'malaise' and notes that his lips have been extremely chapped, mildly swollen, and 'blue'. He saw his PCP who didn't know what it was and suggested coming to ED. Denied CP, palpitations, leg pains, nausea/vomiting/diarrhea. Has some mild abd pain in LLL crampy. Pt overall is poor historian for his sxs.    In ED: T96.9, HR 58, /62, RR18, SpO2 86% on RA > 92% on 3L NC.  CT a/p no acute pathology but noted small L renal lesion & b/l effusions with signs of RH dysfcn.   Labs showed leukopenia/lymphopenia, thrombocytopenia. Trop 0.04, EKG ventricularly paced.  CXR diffuse infiltrates. (07 Dec 2020 23:15)  ID called for COVID-19      FAMILY HISTORY:  Family history of throat cancer (Sibling)    Family history of diabetes mellitus (Mother)      PAST MEDICAL & SURGICAL HISTORY:  Mild dementia    Chronic right-sided heart failure    Depression    Liver cirrhosis, alcoholic    Borderline diabetes mellitus    HTN (hypertension)    Afib    Presence of Watchman left atrial appendage closure device    AICD (automatic cardioverter/defibrillator) present    History of cholecystectomy    History of appendectomy    History of knee surgery    Deviated nasal septum          VITALS:  T(F): 98.8, Max: 98.9 (12-07-20 @ 23:36)  HR: 68  BP: 112/74  RR: 18Vital Signs Last 24 Hrs  T(C): 37.1 (08 Dec 2020 04:35), Max: 37.2 (07 Dec 2020 23:36)  T(F): 98.8 (08 Dec 2020 04:35), Max: 98.9 (07 Dec 2020 23:36)  HR: 68 (08 Dec 2020 04:35) (58 - 68)  BP: 112/74 (08 Dec 2020 04:35) (105/62 - 112/74)  BP(mean): --  RR: 18 (08 Dec 2020 08:25) (17 - 20)  SpO2: 95% (08 Dec 2020 08:25) (86% - 96%)    TESTS & MEASUREMENTS:                        10.3   1.88  )-----------( 99       ( 08 Dec 2020 06:38 )             35.1     12-08    141  |  110  |  23<H>  ----------------------------<  145<H>  4.7   |  24  |  1.2    Ca    8.9      08 Dec 2020 06:38  Mg     1.9     12-08    TPro  5.8<L>  /  Alb  3.2<L>  /  TBili  0.9  /  DBili  x   /  AST  35  /  ALT  14  /  AlkPhos  63  12-08    LIVER FUNCTIONS - ( 08 Dec 2020 06:38 )  Alb: 3.2 g/dL / Pro: 5.8 g/dL / ALK PHOS: 63 U/L / ALT: 14 U/L / AST: 35 U/L / GGT: x                   RADIOLOGY & ADDITIONAL TESTS:  Personal review of radiological diagnostics performed  Echo and EKG results noted when applicable.     MEDICATIONS:  acetaminophen   Tablet .. 650 milliGRAM(s) Oral every 6 hours PRN  ALBUTerol    90 MICROgram(s) HFA Inhaler 2 Puff(s) Inhalation every 6 hours  aspirin 325 milliGRAM(s) Oral daily  atorvastatin 80 milliGRAM(s) Oral at bedtime  clopidogrel Tablet 75 milliGRAM(s) Oral daily  dexAMETHasone  Injectable 6 milliGRAM(s) IV Push daily  donepezil 10 milliGRAM(s) Oral at bedtime  enoxaparin Injectable 40 milliGRAM(s) SubCutaneous daily  famotidine    Tablet 20 milliGRAM(s) Oral two times a day  furosemide   Injectable 40 milliGRAM(s) IV Push once  guaiFENesin  milliGRAM(s) Oral every 12 hours  ipratropium 17 MICROgram(s) HFA Inhaler 1 Puff(s) Inhalation every 6 hours  metoprolol succinate ER 25 milliGRAM(s) Oral daily  remdesivir  IVPB   IV Intermittent       ANTIBIOTICS:  remdesivir  IVPB   IV Intermittent

## 2020-12-08 NOTE — CONSULT NOTE ADULT - ASSESSMENT
85y/o M with PMH of CHFrEF & severe MR/pulm HTN, Afib s/p watchman 2019 not on AC, HTN, hx of chronic anemia presents to ED with 'blue lips' and SOB. Pt was known COVID+ for 1 week and had increased sxs over past few days but can't really give details. He also states that for 1mo he hasn't felt well, general sense of 'malaise' and notes that his lips have been extremely chapped, mildly swollen, and 'blue'. He saw his PCP who didn't know what it was and suggested coming to ED. Denied CP, palpitations, leg pains, nausea/vomiting/diarrhea. Has some mild abd pain in LLL crampy. Pt overall is poor historian for his sxs.    IMPRESSION;  COVID 19 with severe illness. SpO2 < 94% on RA and need for supplemental O2.  Steroids beneficial.   Pt is in the late inflammatory response phase ot the illness based on the onset of symptoms. ( possibly one month )  CT with right pleural effusion  Ddimers 807    RECOMMENDATIONS;  Target SpO2 92 % to 96 %  f/u ferritin, CRP, procalcitonin  Dexamethasone 6 mg iv q24h for 10 days ( or until discharge ) or equivalent glucocorticoid dose may be substituted. Equivalent total dosis of alternative steroids are Methylprednisolone 32 mg and prednisone 40 mg q24h.  Monitor for side effects: hyperglycemia, neurological ( agitation/confusion), adrenal suppression, bacterial and fungal infections  D/c RDV  Anticoagulation as per team  Proning for 16h/day if pt can tolerate it.

## 2020-12-08 NOTE — CONSULT NOTE ADULT - ASSESSMENT
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  DW cardiology   Continue with decadron, currently day 2  Trend inflammatory markers  Check Procal  FU ID  HOB at 30  Aspiration precautions  DVT ppx

## 2020-12-08 NOTE — PROGRESS NOTE ADULT - ASSESSMENT
85y/o M with CHFrEF, severe MR/pulm HTN, Afib s/p watchman, HTN, hx of anemia presents with inc SOB with COVID 19 PNA.    #Acute hypoxic respiratory failure   #COVID19+ pneumonia  #pleural effusion  - SXS start date: ~11/30   - hypoxemic to 86% on RA, on 4L NC  - lymphopenia/thrombocytopenia likely due to virus  - CXR b/l diffuse infiltrates, CT with right pleural effusion. Ddimers 807  - tylenol PRN, inhalers q6h  - start decadron 6 q24h for 10 days, no RDV  - ID consult, recs appreciated    #Tropeniemia  - Trop 0.04 on admission, repeat 0.03 downtrending, EKG no acute ST changes, no CP or sxs  - likely demand ischemia. monitor for now    #CHFrEF / Afib s/p watchman  - CT shows b/l effusions, R heart reflux, small pericardial effusion  - c/w metoprolol 25, ASA/plavix  -repeat echo    #HTN  - hold losartan for possible angioedema/swollen lips  #HLD- c/w atorvastatin 80  #Dementia- c/w donepezil     #Incidental L renal lesion- outpt renal US follow-up    #MISC  - DVT ppx: lovenox  - GI ppx: pepcid  - Diet: DASH  - ActivitY: as tolerated  - Dispo: acute  - FULL CODE   87y/o M with CHFrEF, severe MR/pulm HTN, Afib s/p watchman, HTN, hx of anemia presents with inc SOB with COVID 19 PNA.    #Acute hypoxic respiratory failure   #COVID19+ pneumonia  #pleural effusion  - SXS start date: ~11/30   - hypoxemic to 86% on RA, on 4L NC  - lymphopenia/thrombocytopenia likely due to virus  - CXR b/l diffuse infiltrates, CT with right pleural effusion. Ddimers 807  - tylenol PRN, inhalers q6h  - start decadron 6 q24h for 10 days, no RDV  - ID consult, recs appreciated  - right side pleural effusion, s/p thoracentesis, 1.5L removed. post procedure CXR showed trace peunothorax x2, pt on 100% non-rebreather per pulm    #Tropeniemia  - Trop 0.04 on admission, repeat 0.03 downtrending, EKG no acute ST changes, no CP or sxs  - likely demand ischemia. monitor for now    #CHFrEF / Afib s/p watchman  - CT shows b/l effusions, R heart reflux, small pericardial effusion  - c/w metoprolol 25, ASA/plavix  -repeat echo    #HTN  - hold losartan for possible angioedema/swollen lips  #HLD- c/w atorvastatin 80  #Dementia- c/w donepezil     #Incidental L renal lesion- outpt renal US follow-up    #MISC  - DVT ppx: lovenox  - GI ppx: pepcid  - Diet: DASH  - ActivitY: as tolerated  - Dispo: acute  - FULL CODE

## 2020-12-08 NOTE — PROGRESS NOTE ADULT - SUBJECTIVE AND OBJECTIVE BOX
SUBJECTIVE:    Patient is a 86y old Male who presents with a chief complaint of COVID19+ (08 Dec 2020 10:13)    Currently admitted to medicine with the primary diagnosis of COVID-19       Today is hospital day 1d. This morning he is resting comfortably in bed and reports no new issues or overnight events.     INTERVAL EVENTS:   s/p thoracentesis today    PAST MEDICAL & SURGICAL HISTORY  Mild dementia    Chronic right-sided heart failure    Depression    Liver cirrhosis, alcoholic    Borderline diabetes mellitus    HTN (hypertension)    Afib    Presence of Watchman left atrial appendage closure device    AICD (automatic cardioverter/defibrillator) present    History of cholecystectomy    History of appendectomy    History of knee surgery    Deviated nasal septum        ALLERGIES:  No Known Allergies    MEDICATIONS:  STANDING MEDICATIONS  ALBUTerol    90 MICROgram(s) HFA Inhaler 2 Puff(s) Inhalation every 6 hours  aspirin 325 milliGRAM(s) Oral daily  atorvastatin 80 milliGRAM(s) Oral at bedtime  clopidogrel Tablet 75 milliGRAM(s) Oral daily  dexAMETHasone  Injectable 6 milliGRAM(s) IV Push daily  donepezil 10 milliGRAM(s) Oral at bedtime  enoxaparin Injectable 40 milliGRAM(s) SubCutaneous daily  famotidine    Tablet 20 milliGRAM(s) Oral two times a day  guaiFENesin  milliGRAM(s) Oral every 12 hours  ipratropium 17 MICROgram(s) HFA Inhaler 1 Puff(s) Inhalation every 6 hours  metoprolol succinate ER 25 milliGRAM(s) Oral daily    PRN MEDICATIONS  acetaminophen   Tablet .. 650 milliGRAM(s) Oral every 6 hours PRN    VITALS:   T(F): 98.8  HR: 60  BP: 107/64  RR: 18  SpO2: 95%    LABS:                        10.3   1.88  )-----------( 99       ( 08 Dec 2020 06:38 )             35.1     12-08    138  |  109  |  24<H>  ----------------------------<  194<H>  4.9   |  16<L>  |  1.2    Ca    8.6      08 Dec 2020 12:44  Mg     1.9     12-08    TPro  6.3  /  Alb  3.3<L>  /  TBili  0.8  /  DBili  x   /  AST  39  /  ALT  14  /  AlkPhos  64  12-08    PT/INR - ( 08 Dec 2020 00:58 )   PT: 15.10 sec;   INR: 1.31 ratio         PTT - ( 08 Dec 2020 00:58 )  PTT:38.6 sec      Creatine Kinase, Serum: 82 U/L (12-08-20 @ 12:44)  Troponin T, Serum: 0.01 ng/mL (12-08-20 @ 12:44)  Troponin T, Serum: 0.03 ng/mL <HH> (12-08-20 @ 06:38)  Troponin T, Serum: 0.03 ng/mL <HH> (12-08-20 @ 00:58)  Lactate, Blood: 1.7 mmol/L (12-07-20 @ 16:01)  Troponin T, Serum: 0.04 ng/mL <HH> (12-07-20 @ 16:01)      CARDIAC MARKERS ( 08 Dec 2020 12:44 )  x     / 0.01 ng/mL / 82 U/L / x     / 5.1 ng/mL  CARDIAC MARKERS ( 08 Dec 2020 06:38 )  x     / 0.03 ng/mL / x     / x     / x      CARDIAC MARKERS ( 08 Dec 2020 00:58 )  x     / 0.03 ng/mL / x     / x     / x      CARDIAC MARKERS ( 07 Dec 2020 16:01 )  x     / 0.04 ng/mL / x     / x     / x          RADIOLOGY:    PHYSICAL EXAM:  GEN: No acute distress  PULM/CHEST: Clear to auscultation bilaterally, no rales, rhonchi or wheezes   CVS: Regular rate and rhythm, S1-S2, no murmurs  ABD: Soft, non-tender, non-distended, +BS  EXT: No edema  NEURO: AAOx3    Blanco Catheter:        SUBJECTIVE:    Patient is a 86y old Male who presents with a chief complaint of COVID19+ (08 Dec 2020 10:13)    Currently admitted to medicine with the primary diagnosis of COVID-19       Today is hospital day 1d.     INTERVAL EVENTS:   s/p thoracentesis today, 1.5l of fluid removed, developed iatrogenic trace pneumothorax afterwards, asymptomatic otherwise. will treat with high flow oxygen    PAST MEDICAL & SURGICAL HISTORY  Mild dementia    Chronic right-sided heart failure    Depression    Liver cirrhosis, alcoholic    Borderline diabetes mellitus    HTN (hypertension)    Afib    Presence of Watchman left atrial appendage closure device    AICD (automatic cardioverter/defibrillator) present    History of cholecystectomy    History of appendectomy    History of knee surgery    Deviated nasal septum        ALLERGIES:  No Known Allergies    MEDICATIONS:  STANDING MEDICATIONS  ALBUTerol    90 MICROgram(s) HFA Inhaler 2 Puff(s) Inhalation every 6 hours  aspirin 325 milliGRAM(s) Oral daily  atorvastatin 80 milliGRAM(s) Oral at bedtime  clopidogrel Tablet 75 milliGRAM(s) Oral daily  dexAMETHasone  Injectable 6 milliGRAM(s) IV Push daily  donepezil 10 milliGRAM(s) Oral at bedtime  enoxaparin Injectable 40 milliGRAM(s) SubCutaneous daily  famotidine    Tablet 20 milliGRAM(s) Oral two times a day  guaiFENesin  milliGRAM(s) Oral every 12 hours  ipratropium 17 MICROgram(s) HFA Inhaler 1 Puff(s) Inhalation every 6 hours  metoprolol succinate ER 25 milliGRAM(s) Oral daily    PRN MEDICATIONS  acetaminophen   Tablet .. 650 milliGRAM(s) Oral every 6 hours PRN    VITALS:   T(F): 98.8  HR: 60  BP: 107/64  RR: 18  SpO2: 95%    LABS:                        10.3   1.88  )-----------( 99       ( 08 Dec 2020 06:38 )             35.1     12-08    138  |  109  |  24<H>  ----------------------------<  194<H>  4.9   |  16<L>  |  1.2    Ca    8.6      08 Dec 2020 12:44  Mg     1.9     12-08    TPro  6.3  /  Alb  3.3<L>  /  TBili  0.8  /  DBili  x   /  AST  39  /  ALT  14  /  AlkPhos  64  12-08    PT/INR - ( 08 Dec 2020 00:58 )   PT: 15.10 sec;   INR: 1.31 ratio         PTT - ( 08 Dec 2020 00:58 )  PTT:38.6 sec      Creatine Kinase, Serum: 82 U/L (12-08-20 @ 12:44)  Troponin T, Serum: 0.01 ng/mL (12-08-20 @ 12:44)  Troponin T, Serum: 0.03 ng/mL <HH> (12-08-20 @ 06:38)  Troponin T, Serum: 0.03 ng/mL <HH> (12-08-20 @ 00:58)  Lactate, Blood: 1.7 mmol/L (12-07-20 @ 16:01)  Troponin T, Serum: 0.04 ng/mL <HH> (12-07-20 @ 16:01)      CARDIAC MARKERS ( 08 Dec 2020 12:44 )  x     / 0.01 ng/mL / 82 U/L / x     / 5.1 ng/mL  CARDIAC MARKERS ( 08 Dec 2020 06:38 )  x     / 0.03 ng/mL / x     / x     / x      CARDIAC MARKERS ( 08 Dec 2020 00:58 )  x     / 0.03 ng/mL / x     / x     / x      CARDIAC MARKERS ( 07 Dec 2020 16:01 )  x     / 0.04 ng/mL / x     / x     / x          RADIOLOGY:    PHYSICAL EXAM:  GEN: No acute distress  PULM/CHEST: Clear to auscultation bilaterally, no rales, rhonchi or wheezes   CVS: Regular rate and rhythm, S1-S2, no murmurs  ABD: Soft, non-tender, non-distended, +BS  EXT: No edema  NEURO: AAOx3    Blanco Catheter:

## 2020-12-09 NOTE — PROGRESS NOTE ADULT - SUBJECTIVE AND OBJECTIVE BOX
Patient is a 86y old  Male who presents with a chief complaint of COVID19+ (08 Dec 2020 14:08)        SUBJECTIVE:  S/p thoracentesis, 1.5L removed.     PHYSICAL EXAM  Vital Signs Last 24 Hrs  T(C): 36.1 (09 Dec 2020 05:00), Max: 36.9 (08 Dec 2020 22:45)  T(F): 97 (09 Dec 2020 05:00), Max: 98.4 (08 Dec 2020 22:45)  HR: 82 (09 Dec 2020 06:39) (59 - 82)  BP: 102/58 (09 Dec 2020 06:39) (81/50 - 113/68)  BP(mean): --  RR: 18 (09 Dec 2020 05:00) (18 - 18)  SpO2: 98% (09 Dec 2020 05:00) (89% - 98%)    CONSTITUTIONAL:  Well nourished.  NAD    ENT:   Airway patent,   No thrush    EYES:   Clear bilaterally,   pupils equal, round and reactive to light.    CARDIAC:   Normal rate,   regular rhythm.    no edema    CAROTID:   normal systolic impulse  no bruits    RESPIRATORY:   On NC, satting 98%  No wheezing  Normal chest expansion  Not tachypneic,  Nno use of accessory muscles    GASTROINTESTINAL:  Abdomen soft,   non-tender,   no guarding,   + BS    MUSCULOSKELETAL:   range of motion is not limited,  no clubbing, cyanosis    NEUROLOGICAL:   AOx4  Alert and oriented   no motor  deficits.    SKIN:   Skin normal color for race,   No evidence of rash.    PSYCHIATRIC:   normal mood and affect.   no apparent risk to self or others.      12-08-20 @ 07:01  -  12-09-20 @ 07:00  --------------------------------------------------------  IN:    Oral Fluid: 240 mL  Total IN: 240 mL    OUT:    Voided (mL): 1100 mL  Total OUT: 1100 mL    Total NET: -860 mL          LABS:                          10.6   5.41  )-----------( 116      ( 09 Dec 2020 06:48 )             36.6                                               12-09    139  |  108  |  31<H>  ----------------------------<  120<H>  4.5   |  23  |  1.2    Ca    8.0<L>      09 Dec 2020 06:48  Phos  4.1     12-09  Mg     1.8     12-09    TPro  x   /  Alb  x   /  TBili  0.7  /  DBili  x   /  AST  x   /  ALT  x   /  AlkPhos  x   12-09      PT/INR - ( 09 Dec 2020 06:48 )   PT: 17.50 sec;   INR: 1.52 ratio         PTT - ( 08 Dec 2020 00:58 )  PTT:38.6 sec                                           CARDIAC MARKERS ( 08 Dec 2020 12:44 )  x     / 0.01 ng/mL / 82 U/L / x     / 5.1 ng/mL  CARDIAC MARKERS ( 08 Dec 2020 06:38 )  x     / 0.03 ng/mL / x     / x     / x      CARDIAC MARKERS ( 08 Dec 2020 00:58 )  x     / 0.03 ng/mL / x     / x     / x      CARDIAC MARKERS ( 07 Dec 2020 16:01 )  x     / 0.04 ng/mL / x     / x     / x                                                LIVER FUNCTIONS - ( 08 Dec 2020 12:44 )  Alb: 3.3 g/dL / Pro: 6.3 g/dL / ALK PHOS: 64 U/L / ALT: 14 U/L / AST: 39 U/L / GGT: x                                                  Culture - Body Fluid with Gram Stain (collected 08 Dec 2020 10:40)  Source: Pleural Fl Pleural Fluid  Gram Stain (09 Dec 2020 02:45):    polymorphonuclear leukocytes seen    No organisms seen    by cytocentrifuge                                                    MEDICATIONS  (STANDING):  ALBUTerol    90 MICROgram(s) HFA Inhaler 2 Puff(s) Inhalation every 6 hours  aspirin 325 milliGRAM(s) Oral daily  atorvastatin 80 milliGRAM(s) Oral at bedtime  clopidogrel Tablet 75 milliGRAM(s) Oral daily  dexAMETHasone  Injectable 6 milliGRAM(s) IV Push daily  donepezil 10 milliGRAM(s) Oral at bedtime  enoxaparin Injectable 40 milliGRAM(s) SubCutaneous daily  famotidine    Tablet 20 milliGRAM(s) Oral two times a day  furosemide    Tablet 40 milliGRAM(s) Oral daily  guaiFENesin  milliGRAM(s) Oral every 12 hours  ipratropium 17 MICROgram(s) HFA Inhaler 1 Puff(s) Inhalation every 6 hours  metoprolol succinate ER 25 milliGRAM(s) Oral daily    MEDICATIONS  (PRN):  acetaminophen   Tablet .. 650 milliGRAM(s) Oral every 6 hours PRN Temp greater or equal to 38C (100.4F)

## 2020-12-09 NOTE — CONSULT NOTE ADULT - ASSESSMENT
IMPRESSION:  - Acute respiratory failure secondary to COVID-19 pneumonia  - Bilateral effusions, larger on the right s/p thoracentesis --> Transudate (from CHF/MR +/- COVID)  - HFrEF (EF45%) with severe MR; patient may be mildly fluid overloaded - neck veins slightly engorged, mild lower extremities edema; but patient laying flat, no orthopnea  - CAD (known total RCA occlusion, collateralized from left circulation, LCx 60% stenosis, LAD mild disease; on cath from 2009) on medical therapy  - History of AF s/p watchman because of GI bleeding on full dose aspirin at home  - Small pericardial effusion on CT (old finding, stable from years ago)    PLAN:  - Treat underlying COVID infection as per primary team  - Can give one day of IV lasix then continue with oral lasix (home dose) - patient slightly overloaded but not in acutely decompensated CHF  - Consider stopping plavix - patient says he is only taking aspirin 325mg at home after watchman procedure  - Check repeat 2d echo  - F/U electrolytes and renal function, correct as needed, keep K>4 and Mg>2  - Follow-up with primary cardiologist and EP as outpatient IMPRESSION:  - Acute respiratory failure secondary to COVID-19 pneumonia  - Bilateral effusions, larger on the right s/p thoracentesis --> Transudate (from CHF/MR +/- COVID)  - HFrEF (EF45%) with severe MR; patient may be mildly fluid overloaded - neck veins slightly engorged, mild lower extremities edema; but patient laying flat, no orthopnea  - CAD (known total RCA occlusion, collateralized from left circulation, LCx 60% stenosis, LAD mild disease; on cath from 2009) on medical therapy  - History of AF s/p watchman because of GI bleeding on full dose aspirin at home  - Small pericardial effusion on CT (old finding, stable from years ago)    PLAN:  - Treat underlying COVID infection as per primary team  - Can give one day of IV lasix then continue with oral lasix (home dose) - patient slightly overloaded but not in acutely decompensated CHF  - Consider stopping plavix - patient says he is only taking aspirin 325mg at home after watchman procedure, no plavix  - No need to do 2d echo now (decrease exposure of hospital employees and echo tech members) - Pericardial effusion is small on the CT and it is an old finding from previous echo  - F/U electrolytes and renal function, correct as needed, keep K>4 and Mg>2  - Follow-up with primary cardiologist and EP as outpatient for repeat echo and management of CHF and severe MR

## 2020-12-09 NOTE — PROGRESS NOTE ADULT - ASSESSMENT
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

## 2020-12-09 NOTE — PROGRESS NOTE ADULT - ASSESSMENT
87y/o M with CHFrEF, severe MR/pulm HTN, Afib s/p watchman, HTN, hx of anemia presents with inc SOB with COVID 19 PNA.    #Acute hypoxic respiratory failure   #COVID19+ pneumonia  #pleural effusion  - SXS start date: ~11/30   - hypoxemic to 86% on RA, on 4L NC  - lymphopenia/thrombocytopenia likely due to virus  - CXR b/l diffuse infiltrates, CT with right pleural effusion. Ddimers 807  - tylenol PRN, inhalers q6h  - start decadron 6 q24h for 10 days, no RDV  - ID consult, recs appreciated  - right side pleural effusion, s/p thoracentesis, 1.5L removed. post procedure CXR showed trace peunothorax x2, now resolved, Follow up cytology    #Tropeniemia, resolved  - Trop 0.04 on admission, repeat 0.03, 0.01 this am, EKG no acute ST changes, no CP or sxs  - likely demand ischemia. monitor for now    #CHFrEF / Afib s/p watchman  - CT shows b/l effusions, R heart reflux, small pericardial effusion  - c/w metoprolol 25, ASA  - d/c plavix  - CXR today showed large cardiac silhoulette, likely a pericardial effusion, Follow up repeat echo  -cardiology consult in place, recs apprecaited    #HTN  - hold losartan for possible angioedema/swollen lips  #HLD- c/w atorvastatin 80  #Dementia- c/w donepezil     #Incidental L renal lesion- outpt renal US follow-up    #MISC  - DVT ppx: lovenox  - GI ppx: pepcid  - Diet: DASH  - Activity: as tolerated  - Dispo: acute  - FULL CODE

## 2020-12-09 NOTE — CONSULT NOTE ADULT - SUBJECTIVE AND OBJECTIVE BOX
HPI:  85y/o M with PMH of CHFrEF & severe MR/pulm HTN, Afib s/p watchman 2019 not on AC, HTN, hx of chronic anemia presents to ED with 'blue lips' and SOB. Pt was known COVID+ for 1 week and had increased sxs over past few days but can't really give details. He also states that for 1mo he hasn't felt well, general sense of 'malaise' and notes that his lips have been extremely chapped, mildly swollen, and 'blue'. He saw his PCP who didn't know what it was and suggested coming to ED. Denied CP, palpitations, leg pains, nausea/vomiting/diarrhea. Has some mild abd pain in LLL crampy. Pt overall is poor historian for his sxs.    In ED: T96.9, HR 58, /62, RR18, SpO2 86% on RA > 92% on 3L NC.  CT a/p no acute pathology but noted small L renal lesion & b/l effusions with signs of RH dysfcn.   Labs showed leukopenia/lymphopenia, thrombocytopenia. Trop 0.04, EKG ventricularly paced.  CXR diffuse infiltrates. (07 Dec 2020 23:15)      PAST MEDICAL & SURGICAL HISTORY  Mild dementia    Chronic right-sided heart failure    Depression    Liver cirrhosis, alcoholic    Borderline diabetes mellitus    HTN (hypertension)    Afib    Presence of Watchman left atrial appendage closure device    AICD (automatic cardioverter/defibrillator) present    History of cholecystectomy    History of appendectomy    History of knee surgery    Deviated nasal septum        FAMILY HISTORY:  FAMILY HISTORY:  Family history of throat cancer (Sibling)    Family history of diabetes mellitus (Mother)        SOCIAL HISTORY:  []smoker  []Alcohol  []Drug    ALLERGIES:  No Known Allergies      MEDICATIONS:  MEDICATIONS  (STANDING):  ALBUTerol    90 MICROgram(s) HFA Inhaler 2 Puff(s) Inhalation every 6 hours  aspirin 325 milliGRAM(s) Oral daily  atorvastatin 80 milliGRAM(s) Oral at bedtime  clopidogrel Tablet 75 milliGRAM(s) Oral daily  dexAMETHasone  Injectable 6 milliGRAM(s) IV Push daily  donepezil 10 milliGRAM(s) Oral at bedtime  enoxaparin Injectable 40 milliGRAM(s) SubCutaneous daily  famotidine    Tablet 20 milliGRAM(s) Oral two times a day  furosemide    Tablet 40 milliGRAM(s) Oral daily  guaiFENesin  milliGRAM(s) Oral every 12 hours  ipratropium 17 MICROgram(s) HFA Inhaler 1 Puff(s) Inhalation every 6 hours  metoprolol succinate ER 25 milliGRAM(s) Oral daily    MEDICATIONS  (PRN):  acetaminophen   Tablet .. 650 milliGRAM(s) Oral every 6 hours PRN Temp greater or equal to 38C (100.4F)      HOME MEDICATIONS:  Home Medications:  aspirin 325 mg oral tablet: 1 tab(s) orally once a day (08 Dec 2020 00:09)  clopidogrel 75 mg oral tablet: 1 tab(s) orally once a day (08 Dec 2020 00:09)  donepezil 10 mg oral tablet: 1 tab(s) orally once a day (at bedtime) (08 Dec 2020 00:09)  ferrous fumarate 325 mg (106 mg elemental iron) oral tablet: 1 tab(s) orally once a day (08 Dec 2020 00:09)  losartan 100 mg oral tablet: 1 tab(s) orally once a day (08 Dec 2020 00:09)  metoprolol succinate 25 mg oral tablet, extended release: 1 tab(s) orally once a day (08 Dec 2020 00:09)  mirtazapine 30 mg oral tablet: 1 tab(s) orally once a day (at bedtime) (08 Dec 2020 00:09)  spironolactone 50 mg oral tablet: 1 tab(s) orally once a day (08 Dec 2020 00:09)      VITALS:   T(F): 97 (12-09 @ 05:00), Max: 98.9 (12-07 @ 23:36)  HR: 82 (12-09 @ 06:39) (58 - 82)  BP: 102/58 (12-09 @ 06:39) (81/50 - 113/68)  BP(mean): --  RR: 18 (12-09 @ 05:00) (17 - 20)  SpO2: 98% (12-09 @ 05:00) (86% - 98%)    I&O's Summary    08 Dec 2020 07:01  -  09 Dec 2020 07:00  --------------------------------------------------------  IN: 240 mL / OUT: 1100 mL / NET: -860 mL        REVIEW OF SYSTEMS:  CONSTITUTIONAL: No weakness, fevers or chills  EYES: No visual changes  ENT: No vertigo or throat pain   NECK: No pain or stiffness  RESPIRATORY: No cough, wheezing, hemoptysis; No shortness of breath  CARDIOVASCULAR: No chest pain or palpitations  GASTROINTESTINAL: No abdominal or epigastric pain. No nausea, vomiting, or hematemesis; No diarrhea or constipation. No melena or hematochezia.  GENITOURINARY: No dysuria, frequency or hematuria  NEUROLOGICAL: No numbness or weakness  SKIN: No itching, no rashes  MSK: No pain    PHYSICAL EXAM:  NEURO: patient is awake , alert and oriented  GEN: Not in acute distress  NECK: no thyroid enlargement, no JVD  LUNGS: Clear to auscultation bilaterally   CARDIOVASCULAR: S1/S2 present, RRR , no murmurs or rubs, no carotid bruits,  + PP bilaterally  ABD: Soft, non-tender, non-distended, +BS  EXT: No LESLIE  SKIN: Intact    LABS:                        10.6   5.41  )-----------( 116      ( 09 Dec 2020 06:48 )             36.6     12-09    139  |  108  |  31<H>  ----------------------------<  120<H>  4.5   |  23  |  1.2    Ca    8.0<L>      09 Dec 2020 06:48  Phos  4.1     12-09  Mg     1.8     12-09    TPro  x   /  Alb  x   /  TBili  0.7  /  DBili  x   /  AST  x   /  ALT  x   /  AlkPhos  x   12-09    PT/INR - ( 09 Dec 2020 06:48 )   PT: 17.50 sec;   INR: 1.52 ratio         PTT - ( 08 Dec 2020 00:58 )  PTT:38.6 sec  Creatine Kinase, Serum: 82 U/L (12-08-20 @ 12:44)  Troponin T, Serum: 0.01 ng/mL (12-08-20 @ 12:44)    CARDIAC MARKERS ( 08 Dec 2020 12:44 )  x     / 0.01 ng/mL / 82 U/L / x     / 5.1 ng/mL  CARDIAC MARKERS ( 08 Dec 2020 06:38 )  x     / 0.03 ng/mL / x     / x     / x      CARDIAC MARKERS ( 08 Dec 2020 00:58 )  x     / 0.03 ng/mL / x     / x     / x      CARDIAC MARKERS ( 07 Dec 2020 16:01 )  x     / 0.04 ng/mL / x     / x     / x        RADIOLOGY:  -CXR: Bilateral opacities and effusions    -TTE (2019): < from: Transthoracic Echocardiogram (04.24.19 @ 11:14) >  Summary:   1. Left ventricular ejection fraction, by visual estimation, is 45 to   50%.   2. Moderately increased LV wall thickness.   3. Moderately enlarged right ventricle.  4. Severe mitral valve regurgitation.   5. Moderate-severe tricuspid regurgitation.   6. Sclerotic aortic valve with decreased opening.   7. Estimated pulmonary artery systolic pressure is 61.1 mmHg assuming a   right atrial pressure of 10 mmHg, which is consistent with severe   pulmonary hypertension.   8. Mitral valve mean gradient is 1.8 mmHg consistent with normal mitral   stenosis.    < end of copied text >     -CCTA:    -STRESS TEST:    -CATHETERIZATION (2009): Mild disease LAD, 60% prox LCx, 100% prox RCA collateralized from left    ECG:  AF, V paced    TELEMETRY EVENTS:

## 2020-12-09 NOTE — PROGRESS NOTE ADULT - SUBJECTIVE AND OBJECTIVE BOX
WILLIAM ARAIZA  86y, Male    All available historical data reviewed    OVERNIGHT EVENTS:  no fevers  SOB  NRB    ROS:  General: Denies rigors, nightsweats  HEENT: Denies headache, rhinorrhea, sore throat, eye pain  CV: Denies CP, palpitations  PULM: Denies wheezing, hemoptysis  GI: Denies hematemesis, hematochezia, melena  : Denies discharge, hematuria  MSK: Denies arthralgias, myalgias  SKIN: Denies rash, lesions  NEURO: Denies paresthesias, weakness  PSYCH: Denies depression, anxiety    VITALS:  T(F): 97, Max: 98.4 (12-08-20 @ 22:45)  HR: 82  BP: 102/58  RR: 18Vital Signs Last 24 Hrs  T(C): 36.1 (09 Dec 2020 05:00), Max: 36.9 (08 Dec 2020 22:45)  T(F): 97 (09 Dec 2020 05:00), Max: 98.4 (08 Dec 2020 22:45)  HR: 82 (09 Dec 2020 06:39) (59 - 82)  BP: 102/58 (09 Dec 2020 06:39) (81/50 - 105/62)  BP(mean): --  RR: 18 (09 Dec 2020 12:35) (18 - 18)  SpO2: 95% (09 Dec 2020 12:35) (89% - 98%)    TESTS & MEASUREMENTS:                        10.6   5.41  )-----------( 116      ( 09 Dec 2020 06:48 )             36.6     12-09    139  |  108  |  31<H>  ----------------------------<  120<H>  4.5   |  23  |  1.2    Ca    8.0<L>      09 Dec 2020 06:48  Phos  4.1     12-09  Mg     1.8     12-09    TPro  x   /  Alb  x   /  TBili  0.7  /  DBili  x   /  AST  x   /  ALT  x   /  AlkPhos  x   12-09    LIVER FUNCTIONS - ( 08 Dec 2020 12:44 )  Alb: 3.3 g/dL / Pro: 6.3 g/dL / ALK PHOS: 64 U/L / ALT: 14 U/L / AST: 39 U/L / GGT: x             Culture - Fungal, Body Fluid (collected 12-08-20 @ 10:40)  Source: Pleural Fl Pleural Fluid  Preliminary Report (12-09-20 @ 11:19):    Testing in progress    Culture - Body Fluid with Gram Stain (collected 12-08-20 @ 10:40)  Source: Pleural Fl Pleural Fluid  Gram Stain (12-09-20 @ 02:45):    polymorphonuclear leukocytes seen    No organisms seen    by cytocentrifuge            RADIOLOGY & ADDITIONAL TESTS:  Personal review of radiological diagnostics performed  Echo and EKG results noted when applicable.     MEDICATIONS:  acetaminophen   Tablet .. 650 milliGRAM(s) Oral every 6 hours PRN  ALBUTerol    90 MICROgram(s) HFA Inhaler 2 Puff(s) Inhalation every 6 hours  aspirin 325 milliGRAM(s) Oral daily  atorvastatin 80 milliGRAM(s) Oral at bedtime  clopidogrel Tablet 75 milliGRAM(s) Oral daily  dexAMETHasone  Injectable 6 milliGRAM(s) IV Push daily  donepezil 10 milliGRAM(s) Oral at bedtime  enoxaparin Injectable 40 milliGRAM(s) SubCutaneous daily  famotidine    Tablet 20 milliGRAM(s) Oral two times a day  furosemide    Tablet 40 milliGRAM(s) Oral daily  guaiFENesin  milliGRAM(s) Oral every 12 hours  ipratropium 17 MICROgram(s) HFA Inhaler 1 Puff(s) Inhalation every 6 hours  metoprolol succinate ER 25 milliGRAM(s) Oral daily      ANTIBIOTICS:

## 2020-12-09 NOTE — PROGRESS NOTE ADULT - ASSESSMENT
· Assessment	  87y/o M with PMH of CHFrEF & severe MR/pulm HTN, Afib s/p watchman 2019 not on AC, HTN, hx of chronic anemia presents to ED with 'blue lips' and SOB. Pt was known COVID+ for 1 week and had increased sxs over past few days but can't really give details. He also states that for 1mo he hasn't felt well, general sense of 'malaise' and notes that his lips have been extremely chapped, mildly swollen, and 'blue'. He saw his PCP who didn't know what it was and suggested coming to ED. Denied CP, palpitations, leg pains, nausea/vomiting/diarrhea. Has some mild abd pain in LLL crampy. Pt overall is poor historian for his sxs.    IMPRESSION;  COVID 19 with severe illness. SpO2 < 94% on RA and need for supplemental O2. NRB  Pt is in the late inflammatory response phase ot the illness based on the onset of symptoms. ( possibly one month )  CT with right pleural effusion  Ddimers 807  Ferritin 56  procal 0.08  CRP 0.76  No elevation of the inflammatory markers. No cytokine release  SOB secondary to right pleural effusion ?    RECOMMENDATIONS;  Target SpO2 92 % to 96 %  IVR : possible therapeutic right thoracocentesis  Dexamethasone 6 mg iv q24h for 10 days ( or until discharge ) or equivalent glucocorticoid dose may be substituted. Equivalent total dosis of alternative steroids are Methylprednisolone 32 mg and prednisone 40 mg q24h.  Monitor for side effects: hyperglycemia, neurological ( agitation/confusion), adrenal suppression, bacterial and fungal infections  Anticoagulation as per team  Proning for 16h/day if pt can tolerate it. · Assessment	  87y/o M with PMH of CHFrEF & severe MR/pulm HTN, Afib s/p watchman 2019 not on AC, HTN, hx of chronic anemia presents to ED with 'blue lips' and SOB. Pt was known COVID+ for 1 week and had increased sxs over past few days but can't really give details. He also states that for 1mo he hasn't felt well, general sense of 'malaise' and notes that his lips have been extremely chapped, mildly swollen, and 'blue'. He saw his PCP who didn't know what it was and suggested coming to ED. Denied CP, palpitations, leg pains, nausea/vomiting/diarrhea. Has some mild abd pain in LLL crampy. Pt overall is poor historian for his sxs.    IMPRESSION;  COVID 19 with severe illness. SpO2 < 94% on RA and need for supplemental O2. NRB  Pt is in the late inflammatory response phase ot the illness based on the onset of symptoms. ( possibly one month )  CT with right pleural effusion  s/p rigth thoracocentesis 12/8 with 1500 ccs fluid. Pneumo right  Possible pericardial effusion  Ddimers 807  Ferritin 56  procal 0.08  CRP 0.76  No elevation of the inflammatory markers. No cytokine release  SOB secondary to right pleural effusion /pneumo/pericardial effusion    RECOMMENDATIONS;  BCx   ECHO : not cleared by cardio  Target SpO2 92 % to 96 %  Add Cefepime 1 gm iv q12h  Dexamethasone 6 mg iv q24h for 10 days ( or until discharge ) or equivalent glucocorticoid dose may be substituted. Equivalent total dosis of alternative steroids are Methylprednisolone 32 mg and prednisone 40 mg q24h.  Monitor for side effects: hyperglycemia, neurological ( agitation/confusion), adrenal suppression, bacterial and fungal infections  Anticoagulation as per team

## 2020-12-09 NOTE — PROGRESS NOTE ADULT - SUBJECTIVE AND OBJECTIVE BOX
SUBJECTIVE:    Patient is a 86y old Male who presents with a chief complaint of COVID19+ (09 Dec 2020 12:15)    Currently admitted to medicine with the primary diagnosis of COVID-19       Today is hospital day 2d. more lethargic today    INTERVAL EVENTS:   desated overnight to 88%, spiking fevers. patient is more lethargic this morning, stating he is tired, he is easily arousable however, answering question appropriately. CXR today showed marked enlargement of the cardiomediastinal silhouette, cardiology consult placed    PAST MEDICAL & SURGICAL HISTORY  Mild dementia    Chronic right-sided heart failure    Depression    Liver cirrhosis, alcoholic    Borderline diabetes mellitus    HTN (hypertension)    Afib    Presence of Watchman left atrial appendage closure device    AICD (automatic cardioverter/defibrillator) present    History of cholecystectomy    History of appendectomy    History of knee surgery    Deviated nasal septum        ALLERGIES:  No Known Allergies    MEDICATIONS:  STANDING MEDICATIONS  ALBUTerol    90 MICROgram(s) HFA Inhaler 2 Puff(s) Inhalation every 6 hours  aspirin 325 milliGRAM(s) Oral daily  atorvastatin 80 milliGRAM(s) Oral at bedtime  clopidogrel Tablet 75 milliGRAM(s) Oral daily  dexAMETHasone  Injectable 6 milliGRAM(s) IV Push daily  donepezil 10 milliGRAM(s) Oral at bedtime  enoxaparin Injectable 40 milliGRAM(s) SubCutaneous daily  famotidine    Tablet 20 milliGRAM(s) Oral two times a day  furosemide    Tablet 40 milliGRAM(s) Oral daily  guaiFENesin  milliGRAM(s) Oral every 12 hours  ipratropium 17 MICROgram(s) HFA Inhaler 1 Puff(s) Inhalation every 6 hours  metoprolol succinate ER 25 milliGRAM(s) Oral daily    PRN MEDICATIONS  acetaminophen   Tablet .. 650 milliGRAM(s) Oral every 6 hours PRN    VITALS:   T(F): 97  HR: 82  BP: 102/58  RR: 18  SpO2: 95%    LABS:                        10.6   5.41  )-----------( 116      ( 09 Dec 2020 06:48 )             36.6     12-09    139  |  108  |  31<H>  ----------------------------<  120<H>  4.5   |  23  |  1.2    Ca    8.0<L>      09 Dec 2020 06:48  Phos  4.1     12-09  Mg     1.8     12-09    TPro  x   /  Alb  x   /  TBili  0.7  /  DBili  x   /  AST  x   /  ALT  x   /  AlkPhos  x   12-09    PT/INR - ( 09 Dec 2020 06:48 )   PT: 17.50 sec;   INR: 1.52 ratio         PTT - ( 08 Dec 2020 00:58 )  PTT:38.6 sec          Culture - Fungal, Body Fluid (collected 08 Dec 2020 10:40)  Source: Pleural Fl Pleural Fluid  Preliminary Report (09 Dec 2020 11:19):    Testing in progress    Culture - Body Fluid with Gram Stain (collected 08 Dec 2020 10:40)  Source: Pleural Fl Pleural Fluid  Gram Stain (09 Dec 2020 02:45):    polymorphonuclear leukocytes seen    No organisms seen    by cytocentrifuge      CARDIAC MARKERS ( 08 Dec 2020 12:44 )  x     / 0.01 ng/mL / 82 U/L / x     / 5.1 ng/mL  CARDIAC MARKERS ( 08 Dec 2020 06:38 )  x     / 0.03 ng/mL / x     / x     / x      CARDIAC MARKERS ( 08 Dec 2020 00:58 )  x     / 0.03 ng/mL / x     / x     / x      CARDIAC MARKERS ( 07 Dec 2020 16:01 )  x     / 0.04 ng/mL / x     / x     / x          RADIOLOGY:  < from: Xray Chest 1 View- PORTABLE-Routine (Xray Chest 1 View- PORTABLE-Routine in AM.) (12.09.20 @ 06:25) >  IMPRESSION:    Marked enlargement of the cardiomediastinal silhouette.    Left greater than right bilateral opacities/effusions.    < end of copied text >      PHYSICAL EXAM:  GEN: No acute distress  PULM/CHEST: Clear to auscultation bilaterally, no rales, rhonchi or wheezes   CVS: Regular rate and rhythm, S1-S2, no murmurs  ABD: Soft, non-tender, non-distended, +BS  EXT: No edema  NEURO: AAOx3    Blanco Catheter:

## 2020-12-10 NOTE — PROGRESS NOTE ADULT - SUBJECTIVE AND OBJECTIVE BOX
LENGTH OF HOSPITAL STAY: 3d    CHIEF COMPLAINT:   Patient is a 86y old  Male who presents with a chief complaint of COVID19+ (10 Dec 2020 14:24)      HISTORY OF PRESENTING ILLNESS:    HPI:  85y/o M with PMH of CHFrEF & severe MR/pulm HTN, Afib s/p watchman 2019 not on AC, HTN, hx of chronic anemia presents to ED with 'blue lips' and SOB. Pt was known COVID+ for 1 week and had increased sxs over past few days but can't really give details. He also states that for 1mo he hasn't felt well, general sense of 'malaise' and notes that his lips have been extremely chapped, mildly swollen, and 'blue'. He saw his PCP who didn't know what it was and suggested coming to ED. Denied CP, palpitations, leg pains, nausea/vomiting/diarrhea. Has some mild abd pain in LLL crampy. Pt overall is poor historian for his sxs.    In ED: T96.9, HR 58, /62, RR18, SpO2 86% on RA > 92% on 3L NC.  CT a/p no acute pathology but noted small L renal lesion & b/l effusions with signs of RH dysfcn.   Labs showed leukopenia/lymphopenia, thrombocytopenia. Trop 0.04, EKG ventricularly paced.  CXR diffuse infiltrates. (07 Dec 2020 23:15)    PAST MEDICAL & SURGICAL HISTORY  PAST MEDICAL & SURGICAL HISTORY:  Mild dementia    Chronic right-sided heart failure    Depression    Liver cirrhosis, alcoholic    Borderline diabetes mellitus    HTN (hypertension)    Afib    Presence of Watchman left atrial appendage closure device    AICD (automatic cardioverter/defibrillator) present    History of cholecystectomy    History of appendectomy    History of knee surgery    Deviated nasal septum      SOCIAL HISTORY:    ALLERGIES:  No Known Allergies    MEDICATIONS:  STANDING MEDICATIONS  ALBUTerol    90 MICROgram(s) HFA Inhaler 2 Puff(s) Inhalation every 6 hours  aspirin 325 milliGRAM(s) Oral daily  atorvastatin 80 milliGRAM(s) Oral at bedtime  cefepime   IVPB 1000 milliGRAM(s) IV Intermittent every 12 hours  dexAMETHasone  Injectable 6 milliGRAM(s) IV Push daily  donepezil 10 milliGRAM(s) Oral at bedtime  enoxaparin Injectable 40 milliGRAM(s) SubCutaneous daily  famotidine    Tablet 20 milliGRAM(s) Oral two times a day  furosemide    Tablet 40 milliGRAM(s) Oral daily  guaiFENesin  milliGRAM(s) Oral every 12 hours  ipratropium 17 MICROgram(s) HFA Inhaler 1 Puff(s) Inhalation every 6 hours  metoprolol succinate ER 25 milliGRAM(s) Oral daily    PRN MEDICATIONS  acetaminophen   Tablet .. 650 milliGRAM(s) Oral every 6 hours PRN  haloperidol     Tablet 0.5 milliGRAM(s) Oral once PRN    VITALS:   T(F): 97.2  HR: 60  BP: 111/66  RR: 20  SpO2: 94%    LABS:                        11.0   6.73  )-----------( 122      ( 10 Dec 2020 07:16 )             37.0     12-10    140  |  107  |  39<H>  ----------------------------<  97  4.4   |  24  |  1.3    Ca    8.3<L>      10 Dec 2020 07:16  Phos  4.1     12-09  Mg     1.9     12-10    TPro  x   /  Alb  x   /  TBili  0.7  /  DBili  x   /  AST  x   /  ALT  x   /  AlkPhos  x   12-09    PT/INR - ( 09 Dec 2020 06:48 )   PT: 17.50 sec;   INR: 1.52 ratio                   Culture - Fungal, Body Fluid (collected 08 Dec 2020 10:40)  Source: Pleural Fl Pleural Fluid  Preliminary Report (09 Dec 2020 11:19):    Testing in progress    Culture - Body Fluid with Gram Stain (collected 08 Dec 2020 10:40)  Source: Pleural Fl Pleural Fluid  Gram Stain (09 Dec 2020 02:45):    polymorphonuclear leukocytes seen    No organisms seen    by cytocentrifuge  Preliminary Report (09 Dec 2020 17:36):    No growth          RADIOLOGY:  < from: Xray Chest 1 View- PORTABLE-Routine (Xray Chest 1 View- PORTABLE-Routine in AM.) (12.09.20 @ 06:25) >  IMPRESSION:    Marked enlargement of the cardiomediastinal silhouette.    Left greater than right bilateral opacities/effusions.    < end of copied text >    PHYSICAL EXAM:  GEN: No acute distress  HEENT: AT, NC, NORA  LUNGS: Clear to auscultation bilaterally   HEART: S1/S2 present. RRR.   ABD: Soft, non-tender, non-distended. Bowel sounds present  EXT: edema, cyanosis, clubbing absent  NEURO: AAOX3

## 2020-12-10 NOTE — PROGRESS NOTE ADULT - SUBJECTIVE AND OBJECTIVE BOX
pt seen and examined  pt is feeling better, more alert, no pain,   Vital Signs Last 24 Hrs  T(C): 36.2 (10 Dec 2020 05:34), Max: 36.2 (10 Dec 2020 05:34)  T(F): 97.2 (10 Dec 2020 05:34), Max: 97.2 (10 Dec 2020 05:34)  HR: 60 (10 Dec 2020 05:34) (60 - 61)  BP: 111/66 (10 Dec 2020 05:34) (100/68 - 111/66)  BP(mean): --  RR: 20 (10 Dec 2020 05:34) (18 - 22)  SpO2: 94% (10 Dec 2020 09:01) (90% - 94%)    Physical exam:   constitutional NAD, AAOX3, Respiratory  lungs CTA, CVS heart RRR, GI: abdomen Soft NT, ND, BS+, skin: intact  neuro exam non focal.                           11.0   6.73  )-----------( 122      ( 10 Dec 2020 07:16 )             37.0   12-10    140  |  107  |  39<H>  ----------------------------<  97  4.4   |  24  |  1.3    Ca    8.3<L>      10 Dec 2020 07:16  Phos  4.1     12-09  Mg     1.9     12-10    TPro  x   /  Alb  x   /  TBili  0.7  /  DBili  x   /  AST  x   /  ALT  x   /  AlkPhos  x   12-09    < from: TTE Echo Complete w/o Contrast w/ Doppler (12.09.20 @ 13:51) >   1. Left ventricular ejection fraction, by visual estimation, is 50 to 55%.   2. Basal inferoseptal segment and basal inferior segment are abnormal as described above.   3. Severely enlarged right atrium.   4. Moderately increased LV wall thickness.   5. Severely enlarged right ventricle.   6. Severely reduced RV systolic function.   7. Moderately enlarged left atrium.   8. Moderate pericardial effusion.   9. Mild to moderate mitral valve regurgitation.  10. Severe tricuspid regurgitation.  11. Sclerotic aortic valve with decreased opening.  12. Estimated pulmonary artery systolic pressure is 54.0 mmHg assuming a right atrial pressure of 15 mmHg, which is consistent with moderate pulmonary hypertension.    < end of copied text >    < from: Xray Chest 1 View- PORTABLE-Routine (Xray Chest 1 View- PORTABLE-Routine in AM.) (12.09.20 @ 06:25) >  Marked enlargement of the cardiomediastinal silhouette.    Left greater than right bilateral opacities/effusions.    < end of copied text >    a/p  # acute resp hypoxic rep failure, covid pna and ? bacterial pna, also pericardial effusion, pleural effusion, pneumothorax, TR, pulm hypertension.   follow up cardio, dw cardio: no sign of temponade.   follow up pulm and ID   ID recommends starting abx, doubt bacterial infection,   poor prognosis   full code    PAST MEDICAL & SURGICAL HISTORY: cont meds  Mild dementia    Chronic right-sided heart failure    Depression    Liver cirrhosis, alcoholic    Borderline diabetes mellitus    HTN (hypertension)    Afib    Presence of Watchman left atrial appendage closure device    AICD (automatic cardioverter/defibrillator) present    History of cholecystectomy    History of appendectomy    History of knee surgery    Deviated nasal septum

## 2020-12-10 NOTE — PROGRESS NOTE ADULT - ASSESSMENT
85y/o M with CHFrEF, severe MR/pulm HTN, Afib s/p watchman, HTN, hx of anemia presents with inc SOB with COVID 19 PNA.    #Acute hypoxic respiratory failure   #COVID19+ pneumonia  #pleural/pericardial  effusion  - SXS start date: ~11/30   - hypoxemic to 86% on RA, on 6L NC  - lymphopenia/thrombocytopenia likely due to virus  - CXR b/l diffuse infiltrates, CT with right pleural effusion. Ddimers 807  - tylenol PRN, inhalers q6h  - start decadron 6 q24h for 10 days ( Day # 4), no RDV  - ID consult, recs appreciated  - right side pleural effusion, s/p thoracentesis, 1.5L removed. post procedure CXR showed trace peunothorax x2, now resolved, Fluid is transudative according to light's criteria    #Tropeniemia, resolved  - Trop 0.04 on admission, repeat 0.03, 0.01 this am, EKG no acute ST changes, no CP or sxs  - likely demand ischemia. monitor for now    #CHFrEF / Afib s/p watchman  - CT shows b/l effusions, R heart reflux, small pericardial effusion  - c/w metoprolol 25, ASA  - d/c plavix  - CXR today showed large cardiac silhoulette, likely a pericardial effusion,   - Repeat 2D echo showed normal  -cardiology consult in place, recs apprecaited    #HTN  - hold losartan for possible angioedema/swollen lips  #HLD- c/w atorvastatin 80  #Dementia- c/w donepezil     #Incidental L renal lesion- outpt renal US follow-up    #MISC  - DVT ppx: lovenox  - GI ppx: pepcid  - Diet: DASH  - Activity: as tolerated  - Dispo: acute  - FULL CODE

## 2020-12-11 NOTE — PROGRESS NOTE ADULT - ASSESSMENT
· Assessment	  87y/o M with PMH of CHFrEF & severe MR/pulm HTN, Afib s/p watchman 2019 not on AC, HTN, hx of chronic anemia presents to ED with 'blue lips' and SOB. Pt was known COVID+ for 1 week and had increased sxs over past few days but can't really give details. He also states that for 1mo he hasn't felt well, general sense of 'malaise' and notes that his lips have been extremely chapped, mildly swollen, and 'blue'. He saw his PCP who didn't know what it was and suggested coming to ED. Denied CP, palpitations, leg pains, nausea/vomiting/diarrhea. Has some mild abd pain in LLL crampy. Pt overall is poor historian for his sxs.    IMPRESSION;  COVID 19 with severe illness. SpO2 < 94% on RA and need for supplemental O2. NRB  Pt is in the late inflammatory response phase ot the illness based on the onset of symptoms. ( possibly one month )  CT with right pleural effusion  s/p rigth thoracocentesis 12/8 with 1500 ccs fluid. Cultures NG  Possible pericardial effusion  Ddimers 807  Ferritin 56  procal 0.08  CRP 0.76  No elevation of the inflammatory markers. No cytokine release  SOB secondary to right pleural effusion /pneumo/pericardial effusion  BCx 12/8 NG  Pleural fluid cultures NG    RECOMMENDATIONS;  Target SpO2 92 % to 96 %  Cefepime 1 gm iv q12h  Dexamethasone 6 mg iv q24h for 10 days ( or until discharge ) or equivalent glucocorticoid dose may be substituted. Equivalent total dosis of alternative steroids are Methylprednisolone 32 mg and prednisone 40 mg q24h.  Monitor for side effects: hyperglycemia, neurological ( agitation/confusion), adrenal suppression, bacterial and fungal infections  Anticoagulation as per team

## 2020-12-11 NOTE — PROGRESS NOTE ADULT - SUBJECTIVE AND OBJECTIVE BOX
WILLIAM ARAIZA  86y, Male    All available historical data reviewed    OVERNIGHT EVENTS:  no fevers  NRB    ROS:  General: Denies rigors, nightsweats  HEENT: Denies headache, rhinorrhea, sore throat, eye pain  CV: Denies CP, palpitations  PULM: Denies wheezing, hemoptysis  GI: Denies hematemesis, hematochezia, melena  : Denies discharge, hematuria  MSK: Denies arthralgias, myalgias  SKIN: Denies rash, lesions  NEURO: Denies paresthesias, weakness  PSYCH: Denies depression, anxiety    VITALS:  T(F): 96.4, Max: 96.4 (12-11-20 @ 05:13)  HR: 62  BP: 132/84  RR: 94Vital Signs Last 24 Hrs  T(C): 35.8 (11 Dec 2020 05:13), Max: 35.8 (11 Dec 2020 05:13)  T(F): 96.4 (11 Dec 2020 05:13), Max: 96.4 (11 Dec 2020 05:13)  HR: 62 (11 Dec 2020 12:20) (60 - 110)  BP: 132/84 (11 Dec 2020 12:20) (108/68 - 139/80)  BP(mean): --  RR: 94 (11 Dec 2020 10:23) (20 - 94)  SpO2: 95% (11 Dec 2020 08:10) (93% - 95%)    TESTS & MEASUREMENTS:                        10.4   8.65  )-----------( 103      ( 11 Dec 2020 06:33 )             34.1     12-11    144  |  108  |  44<H>  ----------------------------<  132<H>  3.8   |  23  |  1.3    Ca    8.8      11 Dec 2020 06:33  Mg     2.0     12-11          Culture - Blood (collected 12-09-20 @ 17:20)  Source: .Blood Blood-Peripheral  Preliminary Report (12-10-20 @ 23:02):    No growth to date.    Culture - Fungal, Body Fluid (collected 12-08-20 @ 10:40)  Source: Pleural Fl Pleural Fluid  Preliminary Report (12-09-20 @ 11:19):    Testing in progress    Culture - Body Fluid with Gram Stain (collected 12-08-20 @ 10:40)  Source: Pleural Fl Pleural Fluid  Gram Stain (12-09-20 @ 02:45):    polymorphonuclear leukocytes seen    No organisms seen    by cytocentrifuge  Preliminary Report (12-09-20 @ 17:36):    No growth            RADIOLOGY & ADDITIONAL TESTS:  Personal review of radiological diagnostics performed  Echo and EKG results noted when applicable.     MEDICATIONS:  acetaminophen   Tablet .. 650 milliGRAM(s) Oral every 6 hours PRN  ALBUTerol    90 MICROgram(s) HFA Inhaler 2 Puff(s) Inhalation every 6 hours  aspirin 325 milliGRAM(s) Oral daily  atorvastatin 80 milliGRAM(s) Oral at bedtime  cefepime   IVPB 1000 milliGRAM(s) IV Intermittent every 12 hours  dexAMETHasone  Injectable 6 milliGRAM(s) IV Push daily  enoxaparin Injectable 40 milliGRAM(s) SubCutaneous daily  famotidine    Tablet 20 milliGRAM(s) Oral two times a day  furosemide   Injectable 40 milliGRAM(s) IV Push daily  guaiFENesin  milliGRAM(s) Oral every 12 hours  ipratropium 17 MICROgram(s) HFA Inhaler 1 Puff(s) Inhalation every 6 hours  LORazepam   Injectable 0.5 milliGRAM(s) IntraMuscular once  metoprolol succinate ER 25 milliGRAM(s) Oral daily      ANTIBIOTICS:  cefepime   IVPB 1000 milliGRAM(s) IV Intermittent every 12 hours

## 2020-12-11 NOTE — PROGRESS NOTE ADULT - SUBJECTIVE AND OBJECTIVE BOX
Patient seen and examined independently. Agree with resident note/ history / physical exam and plan of care with following exceptions/additions/updates. Case discussed with patient/pt decision maker, house-staff and nursing.     pt is restless, confused, pulls his mask off frequently  Vital Signs Last 24 Hrs  T(C): 35.8 (11 Dec 2020 05:13), Max: 35.8 (11 Dec 2020 05:13)  T(F): 96.4 (11 Dec 2020 05:13), Max: 96.4 (11 Dec 2020 05:13)  HR: 73 (11 Dec 2020 10:23) (60 - 110)  BP: 136/67 (11 Dec 2020 10:23) (108/68 - 139/80)  RR: 94 (11 Dec 2020 10:23) (20 - 94)  SpO2: 95% (11 Dec 2020 08:10) (93% - 95%)  Physical exam:   constitutional NAD, AA disoriented, Respiratory  lungs bilat crakles bilat, decreased breath sounds bilat, , CVS heart RRR, GI: abdomen Soft NT, ND, BS+,   neuro exam non focal.                        10.4   8.65  )-----------( 103      ( 11 Dec 2020 06:33 )             34.1   12-11    144  |  108  |  44<H>  ----------------------------<  132<H>  3.8   |  23  |  1.3    Ca    8.8      11 Dec 2020 06:33  Mg     2.0     12-11    < from: Xray Chest 1 View- PORTABLE-Routine (Xray Chest 1 View- PORTABLE-Routine .) (12.10.20 @ 11:57) >    Bilateral opacities and bilateral pleural effusions no change. No air leak.    < end of copied text >    < from: 12 Lead ECG (12.07.20 @ 16:22) >    QTC Calculation(Bazett) 616 ms    < end of copied text >      a/p  #acute hypoxic resp failure, multifactorial, chf HFpEF, pulm hypertension, pleural effusion, cont lasix but change to iv, monitor labs  # ams, partly delirium due to hospitalization, repeat ecg may use small dose of benzo, avoid haldol in view of prolonged QT, EP to see if repeat ecg still shows prolonged qt  # PAST MEDICAL & SURGICAL HISTORY:  Mild dementia  Chronic right-sided heart failure  Depression  Liver cirrhosis, alcoholic  Borderline diabetes mellitus  HTN (hypertension)  Afib  Presence of Watchman left atrial appendage closure device  AICD (automatic cardioverter/defibrillator) present    poor prognosis

## 2020-12-11 NOTE — PROGRESS NOTE ADULT - ASSESSMENT
85y/o M with CHFrEF, severe MR/pulm HTN, Afib s/p watchman, HTN, hx of anemia presents with inc SOB with COVID 19 PNA.    #Acute hypoxic respiratory failure   #COVID19+ pneumonia  #pleural/pericardial  effusion  - SXS start date: ~11/30   - hypoxemic to 86% on RA, on venti mask 15L ( hypoxia appears to be sec to CHF exacerbation rather than COVID)  - lymphopenia/thrombocytopenia likely due to virus  - CXR b/l diffuse infiltrates, CT with right pleural effusion. Ddimers 807  - tylenol PRN, inhalers q6h  - CRP 0.69 ->  0.61  , ferritin  81 ,   procal 0.06  - start decadron 6 q24h for 10 days ( Day # 5), no RDV  - ID consult, recs appreciated, started on Cefepime 1g Q12 hrs today    # Tropeniemia, resolved  - Trop 0.04 on admission, repeat 0.03, 0.01 this am, EKG no acute ST changes, no CP or sxs  - likely demand ischemia. monitor for now    # QT prolongation:  - QTc at admission was 616 ms  - repeat EKG showed QTc of 590 ms today  - will DC aricept as it may prolong QTc    #CHFpEF / Afib s/p watchman  - CT shows b/l effusions, R heart reflux, small pericardial effusion  - c/w metoprolol 25, ASA  - d/c plavix  - CXR today showed large cardiac silhoulette, likely a pericardial effusion,   - Repeat 2D echo showed normal LVEF, decreased RVEF ,  moderate pericardial effusion,  moderate pulm hypertension. spoke to CT surgery on phone, they wont do any intervention on the patient unless its tamponade or pt is on pressors  -cardiology consult in place, recs apprecaited  - right side pleural effusion, s/p thoracentesis, 1.5L removed. post procedure CXR showed trace pneumothorax x2, now resolved, Fluid is transudative according to light's criteria  - Lasix 40 PO changed to IV 40 mg push daily     #HTN  - hold losartan for possible angioedema/swollen lips  #HLD- c/w atorvastatin 80  #Dementia- c/w donepezil     #Incidental L renal lesion- outpt renal US follow-up    #MISC  - DVT ppx: lovenox  - GI ppx: pepcid  - Diet: DASH  - Activity: as tolerated  - Dispo: acute  - FULL CODE     85y/o M with CHFrEF, severe MR/pulm HTN, Afib s/p watchman, HTN, hx of anemia presents with inc SOB with COVID 19 PNA.    #Acute hypoxic respiratory failure   #COVID19+ pneumonia  #pleural/pericardial  effusion  - SXS start date: ~11/30   - hypoxemic to 86% on RA, on venti mask 15L ( hypoxia appears to be sec to CHF exacerbation rather than COVID)  - lymphopenia/thrombocytopenia likely due to virus  - CXR b/l diffuse infiltrates, CT with right pleural effusion. Ddimers 807  - tylenol PRN, inhalers q6h  - CRP 0.69 ->  0.61  , ferritin  81 ,   procal 0.06  - start decadron 6 q24h for 10 days ( Day # 5), no RDV  - ID consult, recs appreciated, started on Cefepime 1g Q12 hrs today    # Tropeniemia, resolved  - Trop 0.04 on admission, repeat 0.03, 0.01 this am, EKG no acute ST changes, no CP or sxs  - likely demand ischemia. monitor for now    # QT prolongation:  - QTc at admission was 616 ms  - repeat EKG showed QTc of 590 ms today  - will DC aricept as it may prolong QTc    #CHFpEF / Afib s/p watchman  - CT shows b/l effusions, R heart reflux, small pericardial effusion  - c/w metoprolol 25, ASA  - d/c plavix  - CXR today showed large cardiac silhoulette, likely a pericardial effusion,   - Repeat 2D echo showed normal LVEF, decreased RVEF ,  moderate pericardial effusion,  moderate pulm hypertension. spoke to CT surgery on phone, they wont do any intervention on the patient unless its tamponade or pt is on pressors  -cardiology consult in place, recs apprecaited  - right side pleural effusion, s/p thoracentesis, 1.5L removed. post procedure CXR showed trace pneumothorax x2, now resolved, Fluid is transudative according to light's criteria  - Lasix 40 PO changed to IV 40 mg push daily     #HTN  - hold losartan for possible angioedema/swollen lips  #HLD- c/w atorvastatin 80  #Dementia- c/w donepezil     #Incidental L renal lesion- outpt renal US follow-up    #MISC  - DVT ppx: lovenox  - GI ppx: pepcid  - Diet: DASH  - Activity: as tolerated  - Dispo: acute  - FULL CODE    Son Will updated ( [phone]) on his father's condition. Told hime about grave prognosis. Introduced MOLST. patient does not have any advance directives. Family jhas not made any decisions yet. Patient remains full code.

## 2020-12-11 NOTE — PROGRESS NOTE ADULT - SUBJECTIVE AND OBJECTIVE BOX
LENGTH OF HOSPITAL STAY: 4d    CHIEF COMPLAINT:   Patient is a 86y old  Male who presents with a chief complaint of COVID19+ (11 Dec 2020 14:23)      Subjective :     Patient seen this morning, patient has been very disoriented since yesterday afternoon. His mental status has been waxing and waning. He becomes agitated at times and tries to get out of time while he is stable at other times.    PAST MEDICAL & SURGICAL HISTORY  PAST MEDICAL & SURGICAL HISTORY:  Mild dementia    Chronic right-sided heart failure    Depression    Liver cirrhosis, alcoholic    Borderline diabetes mellitus    HTN (hypertension)    Afib    Presence of Watchman left atrial appendage closure device    AICD (automatic cardioverter/defibrillator) present    History of cholecystectomy    History of appendectomy    History of knee surgery    Deviated nasal septum      SOCIAL HISTORY:    ALLERGIES:  No Known Allergies    MEDICATIONS:  STANDING MEDICATIONS  ALBUTerol    90 MICROgram(s) HFA Inhaler 2 Puff(s) Inhalation every 6 hours  aspirin 325 milliGRAM(s) Oral daily  atorvastatin 80 milliGRAM(s) Oral at bedtime  cefepime   IVPB 1000 milliGRAM(s) IV Intermittent every 12 hours  dexAMETHasone  Injectable 6 milliGRAM(s) IV Push daily  enoxaparin Injectable 40 milliGRAM(s) SubCutaneous daily  famotidine    Tablet 20 milliGRAM(s) Oral two times a day  furosemide   Injectable 40 milliGRAM(s) IV Push daily  guaiFENesin  milliGRAM(s) Oral every 12 hours  ipratropium 17 MICROgram(s) HFA Inhaler 1 Puff(s) Inhalation every 6 hours  LORazepam   Injectable 0.5 milliGRAM(s) IntraMuscular once  metoprolol succinate ER 25 milliGRAM(s) Oral daily    PRN MEDICATIONS  acetaminophen   Tablet .. 650 milliGRAM(s) Oral every 6 hours PRN    VITALS:   T(F): 96  HR: 60  BP: 138/69  RR: 20  SpO2: 95%    LABS:                        10.4   8.65  )-----------( 103      ( 11 Dec 2020 06:33 )             34.1     12-11    144  |  108  |  44<H>  ----------------------------<  132<H>  3.8   |  23  |  1.3    Ca    8.8      11 Dec 2020 06:33  Mg     2.0     12-11                Culture - Blood (collected 09 Dec 2020 17:20)  Source: .Blood Blood-Peripheral  Preliminary Report (10 Dec 2020 23:02):    No growth to date.          RADIOLOGY:  < from: Xray Chest 1 View- PORTABLE-Routine (Xray Chest 1 View- PORTABLE-Routine .) (12.10.20 @ 11:57) >  Impression:    Bilateral opacities and bilateral pleural effusions no change. No air leak.      < end of copied text >    PHYSICAL EXAM:  GEN: No acute distress  HEENT: AT, NC, NORA  LUNGS: decreased breath sounds at bases  HEART: S1/S2 present. RRR.   ABD: Soft, non-tender, non-distended. Bowel sounds present  EXT: edema, cyanosis, clubbing absent  NEURO: AAOX3

## 2020-12-12 NOTE — PROGRESS NOTE ADULT - ASSESSMENT
· Assessment	  85y/o M with PMH of CHFrEF & severe MR/pulm HTN, Afib s/p watchman 2019 not on AC, HTN, hx of chronic anemia presents to ED with 'blue lips' and SOB. Pt was known COVID+ for 1 week and had increased sxs over past few days but can't really give details. He also states that for 1mo he hasn't felt well, general sense of 'malaise' and notes that his lips have been extremely chapped, mildly swollen, and 'blue'. He saw his PCP who didn't know what it was and suggested coming to ED. Denied CP, palpitations, leg pains, nausea/vomiting/diarrhea. Has some mild abd pain in LLL crampy. Pt overall is poor historian for his sxs.    IMPRESSION;  COVID 19 with severe illness. SpO2 < 94% on RA and need for supplemental O2. NRB  Pt is in the late inflammatory response phase ot the illness based on the onset of symptoms. ( possibly one month )  CT with right pleural effusion  s/p rigth thoracocentesis 12/8 with 1500 ccs fluid. Cultures NG  Possible pericardial effusion  Ddimers 807>456  Ferritin 56>81  procal 0.08  CRP 0.76  No elevation of the inflammatory markers. No cytokine release  SOB secondary to right pleural effusion /pneumo/pericardial effusion  BCx 12/8 NG    RECOMMENDATIONS;  Target SpO2 92 % to 96 %  Cefepime 1 gm iv q12h  Dexamethasone 6 mg iv q24h for 10 days ( or until discharge ) or equivalent glucocorticoid dose may be substituted. Equivalent total dosis of alternative steroids are Methylprednisolone 32 mg and prednisone 40 mg q24h.  Monitor for side effects: hyperglycemia, neurological ( agitation/confusion), adrenal suppression, bacterial and fungal infections  Anticoagulation as per team

## 2020-12-12 NOTE — CHART NOTE - NSCHARTNOTEFT_GEN_A_CORE
ICU Transfer Note    Transfer from: ( x ) Telemetry F9    Transfer to:  ( x ) MICU       Signout given to:     ----------------------------------------------------------------------------------------------------------  HPI:  85y/o M with PMH of CHFrEF & severe MR/pulm HTN, Afib s/p watchman 2019 not on AC, HTN, hx of chronic anemia presents to ED with 'blue lips' and SOB. Pt was known COVID+ for 1 week and had increased sxs over past few days but can't really give details. He also states that for 1mo he hasn't felt well, general sense of 'malaise' and notes that his lips have been extremely chapped, mildly swollen, and 'blue'. He saw his PCP who didn't know what it was and suggested coming to ED. Denied CP, palpitations, leg pains, nausea/vomiting/diarrhea. Has some mild abd pain in LLL crampy. Pt overall is poor historian for his sxs.    In ED: T96.9, HR 58, /62, RR18, SpO2 86% on RA > 92% on 3L NC.  CT a/p no acute pathology but noted small L renal lesion & b/l effusions with signs of RH dysfcn.   Labs showed leukopenia/lymphopenia, thrombocytopenia. Trop 0.04, EKG ventricularly paced.  CXR diffuse infiltrates.    Patient assessed throughout day. Tachypneic in AM but more alert, was placed on BiPAP from NRB. Mentation and tachypnea worsened (~35 breaths/min), w/ paradoxical breathing. Called family and decision made to intubate patient.      --------------------------------------------------------------------------------------------------------  PMH/PSH:  PAST MEDICAL & SURGICAL HISTORY:  Mild dementia    Chronic right-sided heart failure    Depression    Liver cirrhosis, alcoholic    Borderline diabetes mellitus    HTN (hypertension)    Afib    Presence of Watchman left atrial appendage closure device    AICD (automatic cardioverter/defibrillator) present    History of cholecystectomy    History of appendectomy    History of knee surgery    Deviated nasal septum        -------------------------------------------------------------------------------------------------------  PHYSICAL EXAM:  General: Intubated and sedated  Respiratory: CTAB  Cardiac: RRR  Abdomen: soft, non-tender, non-distended  Extremities: warm and well-perfused    Vital Signs Last 24 Hrs  T(C): 36.3 (12 Dec 2020 10:00), Max: 36.3 (12 Dec 2020 08:00)  T(F): 97.4 (12 Dec 2020 10:00), Max: 97.4 (12 Dec 2020 08:00)  HR: 84 (12 Dec 2020 13:20) (60 - 84)  BP: 132/70 (12 Dec 2020 10:00) (130/82 - 151/77)  BP(mean): --  RR: 22 (12 Dec 2020 10:00) (20 - 22)  SpO2: 100% (12 Dec 2020 13:20) (93% - 100%)    I&O's Summary      --------------------------------------------------------------------------------------------------------  LABS:                               10.4   9.58  )-----------( 100      ( 12 Dec 2020 05:49 )             34.0       12-12    144  |  110  |  51<H>  ----------------------------<  118<H>  4.7   |  20  |  1.3    Ca    8.4<L>      12 Dec 2020 05:49  Mg     2.0     12-12                CULTURE RESULTS:                12-09-20 @ 17:20  Specimen Source: --  Method Type: --  Gram Stain - RRL: --  Gram Stain - Wound: --  Bacteria: --  Culture Results:   No growth to date.      Specimen Source:   Method Type:   Gram Stain:   Culture Results: Culture Results:   No growth to date. (12-09-20 @ 17:20)  Culture Results:   No growth (12-08-20 @ 10:40)  Culture Results:   Testing in progress (12-08-20 @ 10:40)    Bacteria:       -------------------------------------------------------------------------------------------------  RADIOLOGY:      ---------------------------------------------------------------------------------------------------  ASSESSMENT & PLAN:     85y/o M with CHFrEF, severe MR/pulm HTN, Afib s/p watchman, HTN, hx of anemia presents with inc SOB with COVID 19 PNA.    #Acute hypoxic respiratory failure   #COVID19+ pneumonia  #pleural/pericardial  effusion  - SXS start date: ~11/30   -COVID-19 PCR: Detected (12-07)  -SpO2: 97% (12-12 @ 08:45) (93% - 97%)  -Procalcitonin: 0.06 (12-09)  -CRP: 0.61 (12-10)  -D-dimer: 456 (12-10)  -LDH: 298 (12-10)  -Ferritin: 81 (12-10)  -Troponin: 0.01 (12-08)  Plan  - hypoxemic to 86% on RA, on venti mask 15L in AM >> Tachypneic in AM but more alert, was placed on BiPAP from NRB. Mentation and tachypnea (upto 35/min) worsened throughout day, w/ paradoxical breathing. Called family and decision made to intubate patient.  - F/u ABG in 1 hr  - LE Duplex  - Repeat inflammatory markers.  - Decadron 6 q24h for 10 days, no RDV  - ID consult, recs appreciated, started on Cefepime 1g Q12 hrs    # Tropeniemia, resolved  - Trop 0.04 on admission, repeat 0.03, 0.01 this am, EKG no acute ST changes, no CP or sxs  - likely demand ischemia. monitor for now    # QT prolongation:  - QTc at admission was 616 ms  - repeat EKG showed QTc of 590 ms today  - will DC aricept as it may prolong QTc    #CHFpEF / Afib s/p watchman  - CT shows b/l effusions, R heart reflux, small pericardial effusion  - c/w metoprolol 25, ASA  - d/c plavix   - Repeat 2D echo showed normal LVEF, decreased RVEF ,  moderate pericardial effusion,  moderate pulm hypertension. spoke to CT surgery on phone, they wont do any intervention on the patient unless its tamponade or pt is on pressors  -cardiology consult in place, recs apprecaited  - right side pleural effusion, s/p thoracentesis, 1.5L removed. post procedure CXR showed trace pneumothorax x2, now resolved, Fluid is transudative according to light's criteria  - Lasix 40 PO changed to IV 40 mg push daily     #HTN  - hold losartan for possible angioedema/swollen lips    #HLD- c/w atorvastatin 80  #Dementia- c/w donepezil     #Incidental L renal lesion- outpt renal US follow-up    DVT ppx: lovenox  GI ppx: pepcid  Diet: DASH  Activity: as tolerated  Dispo: acute  FULL CODE    Son Will updated ( [phone]) on his father's condition.      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 Pt was known COVID+ for 1 week and had increased sxs over past few days but can't really give details. He also states that for 1mo he hasn't felt well, general sense of 'malaise' and notes that his lips have been extremely chapped, mildly swollen, and 'blue'. He saw his PCP who didn't know what it was and suggested coming to ED. Denied CP, palpitations, leg pains, nausea/vomiting/diarrhea. Has some mild abd pain in LLL crampy. Pt overall is poor historian for his sxs.    In ED: T96.9, HR 58, /62, RR18, SpO2 86% on RA > 92% on 3L NC.  CT a/p no acute pathology but noted small L renal lesion & b/l effusions with signs of RH dysfcn.   Labs showed leukopenia/lymphopenia, thrombocytopenia. Trop 0.04, EKG ventricularly paced.  CXR diffuse infiltrates.    Patient assessed throughout day. Tachypneic in AM but more alert, was placed on BiPAP from NRB. Mentation and tachypnea worsened (~35 breaths/min), w/ paradoxical breathing. Called family and decision made to intubate patient.      --------------------------------------------------------------------------------------------------------  PMH/PSH:  PAST MEDICAL & SURGICAL HISTORY:  Mild dementia    Chronic right-sided heart failure    Depression    Liver cirrhosis, alcoholic    Borderline diabetes mellitus    HTN (hypertension)    Afib    Presence of Watchman left atrial appendage closure device    AICD (automatic cardioverter/defibrillator) present    History of cholecystectomy    History of appendectomy    History of knee surgery    Deviated nasal septum        -------------------------------------------------------------------------------------------------------  PHYSICAL EXAM:  General: Intubated and sedated  Respiratory: CTAB  Cardiac: RRR  Abdomen: soft, non-tender, non-distended  Extremities: warm and well-perfused    Vital Signs Last 24 Hrs  T(C): 36.3 (12 Dec 2020 10:00), Max: 36.3 (12 Dec 2020 08:00)  T(F): 97.4 (12 Dec 2020 10:00), Max: 97.4 (12 Dec 2020 08:00)  HR: 84 (12 Dec 2020 13:20) (60 - 84)  BP: 132/70 (12 Dec 2020 10:00) (130/82 - 151/77)  BP(mean): --  RR: 22 (12 Dec 2020 10:00) (20 - 22)  SpO2: 100% (12 Dec 2020 13:20) (93% - 100%)    I&O's Summary      --------------------------------------------------------------------------------------------------------  LABS:                               10.4   9.58  )-----------( 100      ( 12 Dec 2020 05:49 )             34.0       12-12    144  |  110  |  51<H>  ----------------------------<  118<H>  4.7   |  20  |  1.3    Ca    8.4<L>      12 Dec 2020 05:49  Mg     2.0     12-12                CULTURE RESULTS:                12-09-20 @ 17:20  Specimen Source: --  Method Type: --  Gram Stain - RRL: --  Gram Stain - Wound: --  Bacteria: --  Culture Results:   No growth to date.      Specimen Source:   Method Type:   Gram Stain:   Culture Results: Culture Results:   No growth to date. (12-09-20 @ 17:20)  Culture Results:   No growth (12-08-20 @ 10:40)  Culture Results:   Testing in progress (12-08-20 @ 10:40)    Bacteria:       -------------------------------------------------------------------------------------------------  RADIOLOGY:      ---------------------------------------------------------------------------------------------------  ASSESSMENT & PLAN:     87y/o M with CHFrEF, severe MR/pulm HTN, Afib s/p watchman, HTN, hx of anemia presents with inc SOB with COVID 19 PNA.    #Acute hypoxic respiratory failure   #COVID19+ pneumonia  #pleural/pericardial  effusion  - SXS start date: ~11/30   -COVID-19 PCR: Detected (12-07)  -SpO2: 97% (12-12 @ 08:45) (93% - 97%)  -Procalcitonin: 0.06 (12-09)  -CRP: 0.61 (12-10)  -D-dimer: 456 (12-10)  -LDH: 298 (12-10)  -Ferritin: 81 (12-10)  -Troponin: 0.01 (12-08)  Plan  - hypoxemic to 86% on RA, on venti mask 15L in AM >> Tachypneic in AM but more alert, was placed on BiPAP from NRB. Mentation and tachypnea (upto 35/min) worsened throughout day, w/ paradoxical breathing. Called family and decision made to intubate patient.  - F/u ABG in 1 hr  - LE Duplex  - Repeat inflammatory markers.  - Decadron 6 q24h for 10 days, no RDV  - ID consult, recs appreciated, started on Cefepime 1g Q12 hrs    # Tropeniemia, resolved  - Trop 0.04 on admission, repeat 0.03, 0.01 this am, EKG no acute ST changes, no CP or sxs  - likely demand ischemia. monitor for now    # QT prolongation:  - QTc at admission was 616 ms  - repeat EKG showed QTc of 590 ms today  - will DC aricept as it may prolong QTc    #CHFpEF / Afib s/p watchman  - CT shows b/l effusions, R heart reflux, small pericardial effusion  - c/w metoprolol 25, ASA  - d/c plavix   - Repeat 2D echo showed normal LVEF, decreased RVEF ,  moderate pericardial effusion,  moderate pulm hypertension. spoke to CT surgery on phone, they wont do any intervention on the patient unless its tamponade or pt is on pressors  -cardiology consult in place, recs apprecaited  - right side pleural effusion, s/p thoracentesis, 1.5L removed. post procedure CXR showed trace pneumothorax x2, now resolved, Fluid is transudative according to light's criteria  - Lasix 40 PO changed to IV 40 mg push daily     #HTN  - hold losartan for possible angioedema/swollen lips    #HLD- c/w atorvastatin 80  #Dementia- c/w donepezil     #Incidental L renal lesion- outpt renal US follow-up    DVT ppx: lovenox  GI ppx: pepcid  Diet: DASH  Activity: as tolerated  Dispo: acute  FULL CODE    Son Will updated ( [phone]) on his father's condition.      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 Pt was known COVID+ for 1 week and had increased sxs over past few days but can't really give details. He also states that for 1mo he hasn't felt well, general sense of 'malaise' and notes that his lips have been extremely chapped, mildly swollen, and 'blue'. He saw his PCP who didn't know what it was and suggested coming to ED. Denied CP, palpitations, leg pains, nausea/vomiting/diarrhea. Has some mild abd pain in LLL crampy. Pt overall is poor historian for his sxs.    In ED: T96.9, HR 58, /62, RR18, SpO2 86% on RA > 92% on 3L NC.  CT a/p no acute pathology but noted small L renal lesion & b/l effusions with signs of RH dysfcn.   Labs showed leukopenia/lymphopenia, thrombocytopenia. Trop 0.04, EKG ventricularly paced.  CXR diffuse infiltrates.    Patient assessed throughout day. Tachypneic in AM but more alert, was placed on BiPAP from NRB. Mentation and tachypnea worsened (~35 breaths/min), w/ paradoxical breathing. Called family and decision made to intubate patient.      --------------------------------------------------------------------------------------------------------  PMH/PSH:  PAST MEDICAL & SURGICAL HISTORY:  Mild dementia    Chronic right-sided heart failure    Depression    Liver cirrhosis, alcoholic    Borderline diabetes mellitus    HTN (hypertension)    Afib    Presence of Watchman left atrial appendage closure device    AICD (automatic cardioverter/defibrillator) present    History of cholecystectomy    History of appendectomy    History of knee surgery    Deviated nasal septum        -------------------------------------------------------------------------------------------------------  PHYSICAL EXAM:  General: Intubated and sedated  Respiratory: CTAB  Cardiac: RRR  Abdomen: soft, non-tender, non-distended  Extremities: warm and well-perfused    Vital Signs Last 24 Hrs  T(C): 36.3 (12 Dec 2020 10:00), Max: 36.3 (12 Dec 2020 08:00)  T(F): 97.4 (12 Dec 2020 10:00), Max: 97.4 (12 Dec 2020 08:00)  HR: 84 (12 Dec 2020 13:20) (60 - 84)  BP: 132/70 (12 Dec 2020 10:00) (130/82 - 151/77)  BP(mean): --  RR: 22 (12 Dec 2020 10:00) (20 - 22)  SpO2: 100% (12 Dec 2020 13:20) (93% - 100%)    I&O's Summary      --------------------------------------------------------------------------------------------------------  LABS:                               10.4   9.58  )-----------( 100      ( 12 Dec 2020 05:49 )             34.0       12-12    144  |  110  |  51<H>  ----------------------------<  118<H>  4.7   |  20  |  1.3    Ca    8.4<L>      12 Dec 2020 05:49  Mg     2.0     12-12                CULTURE RESULTS:                12-09-20 @ 17:20  Specimen Source: --  Method Type: --  Gram Stain - RRL: --  Gram Stain - Wound: --  Bacteria: --  Culture Results:   No growth to date.      Specimen Source:   Method Type:   Gram Stain:   Culture Results: Culture Results:   No growth to date. (12-09-20 @ 17:20)  Culture Results:   No growth (12-08-20 @ 10:40)  Culture Results:   Testing in progress (12-08-20 @ 10:40)    Bacteria:       -------------------------------------------------------------------------------------------------  RADIOLOGY:      ---------------------------------------------------------------------------------------------------  ASSESSMENT & PLAN:     87y/o M with CHFrEF, severe MR/pulm HTN, Afib s/p watchman, HTN, hx of anemia presents with inc SOB with COVID 19 PNA.    #Acute hypoxic respiratory failure   #COVID19+ pneumonia  #pleural/pericardial  effusion  - SXS start date: ~11/30   -COVID-19 PCR: Detected (12-07)  -SpO2: 97% (12-12 @ 08:45) (93% - 97%)  -Procalcitonin: 0.06 (12-09)  -CRP: 0.61 (12-10)  -D-dimer: 456 (12-10)  -LDH: 298 (12-10)  -Ferritin: 81 (12-10)  -Troponin: 0.01 (12-08)  Plan  - hypoxemic to 86% on RA, on venti mask 15L in AM >> Tachypneic in AM but more alert, was placed on BiPAP from NRB. Mentation and tachypnea (upto 35/min) worsened throughout day, w/ paradoxical breathing. Called family and decision made to intubate patient.  - F/u ABG in 1 hr  - LE Duplex  - Repeat inflammatory markers.  - Decadron 6 q24h for 10 days, no RDV  - ID consult, recs appreciated, started on Cefepime 1g Q12 hrs    # Tropeniemia, resolved  - Trop 0.04 on admission, repeat 0.03, 0.01 this am, EKG no acute ST changes, no CP or sxs  - likely demand ischemia. monitor for now    # QT prolongation:  - QTc at admission was 616 ms  - repeat EKG showed QTc of 590 ms today  - will DC aricept as it may prolong QTc    #CHFpEF / Afib s/p watchman  - CT shows b/l effusions, R heart reflux, small pericardial effusion  - c/w metoprolol 25, ASA  - d/c plavix   - Repeat 2D echo showed normal LVEF, decreased RVEF ,  moderate pericardial effusion,  moderate pulm hypertension. spoke to CT surgery on phone, they wont do any intervention on the patient unless its tamponade or pt is on pressors  -cardiology consult in place, recs apprecaited  - right side pleural effusion, s/p thoracentesis, 1.5L removed. post procedure CXR showed trace pneumothorax x2, now resolved, Fluid is transudative according to light's criteria  - Lasix 40 PO changed to IV 40 mg push daily yesterday > will increase it to q12     #HTN  - hold losartan for possible angioedema/swollen lips    #HLD- c/w atorvastatin 80  #Dementia- c/w donepezil     #Incidental L renal lesion- outpt renal US follow-up    DVT ppx: lovenox  GI ppx: pepcid  Diet: DASH  Activity: as tolerated  Dispo: acute  FULL CODE    Son Will updated ( [phone]) on his father's condition.      FOR FOLLOW UP:  [ ] Markers  [ ] LE Duplex  [ ] ABG in 1Hr

## 2020-12-12 NOTE — CHART NOTE - NSCHARTNOTEFT_GEN_A_CORE
Patient assessed throughout day. Tachypneic in AM but more alert, was placed on BiPAP from NRB. Patient assessed throughout day. Tachypneic in AM but more alert, was placed on BiPAP from NRB. Mentation and tachypnea worsened, w/ paradoxical breathing. Called family and decision made to intubate patient.    Plan  -F/u ABG in 1 hr  -LE Duplex  -Repeat inflammatory markers

## 2020-12-12 NOTE — PROGRESS NOTE ADULT - SUBJECTIVE AND OBJECTIVE BOX
WILLIAM ARAIZA  86y, Male    All available historical data reviewed    OVERNIGHT EVENTS:  no fevers  NRB    ROS:  General: Denies rigors, nightsweats  HEENT: Denies headache, rhinorrhea, sore throat, eye pain  CV: Denies CP, palpitations  PULM: Denies wheezing, hemoptysis  GI: Denies hematemesis, hematochezia, melena  : Denies discharge, hematuria  MSK: Denies arthralgias, myalgias  SKIN: Denies rash, lesions  NEURO: Denies paresthesias, weakness  PSYCH: Denies depression, anxiety    VITALS:  T(F): 97.4, Max: 97.4 (12-12-20 @ 08:00)  HR: 80  BP: 137/68  RR: 20Vital Signs Last 24 Hrs  T(C): 36.3 (12 Dec 2020 08:00), Max: 36.3 (12 Dec 2020 08:00)  T(F): 97.4 (12 Dec 2020 08:00), Max: 97.4 (12 Dec 2020 08:00)  HR: 80 (12 Dec 2020 08:00) (60 - 80)  BP: 137/68 (12 Dec 2020 08:00) (130/82 - 151/77)  BP(mean): --  RR: 20 (12 Dec 2020 08:00) (20 - 94)  SpO2: 93% (12 Dec 2020 02:38) (93% - 93%)    TESTS & MEASUREMENTS:                        10.4   9.58  )-----------( 100      ( 12 Dec 2020 05:49 )             34.0     12-12    144  |  110  |  51<H>  ----------------------------<  118<H>  4.7   |  20  |  1.3    Ca    8.4<L>      12 Dec 2020 05:49  Mg     2.0     12-12          Culture - Blood (collected 12-09-20 @ 17:20)  Source: .Blood Blood-Peripheral  Preliminary Report (12-10-20 @ 23:02):    No growth to date.    Culture - Fungal, Body Fluid (collected 12-08-20 @ 10:40)  Source: Pleural Fl Pleural Fluid  Preliminary Report (12-09-20 @ 11:19):    Testing in progress    Culture - Body Fluid with Gram Stain (collected 12-08-20 @ 10:40)  Source: Pleural Fl Pleural Fluid  Gram Stain (12-09-20 @ 02:45):    polymorphonuclear leukocytes seen    No organisms seen    by cytocentrifuge  Preliminary Report (12-09-20 @ 17:36):    No growth            RADIOLOGY & ADDITIONAL TESTS:  Personal review of radiological diagnostics performed  Echo and EKG results noted when applicable.     MEDICATIONS:  acetaminophen   Tablet .. 650 milliGRAM(s) Oral every 6 hours PRN  ALBUTerol    90 MICROgram(s) HFA Inhaler 2 Puff(s) Inhalation every 6 hours  aspirin 325 milliGRAM(s) Oral daily  atorvastatin 80 milliGRAM(s) Oral at bedtime  cefepime   IVPB 1000 milliGRAM(s) IV Intermittent every 12 hours  dexAMETHasone  Injectable 6 milliGRAM(s) IV Push daily  enoxaparin Injectable 40 milliGRAM(s) SubCutaneous daily  famotidine    Tablet 20 milliGRAM(s) Oral two times a day  furosemide   Injectable 40 milliGRAM(s) IV Push daily  guaiFENesin  milliGRAM(s) Oral every 12 hours  ipratropium 17 MICROgram(s) HFA Inhaler 1 Puff(s) Inhalation every 6 hours  LORazepam   Injectable 0.5 milliGRAM(s) IntraMuscular once  metoprolol succinate ER 25 milliGRAM(s) Oral daily      ANTIBIOTICS:  cefepime   IVPB 1000 milliGRAM(s) IV Intermittent every 12 hours

## 2020-12-12 NOTE — PROGRESS NOTE ADULT - SUBJECTIVE AND OBJECTIVE BOX
pt seen and examined.   extremely restless. in mild to mod resp distress on bipap. did not tolerate high flow or non rebreather. rapid response is in process at this point. will need to be intubated . not stable at this time.  Vital Signs Last 24 Hrs  T(C): 36.3 (12 Dec 2020 10:00), Max: 36.3 (12 Dec 2020 08:00)  T(F): 97.4 (12 Dec 2020 10:00), Max: 97.4 (12 Dec 2020 08:00)  HR: 60 (12 Dec 2020 10:00) (60 - 80)  BP: 132/70 (12 Dec 2020 10:00) (130/82 - 151/77)  BP(mean): --  RR: 22 (12 Dec 2020 10:00) (20 - 22)  SpO2: 97% (12 Dec 2020 08:45) (93% - 97%)    Physical exam:   constitutional NAD, resteless, , Respiratory  lungs bilat crackles, CVS heart reg, tachy, GI: abdomen Soft NT, ND, BS+, skin: intact  neuro exam cannot participate                          10.4   9.58  )-----------( 100      ( 12 Dec 2020 05:49 )             34.0     12-12    144  |  110  |  51<H>  ----------------------------<  118<H>  4.7   |  20  |  1.3    Ca    8.4<L>      12 Dec 2020 05:49  Mg     2.0     12-12    < from: Xray Chest 1 View- PORTABLE-Routine (Xray Chest 1 View- PORTABLE-Routine .) (12.10.20 @ 11:57) >  Bilateral opacities and bilateral pleural effusions no change. No air leak.    < end of copied text >    < from: TTE Echo Complete w/o Contrast w/ Doppler (12.09.20 @ 13:51) >   1. Left ventricular ejection fraction, by visual estimation, is 50 to 55%.   2. Basal inferoseptal segment and basal inferior segment are abnormal as described above.   3. Severely enlarged right atrium.   4. Moderately increased LV wall thickness.   5. Severely enlarged right ventricle.   6. Severely reduced RV systolic function.   7. Moderately enlarged left atrium.   8. Moderate pericardial effusion.   9. Mild to moderate mitral valve regurgitation.  10. Severe tricuspid regurgitation.  11. Sclerotic aortic valve with decreased opening.  12. Estimated pulmonary artery systolic pressure is 54.0 mmHg assuming a right atrial pressure of 15 mmHg, which is consistent with moderate pulmonary hypertension.    < end of copied text >      a/p  #acute respiratory failue, now being intubated. unstable   Covid pna, pleural effusion, cardiomegaly, pulm hypertension   poor prognosis   resident spoke with son today, full code    # PAST MEDICAL & SURGICAL HISTORY:  Mild dementia  Chronic right-sided heart failure  Depression  Liver cirrhosis, alcoholic  Borderline diabetes mellitus  HTN (hypertension)  Afib  Presence of Watchman left atrial appendage closure device  AICD (automatic cardioverter/defibrillator) present  History of cholecystectomy  History of appendectomy  History of knee surgery  Deviated nasal septum

## 2020-12-12 NOTE — AIRWAY PLACEMENT NOTE ADULT - POST AIRWAY PLACEMENT ASSESSMENT:
CXR pending/skin color improved/breath sounds bilateral/breath sounds equal/positive end tidal CO2 noted/chest excursion noted

## 2020-12-13 NOTE — CONSULT NOTE ADULT - ASSESSMENT
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 Daily SAT.     HEENT: Oral care.     PULMONARY:  HOB @ 45 degrees.  Vent changes as follows: PEEP-10, wean oxygen as tolerated    CARDIOVASCULAR: CHEETAH. Target MAP-60. Wean levo. Avoid volume overload. Hold lasix for now.     GI: GI prophylaxis.  Feeding.  Bowel regimen     RENAL:  Follow up lytes.  Correct as needed.     INFECTIOUS DISEASE: Follow up cultures. Trend inflammatory markers. Repeat Procal. FU ID    HEMATOLOGICAL:  DVT prophylaxis.    ENDOCRINE:  Follow up FS.  Insulin protocol if needed.    MUSCULOSKELETAL: bedrest    MICU monitoring

## 2020-12-13 NOTE — PROGRESS NOTE ADULT - ASSESSMENT
· Assessment	  87y/o M with PMH of CHFrEF & severe MR/pulm HTN, Afib s/p watchman 2019 not on AC, HTN, hx of chronic anemia presents to ED with 'blue lips' and SOB. Pt was known COVID+ for 1 week and had increased sxs over past few days but can't really give details. He also states that for 1mo he hasn't felt well, general sense of 'malaise' and notes that his lips have been extremely chapped, mildly swollen, and 'blue'. He saw his PCP who didn't know what it was and suggested coming to ED. Denied CP, palpitations, leg pains, nausea/vomiting/diarrhea. Has some mild abd pain in LLL crampy. Pt overall is poor historian for his sxs.    IMPRESSION;  COVID 19 with severe illness > critical illness on MV 12/12 with AARDS  Pt is in the late inflammatory response phase ot the illness based on the onset of symptoms. ( possibly one month )  CT with right pleural effusion  s/p rigth thoracocentesis 12/8 with 1500 ccs fluid. Cultures NG  Possible pericardial effusion  Ddimers 807>456>695  Ferritin 56>81  procal 0.08  CRP 0.76  SOB secondary to right pleural effusion /pneumo/pericardial effusion  BCx 12/8 NG    RECOMMENDATIONS;  deep ET cultures  Nares ORSA  Cefepime 1 gm iv q12h  Dexamethasone 6 mg iv q24h for 10 days ( or until discharge ) or equivalent glucocorticoid dose may be substituted. Equivalent total dosis of alternative steroids are Methylprednisolone 32 mg and prednisone 40 mg q24h.  Monitor for side effects: hyperglycemia, neurological ( agitation/confusion), adrenal suppression, bacterial and fungal infections  Anticoagulation as per team

## 2020-12-13 NOTE — PROGRESS NOTE ADULT - SUBJECTIVE AND OBJECTIVE BOX
WILLIAM ARAIZA  86y, Male    All available historical data reviewed    OVERNIGHT EVENTS:  intubated 12/12  sedated, non responsive    ROS:  unable to obtain history secondary to patient's mental status and/or sedation      VITALS:  T(F): 97.6, Max: 97.6 (12-12-20 @ 15:00)  HR: 60  BP: 93/57  RR: 21Vital Signs Last 24 Hrs  T(C): 36.4 (13 Dec 2020 08:00), Max: 36.4 (12 Dec 2020 15:00)  T(F): 97.6 (13 Dec 2020 08:00), Max: 97.6 (12 Dec 2020 15:00)  HR: 60 (13 Dec 2020 11:22) (60 - 84)  BP: 93/57 (13 Dec 2020 10:00) (73/46 - 147/83)  BP(mean): 67 (13 Dec 2020 10:00) (52 - 98)  RR: 21 (13 Dec 2020 10:00) (16 - 34)  SpO2: 99% (13 Dec 2020 11:22) (79% - 100%)    TESTS & MEASUREMENTS:                        10.7   11.79 )-----------( 125      ( 13 Dec 2020 04:30 )             34.9     12-13    151<H>  |  112<H>  |  52<H>  ----------------------------<  126<H>  3.9   |  29  |  1.3    Ca    8.0<L>      13 Dec 2020 04:30  Mg     1.9     12-13    TPro  5.6<L>  /  Alb  2.9<L>  /  TBili  2.5<H>  /  DBili  x   /  AST  50<H>  /  ALT  22  /  AlkPhos  90  12-13    LIVER FUNCTIONS - ( 13 Dec 2020 04:30 )  Alb: 2.9 g/dL / Pro: 5.6 g/dL / ALK PHOS: 90 U/L / ALT: 22 U/L / AST: 50 U/L / GGT: x             Culture - Blood (collected 12-09-20 @ 17:20)  Source: .Blood Blood-Peripheral  Preliminary Report (12-10-20 @ 23:02):    No growth to date.    Culture - Fungal, Body Fluid (collected 12-08-20 @ 10:40)  Source: Pleural Fl Pleural Fluid  Preliminary Report (12-09-20 @ 11:19):    Testing in progress    Culture - Body Fluid with Gram Stain (collected 12-08-20 @ 10:40)  Source: Pleural Fl Pleural Fluid  Gram Stain (12-09-20 @ 02:45):    polymorphonuclear leukocytes seen    No organisms seen    by cytocentrifuge  Preliminary Report (12-09-20 @ 17:36):    No growth            RADIOLOGY & ADDITIONAL TESTS:  Personal review of radiological diagnostics performed  Echo and EKG results noted when applicable.     MEDICATIONS:  acetaminophen   Tablet .. 650 milliGRAM(s) Oral every 6 hours PRN  ALBUTerol    90 MICROgram(s) HFA Inhaler 2 Puff(s) Inhalation every 6 hours  aspirin 325 milliGRAM(s) Oral daily  atorvastatin 80 milliGRAM(s) Oral at bedtime  cefepime   IVPB 1000 milliGRAM(s) IV Intermittent every 12 hours  chlorhexidine 0.12% Liquid 15 milliLiter(s) Oral Mucosa two times a day  chlorhexidine 4% Liquid 1 Application(s) Topical <User Schedule>  dexAMETHasone  Injectable 6 milliGRAM(s) IV Push daily  enoxaparin Injectable 40 milliGRAM(s) SubCutaneous daily  famotidine    Tablet 20 milliGRAM(s) Oral two times a day  fentaNYL   Infusion. 0.5 MICROgram(s)/kG/Hr IV Continuous <Continuous>  furosemide   Injectable 40 milliGRAM(s) IV Push two times a day  guaiFENesin  milliGRAM(s) Oral every 12 hours  ipratropium 17 MICROgram(s) HFA Inhaler 1 Puff(s) Inhalation every 6 hours  LORazepam   Injectable 0.5 milliGRAM(s) IntraMuscular once  metoprolol succinate ER 25 milliGRAM(s) Oral daily  midazolam Infusion 0.02 mG/kG/Hr IV Continuous <Continuous>  norepinephrine Infusion 0.05 MICROgram(s)/kG/Min IV Continuous <Continuous>  pantoprazole   Suspension 40 milliGRAM(s) Oral daily      ANTIBIOTICS:  cefepime   IVPB 1000 milliGRAM(s) IV Intermittent every 12 hours

## 2020-12-13 NOTE — PROGRESS NOTE ADULT - SUBJECTIVE AND OBJECTIVE BOX
WILLIAM ARAIZA 86y Male  MRN#: [id number]       SUBJECTIVE  Patient is a 86y old Male who presents with a chief complaint of COVID19+ (13 Dec 2020 12:19)      ***    Today is hospital day 6d. Last night the patient was assessed throughout night. Tachypneic in AM but more alert, was placed on BiPAP from NRB. Mentation and tachypnea worsened (~35 breaths/min), w/ paradoxical breathing. Called family and decision made to intubate patient    OBJECTIVE  PAST MEDICAL & SURGICAL HISTORY  Mild dementia    Chronic right-sided heart failure    Depression    Liver cirrhosis, alcoholic    Borderline diabetes mellitus    HTN (hypertension)    Afib    Presence of Watchman left atrial appendage closure device    AICD (automatic cardioverter/defibrillator) present    History of cholecystectomy    History of appendectomy    History of knee surgery    Deviated nasal septum      ALLERGIES:  No Known Allergies    MEDICATIONS:  STANDING MEDICATIONS  ALBUTerol    90 MICROgram(s) HFA Inhaler 2 Puff(s) Inhalation every 6 hours  aspirin 325 milliGRAM(s) Oral daily  atorvastatin 80 milliGRAM(s) Oral at bedtime  cefepime   IVPB 1000 milliGRAM(s) IV Intermittent every 12 hours  chlorhexidine 0.12% Liquid 15 milliLiter(s) Oral Mucosa two times a day  chlorhexidine 4% Liquid 1 Application(s) Topical <User Schedule>  dexAMETHasone  Injectable 6 milliGRAM(s) IV Push daily  enoxaparin Injectable 40 milliGRAM(s) SubCutaneous daily  famotidine    Tablet 20 milliGRAM(s) Oral two times a day  fentaNYL   Infusion. 0.5 MICROgram(s)/kG/Hr IV Continuous <Continuous>  furosemide   Injectable 40 milliGRAM(s) IV Push two times a day  guaiFENesin  milliGRAM(s) Oral every 12 hours  ipratropium 17 MICROgram(s) HFA Inhaler 1 Puff(s) Inhalation every 6 hours  LORazepam   Injectable 0.5 milliGRAM(s) IntraMuscular once  metoprolol succinate ER 25 milliGRAM(s) Oral daily  midazolam Infusion 0.02 mG/kG/Hr IV Continuous <Continuous>  norepinephrine Infusion 0.05 MICROgram(s)/kG/Min IV Continuous <Continuous>  pantoprazole   Suspension 40 milliGRAM(s) Oral daily    PRN MEDICATIONS  acetaminophen   Tablet .. 650 milliGRAM(s) Oral every 6 hours PRN      VITAL SIGNS: Last 24 Hours  T(C): 36.5 (13 Dec 2020 12:00), Max: 36.5 (13 Dec 2020 12:00)  T(F): 97.7 (13 Dec 2020 12:00), Max: 97.7 (13 Dec 2020 12:00)  HR: 60 (13 Dec 2020 15:00) (60 - 66)  BP: 93/52 (13 Dec 2020 15:00) (73/46 - 147/83)  BP(mean): 67 (13 Dec 2020 15:00) (52 - 98)  RR: 22 (13 Dec 2020 15:00) (20 - 33)  SpO2: 100% (13 Dec 2020 15:00) (79% - 100%)    LABS:                        10.7   11.79 )-----------( 125      ( 13 Dec 2020 04:30 )             34.9     12-13    151<H>  |  112<H>  |  52<H>  ----------------------------<  126<H>  3.9   |  29  |  1.3    Ca    8.0<L>      13 Dec 2020 04:30  Mg     1.9     12-13    TPro  5.6<L>  /  Alb  2.9<L>  /  TBili  2.5<H>  /  DBili  x   /  AST  50<H>  /  ALT  22  /  AlkPhos  90  12-13        ABG - ( 13 Dec 2020 02:59 )  pH, Arterial: 7.43  pH, Blood: x     /  pCO2: 50    /  pO2: 83    / HCO3: 33    / Base Excess: 7.8   /  SaO2: 96                        RADIOLOGY:      PHYSICAL EXAM:    GENERAL: NAD, well-developed, AAOx3  HEENT:  Atraumatic, Normocephalic. EOMI, PERRLA, conjunctiva and sclera clear, No JVD  PULMONARY: Clear to auscultation bilaterally; No wheeze  CARDIOVASCULAR: Regular rate and rhythm; No murmurs, rubs, or gallops  GASTROINTESTINAL: Soft, Nontender, Nondistended; Bowel sounds present  MUSCULOSKELETAL:  2+ Peripheral Pulses, No clubbing, cyanosis, or edema  NEUROLOGY: non-focal  SKIN: No rashes or lesions      ADMISSION SUMMARY  Patient is a 86y old Male who presents with a chief complaint of COVID19+ (13 Dec 2020 12:19)

## 2020-12-13 NOTE — CONSULT NOTE ADULT - SUBJECTIVE AND OBJECTIVE BOX
Patient is a 86y old  Male who presents with a chief complaint of COVID19+ (12 Dec 2020 13:09)      HPI:  87y/o M with PMH of CHFrEF & severe MR/pulm HTN, Afib s/p watchman 2019 not on AC, HTN, hx of chronic anemia presents to ED with 'blue lips' and SOB. Pt was known COVID+ for 1 week and had increased sxs over past few days but can't really give details. He also states that for 1mo he hasn't felt well, general sense of 'malaise' and notes that his lips have been extremely chapped, mildly swollen, and 'blue'. He saw his PCP who didn't know what it was and suggested coming to ED. Denied CP, palpitations, leg pains, nausea/vomiting/diarrhea. Has some mild abd pain in LLL crampy. Pt overall is poor historian for his sxs.    In ED: T96.9, HR 58, /62, RR18, SpO2 86% on RA > 92% on 3L NC.  CT a/p no acute pathology but noted small L renal lesion & b/l effusions with signs of RH dysfcn.   Labs showed leukopenia/lymphopenia, thrombocytopenia. Trop 0.04, EKG ventricularly paced.  CXR diffuse infiltrates. (07 Dec 2020 23:15)      PAST MEDICAL & SURGICAL HISTORY:  Mild dementia    Chronic right-sided heart failure    Depression    Liver cirrhosis, alcoholic    Borderline diabetes mellitus    HTN (hypertension)    Afib    Presence of Watchman left atrial appendage closure device    AICD (automatic cardioverter/defibrillator) present    History of cholecystectomy    History of appendectomy    History of knee surgery    Deviated nasal septum        SOCIAL HX:   Smoking      former smoker, quit 20 yrs ago, 1ppd for 20 years                   ETOH    denies                        Other denies illicit drug use, denies travel outside country in past 6 months, from home, ambulates with cane, retired, worked in sanitation    FAMILY HISTORY:  Family history of throat cancer (Sibling)    Family history of diabetes mellitus (Mother)    :  No known cardiovacular family hisotry     Review Of Systems:     All ROS are negative except per HPI       Allergies    No Known Allergies    Intolerances    Drips  Levo- 0.06  Versed- 2mg/hr  Fentanyl- 0.5      PHYSICAL EXAM    ICU Vital Signs Last 24 Hrs  T(C): 36.4 (13 Dec 2020 08:00), Max: 36.4 (12 Dec 2020 15:00)  T(F): 97.6 (13 Dec 2020 08:00), Max: 97.6 (12 Dec 2020 15:00)  HR: 60 (13 Dec 2020 11:01) (60 - 84)  BP: 93/57 (13 Dec 2020 10:00) (73/46 - 147/83)  BP(mean): 67 (13 Dec 2020 10:00) (52 - 98)  ABP: --  ABP(mean): --  RR: 21 (13 Dec 2020 10:00) (16 - 34)  SpO2: 100% (13 Dec 2020 11:01) (79% - 100%)      CONSTITUTIONAL:  Well nourished.  NAD    ENT:   Airway patent,   Mouth with normal mucosa.   No thrush    EYES:   pupils equal,   round and reactive to light.    CARDIAC:   Normal rate,   Regular rhythm.    Heart sounds S1, S2.   No edema    Vascular:   normal systolic impulse  no bruits    RESPIRATORY:   On MV  No wheezing   Normal chest expansion  No use of accessory muscles    GASTROINTESTINAL:  Abdomen soft   Non-tender,   No guarding,   + BS    MUSCULOSKELETAL:   Range of motion is not limited,  No clubbing, cyanosis    NEUROLOGICAL:   Agitated off sedation  Alert and oriented   No motor or sensory deficits.  Pertinent DTRs normal    SKIN:   Skin normal color for race,   Warm and dry  No evidence of rash.    PSYCHIATRIC:   Normal mood and affect.   No apparent risk to self or others.        12-12-20 @ 07:01  -  12-13-20 @ 07:00  --------------------------------------------------------  IN:    FentaNYL: 30.5 mL    IV PiggyBack: 150 mL    Midazolam: 28 mL    Norepinephrine: 86.8 mL  Total IN: 295.3 mL    OUT:    Indwelling Catheter - Urethral (mL): 1545 mL  Total OUT: 1545 mL    Total NET: -1249.7 mL      12-13-20 @ 07:01  -  12-13-20 @ 11:15  --------------------------------------------------------  IN:    FentaNYL: 11.1 mL    Midazolam: 6 mL    Norepinephrine: 20.7 mL  Total IN: 37.8 mL    OUT:    Indwelling Catheter - Urethral (mL): 1000 mL  Total OUT: 1000 mL    Total NET: -962.2 mL        LABS:                          10.7   11.79 )-----------( 125      ( 13 Dec 2020 04:30 )             34.9                                               12-13    151<H>  |  112<H>  |  52<H>  ----------------------------<  126<H>  3.9   |  29  |  1.3    Ca    8.0<L>      13 Dec 2020 04:30  Mg     1.9     12-13    TPro  5.6<L>  /  Alb  2.9<L>  /  TBili  2.5<H>  /  DBili  x   /  AST  50<H>  /  ALT  22  /  AlkPhos  90  12-13                                                                                           LIVER FUNCTIONS - ( 13 Dec 2020 04:30 )  Alb: 2.9 g/dL / Pro: 5.6 g/dL / ALK PHOS: 90 U/L / ALT: 22 U/L / AST: 50 U/L / GGT: x                                                                                               Mode: AC/ CMV (Assist Control/ Continuous Mandatory Ventilation)  RR (machine): 22  TV (machine): 420  FiO2: 50  PEEP: 10  ITime: 1  MAP: 16  PIP: 26                                      ABG - ( 13 Dec 2020 02:59 )  pH, Arterial: 7.43  pH, Blood: x     /  pCO2: 50    /  pO2: 83    / HCO3: 33    / Base Excess: 7.8   /  SaO2: 96          LA-1.2  Plateau-22  peak-26        X-Rays reviewed:                                                                                    ECHO    CXR interpreted by me:    MEDICATIONS  (STANDING):  ALBUTerol    90 MICROgram(s) HFA Inhaler 2 Puff(s) Inhalation every 6 hours  aspirin 325 milliGRAM(s) Oral daily  atorvastatin 80 milliGRAM(s) Oral at bedtime  cefepime   IVPB 1000 milliGRAM(s) IV Intermittent every 12 hours  chlorhexidine 0.12% Liquid 15 milliLiter(s) Oral Mucosa two times a day  chlorhexidine 4% Liquid 1 Application(s) Topical <User Schedule>  dexAMETHasone  Injectable 6 milliGRAM(s) IV Push daily  enoxaparin Injectable 40 milliGRAM(s) SubCutaneous daily  famotidine    Tablet 20 milliGRAM(s) Oral two times a day  fentaNYL   Infusion. 0.5 MICROgram(s)/kG/Hr (4 mL/Hr) IV Continuous <Continuous>  furosemide   Injectable 40 milliGRAM(s) IV Push two times a day  guaiFENesin  milliGRAM(s) Oral every 12 hours  ipratropium 17 MICROgram(s) HFA Inhaler 1 Puff(s) Inhalation every 6 hours  LORazepam   Injectable 0.5 milliGRAM(s) IntraMuscular once  metoprolol succinate ER 25 milliGRAM(s) Oral daily  midazolam Infusion 0.02 mG/kG/Hr (1.6 mL/Hr) IV Continuous <Continuous>  norepinephrine Infusion 0.05 MICROgram(s)/kG/Min (7.5 mL/Hr) IV Continuous <Continuous>  pantoprazole   Suspension 40 milliGRAM(s) Oral daily    MEDICATIONS  (PRN):  acetaminophen   Tablet .. 650 milliGRAM(s) Oral every 6 hours PRN Temp greater or equal to 38C (100.4F)

## 2020-12-13 NOTE — PROGRESS NOTE ADULT - ASSESSMENT
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 Daily SAT.   HEENT: Oral care.   PULMONARY:  HOB @ 45 degrees.  Vent changes as follows: PEEP-10, wean oxygen as tolerated  CARDIOVASCULAR: CHEETAH. Target MAP-60. Wean levo. Avoid volume overload. Hold lasix for now.   GI: GI prophylaxis.  Feeding.  Bowel regimen   RENAL:  Follow up lytes.  Correct as needed.   INFECTIOUS DISEASE: Follow up cultures. Trend inflammatory markers. Repeat Procal. FU ID  HEMATOLOGICAL:  DVT prophylaxis.  ENDOCRINE:  Follow up FS.  Insulin protocol if needed.  MUSCULOSKELETAL: bedrest  MICU monitoring

## 2020-12-14 NOTE — CHART NOTE - NSCHARTNOTEFT_GEN_A_CORE
RK: Multiple attempts were made to call all 3 numbers on the patient's file in order to update the patient's family; however, none of the attempts were successful. Will try again tomorrow.

## 2020-12-14 NOTE — DIETITIAN INITIAL EVALUATION ADULT. - ADD RECOMMEND
Rec: (1) Please check HgbA1c % level. Hx of T2DM. (2) If TF to start, order OSMOLITE 1.0 at 30cc/hr, increase by 10cc/hr to a GOAL of 55cc/hr, with No-carb Prosource TF packet x3/day. This regimen at GOAL gives a TOTAL of 1500 kcal/ 90g protein/ 2035mg K/ 1110mL free water, additional flushes per ICU team.

## 2020-12-14 NOTE — PROGRESS NOTE ADULT - ASSESSMENT
ASSESSMENT & PLAN:     85y/o M with CHFrEF, severe MR/pulm HTN, Afib s/p watchman, HTN, hx of anemia presents with inc SOB with COVID 19 PNA.    #Acute hypoxic respiratory failure   #COVID19+ pneumonia  #pleural/pericardial  effusion  - SXS start date: ~11/30   -COVID-19 PCR: Detected (12-07)  -SpO2: 97% (12-12 @ 08:45) (93% - 97%)  -wbc 9.5 (11.79)  -Procalcitonin: 0.06 (12-09)  -CRP: 0.61 (12-10)  -D-dimer: 187 (12/14)  -LDH: 298 (12/10)  -Ferritin: 81 (12/10)  -Troponin: 0.01 (12-08)  Plan  - hypoxemic to 86% on RA, on venti mask 15L in AM >> Tachypneic in AM but more alert, was placed on BiPAP from NRB. Mentation and tachypnea (upto 35/min) worsened throughout day, w/ paradoxical breathing. Called family and decision made to intubate patient.  - F/u ABG in 1 hr  - LE Duplex  - Repeat inflammatory markers.  - cont decadron  - cont Cefepime 1g Q12 hrs  - SBT     #hypernatremia  -hold lasix  -give 200cc free water q6hr via NG tube    # Tropeniemia, resolved  - Trop 0.04 on admission, repeat 0.03, 0.01 this am, EKG no acute ST changes, no CP or sxs  - likely demand ischemia. monitor for now    # QT prolongation:  - QTc at admission was 616 ms  - repeat EKG showed QTc of 590 ms today  - will DC aricept as it may prolong QTc    #CHFpEF / Afib s/p watchman  - CT shows b/l effusions, R heart reflux, small pericardial effusion  - c/w metoprolol 25, ASA  - d/c plavix   - Repeat 2D echo showed normal LVEF, decreased RVEF ,  moderate pericardial effusion,  moderate pulm hypertension. spoke to CT surgery on phone, they wont do any intervention on the patient unless its tamponade or pt is on pressors  -cardiology consult in place, recs apprecaited  - right side pleural effusion, s/p thoracentesis, 1.5L removed. post procedure CXR showed trace pneumothorax x2, now resolved, Fluid is transudative according to light's criteria  - hold lasix because negative fluid balance  - change lopressor extended release to 12.5mg lopressor bid    #HTN  - hold losartan for possible angioedema/swollen lips    #HLD- c/w atorvastatin 80  #Dementia- c/w donepezil     #Incidental L renal lesion- outpt renal US follow-up    DVT ppx: lovenox  GI ppx: pepcid --- d/c protonix  Diet: DASH  Activity: as tolerated  Dispo: acute  FULL CODE   ASSESSMENT & PLAN:     85y/o M with CHFrEF, severe MR/pulm HTN, Afib s/p watchman, HTN, hx of anemia presents with inc SOB with COVID 19 PNA.    #Acute hypoxic respiratory failure   #COVID19+ pneumonia  #pleural/pericardial  effusion  - SXS start date: ~11/30   -COVID-19 PCR: Detected (12-07)  -SpO2: 97% (12-12 @ 08:45) (93% - 97%)  -wbc 9.5 (11.79)  -Procalcitonin: 0.06 (12-09)  -CRP: 0.61 (12-10)  -D-dimer: 187 (12/14)  -LDH: 298 (12/10)  -Ferritin: 81 (12/10)  -Troponin: 0.01 (12-08)  Plan  - hypoxemic to 86% on RA, on venti mask 15L in AM >> Tachypneic in AM but more alert, was placed on BiPAP from NRB. Mentation and tachypnea (upto 35/min) worsened throughout day, w/ paradoxical breathing. Called family and decision made to intubate patient.  - F/u ABG in 1 hr  - LE Duplex  - Repeat inflammatory markers.  - cont decadron  - cont Cefepime 1g Q12 hrs  - SBT   -d/c versedm d/c fentanyl  -start precedex    #hypernatremia  -hold lasix  -give 200cc free water q6hr via NG tube if follow-up BMP 4pm continues to show no improvement    # Tropeniemia, resolved  - Trop 0.04 on admission, repeat 0.03, 0.01 this am, EKG no acute ST changes, no CP or sxs  - likely demand ischemia. monitor for now    # QT prolongation:  - QTc at admission was 616 ms  - repeat EKG showed QTc of 590 ms today  - will DC aricept as it may prolong QTc    #CHFpEF / Afib s/p watchman  - CT shows b/l effusions, R heart reflux, small pericardial effusion  - c/w metoprolol 25, ASA  - d/c plavix   - Repeat 2D echo showed normal LVEF, decreased RVEF ,  moderate pericardial effusion,  moderate pulm hypertension. spoke to CT surgery on phone, they wont do any intervention on the patient unless its tamponade or pt is on pressors  -cardiology consult in place, recs apprecaited  - right side pleural effusion, s/p thoracentesis, 1.5L removed. post procedure CXR showed trace pneumothorax x2, now resolved, Fluid is transudative according to light's criteria  - hold lasix because negative fluid balance  - change lopressor extended release to 12.5mg lopressor bid    #HTN  - hold losartan for possible angioedema/swollen lips    #HLD- c/w atorvastatin 80  #Dementia- c/w donepezil     #Incidental L renal lesion- outpt renal US follow-up    DVT ppx: lovenox, duplex negative 12/12/20  GI ppx: pepcid --- d/c protonix  Diet: DASH  Activity: as tolerated  Dispo: acute  FULL CODE

## 2020-12-14 NOTE — PHARMACOTHERAPY INTERVENTION NOTE - COMMENTS
d/w team to evaluate, pt on famotidine 20mg po q12h & pantoprazole suspension 40mg daily  -d/c pantoprazole

## 2020-12-14 NOTE — PROGRESS NOTE ADULT - ASSESSMENT
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     PULMONARY:  HOB @ 45 degrees.  Vent changes as follows: dec fio2 4%, SBT    CARDIOVASCULAR:  Target MAP-60. Wean levo. Avoid volume overload. Hold lasix for now. FREE WATER    GI: GI prophylaxis.  Feeding.  Bowel regimen     RENAL:  Follow up lytes.  Correct as needed. FUP CMP    INFECTIOUS DISEASE: Follow up cultures. Trend inflammatory markers. Repeat Procal. FU ID    HEMATOLOGICAL:  DVT prophylaxis. LE doppler    ENDOCRINE:  Follow up FS.  Insulin protocol if needed.    MUSCULOSKELETAL: bedrest    MICU monitoring  poor prognosis

## 2020-12-14 NOTE — PROGRESS NOTE ADULT - SUBJECTIVE AND OBJECTIVE BOX
OVERNIGHT EVENTS: events noted, still intubated, ventilated, on fentanyl, versed, levophed 0.02    Vital Signs Last 24 Hrs  T(C): 37 (14 Dec 2020 04:00), Max: 37.3 (14 Dec 2020 00:00)  T(F): 98.6 (14 Dec 2020 04:00), Max: 99.2 (14 Dec 2020 00:00)  HR: 62 (14 Dec 2020 09:25) (60 - 62)  BP: 103/58 (14 Dec 2020 07:15) (82/47 - 138/71)  BP(mean): 75 (14 Dec 2020 07:15) (59 - 105)  RR: 21 (14 Dec 2020 07:30) (21 - 30)  SpO2: 99% (14 Dec 2020 09:25) (98% - 100%)    PHYSICAL EXAMINATION:    GENERAL: ill looking, sedated, cachectic    HEENT: Head is normocephalic and atraumatic.     NECK: Supple.    LUNGS: bl rhonchi    HEART: Regular rate and rhythm without murmur.    ABDOMEN: Soft, nontender, and nondistended.      EXTREMITIES: Without any cyanosis, clubbing, rash, lesions or edema.    NEUROLOGIC: Grossly intact.    SKIN: No ulceration or induration present.      LABS:                        10.6   9.50  )-----------( 96       ( 14 Dec 2020 04:23 )             34.7     12-14    151<H>  |  113<H>  |  52<H>  ----------------------------<  142<H>  3.9   |  29  |  1.3    Ca    8.1<L>      14 Dec 2020 04:23  Mg     2.0     12-14    TPro  5.5<L>  /  Alb  2.7<L>  /  TBili  2.4<H>  /  DBili  x   /  AST  45<H>  /  ALT  25  /  AlkPhos  79  12-14        ABG - ( 14 Dec 2020 03:12 )  pH, Arterial: 7.44  pH, Blood: x     /  pCO2: 52    /  pO2: 120   / HCO3: 35    / Base Excess: 9.1   /  SaO2: 98        420/22/50/8              D-Dimer Assay, Quantitative: 587 ng/mL DDU (12-13-20 @ 20:32)    Serum Pro-Brain Natriuretic Peptide: 5587 pg/mL (12-13-20 @ 20:32)            12-13-20 @ 07:01  -  12-14-20 @ 07:00  --------------------------------------------------------  IN: 217.7 mL / OUT: 2100 mL / NET: -1882.3 mL    12-14-20 @ 07:01  -  12-14-20 @ 09:28  --------------------------------------------------------  IN: 10 mL / OUT: 50 mL / NET: -40 mL        MICROBIOLOGY:      MEDICATIONS  (STANDING):  ALBUTerol    90 MICROgram(s) HFA Inhaler 2 Puff(s) Inhalation every 6 hours  aspirin 325 milliGRAM(s) Oral daily  atorvastatin 80 milliGRAM(s) Oral at bedtime  cefepime   IVPB 1000 milliGRAM(s) IV Intermittent every 12 hours  chlorhexidine 0.12% Liquid 15 milliLiter(s) Oral Mucosa two times a day  chlorhexidine 4% Liquid 1 Application(s) Topical <User Schedule>  dexAMETHasone  Injectable 6 milliGRAM(s) IV Push daily  enoxaparin Injectable 40 milliGRAM(s) SubCutaneous daily  famotidine    Tablet 20 milliGRAM(s) Oral two times a day  fentaNYL   Infusion. 0.5 MICROgram(s)/kG/Hr (4 mL/Hr) IV Continuous <Continuous>  furosemide   Injectable 40 milliGRAM(s) IV Push two times a day  guaiFENesin  milliGRAM(s) Oral every 12 hours  ipratropium 17 MICROgram(s) HFA Inhaler 1 Puff(s) Inhalation every 6 hours  LORazepam   Injectable 0.5 milliGRAM(s) IntraMuscular once  metoprolol succinate ER 25 milliGRAM(s) Oral daily  midazolam Infusion 0.02 mG/kG/Hr (1.6 mL/Hr) IV Continuous <Continuous>  norepinephrine Infusion 0.05 MICROgram(s)/kG/Min (7.5 mL/Hr) IV Continuous <Continuous>  pantoprazole   Suspension 40 milliGRAM(s) Oral daily    MEDICATIONS  (PRN):  acetaminophen   Tablet .. 650 milliGRAM(s) Oral every 6 hours PRN Temp greater or equal to 38C (100.4F)      RADIOLOGY & ADDITIONAL STUDIES:

## 2020-12-14 NOTE — DIETITIAN INITIAL EVALUATION ADULT. - CONTINUE CURRENT NUTRITION CARE PLAN
pt to meet and tolerate >85% of estimated kcal/protein via TF upon f/u in 3 days once TF initiated. Enteral nutrition. RD to monitor diet order, energy intake, NFPF (EN tolerance, electrolytes, renal profile)

## 2020-12-14 NOTE — DIETITIAN INITIAL EVALUATION ADULT. - REASON INDICATOR FOR ASSESSMENT
Found to have transferred to ICU from another unit over the weekend (I was not covering), and intubated to ventilator since. NPO currently, will start OG feeding per RN per rounds this AM. Currently LBM 12/12. Skin intact. No edema. Temp 37.0c, Temp 9.6, /58, MAP 75, Ve 9.6.  on IV fentanyl, IVF.  Noted Hx of T2DM but no HgbA1c at this time.

## 2020-12-14 NOTE — DIETITIAN INITIAL EVALUATION ADULT. - OTHER CALCULATIONS
ABW: 73.7kg ---- CALORIE: 1616 kcal/day ([id number]);     PROTEIN: 88-108g/day (1.2-1.4 g/kg of ABW) will aim towards the lower end as pt with fair renal function);    FLUID: per ICU team.

## 2020-12-14 NOTE — PROGRESS NOTE ADULT - SUBJECTIVE AND OBJECTIVE BOX
WILLIAM ARAIZA 86y Male  MRN#: [id number]       SUBJECTIVE  Patient is a 86y old Male who presents with a chief complaint of COVID19+ (14 Dec 2020 09:27)      ***    Today is hospital day 7, intubated day 2.    No acute overnight events.     OBJECTIVE  PAST MEDICAL & SURGICAL HISTORY  Mild dementia    Chronic right-sided heart failure    Depression    Liver cirrhosis, alcoholic    Borderline diabetes mellitus    HTN (hypertension)    Afib    Presence of Watchman left atrial appendage closure device    AICD (automatic cardioverter/defibrillator) present    History of cholecystectomy    History of appendectomy    History of knee surgery    Deviated nasal septum      ALLERGIES:  No Known Allergies    MEDICATIONS:  STANDING MEDICATIONS  ALBUTerol    90 MICROgram(s) HFA Inhaler 2 Puff(s) Inhalation every 6 hours  aspirin 325 milliGRAM(s) Oral daily  atorvastatin 80 milliGRAM(s) Oral at bedtime  cefepime   IVPB 1000 milliGRAM(s) IV Intermittent every 12 hours  chlorhexidine 0.12% Liquid 15 milliLiter(s) Oral Mucosa two times a day  chlorhexidine 4% Liquid 1 Application(s) Topical <User Schedule>  dexAMETHasone  Injectable 6 milliGRAM(s) IV Push daily  enoxaparin Injectable 40 milliGRAM(s) SubCutaneous daily  famotidine    Tablet 20 milliGRAM(s) Oral two times a day  fentaNYL   Infusion. 0.5 MICROgram(s)/kG/Hr IV Continuous <Continuous>  guaiFENesin  milliGRAM(s) Oral every 12 hours  ipratropium 17 MICROgram(s) HFA Inhaler 1 Puff(s) Inhalation every 6 hours  LORazepam   Injectable 0.5 milliGRAM(s) IntraMuscular once  metoprolol succinate ER 25 milliGRAM(s) Oral daily  midazolam Infusion 0.02 mG/kG/Hr IV Continuous <Continuous>  norepinephrine Infusion 0.05 MICROgram(s)/kG/Min IV Continuous <Continuous>  pantoprazole   Suspension 40 milliGRAM(s) Oral daily    PRN MEDICATIONS  acetaminophen   Tablet .. 650 milliGRAM(s) Oral every 6 hours PRN      VITAL SIGNS: Last 24 Hours  T(C): 37 (14 Dec 2020 04:00), Max: 37.3 (14 Dec 2020 00:00)  T(F): 98.6 (14 Dec 2020 04:00), Max: 99.2 (14 Dec 2020 00:00)  HR: 62 (14 Dec 2020 09:25) (60 - 62)  BP: 103/58 (14 Dec 2020 07:15) (82/47 - 138/71)  BP(mean): 75 (14 Dec 2020 07:15) (59 - 105)  RR: 21 (14 Dec 2020 07:30) (21 - 30)  SpO2: 99% (14 Dec 2020 09:25) (98% - 100%)    LABS:                        10.6   9.50  )-----------( 96       ( 14 Dec 2020 04:23 )             34.7     12-14    151<H>  |  113<H>  |  52<H>  ----------------------------<  142<H>  3.9   |  29  |  1.3    Ca    8.1<L>      14 Dec 2020 04:23  Mg     2.0     12-14    TPro  5.5<L>  /  Alb  2.7<L>  /  TBili  2.4<H>  /  DBili  x   /  AST  45<H>  /  ALT  25  /  AlkPhos  79  12-14        ABG - ( 14 Dec 2020 03:12 )  pH, Arterial: 7.44  pH, Blood: x     /  pCO2: 52    /  pO2: 120   / HCO3: 35    / Base Excess: 9.1   /  SaO2: 98                        RADIOLOGY:  reviewed    PHYSICAL EXAM:    GENERAL: nad, sedated  HEENT:  Atraumatic, Normocephalic. EOMI, PERRLA, conjunctiva and sclera clear, No JVD  PULMONARY: b/l crackles and diminished breath sounds  CARDIOVASCULAR: Regular rate and rhythm; No murmurs, rubs, or gallops  GASTROINTESTINAL: Soft, Nontender, Nondistended; Bowel sounds present  MUSCULOSKELETAL:  2+ Peripheral Pulses, No clubbing, cyanosis, or edema  NEUROLOGY: non-focal, responds more the pain stimuli on right side than left  SKIN: No rashes or lesions      ADMISSION SUMMARY  Patient is a 86y old Male who presents with a chief complaint of COVID19+ (14 Dec 2020 09:27)

## 2020-12-14 NOTE — DIETITIAN INITIAL EVALUATION ADULT. - OTHER INFO
P/w COVID 19+, c/o SOB. was a known COVID+ for a week. mentation and tachypnea worsened with paradoxial breathing. Large R sided exodated pleural effusion s/p thoracentesis 12/9. f/u electrolytes. f/u ID.

## 2020-12-14 NOTE — PHARMACOTHERAPY INTERVENTION NOTE - COMMENTS
d/w team, recommended holding guaifenesin ER 600mg q12h while intubated  -metoprolol ER 25mg daily, recommended changing 12.5mg q12h  -changing precedex starting dose to 0.2 mcg/kg/min

## 2020-12-15 NOTE — PROGRESS NOTE ADULT - ASSESSMENT
87y/o M with PMH of CHFrEF & severe MR/pulm HTN, Afib s/p watchman 2019 not on AC, HTN, hx of chronic anemia presents to ED with 'blue lips' and SOB. Pt was known COVID+ for 1 week and had increased sxs over past few days but can't really give details. He also states that for 1mo he hasn't felt well, general sense of 'malaise' and notes that his lips have been extremely chapped, mildly swollen, and 'blue'. He saw his PCP who didn't know what it was and suggested coming to ED. Denied CP, palpitations, leg pains, nausea/vomiting/diarrhea. Has some mild abd pain in LLL crampy. Pt overall is poor historian for his sxs.    IMPRESSION;  COVID 19 with severe illness > critical illness on MV 12/12 with ARDS  Pt is in the late inflammatory response phase ot the illness based on the onset of symptoms. ( possibly one month )  CT with right pleural effusion with opacities b/l  s/p rigth thoracocentesis 12/8 with 1500 ccs fluid. Cultures NG  Possible pericardial effusion  Ddimers 807>456>695>587  Ferritin 56>81>101  procal 0.08>0.16  CRP 0.76>1.39  SOB secondary to right pleural effusion /pneumo/pericardial effusion  BCx 12/8,9  NG    RECOMMENDATIONS;  deep ET cultures  Nares ORSA  Cefepime 1 gm iv q12h  Dexamethasone 6 mg iv q24h for 10 days ( or until discharge ) or equivalent glucocorticoid dose may be substituted. Equivalent total dosis of alternative steroids are Methylprednisolone 32 mg and prednisone 40 mg q24h.  Monitor for side effects: hyperglycemia, neurological ( agitation/confusion), adrenal suppression, bacterial and fungal infections  Anticoagulation as per team

## 2020-12-15 NOTE — PROGRESS NOTE ADULT - SUBJECTIVE AND OBJECTIVE BOX
OVERNIGHT EVENTS: hosp 8, intuabted 3, still intubated, ventilated, levophed, precedex, fentanyl, hypothermic    Vital Signs Last 24 Hrs  T(C): 35 (15 Dec 2020 08:00), Max: 37.1 (14 Dec 2020 12:00)  T(F): 95 (15 Dec 2020 08:00), Max: 98.7 (14 Dec 2020 12:00)  HR: 60 (15 Dec 2020 08:00) (60 - 64)  BP: 75/52 (15 Dec 2020 08:00) (68/48 - 142/81)  BP(mean): 59 (15 Dec 2020 08:00) (53 - 106)  RR: 21 (15 Dec 2020 08:00) (21 - 31)  SpO2: 100% (15 Dec 2020 08:00) (81% - 100%)    PHYSICAL EXAMINATION:    GENERAL: ill looking    HEENT: Head is normocephalic and atraumatic.    NECK: Supple.    LUNGS:  dec bs both abses    HEART: BALDEV 3/6    ABDOMEN: Soft, nontender, and nondistended.      EXTREMITIES: Without any cyanosis, clubbing, rash, lesions or edema.    NEUROLOGIC: Grossly intact.    SKIN: No ulceration or induration present.      LABS:                        10.5   7.86  )-----------( 93       ( 15 Dec 2020 04:51 )             35.0     12-15    153<H>  |  113<H>  |  56<H>  ----------------------------<  275<H>  3.7   |  31  |  1.3    Ca    8.6      15 Dec 2020 04:51  Phos  2.3     12-15  Mg     2.2     12-15    TPro  5.7<L>  /  Alb  2.9<L>  /  TBili  2.6<H>  /  DBili  x   /  AST  37  /  ALT  27  /  AlkPhos  83  12-15    PT/INR - ( 15 Dec 2020 04:51 )   PT: 14.90 sec;   INR: 1.30 ratio         PTT - ( 15 Dec 2020 04:51 )  PTT:44.2 sec    ABG - ( 15 Dec 2020 02:59 )  pH, Arterial: 7.44  pH, Blood: x     /  pCO2: 53    /  pO2: 96    / HCO3: 36    / Base Excess: 10.8  /  SaO2: 99        420/22/40/8              Serum Pro-Brain Natriuretic Peptide: 5587 pg/mL (12-13-20 @ 20:32)      Procalcitonin, Serum: 0.16 ng/mL (12-12-20 @ 17:00)        12-14-20 @ 07:01  -  12-15-20 @ 07:00  --------------------------------------------------------  IN: 1938.6 mL / OUT: 2090 mL / NET: -151.4 mL    12-15-20 @ 07:01  -  12-15-20 @ 09:49  --------------------------------------------------------  IN: 143.2 mL / OUT: 125 mL / NET: 18.2 mL        MICROBIOLOGY:      MEDICATIONS  (STANDING):  ALBUTerol    90 MICROgram(s) HFA Inhaler 2 Puff(s) Inhalation every 6 hours  aspirin 325 milliGRAM(s) Oral daily  atorvastatin 80 milliGRAM(s) Oral at bedtime  cefepime   IVPB 1000 milliGRAM(s) IV Intermittent every 12 hours  chlorhexidine 0.12% Liquid 15 milliLiter(s) Oral Mucosa two times a day  chlorhexidine 4% Liquid 1 Application(s) Topical <User Schedule>  dexAMETHasone  Injectable 6 milliGRAM(s) IV Push daily  dexMEDEtomidine Infusion 0.2 MICROgram(s)/kG/Hr (3.69 mL/Hr) IV Continuous <Continuous>  enoxaparin Injectable 40 milliGRAM(s) SubCutaneous daily  famotidine    Tablet 20 milliGRAM(s) Oral two times a day  fentaNYL   Infusion. 0.5 MICROgram(s)/kG/Hr (3.69 mL/Hr) IV Continuous <Continuous>  ipratropium 17 MICROgram(s) HFA Inhaler 1 Puff(s) Inhalation every 6 hours  LORazepam   Injectable 0.5 milliGRAM(s) IntraMuscular once  metoprolol tartrate 12.5 milliGRAM(s) Oral two times a day  norepinephrine Infusion 0.054 MICROgram(s)/kG/Min (7.5 mL/Hr) IV Continuous <Continuous>    MEDICATIONS  (PRN):  acetaminophen   Tablet .. 650 milliGRAM(s) Oral every 6 hours PRN Temp greater or equal to 38C (100.4F)      RADIOLOGY & ADDITIONAL STUDIES:

## 2020-12-15 NOTE — CHART NOTE - NSCHARTNOTEFT_GEN_A_CORE
RK: Updated the son on the patient's condition and answered all of his questions.    Will Elizabeth Jr. (son who has same name as patient)  [phone]

## 2020-12-15 NOTE — PROGRESS NOTE ADULT - ASSESSMENT
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 folic acid    HEENT: Oral care.     PULMONARY:  HOB @ 45 degrees.  Vent changes as follows: dec fio2 4%, SBT    CARDIOVASCULAR:  Target MAP-60. Wean levo. Avoid volume overload. Hold lasix for now. FREE WATER    GI: GI prophylaxis.  Feeding.  Bowel regimen     RENAL:  Follow up lytes.  Correct as needed. FUP CMP    INFECTIOUS DISEASE: Follow up cultures. Trend inflammatory markers. Repeat Procal. FU ID    HEMATOLOGICAL:  DVT prophylaxis. LE doppler     ENDOCRINE:  Follow up FS.  Insulin protocol if needed.    MUSCULOSKELETAL: bedrest    MICU monitoring  poor prognosis

## 2020-12-15 NOTE — PROGRESS NOTE ADULT - SUBJECTIVE AND OBJECTIVE BOX
WILLIAM ARAIZA  86y, Male    All available historical data reviewed    OVERNIGHT EVENTS:  no fevers  Fio2 40%  no pressors  no diarrhea  sedated, does respond    ROS:  unable to obtain history secondary to patient's mental status and/or sedation    VITALS:  T(F): 95, Max: 98.7 (12-14-20 @ 12:00)  HR: 60  BP: 75/52  RR: 21Vital Signs Last 24 Hrs  T(C): 35 (15 Dec 2020 08:00), Max: 37.1 (14 Dec 2020 12:00)  T(F): 95 (15 Dec 2020 08:00), Max: 98.7 (14 Dec 2020 12:00)  HR: 60 (15 Dec 2020 08:00) (60 - 64)  BP: 75/52 (15 Dec 2020 08:00) (68/48 - 142/81)  BP(mean): 59 (15 Dec 2020 08:00) (53 - 106)  RR: 21 (15 Dec 2020 08:00) (21 - 31)  SpO2: 100% (15 Dec 2020 08:00) (81% - 100%)    TESTS & MEASUREMENTS:                        10.5   7.86  )-----------( 93       ( 15 Dec 2020 04:51 )             35.0     12-15    153<H>  |  113<H>  |  56<H>  ----------------------------<  275<H>  3.7   |  31  |  1.3    Ca    8.6      15 Dec 2020 04:51  Phos  2.3     12-15  Mg     2.2     12-15    TPro  5.7<L>  /  Alb  2.9<L>  /  TBili  2.6<H>  /  DBili  x   /  AST  37  /  ALT  27  /  AlkPhos  83  12-15    LIVER FUNCTIONS - ( 15 Dec 2020 04:51 )  Alb: 2.9 g/dL / Pro: 5.7 g/dL / ALK PHOS: 83 U/L / ALT: 27 U/L / AST: 37 U/L / GGT: x             Culture - Blood (collected 12-09-20 @ 17:20)  Source: .Blood Blood-Peripheral  Final Report (12-14-20 @ 23:01):    No Growth Final    Culture - Fungal, Body Fluid (collected 12-08-20 @ 10:40)  Source: Pleural Fl Pleural Fluid  Preliminary Report (12-09-20 @ 11:19):    Testing in progress    Culture - Body Fluid with Gram Stain (collected 12-08-20 @ 10:40)  Source: Pleural Fl Pleural Fluid  Gram Stain (12-09-20 @ 02:45):    polymorphonuclear leukocytes seen    No organisms seen    by cytocentrifuge  Final Report (12-13-20 @ 18:05):    No growth at 5 days            RADIOLOGY & ADDITIONAL TESTS:  Personal review of radiological diagnostics performed  Echo and EKG results noted when applicable.     MEDICATIONS:  acetaminophen   Tablet .. 650 milliGRAM(s) Oral every 6 hours PRN  ALBUTerol    90 MICROgram(s) HFA Inhaler 2 Puff(s) Inhalation every 6 hours  aspirin 325 milliGRAM(s) Oral daily  atorvastatin 80 milliGRAM(s) Oral at bedtime  cefepime   IVPB 1000 milliGRAM(s) IV Intermittent every 12 hours  chlorhexidine 0.12% Liquid 15 milliLiter(s) Oral Mucosa two times a day  chlorhexidine 4% Liquid 1 Application(s) Topical <User Schedule>  dexAMETHasone  Injectable 6 milliGRAM(s) IV Push daily  dexMEDEtomidine Infusion 0.2 MICROgram(s)/kG/Hr IV Continuous <Continuous>  enoxaparin Injectable 40 milliGRAM(s) SubCutaneous daily  famotidine    Tablet 20 milliGRAM(s) Oral two times a day  fentaNYL   Infusion. 0.5 MICROgram(s)/kG/Hr IV Continuous <Continuous>  ipratropium 17 MICROgram(s) HFA Inhaler 1 Puff(s) Inhalation every 6 hours  LORazepam   Injectable 0.5 milliGRAM(s) IntraMuscular once  metoprolol tartrate 12.5 milliGRAM(s) Oral two times a day  norepinephrine Infusion 0.054 MICROgram(s)/kG/Min IV Continuous <Continuous>      ANTIBIOTICS:  cefepime   IVPB 1000 milliGRAM(s) IV Intermittent every 12 hours

## 2020-12-15 NOTE — PROGRESS NOTE ADULT - SUBJECTIVE AND OBJECTIVE BOX
WILLIAM ARAIZA 86y Male  MRN#: [id number]       SUBJECTIVE  Patient is a 86y old Male who presents with a chief complaint of COVID19+ (15 Dec 2020 09:48)      ***    Today is hospital day 8d and intubated 3d. Last night he failed SBT. He has been more alert and awake but still does not follow commands.    OBJECTIVE  PAST MEDICAL & SURGICAL HISTORY  Mild dementia    Chronic right-sided heart failure    Depression    Liver cirrhosis, alcoholic    Borderline diabetes mellitus    HTN (hypertension)    Afib    Presence of Watchman left atrial appendage closure device    AICD (automatic cardioverter/defibrillator) present    History of cholecystectomy    History of appendectomy    History of knee surgery    Deviated nasal septum      ALLERGIES:  No Known Allergies    MEDICATIONS:  STANDING MEDICATIONS  ALBUTerol    90 MICROgram(s) HFA Inhaler 2 Puff(s) Inhalation every 6 hours  aspirin 325 milliGRAM(s) Oral daily  atorvastatin 80 milliGRAM(s) Oral at bedtime  cefepime   IVPB 1000 milliGRAM(s) IV Intermittent every 12 hours  chlorhexidine 0.12% Liquid 15 milliLiter(s) Oral Mucosa two times a day  chlorhexidine 4% Liquid 1 Application(s) Topical <User Schedule>  dexAMETHasone  Injectable 6 milliGRAM(s) IV Push daily  dexMEDEtomidine Infusion 0.2 MICROgram(s)/kG/Hr IV Continuous <Continuous>  enoxaparin Injectable 40 milliGRAM(s) SubCutaneous daily  famotidine    Tablet 20 milliGRAM(s) Oral two times a day  fentaNYL   Infusion. 0.5 MICROgram(s)/kG/Hr IV Continuous <Continuous>  folic acid 1 milliGRAM(s) Enteral Tube daily  ipratropium 17 MICROgram(s) HFA Inhaler 1 Puff(s) Inhalation every 6 hours  LORazepam   Injectable 0.5 milliGRAM(s) IntraMuscular once  metoprolol tartrate 12.5 milliGRAM(s) Oral two times a day  norepinephrine Infusion 0.054 MICROgram(s)/kG/Min IV Continuous <Continuous>  thiamine Injectable 100 milliGRAM(s) IV Push daily    PRN MEDICATIONS  acetaminophen   Tablet .. 650 milliGRAM(s) Oral every 6 hours PRN      VITAL SIGNS: Last 24 Hours  T(C): 35.9 (15 Dec 2020 12:00), Max: 37 (14 Dec 2020 16:00)  T(F): 96.6 (15 Dec 2020 12:00), Max: 98.6 (14 Dec 2020 16:00)  HR: 60 (15 Dec 2020 14:00) (60 - 64)  BP: 119/71 (15 Dec 2020 14:00) (68/48 - 142/81)  BP(mean): 86 (15 Dec 2020 14:00) (53 - 106)  RR: 18 (15 Dec 2020 14:00) (10 - 31)  SpO2: 100% (15 Dec 2020 14:00) (81% - 100%)    LABS:                        10.5   7.86  )-----------( 93       ( 15 Dec 2020 04:51 )             35.0     12-15    153<H>  |  113<H>  |  56<H>  ----------------------------<  275<H>  3.7   |  31  |  1.3    Ca    8.6      15 Dec 2020 04:51  Phos  2.3     12-15  Mg     2.2     12-15    TPro  5.7<L>  /  Alb  2.9<L>  /  TBili  2.6<H>  /  DBili  x   /  AST  37  /  ALT  27  /  AlkPhos  83  12-15    PT/INR - ( 15 Dec 2020 04:51 )   PT: 14.90 sec;   INR: 1.30 ratio         PTT - ( 15 Dec 2020 04:51 )  PTT:44.2 sec    ABG - ( 15 Dec 2020 02:59 )  pH, Arterial: 7.44  pH, Blood: x     /  pCO2: 53    /  pO2: 96    / HCO3: 36    / Base Excess: 10.8  /  SaO2: 99                        RADIOLOGY:  reviewed    PHYSICAL EXAM:    GENERAL: sedated  HEENT:  Atraumatic, Normocephalic. EOMI, PERRLA, conjunctiva and sclera clear, No JVD  PULMONARY: b/l rales, no increased work of breathing  CARDIOVASCULAR: Regular rate and rhythm; No murmurs, rubs, or gallops  GASTROINTESTINAL: Soft, Nontender, Nondistended; Bowel sounds present  MUSCULOSKELETAL:  2+ Peripheral Pulses, No clubbing, cyanosis, or edema  NEUROLOGY: responds to pain b/l UE and LE but does not follow commands  SKIN: No rashes or lesions      ADMISSION SUMMARY  Patient is a 86y old Male who presents with a chief complaint of COVID19+ (15 Dec 2020 09:48)

## 2020-12-15 NOTE — PROGRESS NOTE ADULT - ASSESSMENT
ADMISSION SUMMARY  Patient is a 86y old Male who presents with a chief complaint of COVID19+ (14 Dec 2020 09:27)    ASSESSMENT & PLAN:     87y/o M with CHFrEF, severe MR/pulm HTN, Afib s/p watchman, HTN, hx of anemia presents with inc SOB with COVID 19 PNA.    #Acute hypoxic respiratory failure   #COVID19+ pneumonia  #pleural/pericardial  effusion  - SXS start date: ~11/30   -COVID-19 PCR: Detected (12-07)  -SpO2: 97% (12-12 @ 08:45) (93% - 97%)  -wbc 9.5 (11.79)  -Procalcitonin: 0.06 (12-09)  -CRP: 0.61 (12-10)  -D-dimer: 187 (12/14)  -LDH: 298 (12/10)  -Ferritin: 81 (12/10)  -Troponin: 0.01 (12-08)  Plan  - hypoxemic to 86% on RA, on venti mask 15L in AM >> Tachypneic in AM but more alert, was placed on BiPAP from NRB. Mentation and tachypnea (upto 35/min) worsened throughout day, w/ paradoxical breathing. Called family and decision made to intubate patient.  - F/u ABG in 1 hr  - LE Duplex  - Repeat inflammatory markers   - cont decadron  - cont Cefepime 1g Q12 hrs  - SBT failed yesterday, try again today  -cont precedex    #hypernatremia  -lasix was held and despite this, his Na did not improve so 200cc free water q6h was given and Na still did not improve  -we are now giving 300cc q4h of free water   -trend Na level    # Tropeniemia, resolved  - Trop 0.04 on admission, repeat 0.03, 0.01 this am, EKG no acute ST changes, no CP or sxs  - likely demand ischemia. monitor for now    # QT prolongation:  - QTc at admission was 616 ms  - repeat EKG showed QTc of 590 ms    #CHFpEF / Afib s/p watchman  - CT shows b/l effusions, R heart reflux, small pericardial effusion  - Repeat 2D echo showed normal LVEF, decreased RVEF ,  moderate pericardial effusion,  moderate pulm hypertension. spoke to CT surgery on phone, they wont do any intervention on the patient unless its tamponade or pt is on pressors  - right side pleural effusion, s/p thoracentesis, 1.5L removed. post procedure CXR showed trace pneumothorax x2, now resolved, Fluid is transudative according to light's criteria  - cont to hold lasix  - cont 12.5mg lopressor bid    #HTN  - hold losartan for possible angioedema/swollen lips    #HLD- c/w atorvastatin 80  #Dementia- c/w donepezil     #Incidental L renal lesion- outpt renal US follow-up    DVT ppx: heparin subq, duplex negative 12/12/20  GI ppx: pepcid --- d/c protonix  Diet: DASH  Activity: as tolerated  Dispo: acute  FULL CODE

## 2020-12-16 NOTE — PROGRESS NOTE ADULT - ASSESSMENT
87y/o M with PMH of CHFrEF & severe MR/pulm HTN, Afib s/p watchman 2019 not on AC, HTN, hx of chronic anemia presents to ED with 'blue lips' and SOB. Pt was known COVID+ for 1 week and had increased sxs over past few days but can't really give details. He also states that for 1mo he hasn't felt well, general sense of 'malaise' and notes that his lips have been extremely chapped, mildly swollen, and 'blue'. He saw his PCP who didn't know what it was and suggested coming to ED. Denied CP, palpitations, leg pains, nausea/vomiting/diarrhea. Has some mild abd pain in LLL crampy. Pt overall is poor historian for his sxs.    IMPRESSION;  COVID 19 with severe illness > critical illness on MV 12/12 with ARDS  Pt is in the late inflammatory response phase ot the illness based on the onset of symptoms. ( possibly one month )  CT with right pleural effusion with opacities b/l  s/p rigth thoracocentesis 12/8 with 1500 ccs fluid. Cultures NG  Possible pericardial effusion  Ddimers 807>456>695>555  Ferritin 56>81>101  procal 0.08>0.16  CRP 0.76>1.39  SOB secondary to right pleural effusion /pneumo/pericardial effusion  BCx 12/8,9  NG    RECOMMENDATIONS;  D/c Cefepime   Dexamethasone 6 mg iv q24h for 10 days ( or until discharge ) or equivalent glucocorticoid dose may be substituted. Equivalent total dosis of alternative steroids are Methylprednisolone 32 mg and prednisone 40 mg q24h.  Monitor for side effects: hyperglycemia, neurological ( agitation/confusion), adrenal suppression, bacterial and fungal infections  Anticoagulation as per team

## 2020-12-16 NOTE — CHART NOTE - NSCHARTNOTEFT_GEN_A_CORE
RK: Patient has been refusing NG tube. We will give d5 50ml/hr for now instead of the free water. He's still pending speech and swallow evaluation. Will change meds to IV where it is possible.

## 2020-12-16 NOTE — PROGRESS NOTE ADULT - SUBJECTIVE AND OBJECTIVE BOX
WILLIAM ARAIZA 86y Male  MRN#: [id number]       SUBJECTIVE  Patient is a 86y old Male who presents with a chief complaint of COVID19+ (16 Dec 2020 09:06)      ***    Today is hospital day 9, and intubated day 4. He failed SBT yesterday. We will try again today. He has not had a bowel movement since 12/12/20, so we will add miralax to his senna.     No acute overnight events.     OBJECTIVE  PAST MEDICAL & SURGICAL HISTORY  Mild dementia    Chronic right-sided heart failure    Depression    Liver cirrhosis, alcoholic    Borderline diabetes mellitus    HTN (hypertension)    Afib    Presence of Watchman left atrial appendage closure device    AICD (automatic cardioverter/defibrillator) present    History of cholecystectomy    History of appendectomy    History of knee surgery    Deviated nasal septum      ALLERGIES:  No Known Allergies    MEDICATIONS:  STANDING MEDICATIONS  ALBUTerol    90 MICROgram(s) HFA Inhaler 2 Puff(s) Inhalation every 6 hours  aspirin 325 milliGRAM(s) Oral daily  atorvastatin 80 milliGRAM(s) Oral at bedtime  cefepime   IVPB 1000 milliGRAM(s) IV Intermittent every 12 hours  chlorhexidine 0.12% Liquid 15 milliLiter(s) Oral Mucosa two times a day  chlorhexidine 4% Liquid 1 Application(s) Topical <User Schedule>  dexMEDEtomidine Infusion 0.2 MICROgram(s)/kG/Hr IV Continuous <Continuous>  enoxaparin Injectable 40 milliGRAM(s) SubCutaneous daily  famotidine    Tablet 20 milliGRAM(s) Oral two times a day  fentaNYL   Infusion. 0.5 MICROgram(s)/kG/Hr IV Continuous <Continuous>  folic acid 1 milliGRAM(s) Enteral Tube daily  ipratropium 17 MICROgram(s) HFA Inhaler 1 Puff(s) Inhalation every 6 hours  LORazepam   Injectable 0.5 milliGRAM(s) IntraMuscular once  metoprolol tartrate 12.5 milliGRAM(s) Oral two times a day  norepinephrine Infusion 0.054 MICROgram(s)/kG/Min IV Continuous <Continuous>  senna 2 Tablet(s) Oral at bedtime  thiamine Injectable 100 milliGRAM(s) IV Push daily    PRN MEDICATIONS  acetaminophen   Tablet .. 650 milliGRAM(s) Oral every 6 hours PRN  polyethylene glycol 3350 17 Gram(s) Oral daily PRN      VITAL SIGNS: Last 24 Hours  T(C): 36.1 (16 Dec 2020 12:00), Max: 36.6 (16 Dec 2020 04:00)  T(F): 97 (16 Dec 2020 12:00), Max: 97.9 (16 Dec 2020 04:00)  HR: 60 (16 Dec 2020 14:00) (58 - 92)  BP: 92/59 (16 Dec 2020 14:00) (81/54 - 131/79)  BP(mean): 72 (16 Dec 2020 14:00) (62 - 95)  RR: 25 (16 Dec 2020 14:00) (15 - 35)  SpO2: 99% (16 Dec 2020 14:00) (92% - 100%)    LABS:                        10.3   6.23  )-----------( 54       ( 16 Dec 2020 04:34 )             34.3     12-16    148<H>  |  111<H>  |  45<H>  ----------------------------<  265<H>  4.0   |  31  |  1.0    Ca    8.9      16 Dec 2020 12:05  Phos  2.3     12-15  Mg     2.2     12-16    TPro  5.2<L>  /  Alb  2.5<L>  /  TBili  1.9<H>  /  DBili  x   /  AST  29  /  ALT  25  /  AlkPhos  72  12-16    PT/INR - ( 16 Dec 2020 04:34 )   PT: 15.40 sec;   INR: 1.34 ratio         PTT - ( 16 Dec 2020 04:34 )  PTT:39.9 sec    ABG - ( 16 Dec 2020 12:40 )  pH, Arterial: 7.51  pH, Blood: x     /  pCO2: 44    /  pO2: 96    / HCO3: 35    / Base Excess: 10.8  /  SaO2: 98              RADIOLOGY:  reviewed    PHYSICAL EXAM:    GENERAL: sedated  HEENT:  Atraumatic, Normocephalic. EOMI, PERRLA, conjunctiva and sclera clear, No JVD  PULMONARY: b/l rales, no increased work of breathing  CARDIOVASCULAR: Regular rate and rhythm; No murmurs, rubs, or gallops  GASTROINTESTINAL: Soft, Nontender, Nondistended; Bowel sounds present  MUSCULOSKELETAL:  2+ Peripheral Pulses, No clubbing, cyanosis, or edema  NEUROLOGY: responds to pain b/l UE and LE but does not follow commands  SKIN: No rashes or lesions      ADMISSION SUMMARY  Patient is a 86y old Male who presents with a chief complaint of COVID19+ (16 Dec 2020 09:06)

## 2020-12-16 NOTE — CHART NOTE - NSCHARTNOTEFT_GEN_A_CORE
RK: Updated the patient's son about the patient's condition, including that he passed SBT and was extubated, and his hypernatremia improving. Answered all of his questions.

## 2020-12-16 NOTE — PROGRESS NOTE ADULT - SUBJECTIVE AND OBJECTIVE BOX
OVERNIGHT EVENTS: hosp 9, int 4, still intubated, ventilated, on precedex, on lebophed    Vital Signs Last 24 Hrs  T(C): 35.8 (16 Dec 2020 08:00), Max: 36.6 (16 Dec 2020 04:00)  T(F): 96.5 (16 Dec 2020 08:00), Max: 97.9 (16 Dec 2020 04:00)  HR: 60 (16 Dec 2020 08:00) (58 - 92)  BP: 100/65 (16 Dec 2020 08:00) (75/57 - 131/79)  BP(mean): 76 (16 Dec 2020 08:00) (57 - 95)  RR: 25 (16 Dec 2020 08:00) (10 - 35)  SpO2: 100% (16 Dec 2020 08:00) (81% - 100%)    PHYSICAL EXAMINATION:    GENERAL: awake, follows commands    HEENT: Head is normocephalic and atraumatic.     NECK: Supple.    LUNGS: bl crackles    HEART: BALDEV 3/6    ABDOMEN: Soft, nontender, and nondistended.      EXTREMITIES: Without any cyanosis, clubbing, rash, lesions or edema.    NEUROLOGIC: Grossly intact.    SKIN: No ulceration or induration present.      LABS:                        10.3   6.23  )-----------( 54       ( 16 Dec 2020 04:34 )             34.3     12-16    150<H>  |  112<H>  |  50<H>  ----------------------------<  190<H>  3.8   |  35<H>  |  1.2    Ca    8.9      16 Dec 2020 04:34  Phos  2.3     12-15  Mg     2.2     12-16    TPro  5.3<L>  /  Alb  2.6<L>  /  TBili  2.0<H>  /  DBili  x   /  AST  31  /  ALT  25  /  AlkPhos  73  12-16    PT/INR - ( 16 Dec 2020 04:34 )   PT: 15.40 sec;   INR: 1.34 ratio         PTT - ( 16 Dec 2020 04:34 )  PTT:39.9 sec    ABG - ( 16 Dec 2020 02:22 )  pH, Arterial: 7.51  pH, Blood: x     /  pCO2: 46    /  pO2: 82    / HCO3: 36    / Base Excess: 11.8  /  SaO2: 96        22/420/40/8            D-Dimer Assay, Quantitative: 555 ng/mL DDU (12-15-20 @ 17:20)    Serum Pro-Brain Natriuretic Peptide: 2707 pg/mL (12-15-20 @ 17:20)  Serum Pro-Brain Natriuretic Peptide: 5587 pg/mL (12-13-20 @ 20:32)            12-15-20 @ 07:01  -  12-16-20 @ 07:00  --------------------------------------------------------  IN: 3309.1 mL / OUT: 1355 mL / NET: 1954.1 mL    12-16-20 @ 07:01  -  12-16-20 @ 08:27  --------------------------------------------------------  IN: 9 mL / OUT: 0 mL / NET: 9 mL        MICROBIOLOGY:      MEDICATIONS  (STANDING):  ALBUTerol    90 MICROgram(s) HFA Inhaler 2 Puff(s) Inhalation every 6 hours  aspirin 325 milliGRAM(s) Oral daily  atorvastatin 80 milliGRAM(s) Oral at bedtime  cefepime   IVPB 1000 milliGRAM(s) IV Intermittent every 12 hours  chlorhexidine 0.12% Liquid 15 milliLiter(s) Oral Mucosa two times a day  chlorhexidine 4% Liquid 1 Application(s) Topical <User Schedule>  dexAMETHasone  Injectable 6 milliGRAM(s) IV Push daily  dexMEDEtomidine Infusion 0.2 MICROgram(s)/kG/Hr (3.69 mL/Hr) IV Continuous <Continuous>  enoxaparin Injectable 40 milliGRAM(s) SubCutaneous daily  famotidine    Tablet 20 milliGRAM(s) Oral two times a day  fentaNYL   Infusion. 0.5 MICROgram(s)/kG/Hr (3.69 mL/Hr) IV Continuous <Continuous>  folic acid 1 milliGRAM(s) Enteral Tube daily  ipratropium 17 MICROgram(s) HFA Inhaler 1 Puff(s) Inhalation every 6 hours  LORazepam   Injectable 0.5 milliGRAM(s) IntraMuscular once  metoprolol tartrate 12.5 milliGRAM(s) Oral two times a day  norepinephrine Infusion 0.054 MICROgram(s)/kG/Min (7.5 mL/Hr) IV Continuous <Continuous>  senna 2 Tablet(s) Oral at bedtime  thiamine Injectable 100 milliGRAM(s) IV Push daily    MEDICATIONS  (PRN):  acetaminophen   Tablet .. 650 milliGRAM(s) Oral every 6 hours PRN Temp greater or equal to 38C (100.4F)  polyethylene glycol 3350 17 Gram(s) Oral daily PRN Constipation      RADIOLOGY & ADDITIONAL STUDIES:

## 2020-12-16 NOTE — PROGRESS NOTE ADULT - ASSESSMENT
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 folic acid    HEENT: Oral care.     PULMONARY:  HOB @ 45 degrees.  Vent changes as follows: dec fio2 4%, SBT    CARDIOVASCULAR:  Target MAP-60. Wean levo. Avoid volume overload. Hold lasix for now. FREE WATER, REPEAT ECHO    GI: GI prophylaxis.  Feeding.  Bowel regimen     RENAL:  Follow up lytes.  Correct as needed. FUP CMP    INFECTIOUS DISEASE: Follow up cultures. Trend inflammatory markers. Repeat Procal. FU ID    HEMATOLOGICAL:  DVT prophylaxis. LE doppler -    ENDOCRINE:  Follow up FS.  Insulin protocol if needed.    MUSCULOSKELETAL: bedrest    MICU monitoring  poor prognosis

## 2020-12-16 NOTE — PROGRESS NOTE ADULT - SUBJECTIVE AND OBJECTIVE BOX
WILLIAM ARAIZA  86y, Male    All available historical data reviewed    OVERNIGHT EVENTS:  no fevers  remains non responsive  fio2 40%  no diarrhea  no pressors    ROS:  unable to obtain history secondary to patient's mental status and/or sedation    VITALS:  T(F): 96.5, Max: 97.9 (12-16-20 @ 04:00)  HR: 60  BP: 100/65  RR: 25Vital Signs Last 24 Hrs  T(C): 35.8 (16 Dec 2020 08:00), Max: 36.6 (16 Dec 2020 04:00)  T(F): 96.5 (16 Dec 2020 08:00), Max: 97.9 (16 Dec 2020 04:00)  HR: 60 (16 Dec 2020 08:00) (58 - 92)  BP: 100/65 (16 Dec 2020 08:00) (75/57 - 131/79)  BP(mean): 76 (16 Dec 2020 08:00) (57 - 95)  RR: 25 (16 Dec 2020 08:00) (10 - 35)  SpO2: 100% (16 Dec 2020 08:00) (81% - 100%)    TESTS & MEASUREMENTS:                        10.3   6.23  )-----------( 54       ( 16 Dec 2020 04:34 )             34.3     12-16    150<H>  |  112<H>  |  50<H>  ----------------------------<  190<H>  3.8   |  35<H>  |  1.2    Ca    8.9      16 Dec 2020 04:34  Phos  2.3     12-15  Mg     2.2     12-16    TPro  5.3<L>  /  Alb  2.6<L>  /  TBili  2.0<H>  /  DBili  x   /  AST  31  /  ALT  25  /  AlkPhos  73  12-16    LIVER FUNCTIONS - ( 16 Dec 2020 04:34 )  Alb: 2.6 g/dL / Pro: 5.3 g/dL / ALK PHOS: 73 U/L / ALT: 25 U/L / AST: 31 U/L / GGT: x             Culture - Blood (collected 12-09-20 @ 17:20)  Source: .Blood Blood-Peripheral  Final Report (12-14-20 @ 23:01):    No Growth Final            RADIOLOGY & ADDITIONAL TESTS:  Personal review of radiological diagnostics performed  Echo and EKG results noted when applicable.     MEDICATIONS:  acetaminophen   Tablet .. 650 milliGRAM(s) Oral every 6 hours PRN  ALBUTerol    90 MICROgram(s) HFA Inhaler 2 Puff(s) Inhalation every 6 hours  aspirin 325 milliGRAM(s) Oral daily  atorvastatin 80 milliGRAM(s) Oral at bedtime  cefepime   IVPB 1000 milliGRAM(s) IV Intermittent every 12 hours  chlorhexidine 0.12% Liquid 15 milliLiter(s) Oral Mucosa two times a day  chlorhexidine 4% Liquid 1 Application(s) Topical <User Schedule>  dexMEDEtomidine Infusion 0.2 MICROgram(s)/kG/Hr IV Continuous <Continuous>  enoxaparin Injectable 40 milliGRAM(s) SubCutaneous daily  famotidine    Tablet 20 milliGRAM(s) Oral two times a day  fentaNYL   Infusion. 0.5 MICROgram(s)/kG/Hr IV Continuous <Continuous>  folic acid 1 milliGRAM(s) Enteral Tube daily  ipratropium 17 MICROgram(s) HFA Inhaler 1 Puff(s) Inhalation every 6 hours  LORazepam   Injectable 0.5 milliGRAM(s) IntraMuscular once  metoprolol tartrate 12.5 milliGRAM(s) Oral two times a day  norepinephrine Infusion 0.054 MICROgram(s)/kG/Min IV Continuous <Continuous>  polyethylene glycol 3350 17 Gram(s) Oral daily PRN  senna 2 Tablet(s) Oral at bedtime  thiamine Injectable 100 milliGRAM(s) IV Push daily      ANTIBIOTICS:  cefepime   IVPB 1000 milliGRAM(s) IV Intermittent every 12 hours

## 2020-12-16 NOTE — PROGRESS NOTE ADULT - ASSESSMENT
85y/o M with CHFrEF, severe MR/pulm HTN, Afib s/p watchman, HTN, hx of anemia presents with inc SOB with COVID 19 PNA.    #Acute hypoxic respiratory failure   #COVID19+ pneumonia  #pleural/pericardial  effusion  - SXS start date: ~11/30   -COVID-19 PCR: Detected (12-07)  -SpO2: 97% (12-12 @ 08:45) (93% - 97%)  -wbc 9.5 (11.79)  -Procalcitonin: 0.06 (12-09)  -CRP: 0.61 (12-10)  -D-dimer: 187 (12/14)  -LDH: 298 (12/10)  -Ferritin: 81 (12/10)  -Troponin: 0.01 (12-08)  -failed SBT, will try again today and extubate if he passes  -we will give lasix prior to extubation if it happens today  - F/u ABG in 1 hr  - LE Duplex  - Repeat inflammatory markers   - taper decadron to 4mg daily down from 6mg  - cont Cefepime 1g Q12 hrs  -cont precedex    #hypernatremia  -cont 300cc q4h of free water   -trend Na level  -his Na level is downtrending from 153 yesterday to 148 today    #constipation  -last BM on 12/12/20  -add miralax to his senna to encourage BM    # Tropeniemia, resolved  - Trop 0.04 on admission, repeat 0.03, 0.01 this am, EKG no acute ST changes, no CP or sxs  - likely demand ischemia. monitor for now    # QT prolongation:  - QTc at admission was 616 ms  - repeat EKG showed QTc of 590 ms    #CHFpEF / Afib s/p watchman  - CT shows b/l effusions, R heart reflux, small pericardial effusion  - Repeat 2D echo showed normal LVEF, decreased RVEF ,  moderate pericardial effusion,  moderate pulm hypertension. spoke to CT surgery on phone, they wont do any intervention on the patient unless its tamponade or pt is on pressors  - right side pleural effusion, s/p thoracentesis, 1.5L removed. post procedure CXR showed trace pneumothorax x2, now resolved, Fluid is transudative according to light's criteria  - cont to hold lasix  - cont 12.5mg lopressor bid    #HTN  - hold losartan for possible angioedema/swollen lips    #HLD- c/w atorvastatin 80  #Dementia- c/w donepezil     #Incidental L renal lesion- outpt renal US follow-up    DVT ppx: heparin subq, duplex negative 12/12/20  GI ppx: pepcid --- d/c protonix  Diet: DASH  Activity: as tolerated  Dispo: acute  FULL CODE

## 2020-12-16 NOTE — ADVANCED PRACTICE NURSE CONSULT - RECOMMEDATIONS
[facility] service to follow-up with patient when available. Questions or concerns, please contact [employer] [phone].

## 2020-12-17 NOTE — PROGRESS NOTE ADULT - ASSESSMENT
Assessment and Plan:   · Assessment	  85y/o M with CHFrEF, severe MR/pulm HTN, Afib s/p watchman, HTN, hx of anemia presents with inc SOB with COVID 19 PNA.    #Acute hypoxic respiratory failure   #COVID19+ pneumonia  #pleural/pericardial  effusion  - SXS start date: ~11/30   -COVID-19 PCR: Detected (12-07)  -SpO2: 97% (12-12 @ 08:45) (93% - 97%)  - taper decadron to 4mg daily down from 6mg  -d/c cefepime per ID recs  -cont precedex  -urine and blood cx negative  -procal 0.08 (down from 0.16)  -dimer 555 (down from 587)    #hypernatremia  -free water was not given due to pt denying ng tube and speech+swallow evaluation consult still pending  -gave half n/s yesterday but today will give d5w 60cc/hr  -trend Na level  -Na went up from 145 to 151    #constipation  -last BM on 12/12/20  -cont miralax and senna    # Tropeniemia, resolved  - Trop 0.04 on admission, repeat 0.03, 0.01 this am, EKG no acute ST changes, no CP or sxs  - likely demand ischemia. monitor for now    # QT prolongation:  - QTc at admission was 616 ms  - repeat EKG showed QTc of 590 ms    #CHFpEF / Afib s/p watchman  - CT shows b/l effusions, R heart reflux, small pericardial effusion  - Repeat 2D echo showed normal LVEF, decreased RVEF ,  moderate pericardial effusion,  moderate pulm hypertension. spoke to CT surgery on phone, they wont do any intervention on the patient unless its tamponade or pt is on pressors  - right side pleural effusion, s/p thoracentesis, 1.5L removed. post procedure CXR showed trace pneumothorax x2, now resolved, Fluid is transudative according to light's criteria  - cont to hold lasix  - 12.5mg po bid lopressor switched  to 2.5mg iv bid lopressor due to pt denying ng tube    #HTN  - hold losartan for possible angioedema/swollen lips    #HLD- c/w atorvastatin 80  #Dementia- c/w donepezil     #Incidental L renal lesion- outpt renal US follow-up    DVT ppx: heparin subq, duplex negative 12/12/20  GI ppx: pepcid IV push 20mg big  Diet: DASH  Activity: as tolerated  Dispo: acute  FULL CODE Assessment and Plan:   · Assessment	  87y/o M with CHFrEF, severe MR/pulm HTN, Afib s/p watchman, HTN, hx of anemia presents with inc SOB with COVID 19 PNA.    #Acute hypoxic respiratory failure   #COVID19+ pneumonia  #pleural/pericardial  effusion  - SXS start date: ~11/30   -COVID-19 PCR: Detected (12-07)  -SpO2: 97% (12-12 @ 08:45) (93% - 97%)  - taper decadron to 4mg daily down from 6mg  -d/c cefepime per ID recs  -cont precedex  -urine and blood cx negative  -procal 0.08 (down from 0.16)  -dimer 555 (down from 587)    #hypernatremia  -free water was not given due to pt denying ng tube and speech+swallow evaluation consult will be held for now since pt is back on bipap, will replace consult if pt can tolerate high flow  -gave half n/s yesterday but today will give d5w 60cc/hr  -trend Na level  -Na went up from 145 to 151    #constipation  -last BM on 12/12/20  -cont miralax and senna    # Tropeniemia, resolved  - Trop 0.04 on admission, repeat 0.03, 0.01 this am, EKG no acute ST changes, no CP or sxs  - likely demand ischemia. monitor for now    # QT prolongation:  - QTc at admission was 616 ms  - repeat EKG showed QTc of 590 ms    #CHFpEF / Afib s/p watchman  - CT shows b/l effusions, R heart reflux, small pericardial effusion  - Repeat 2D echo showed normal LVEF, decreased RVEF ,  moderate pericardial effusion,  moderate pulm hypertension. spoke to CT surgery on phone, they wont do any intervention on the patient unless its tamponade or pt is on pressors  - right side pleural effusion, s/p thoracentesis, 1.5L removed. post procedure CXR showed trace pneumothorax x2, now resolved, Fluid is transudative according to light's criteria  - cont to hold lasix  - 12.5mg po bid lopressor switched  to 2.5mg iv bid lopressor due to pt denying ng tube    #HTN  - hold losartan for possible angioedema/swollen lips    #HLD- c/w atorvastatin 80  #Dementia- c/w donepezil     #Incidental L renal lesion- outpt renal US follow-up    DVT ppx: heparin subq, duplex negative 12/12/20  GI ppx: pepcid IV push 20mg big  Diet: DASH  Activity: as tolerated  Dispo: acute  FULL CODE

## 2020-12-17 NOTE — PROGRESS NOTE ADULT - SUBJECTIVE AND OBJECTIVE BOX
WILLIAM ARAIZA 86y Male  MRN#: [id number]       SUBJECTIVE  Patient is a 86y old Male who presents with a chief complaint of COVID19+ (17 Dec 2020 08:34)      ***    Today is hospital day 10d. He was extubated yesterday. He was agitated overnight and pulled his peripheral IV line, NG tube and high flow off. B/l wrist restraints was placed, verbal deescalation was done by myself and nurse facetimed patient's son. He was given 1 dose of ativan overnight. He was declared DNR/DNI overnight and family is aware. His PO meds were converted to IV and he can't have free water b/c he denies ng tube so he is getting fluid instead.      OBJECTIVE  PAST MEDICAL & SURGICAL HISTORY  Mild dementia    Chronic right-sided heart failure    Depression    Liver cirrhosis, alcoholic    Borderline diabetes mellitus    HTN (hypertension)    Afib    Presence of Watchman left atrial appendage closure device    AICD (automatic cardioverter/defibrillator) present    History of cholecystectomy    History of appendectomy    History of knee surgery    Deviated nasal septum      ALLERGIES:  No Known Allergies    MEDICATIONS:  STANDING MEDICATIONS  ALBUTerol    90 MICROgram(s) HFA Inhaler 2 Puff(s) Inhalation every 6 hours  aspirin 325 milliGRAM(s) Oral daily  atorvastatin 80 milliGRAM(s) Oral at bedtime  chlorhexidine 4% Liquid 1 Application(s) Topical <User Schedule>  dexAMETHasone  Injectable 4 milliGRAM(s) IV Push daily  dexMEDEtomidine Infusion 0.2 MICROgram(s)/kG/Hr IV Continuous <Continuous>  dextrose 5%. 1000 milliLiter(s) IV Continuous <Continuous>  enoxaparin Injectable 40 milliGRAM(s) SubCutaneous daily  famotidine Injectable 20 milliGRAM(s) IV Push every 12 hours  folic acid 1 milliGRAM(s) Enteral Tube daily  ipratropium 17 MICROgram(s) HFA Inhaler 1 Puff(s) Inhalation every 6 hours  LORazepam   Injectable 0.5 milliGRAM(s) IntraMuscular once  metoprolol tartrate Injectable 2.5 milliGRAM(s) IV Push every 12 hours  norepinephrine Infusion 0.054 MICROgram(s)/kG/Min IV Continuous <Continuous>  senna 2 Tablet(s) Oral at bedtime  thiamine Injectable 100 milliGRAM(s) IV Push daily    PRN MEDICATIONS  acetaminophen   Tablet .. 650 milliGRAM(s) Oral every 6 hours PRN  polyethylene glycol 3350 17 Gram(s) Oral daily PRN      VITAL SIGNS: Last 24 Hours  T(C): 36.8 (17 Dec 2020 08:00), Max: 36.8 (17 Dec 2020 08:00)  T(F): 98.2 (17 Dec 2020 08:00), Max: 98.2 (17 Dec 2020 08:00)  HR: 58 (17 Dec 2020 08:37) (58 - 68)  BP: 110/65 (17 Dec 2020 08:00) (69/42 - 127/73)  BP(mean): 84 (17 Dec 2020 08:00) (49 - 96)  RR: 26 (17 Dec 2020 08:00) (21 - 42)  SpO2: 99% (17 Dec 2020 08:37) (75% - 100%)    LABS:                        9.7    9.05  )-----------( 57       ( 17 Dec 2020 04:45 )             33.0     12-17    151<H>  |  113<H>  |  45<H>  ----------------------------<  151<H>  3.8   |  31  |  1.3    Ca    8.7      17 Dec 2020 04:45  Phos  1.6     12-17  Mg     2.2     12-17    TPro  5.5<L>  /  Alb  2.7<L>  /  TBili  2.4<H>  /  DBili  x   /  AST  37  /  ALT  27  /  AlkPhos  79  12-17    PT/INR - ( 17 Dec 2020 04:45 )   PT: 14.60 sec;   INR: 1.27 ratio         PTT - ( 17 Dec 2020 04:45 )  PTT:35.2 sec    ABG - ( 16 Dec 2020 17:01 )  pH, Arterial: 7.50  pH, Blood: x     /  pCO2: 45    /  pO2: 188   / HCO3: 36    / Base Excess: 11.3  /  SaO2: 99                        RADIOLOGY:  reviewed    PHYSICAL EXAM:    GENERAL: NAD, well-developed, AAOx3, extubated on bipap  HEENT:  Atraumatic, Normocephalic. EOMI, PERRLA, conjunctiva and sclera clear, No JVD  PULMONARY: Clear to auscultation bilaterally; No wheeze  CARDIOVASCULAR: Regular rate and rhythm; No murmurs, rubs, or gallops  GASTROINTESTINAL: Soft, Nontender, Nondistended; Bowel sounds present  MUSCULOSKELETAL:  2+ Peripheral Pulses, No clubbing, cyanosis, or edema  NEUROLOGY: non-focal  SKIN: No rashes or lesions      ADMISSION SUMMARY  Patient is a 86y old Male who presents with a chief complaint of COVID19+ (17 Dec 2020 08:34)

## 2020-12-17 NOTE — CHART NOTE - NSCHARTNOTEFT_GEN_A_CORE
Registered Dietitian Follow-Up    ***Scroll to the bottom for RD recommendation***    Patient Profile Reviewed                           Yes [x]   No []  Nutrition History Previously Obtained        Yes []  No [x]          PERTINENT SUBJECTIVE INFORMATION:  - this is documented remotely  - pt was extubated 12/16, but became agitated overnight, pulled peripheral IV, NG, and HFNC. Ativan given.  - Per RN on the phone this morning 12/17, SLP unable to assess pt due to agitation, may need to be re-intubated today.  - on Bipap for now.   - The last rates when pt was still intubated was Ve 10.3, Temp 36.8c, BP 11/65, MAP 84. Therefore, will use this for calculation.          PERTINENT MEDICAL INFORMATIONS:  (1) Pt extubated 12/16, but became agitated, possible re-intubation 12/17.  (2) was declared DNR/DNI by family 12/16.  (3) COVID19 infection. Large R sided Pleural effusion s/p thoracentesis 12/8.   (4) Wean Levo.   (5) s/p SLP 12/17 - unable to assess.           DIET ORDER:  NPO as of 12/17.        ANTHROPOMETRICS:  - Ht.  175.3cm  - Wt.    (12/12): 73.7kg  (12/17): 71.2kg - suspecting possible edema masking wt loss, will monitor.  - BMI. 24.0  - IBW         PERTINENT LAB DATA:  12/17: h/h 9.7/33.0, Na 151, Chloride 113, BUN 45, Glucose 151, Albumin 2.7, GFR 49, Phos 1.6  PERTINENT MEDS:   aspirin, enoxaparin, d5w, precedex, famotidine, metoprolol, b9, miralax, b1, levophed, atorvastatin, acetaminophen      PHYSICAL FINDINGS  - APPEARANCE:        agitated, mental status likely altered, 1+ b/l hand edema  - GI FUNCTION:        LBM 12/12 per EMR  - TUBES:                       - ORAL/MOUTH:      NPO  - SKIN:                        stage 2 pressure ulcer to sacrum        NUTRITION REQUIREMENTS  WEIGHT USED:                          ABW: 73.7kg (12/12)  ESTIMATED ENERGY NEEDS:       CONTINUE [  ]      ADJUST [ x ] - as of 12/17, will aim for pt being re-intubated as pt is agitated and likely going that path    ESTIMATED ENERGY NEEDS:         1604 kcal/day ([id number])  ESTIMATED PROTEIN NEEDS:        88-108g/day (1.2-1.4 g/kg of ABW) will aim towards the lower end as pt with fair renal function  ESTIMATED FLUID NEEDS:             Fluid per ICU team    CURRENT NUTRIENT NEEDS:    NPO          [ x ] PREVIOUS NUTRITION DIAGNOSIS:   (1) Inadequate protein-energy intake            [ x ] ONGOING        [  ] RESOLVED           PATIENT INTERVENTION:    [  ] ORAL        [x ] EN/TF     GOAL/EXPECTED OUTCOME:     pt to meet and tolerate >85% of estimated kcal/protein via TF upon f/u in 4 days when TF is resumed. Pt to have 1BM/day.  INDICATOR/MONITORING:       RD to monitor diet order, energy intake, body composition, nutrition focused physical findings (EN tolerance, s/s, renal profile, glucose profile, electrolytes)  NUTRITION INTERVENTION:        enteral nutrition. Coordination of care.      RECS: (1) Please check HgbA1c % level. ?T2DM hx. (2) If pt to be re-intubated, then Tube feed to start, order OSMOLITE 1.5 at 280ml q6hr (each feed runs for 1.5 hr) with Beneprotein x3/day. This regimen gives a TOTAL of 1730 kcal/ 87g protein/ 1980mg K/ 850mL free water, additional flushes per ICU team.  (3) If pt to pass SLP eval, order diet per SLP.

## 2020-12-17 NOTE — PROGRESS NOTE ADULT - SUBJECTIVE AND OBJECTIVE BOX
OVERNIGHT EVENTS: events noted, sp extubation, agitation, on BIPAP sp ativan    Vital Signs Last 24 Hrs  T(C): 36.8 (17 Dec 2020 08:00), Max: 36.8 (17 Dec 2020 08:00)  T(F): 98.2 (17 Dec 2020 08:00), Max: 98.2 (17 Dec 2020 08:00)  HR: 64 (17 Dec 2020 08:00) (60 - 68)  BP: 110/65 (17 Dec 2020 08:00) (69/42 - 127/73)  BP(mean): 84 (17 Dec 2020 08:00) (49 - 96)  RR: 26 (17 Dec 2020 08:00) (21 - 42)  SpO2: 99% (17 Dec 2020 08:00) (75% - 100%)    PHYSICAL EXAMINATION:    GENERAL: ill intubated    HEENT: Head is normocephalic and atraumatic.     NECK: Supple.    LUNGS: bl crackles    HEART: BALDEV 3/6    ABDOMEN: Soft, nontender, and nondistended.      EXTREMITIES: Without any cyanosis, clubbing, rash, lesions or edema.    NEUROLOGIC: NON FOCAL    SKIN: No ulceration or induration present.      LABS:                        9.7    9.05  )-----------( 57       ( 17 Dec 2020 04:45 )             33.0     12-17    151<H>  |  113<H>  |  45<H>  ----------------------------<  151<H>  3.8   |  31  |  1.3    Ca    8.7      17 Dec 2020 04:45  Phos  1.6     12-17  Mg     2.2     12-17    TPro  5.5<L>  /  Alb  2.7<L>  /  TBili  2.4<H>  /  DBili  x   /  AST  37  /  ALT  27  /  AlkPhos  79  12-17    PT/INR - ( 17 Dec 2020 04:45 )   PT: 14.60 sec;   INR: 1.27 ratio         PTT - ( 17 Dec 2020 04:45 )  PTT:35.2 sec    ABG - ( 16 Dec 2020 17:01 )  pH, Arterial: 7.50  pH, Blood: x     /  pCO2: 45    /  pO2: 188   / HCO3: 36    / Base Excess: 11.3  /  SaO2: 99                      Serum Pro-Brain Natriuretic Peptide: 2707 pg/mL (12-15-20 @ 17:20)      Procalcitonin, Serum: 0.08 ng/mL (12-16-20 @ 16:55)        12-16-20 @ 07:01  -  12-17-20 @ 07:00  --------------------------------------------------------  IN: 1244.6 mL / OUT: 1255 mL / NET: -10.4 mL    12-17-20 @ 07:01  -  12-17-20 @ 08:35  --------------------------------------------------------  IN: 100 mL / OUT: 20 mL / NET: 80 mL        MICROBIOLOGY:      MEDICATIONS  (STANDING):  ALBUTerol    90 MICROgram(s) HFA Inhaler 2 Puff(s) Inhalation every 6 hours  aspirin 325 milliGRAM(s) Oral daily  atorvastatin 80 milliGRAM(s) Oral at bedtime  cefepime   IVPB 1000 milliGRAM(s) IV Intermittent every 12 hours  chlorhexidine 4% Liquid 1 Application(s) Topical <User Schedule>  dexAMETHasone  Injectable 4 milliGRAM(s) IV Push daily  dexMEDEtomidine Infusion 0.2 MICROgram(s)/kG/Hr (3.69 mL/Hr) IV Continuous <Continuous>  dextrose 5% + sodium chloride 0.45%. 1000 milliLiter(s) (50 mL/Hr) IV Continuous <Continuous>  enoxaparin Injectable 40 milliGRAM(s) SubCutaneous daily  famotidine Injectable 20 milliGRAM(s) IV Push every 12 hours  folic acid 1 milliGRAM(s) Enteral Tube daily  furosemide   Injectable 60 milliGRAM(s) IV Push once  ipratropium 17 MICROgram(s) HFA Inhaler 1 Puff(s) Inhalation every 6 hours  LORazepam   Injectable 0.5 milliGRAM(s) IntraMuscular once  metoprolol tartrate Injectable 2.5 milliGRAM(s) IV Push every 12 hours  norepinephrine Infusion 0.054 MICROgram(s)/kG/Min (7.5 mL/Hr) IV Continuous <Continuous>  senna 2 Tablet(s) Oral at bedtime  thiamine Injectable 100 milliGRAM(s) IV Push daily    MEDICATIONS  (PRN):  acetaminophen   Tablet .. 650 milliGRAM(s) Oral every 6 hours PRN Temp greater or equal to 38C (100.4F)  polyethylene glycol 3350 17 Gram(s) Oral daily PRN Constipation      RADIOLOGY & ADDITIONAL STUDIES:

## 2020-12-17 NOTE — CHART NOTE - NSCHARTNOTEFT_GEN_A_CORE
RK:  Multiple attempts to call son without success. Will try again tomorrow. The overnight senior resident called and spoke with the son this morning as well.    Will Elizabeth Jr. (son who has same name as patient)  [phone].

## 2020-12-17 NOTE — PROGRESS NOTE ADULT - ASSESSMENT
· Assessment	  87y/o M with PMH of CHFrEF & severe MR/pulm HTN, Afib s/p watchman 2019 not on AC, HTN, hx of chronic anemia presents to ED with 'blue lips' and SOB. Pt was known COVID+ for 1 week and had increased sxs over past few days but can't really give details. He also states that for 1mo he hasn't felt well, general sense of 'malaise' and notes that his lips have been extremely chapped, mildly swollen, and 'blue'. He saw his PCP who didn't know what it was and suggested coming to ED. Denied CP, palpitations, leg pains, nausea/vomiting/diarrhea. Has some mild abd pain in LLL crampy. Pt overall is poor historian for his sxs.    IMPRESSION;  COVID 19 with severe illness > critical illness with ARDS  S/p extubation 12/16  On BIPAP. High probability he will need reintubation  Pt is in the late inflammatory response phase ot the illness based on the onset of symptoms. ( possibly one month )  CT with right pleural effusion with opacities b/l  s/p rigth thoracocentesis 12/8 with 1500 ccs fluid. Cultures NG  Possible pericardial effusion  Ddimers 807>456>695>555  Ferritin 56>81>101  procal 0.08>0.16  CRP 0.76>1.39  SOB secondary to right pleural effusion /pneumo/pericardial effusion  BCx 12/8,9  NG    RECOMMENDATIONS;  Dexamethasone 6 mg iv q24h for 10 days ( or until discharge ) or equivalent glucocorticoid dose may be substituted. Equivalent total dosis of alternative steroids are Methylprednisolone 32 mg and prednisone 40 mg q24h.  Monitor for side effects: hyperglycemia, neurological ( agitation/confusion), adrenal suppression, bacterial and fungal infections  Anticoagulation as per team  Prognosis is very poor

## 2020-12-17 NOTE — PROGRESS NOTE ADULT - ASSESSMENT
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     PULMONARY:  HOB @ 45 degrees.  Vent changes as follows: bipap 14/10, 100%    CARDIOVASCULAR:  Target MAP-60. Wean levo. Avoid volume overload. LASIX ONCE    GI: GI prophylaxis.  Feeding.  Bowel regimen     RENAL:  Follow up lytes.  Correct as needed. FUP CMP D 5 W    INFECTIOUS DISEASE: Follow up cultures. Trend inflammatory markers. Repeat Procal. FU ID    HEMATOLOGICAL:  DVT prophylaxis. LE doppler -    ENDOCRINE:  Follow up FS.  Insulin protocol if needed.    MUSCULOSKELETAL: bedrest    MICU monitoring  poor prognosis

## 2020-12-17 NOTE — PROGRESS NOTE ADULT - SUBJECTIVE AND OBJECTIVE BOX
WILLIAM ARAIZA  86y, Male    All available historical data reviewed    OVERNIGHT EVENTS:  no fevers  extubated 12/16  On BIPAP  lethargic    ROS:  unable to obtain history secondary to patient's mental status and/or sedation    VITALS:  T(F): 98.2, Max: 98.2 (12-17-20 @ 08:00)  HR: 64  BP: 110/65  RR: 26Vital Signs Last 24 Hrs  T(C): 36.8 (17 Dec 2020 08:00), Max: 36.8 (17 Dec 2020 08:00)  T(F): 98.2 (17 Dec 2020 08:00), Max: 98.2 (17 Dec 2020 08:00)  HR: 64 (17 Dec 2020 08:00) (60 - 68)  BP: 110/65 (17 Dec 2020 08:00) (69/42 - 127/73)  BP(mean): 84 (17 Dec 2020 08:00) (49 - 96)  RR: 26 (17 Dec 2020 08:00) (21 - 42)  SpO2: 99% (17 Dec 2020 08:00) (75% - 100%)    TESTS & MEASUREMENTS:                        9.7    9.05  )-----------( 57       ( 17 Dec 2020 04:45 )             33.0     12-17    151<H>  |  113<H>  |  45<H>  ----------------------------<  151<H>  3.8   |  31  |  1.3    Ca    8.7      17 Dec 2020 04:45  Phos  1.6     12-17  Mg     2.2     12-17    TPro  5.5<L>  /  Alb  2.7<L>  /  TBili  2.4<H>  /  DBili  x   /  AST  37  /  ALT  27  /  AlkPhos  79  12-17    LIVER FUNCTIONS - ( 17 Dec 2020 04:45 )  Alb: 2.7 g/dL / Pro: 5.5 g/dL / ALK PHOS: 79 U/L / ALT: 27 U/L / AST: 37 U/L / GGT: x                   RADIOLOGY & ADDITIONAL TESTS:  Personal review of radiological diagnostics performed  Echo and EKG results noted when applicable.     MEDICATIONS:  acetaminophen   Tablet .. 650 milliGRAM(s) Oral every 6 hours PRN  ALBUTerol    90 MICROgram(s) HFA Inhaler 2 Puff(s) Inhalation every 6 hours  aspirin 325 milliGRAM(s) Oral daily  atorvastatin 80 milliGRAM(s) Oral at bedtime  cefepime   IVPB 1000 milliGRAM(s) IV Intermittent every 12 hours  chlorhexidine 4% Liquid 1 Application(s) Topical <User Schedule>  dexAMETHasone  Injectable 4 milliGRAM(s) IV Push daily  dexMEDEtomidine Infusion 0.2 MICROgram(s)/kG/Hr IV Continuous <Continuous>  dextrose 5% + sodium chloride 0.45%. 1000 milliLiter(s) IV Continuous <Continuous>  enoxaparin Injectable 40 milliGRAM(s) SubCutaneous daily  famotidine Injectable 20 milliGRAM(s) IV Push every 12 hours  folic acid 1 milliGRAM(s) Enteral Tube daily  furosemide   Injectable 60 milliGRAM(s) IV Push once  ipratropium 17 MICROgram(s) HFA Inhaler 1 Puff(s) Inhalation every 6 hours  LORazepam   Injectable 0.5 milliGRAM(s) IntraMuscular once  metoprolol tartrate Injectable 2.5 milliGRAM(s) IV Push every 12 hours  norepinephrine Infusion 0.054 MICROgram(s)/kG/Min IV Continuous <Continuous>  polyethylene glycol 3350 17 Gram(s) Oral daily PRN  senna 2 Tablet(s) Oral at bedtime  thiamine Injectable 100 milliGRAM(s) IV Push daily      ANTIBIOTICS:  cefepime   IVPB 1000 milliGRAM(s) IV Intermittent every 12 hours

## 2020-12-17 NOTE — GOALS OF CARE CONVERSATION - ADVANCED CARE PLANNING - CONVERSATION DETAILS
I had detailed conversation over thye phone with Mr. Solis the patient son,   I explained to the son the situation and the poor prognosis of hs father given severe  Resp failure following extubation,  pt prognosis is extremely poor, the son understood the prognosis and decided to make the pt DNR and DNI, I had detailed conversation over thye phone with Mr. Solis the patient son, [phone]  I explained to the son the situation and the poor prognosis of hs father given severe  Resp failure following extubation,  pt prognosis is extremely poor, the son understood the prognosis and decided to make the pt DNR and DNI,

## 2020-12-18 NOTE — ADVANCED PRACTICE NURSE CONSULT - RECOMMEDATIONS
1. DTPI coccyx-Cleanse wound bed w/ normal saline, gently pat dry.  Apply thin layer of Coloplast Triad hydrophilic ointment to absorb provide protective layer and maintain clean wound bed. Cover w/ dry gauze dressing, and ABD pad, secure w/ silicone tape. Apply Triad & change dressing q12h and prn for strike-through drainage or soiling. If top layer of Triad soiled, gently remove w/ perineal cleanser and re-apply ointment. Do not scrub off as this may cause further skin breakdown.   -Assess skin/wound qshift, report change to primary provider.    Additional recs:  -Continue turning/positioning patient from side-to-side q2h while in bed,, or in accordance w/ pt's plan of care. Utilize pillows and/or z-orlando fluidized positioner to assist w/ turning/positioning.   -Offload heels from bed surface with soft pillow under calfs or by applying offloading boots to BLEs.   -Apply Coloplast Mara Protect moisture barrier cream to buttock and perineal area daily and prn after each incontinent episode.    -Continue utilizing one underpad underneath patient to contain incontinence episodes; change pad when saturated/soiled.   -When/if stroud d/c'ed, consider utilizing condom catheter (if patient candidate) to contain any urinary incontinence.     Plan of Care: Primary RN Mateo made aware of above. Spoke w/ covering/primary ICU MD in regards to above. No further needs/recs from [facility] service at this time. Staff RN to perform routine skin/wound assessment and manage wound care. Questions or concerns or if wound worsens and reconsult needed, please contact [facility], [facility][id number].   1. DTPI coccyx-Cleanse wound bed w/ normal saline, gently pat dry.  Apply thin layer of Coloplast Triad hydrophilic ointment to absorb provide protective layer and maintain clean wound bed. Cover w/ dry gauze dressing, and ABD pad, secure w/ silicone tape. Apply Triad & change dressing q12h and prn for strike-through drainage or soiling. If top layer of Triad soiled, gently remove w/ perineal cleanser and re-apply ointment. Do not scrub off as this may cause further skin breakdown.   -Assess skin/wound qshift, report change to primary provider.    Additional recs:  -Continue turning/positioning patient from side-to-side q2h while in bed,, or in accordance w/ pt's plan of care. Utilize pillows and/or z-orlando fluidized positioner to assist w/ turning/positioning.   -Offload heels from bed surface with soft pillow under calfs or by applying offloading boots to BLEs.   -Apply Coloplast Mara Protect moisture barrier cream to buttock and perineal area daily and prn after each incontinent episode.    -Continue utilizing one underpad underneath patient to contain incontinence episodes; change pad when saturated/soiled.   -When/if stroud d/c'ed, consider utilizing condom catheter (if patient candidate) to contain any urinary incontinence.     Plan of Care: Primary RN Mateo made aware of above. Spoke w/ covering/primary ICU MD Saavedra in regards to above. No further needs/recs from [facility] service at this time. Staff RN to perform routine skin/wound assessment and manage wound care. Questions or concerns or if wound worsens and reconsult needed, please contact [facility], [facility] #[phone].

## 2020-12-18 NOTE — PROGRESS NOTE ADULT - SUBJECTIVE AND OBJECTIVE BOX
WILLIAM ARAIZA  86y, Male    All available historical data reviewed    OVERNIGHT EVENTS:    no fevers  remains extubated  does not follow commands  bipap  no pressors  no diarrhea  ROS:  unable to obtain history secondary to patient's mental status and/or sedation    VITALS:  T(F): 98.3, Max: 98.3 (12-18-20 @ 08:00)  HR: 62  BP: 133/69  RR: 46Vital Signs Last 24 Hrs  T(C): 36.8 (18 Dec 2020 08:00), Max: 36.8 (18 Dec 2020 08:00)  T(F): 98.3 (18 Dec 2020 08:00), Max: 98.3 (18 Dec 2020 08:00)  HR: 62 (18 Dec 2020 09:00) (56 - 63)  BP: 133/69 (18 Dec 2020 09:00) (113/59 - 138/72)  BP(mean): 93 (18 Dec 2020 09:00) (73 - 94)  RR: 46 (18 Dec 2020 09:00) (26 - 50)  SpO2: 100% (18 Dec 2020 09:00) (83% - 100%)    TESTS & MEASUREMENTS:                        9.8    9.36  )-----------( 43       ( 18 Dec 2020 05:00 )             33.2     12-18    150<H>  |  110  |  47<H>  ----------------------------<  160<H>  3.8   |  29  |  1.4    Ca    8.7      18 Dec 2020 05:00  Phos  1.7     12-18  Mg     2.1     12-18    TPro  5.6<L>  /  Alb  2.6<L>  /  TBili  2.5<H>  /  DBili  x   /  AST  33  /  ALT  28  /  AlkPhos  76  12-18    LIVER FUNCTIONS - ( 18 Dec 2020 05:00 )  Alb: 2.6 g/dL / Pro: 5.6 g/dL / ALK PHOS: 76 U/L / ALT: 28 U/L / AST: 33 U/L / GGT: x                   RADIOLOGY & ADDITIONAL TESTS:  Personal review of radiological diagnostics performed  Echo and EKG results noted when applicable.     MEDICATIONS:  acetaminophen   Tablet .. 650 milliGRAM(s) Oral every 6 hours PRN  ALBUTerol    90 MICROgram(s) HFA Inhaler 2 Puff(s) Inhalation every 6 hours  aspirin 325 milliGRAM(s) Oral daily  atorvastatin 80 milliGRAM(s) Oral at bedtime  chlorhexidine 4% Liquid 1 Application(s) Topical <User Schedule>  dexMEDEtomidine Infusion 0.2 MICROgram(s)/kG/Hr IV Continuous <Continuous>  dextrose 5%. 1000 milliLiter(s) IV Continuous <Continuous>  dextrose 5%. 1000 milliLiter(s) IV Continuous <Continuous>  enoxaparin Injectable 40 milliGRAM(s) SubCutaneous daily  famotidine Injectable 20 milliGRAM(s) IV Push every 12 hours  folic acid 1 milliGRAM(s) Enteral Tube daily  ipratropium 17 MICROgram(s) HFA Inhaler 1 Puff(s) Inhalation every 6 hours  metoprolol tartrate Injectable 2.5 milliGRAM(s) IV Push every 12 hours  norepinephrine Infusion 0.054 MICROgram(s)/kG/Min IV Continuous <Continuous>  polyethylene glycol 3350 17 Gram(s) Oral daily PRN  senna 2 Tablet(s) Oral at bedtime  thiamine Injectable 100 milliGRAM(s) IV Push daily      ANTIBIOTICS:

## 2020-12-18 NOTE — PROGRESS NOTE ADULT - SUBJECTIVE AND OBJECTIVE BOX
OVERNIGHT EVENTS: events noted, on BIPAP 100%, d 5 w 60 cc/h    Vital Signs Last 24 Hrs  T(C): 36.8 (18 Dec 2020 08:00), Max: 36.8 (18 Dec 2020 08:00)  T(F): 98.3 (18 Dec 2020 08:00), Max: 98.3 (18 Dec 2020 08:00)  HR: 62 (18 Dec 2020 09:00) (56 - 63)  BP: 133/69 (18 Dec 2020 09:00) (113/59 - 138/72)  BP(mean): 93 (18 Dec 2020 09:00) (73 - 94)  RR: 46 (18 Dec 2020 09:00) (26 - 50)  SpO2: 100% (18 Dec 2020 09:00) (83% - 100%)    PHYSICAL EXAMINATION:    GENERAL:  Ill looking    HEENT: Head is normocephalic and atraumatic    NECK: Supple.    LUNGS: bl crackles    HEART: BALDEV 3/6  ABDOMEN: Soft, nontender, and nondistended.      EXTREMITIES: Without any cyanosis, clubbing, rash, lesions or edema.    NEUROLOGIC: NO FOCAL    SKIN: No ulceration or induration present.      LABS:                        9.8    9.36  )-----------( 43       ( 18 Dec 2020 05:00 )             33.2     12-18    150<H>  |  110  |  47<H>  ----------------------------<  160<H>  3.8   |  29  |  1.4    Ca    8.7      18 Dec 2020 05:00  Phos  1.7     12-18  Mg     2.1     12-18    TPro  5.6<L>  /  Alb  2.6<L>  /  TBili  2.5<H>  /  DBili  x   /  AST  33  /  ALT  28  /  AlkPhos  76  12-18    PT/INR - ( 18 Dec 2020 05:00 )   PT: 15.20 sec;   INR: 1.32 ratio         PTT - ( 18 Dec 2020 05:00 )  PTT:36.6 sec    ABG - ( 16 Dec 2020 17:01 )  pH, Arterial: 7.50  pH, Blood: x     /  pCO2: 45    /  pO2: 188   / HCO3: 36    / Base Excess: 11.3  /  SaO2: 99                      Serum Pro-Brain Natriuretic Peptide: 2707 pg/mL (12-15-20 @ 17:20)      Procalcitonin, Serum: 0.08 ng/mL (12-16-20 @ 16:55)        12-17-20 @ 07:01  -  12-18-20 @ 07:00  --------------------------------------------------------  IN: 1420 mL / OUT: 2015 mL / NET: -595 mL    12-18-20 @ 07:01  -  12-18-20 @ 09:49  --------------------------------------------------------  IN: 180 mL / OUT: 300 mL / NET: -120 mL        MICROBIOLOGY:      MEDICATIONS  (STANDING):  ALBUTerol    90 MICROgram(s) HFA Inhaler 2 Puff(s) Inhalation every 6 hours  aspirin 325 milliGRAM(s) Oral daily  atorvastatin 80 milliGRAM(s) Oral at bedtime  chlorhexidine 4% Liquid 1 Application(s) Topical <User Schedule>  dexMEDEtomidine Infusion 0.2 MICROgram(s)/kG/Hr (3.69 mL/Hr) IV Continuous <Continuous>  dextrose 5%. 1000 milliLiter(s) (60 mL/Hr) IV Continuous <Continuous>  dextrose 5%. 1000 milliLiter(s) (60 mL/Hr) IV Continuous <Continuous>  enoxaparin Injectable 40 milliGRAM(s) SubCutaneous daily  famotidine Injectable 20 milliGRAM(s) IV Push every 12 hours  folic acid 1 milliGRAM(s) Enteral Tube daily  ipratropium 17 MICROgram(s) HFA Inhaler 1 Puff(s) Inhalation every 6 hours  metoprolol tartrate Injectable 2.5 milliGRAM(s) IV Push every 12 hours  norepinephrine Infusion 0.054 MICROgram(s)/kG/Min (7.5 mL/Hr) IV Continuous <Continuous>  senna 2 Tablet(s) Oral at bedtime  thiamine Injectable 100 milliGRAM(s) IV Push daily    MEDICATIONS  (PRN):  acetaminophen   Tablet .. 650 milliGRAM(s) Oral every 6 hours PRN Temp greater or equal to 38C (100.4F)  polyethylene glycol 3350 17 Gram(s) Oral daily PRN Constipation      RADIOLOGY & ADDITIONAL STUDIES:

## 2020-12-18 NOTE — PROGRESS NOTE ADULT - SUBJECTIVE AND OBJECTIVE BOX
WILLIAM ARAIZA 86y Male  MRN#: [id number]       SUBJECTIVE  Patient is a 86y old Male who presents with a chief complaint of COVID19+ (18 Dec 2020 09:48)      ***    Today is hospital day 11d.    No acute overnight events.     OBJECTIVE  PAST MEDICAL & SURGICAL HISTORY  Mild dementia    Chronic right-sided heart failure    Depression    Liver cirrhosis, alcoholic    Borderline diabetes mellitus    HTN (hypertension)    Afib    Presence of Watchman left atrial appendage closure device    AICD (automatic cardioverter/defibrillator) present    History of cholecystectomy    History of appendectomy    History of knee surgery    Deviated nasal septum      ALLERGIES:  No Known Allergies    MEDICATIONS:  STANDING MEDICATIONS  ALBUTerol    90 MICROgram(s) HFA Inhaler 2 Puff(s) Inhalation every 6 hours  aspirin 325 milliGRAM(s) Oral daily  atorvastatin 80 milliGRAM(s) Oral at bedtime  chlorhexidine 4% Liquid 1 Application(s) Topical <User Schedule>  dexMEDEtomidine Infusion 0.2 MICROgram(s)/kG/Hr IV Continuous <Continuous>  dextrose 5%. 1000 milliLiter(s) IV Continuous <Continuous>  dextrose 5%. 1000 milliLiter(s) IV Continuous <Continuous>  enoxaparin Injectable 40 milliGRAM(s) SubCutaneous daily  famotidine Injectable 20 milliGRAM(s) IV Push every 12 hours  folic acid 1 milliGRAM(s) Enteral Tube daily  furosemide   Injectable 40 milliGRAM(s) IV Push daily  ipratropium 17 MICROgram(s) HFA Inhaler 1 Puff(s) Inhalation every 6 hours  metoprolol tartrate Injectable 2.5 milliGRAM(s) IV Push every 12 hours  norepinephrine Infusion 0.054 MICROgram(s)/kG/Min IV Continuous <Continuous>  senna 2 Tablet(s) Oral at bedtime  thiamine Injectable 100 milliGRAM(s) IV Push daily    PRN MEDICATIONS  acetaminophen   Tablet .. 650 milliGRAM(s) Oral every 6 hours PRN  polyethylene glycol 3350 17 Gram(s) Oral daily PRN      VITAL SIGNS: Last 24 Hours  T(C): 36.7 (18 Dec 2020 12:00), Max: 36.8 (18 Dec 2020 08:00)  T(F): 98 (18 Dec 2020 12:00), Max: 98.3 (18 Dec 2020 08:00)  HR: 62 (18 Dec 2020 14:00) (60 - 63)  BP: 136/68 (18 Dec 2020 14:00) (114/59 - 140/73)  BP(mean): 87 (18 Dec 2020 14:00) (76 - 104)  RR: 42 (18 Dec 2020 14:00) (26 - 48)  SpO2: 97% (18 Dec 2020 14:00) (93% - 100%)    LABS:                        9.8    9.36  )-----------( 43       ( 18 Dec 2020 05:00 )             33.2     12-18    150<H>  |  110  |  47<H>  ----------------------------<  160<H>  3.8   |  29  |  1.4    Ca    8.7      18 Dec 2020 05:00  Phos  1.7     12-18  Mg     2.1     12-18    TPro  5.6<L>  /  Alb  2.6<L>  /  TBili  2.5<H>  /  DBili  x   /  AST  33  /  ALT  28  /  AlkPhos  76  12-18    PT/INR - ( 18 Dec 2020 05:00 )   PT: 15.20 sec;   INR: 1.32 ratio         PTT - ( 18 Dec 2020 05:00 )  PTT:36.6 sec    ABG - ( 16 Dec 2020 17:01 )  pH, Arterial: 7.50  pH, Blood: x     /  pCO2: 45    /  pO2: 188   / HCO3: 36    / Base Excess: 11.3  /  SaO2: 99                        RADIOLOGY:      PHYSICAL EXAM:    GENERAL: NAD, well-developed, AAOx3  HEENT:  Atraumatic, Normocephalic. EOMI, PERRLA, conjunctiva and sclera clear, No JVD  PULMONARY: Clear to auscultation bilaterally; No wheeze  CARDIOVASCULAR: Regular rate and rhythm; No murmurs, rubs, or gallops  GASTROINTESTINAL: Soft, Nontender, Nondistended; Bowel sounds present  MUSCULOSKELETAL:  2+ Peripheral Pulses, No clubbing, cyanosis, or edema  NEUROLOGY: non-focal  SKIN: No rashes or lesions      ADMISSION SUMMARY  Patient is a 86y old Male who presents with a chief complaint of COVID19+ (18 Dec 2020 09:48)

## 2020-12-18 NOTE — ADVANCED PRACTICE NURSE CONSULT - ASSESSMENT
87y/o M with PMH of CHFrEF & severe MR/pulm HTN, Afib s/p watchman 2019 not on AC, HTN, hx of chronic anemia presents to ED (12/7) with 'blue lips' and SOB. Pt was known COVID+ for 1 week and had increased sxs over past few days but can't really give details. He also states that for 1mo he hasn't felt well, general sense of 'malaise' and notes that his lips have been extremely chapped, mildly swollen, and 'blue'. He saw his PCP who didn't know what it was and suggested coming to ED.  In ED: T96.9, HR 58, /62, RR18, SpO2 86% on RA > 92% on 3L NC. CT a/p no acute pathology but noted small L renal lesion & b/l effusions with signs of RH dysfcn. Labs showed leukopenia/lymphopenia, thrombocytopenia. Trop 0.04, EKG ventricularly paced. CXR diffuse infiltrates.  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 Patient extubated 12/16, on 100% BiPap, poor prognosis per critical care MD note (12/18). Pt DNR.     Received patient in ICU, laying supine in bed, turned to left side, lethargic, BiPap in place, per primary RN Mateo's report, pt unstable, unable to turn patient as this time for wound assessment.   Assessment per wound photo in paper chart.     Type of wound: Deep tissue pressure injury(DTPI); evolving; likely r/t hypoperfusion, hypoxia, Covid-19 skin changes, skin failure, ? Jw Terminal ulceration   Location: coccyx   Wound measurements: 2.5cm x 1cm x 0.1cm, true depth indeterminable at this time   Tunneling/Undermining: none   Wound bed: deep purple-maroon colored tissue to periphery; dark pink-red tissue to center w/ area of purple tissue   Wound edges: attached, flush   Periwound: intact  Wound exudate: none per RN report  Wound odor: none per RN report   Induration, erythema, warmth:  none per RN report   Wound pain: no signs of pain per RN report     Patient bedbound, immobile, + stroud, incontinent of stool, NPO. 
87y/o M with PMH of CHFrEF & severe MR/pulm HTN, Afib s/p watchman 2019 not on AC, HTN, hx of chronic anemia presents to ED (12/7) with 'blue lips' and SOB. Pt was known COVID+ for 1 week and had increased sxs over past few days but can't really give details. He also states that for 1mo he hasn't felt well, general sense of 'malaise' and notes that his lips have been extremely chapped, mildly swollen, and 'blue'. He saw his PCP who didn't know what it was and suggested coming to ED.  In ED: T96.9, HR 58, /62, RR18, SpO2 86% on RA > 92% on 3L NC. CT a/p no acute pathology but noted small L renal lesion & b/l effusions with signs of RH dysfcn. Labs showed leukopenia/lymphopenia, thrombocytopenia. Trop 0.04, EKG ventricularly paced. CXR diffuse infiltrates.  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     Upon arrival to unit, informed by primary RN Jannette, pt to be extubated shortly. Unable to perform skin/wound assessment at this time.

## 2020-12-18 NOTE — CHART NOTE - NSCHARTNOTEFT_GEN_A_CORE
RK: Updated the son and answered all of his questions. He is requesting a Facetime; will let the nurse know.    Will Johnstonlorenzodarling . (son who has same name as patient)  [phone].

## 2020-12-18 NOTE — PROGRESS NOTE ADULT - ASSESSMENT
· Assessment	  85y/o M with PMH of CHFrEF & severe MR/pulm HTN, Afib s/p watchman 2019 not on AC, HTN, hx of chronic anemia presents to ED with 'blue lips' and SOB. Pt was known COVID+ for 1 week and had increased sxs over past few days but can't really give details. He also states that for 1mo he hasn't felt well, general sense of 'malaise' and notes that his lips have been extremely chapped, mildly swollen, and 'blue'. He saw his PCP who didn't know what it was and suggested coming to ED. Denied CP, palpitations, leg pains, nausea/vomiting/diarrhea. Has some mild abd pain in LLL crampy. Pt overall is poor historian for his sxs.    IMPRESSION;  COVID 19 with severe illness > critical illness with ARDS  S/p extubation 12/16  On BIPAP. High probability he will need reintubation  Pt is in the late inflammatory response phase ot the illness based on the onset of symptoms. ( possibly one month )  CT with right pleural effusion with opacities b/l  s/p rigth thoracocentesis 12/8 with 1500 ccs fluid. Cultures NG  Possible pericardial effusion  Ddimers 807>456>695>555  Ferritin 56>81>101>98  procal 0.08>0.16>0.08  CRP 0.76>1.39>0.62  SOB secondary to right pleural effusion /pneumo/pericardial effusion  BCx 12/8,9  NG    RECOMMENDATIONS;  NO NEED TO REPEAT INFLAMMATORY MARKERS  Dexamethasone 6 mg iv q24h for 10 days ( or until discharge ) or equivalent glucocorticoid dose may be substituted. Equivalent total dosis of alternative steroids are Methylprednisolone 32 mg and prednisone 40 mg q24h.  Monitor for side effects: hyperglycemia, neurological ( agitation/confusion), adrenal suppression, bacterial and fungal infections  Anticoagulation as per team  Prognosis is very poor

## 2020-12-18 NOTE — ADVANCED PRACTICE NURSE CONSULT - REASON FOR CONSULT
WOCN consult requested for ? sacral pressure injury 
WOCN consult requested for pressure injury assessment & recommendations

## 2020-12-18 NOTE — PROGRESS NOTE ADULT - ASSESSMENT
#Acute hypoxic respiratory failure   #COVID19+ pneumonia  #pleural/pericardial  effusion  - SXS start date: ~11/30   -COVID-19 PCR: Detected (12-07)  -SpO2: 97% (12-12 @ 08:45) (93% - 97%)  -wbc 9.5 (11.79)  -Procalcitonin: 0.06 (12-09)  -CRP: 0.61 (12-10)  -D-dimer: 187 (12/14)  -LDH: 298 (12/10)  -Ferritin: 81 (12/10)  -Troponin: 0.01 (12-08)  -failed SBT, will try again today and extubate if he passes  -we will give lasix prior to extubation if it happens today  - F/u ABG in 1 hr  - LE Duplex  - Repeat inflammatory markers   - taper decadron to 4mg daily down from 6mg  - cont Cefepime 1g Q12 hrs  -cont precedex    #hypernatremia  -cont 300cc q4h of free water   -trend Na level  -his Na level is downtrending from 153 yesterday to 148 today    #metabolic alkalosis  -pH 7.5 32 bicarb  -start dialox 250mg    #constipation  -last BM on 12/12/20  -add miralax to his senna to encourage BM    # Tropeniemia, resolved  - Trop 0.04 on admission, repeat 0.03, 0.01 this am, EKG no acute ST changes, no CP or sxs  - likely demand ischemia. monitor for now    # QT prolongation:  - QTc at admission was 616 ms  - repeat EKG showed QTc of 590 ms    #CHFpEF / Afib s/p watchman  - CT shows b/l effusions, R heart reflux, small pericardial effusion  - Repeat 2D echo showed normal LVEF, decreased RVEF ,  moderate pericardial effusion,  moderate pulm hypertension. spoke to CT surgery on phone, they wont do any intervention on the patient unless its tamponade or pt is on pressors  - right side pleural effusion, s/p thoracentesis, 1.5L removed. post procedure CXR showed trace pneumothorax x2, now resolved, Fluid is transudative according to light's criteria  - cont to hold lasix  - cont 12.5mg lopressor bid    #HTN  - hold losartan for possible angioedema/swollen lips    #HLD- c/w atorvastatin 80  #Dementia- c/w donepezil     #Incidental L renal lesion- outpt renal US follow-up    DVT ppx: heparin subq, duplex negative 12/12/20  GI ppx: pepcid --- d/c protonix  Diet: DASH  Activity: as tolerated  Dispo: acute  FULL CODE

## 2020-12-18 NOTE — PROGRESS NOTE ADULT - ASSESSMENT
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     PULMONARY:  HOB @ 45 degrees.  Vent changes as follows: bipap 14/10, 100% Trial HHFNC    CARDIOVASCULAR:  Target MAP-60. Wean levo. Avoid volume overload.     GI: GI prophylaxis.  Feeding.  Bowel regimen     RENAL:  Follow up lytes.  Correct as needed. FUP CMP D 5 W    INFECTIOUS DISEASE: Follow up cultures. Trend inflammatory markers. Repeat Procal. FU ID    HEMATOLOGICAL:  DVT prophylaxis. LE doppler -    ENDOCRINE:  Follow up FS.  Insulin protocol if needed.    MUSCULOSKELETAL: bedrest    ed3   poor prognosis

## 2020-12-19 NOTE — CHART NOTE - NSCHARTNOTEFT_GEN_A_CORE
Case discussed with attending in rounds. Patient was examined at bedside.    Plan:    1. COVID PNA: will continue BiPAP, change to HFNC as tolerated    2. Depressed mental status: avoid any CNS depressants    3. Thrombocytopenia: dropped from 110s on admission to 40s. Send HIT panel and hold heparin for now; use SCDs for DVT ppx.

## 2020-12-19 NOTE — PROGRESS NOTE ADULT - ASSESSMENT
#Acute hypoxic respiratory failure  2/2 COVID19+ pneumonia s/p intubation now extubated on BIPAP  #pleural/ moderate pericardial  effusion, no signs of tamponade    -covid labs as above stable for now  - LE Duplex neg   - taper decadron to 4mg  to 2 mg   - cont Cefepime 1g Q12 hrs  - cardiology consulted in ICU, outpt follow up, monitor for tamponade       #hypernatremia  -on D5W- follow up BMP in evening   -trend Na level  -his Na level is downtrending from 150-->148  - DC lasix for now as pt is hyperna    #metabolic alkalosis- resolved  -pH 7.5 32 bicarb    #constipation  -continue BM regimen     # Tropeniemia, resolved  - Trop 0.04 on admission, repeat 0.03, 0.01 this am, EKG no acute ST changes, no CP or sxs  - likely demand ischemia. monitor for now    # QT prolongation:  - QTc at admission was 616 ms  - repeat EKG showed QTc of 599 on 12/15, repeat today 12/19    #CHFpEF / Afib s/p watchman  - CT shows b/l effusions, R heart reflux, small pericardial effusion  - Repeat 2D echo showed normal LVEF, decreased RVEF ,  moderate pericardial effusion,  moderate pulm hypertension. spoke to CT surgery on phone, they wont do any intervention on the patient unless its tamponade or pt is on pressors  - right side pleural effusion, s/p thoracentesis, 1.5L removed. post procedure CXR showed trace pneumothorax x2, now resolved, Fluid is transudative according to light's criteria  - hold lasix 40 mg intravenous as pt dry   - cont 2.5mg lopressor bid intravenous while on bipap when stable will switch     #HTN  - hold losartan for possible angioedema/swollen lips in ICU  - BP stable     #HLD- c/w atorvastatin 80  #Dementia- c/w donepezil     #Incidental L renal lesion- outpt renal US follow-up    #thrombocytopenic- hold lovenox, plt 40, will send hit panel, SCD for now.     #Progress Note Handoff  Pending (specify): follow up ekg and bmp  Family discussion: spoke to son Will and updated on plan   Disposition: Home___/SNF___/Other___/Unknown at this time__x_  Pt seen during COVID 19 Pandemic.

## 2020-12-19 NOTE — PROGRESS NOTE ADULT - SUBJECTIVE AND OBJECTIVE BOX
OVERNIGHT EVENTS: events noted, still on bipap    Vital Signs Last 24 Hrs  T(C): 36.8 (19 Dec 2020 00:04), Max: 36.8 (18 Dec 2020 08:00)  T(F): 98.2 (19 Dec 2020 00:04), Max: 98.3 (18 Dec 2020 08:00)  HR: 62 (19 Dec 2020 04:45) (54 - 64)  BP: 120/67 (19 Dec 2020 00:04) (120/67 - 140/73)  BP(mean): 79 (18 Dec 2020 18:22) (79 - 104)  RR: 20 (19 Dec 2020 00:04) (20 - 46)  SpO2: 100% (19 Dec 2020 04:45) (95% - 100%)    PHYSICAL EXAMINATION:    GENERAL: ill looking    HEENT: Head is normocephalic and atraumatic.    NECK: Supple.    LUNGS: bl crackles    HEART: BALDEV 3/6    ABDOMEN: Soft, nontender, and nondistended.      EXTREMITIES: Without any cyanosis, clubbing, rash, lesions or edema.    NEUROLOGIC: Grossly intact.    SKIN: No ulceration or induration present.      LABS:                        9.8    9.36  )-----------( 43       ( 18 Dec 2020 05:00 )             33.2     12-18    150<H>  |  110  |  47<H>  ----------------------------<  160<H>  3.8   |  29  |  1.4    Ca    8.7      18 Dec 2020 05:00  Phos  1.7     12-18  Mg     2.1     12-18    TPro  5.6<L>  /  Alb  2.6<L>  /  TBili  2.5<H>  /  DBili  x   /  AST  33  /  ALT  28  /  AlkPhos  76  12-18    PT/INR - ( 18 Dec 2020 05:00 )   PT: 15.20 sec;   INR: 1.32 ratio         PTT - ( 18 Dec 2020 05:00 )  PTT:36.6 sec      CARDIAC MARKERS ( 18 Dec 2020 17:33 )  x     / 0.03 ng/mL / x     / x     / x          D-Dimer Assay, Quantitative: 785 ng/mL DDU (12-18-20 @ 17:33)    Serum Pro-Brain Natriuretic Peptide: 90914 pg/mL (12-18-20 @ 17:33)      Procalcitonin, Serum: 0.08 ng/mL (12-16-20 @ 16:55)        12-17-20 @ 07:01  -  12-18-20 @ 07:00  --------------------------------------------------------  IN: 1420 mL / OUT: 2015 mL / NET: -595 mL    12-18-20 @ 07:01  -  12-19-20 @ 06:07  --------------------------------------------------------  IN: 820 mL / OUT: 2895 mL / NET: -2075 mL        MICROBIOLOGY:      MEDICATIONS  (STANDING):  ALBUTerol    90 MICROgram(s) HFA Inhaler 2 Puff(s) Inhalation every 6 hours  aspirin 325 milliGRAM(s) Oral daily  atorvastatin 80 milliGRAM(s) Oral at bedtime  chlorhexidine 4% Liquid 1 Application(s) Topical <User Schedule>  dexAMETHasone  Injectable 2 milliGRAM(s) IV Push daily  dexMEDEtomidine Infusion 0.2 MICROgram(s)/kG/Hr (3.69 mL/Hr) IV Continuous <Continuous>  dextrose 5%. 1000 milliLiter(s) (60 mL/Hr) IV Continuous <Continuous>  dextrose 5%. 1000 milliLiter(s) (60 mL/Hr) IV Continuous <Continuous>  enoxaparin Injectable 40 milliGRAM(s) SubCutaneous daily  famotidine Injectable 20 milliGRAM(s) IV Push every 12 hours  folic acid 1 milliGRAM(s) Enteral Tube daily  furosemide   Injectable 40 milliGRAM(s) IV Push daily  ipratropium 17 MICROgram(s) HFA Inhaler 1 Puff(s) Inhalation every 6 hours  metoprolol tartrate Injectable 2.5 milliGRAM(s) IV Push every 12 hours  norepinephrine Infusion 0.054 MICROgram(s)/kG/Min (7.5 mL/Hr) IV Continuous <Continuous>  senna 2 Tablet(s) Oral at bedtime  thiamine Injectable 100 milliGRAM(s) IV Push daily    MEDICATIONS  (PRN):  acetaminophen   Tablet .. 650 milliGRAM(s) Oral every 6 hours PRN Temp greater or equal to 38C (100.4F)  polyethylene glycol 3350 17 Gram(s) Oral daily PRN Constipation      RADIOLOGY & ADDITIONAL STUDIES:         OVERNIGHT EVENTS: events noted, still on bipap    Vital Signs Last 24 Hrs  T(C): 36.8 (19 Dec 2020 00:04), Max: 36.8 (18 Dec 2020 08:00)  T(F): 98.2 (19 Dec 2020 00:04), Max: 98.3 (18 Dec 2020 08:00)  HR: 62 (19 Dec 2020 04:45) (54 - 64)  BP: 120/67 (19 Dec 2020 00:04) (120/67 - 140/73)  BP(mean): 79 (18 Dec 2020 18:22) (79 - 104)  RR: 20 (19 Dec 2020 00:04) (20 - 46)  SpO2: 100% (19 Dec 2020 04:45) (95% - 100%)    PHYSICAL EXAMINATION:    GENERAL: ill looking, tachypneic    HEENT: Head is normocephalic and atraumatic.    NECK: Supple.    LUNGS: bl crackles    HEART: BALDEV 3/6    ABDOMEN: Soft, nontender, and nondistended.      EXTREMITIES: Without any cyanosis, clubbing, rash, lesions or edema.    NEUROLOGIC: Grossly intact.    SKIN: No ulceration or induration present.      LABS:                        9.8    9.36  )-----------( 43       ( 18 Dec 2020 05:00 )             33.2     12-18    150<H>  |  110  |  47<H>  ----------------------------<  160<H>  3.8   |  29  |  1.4    Ca    8.7      18 Dec 2020 05:00  Phos  1.7     12-18  Mg     2.1     12-18    TPro  5.6<L>  /  Alb  2.6<L>  /  TBili  2.5<H>  /  DBili  x   /  AST  33  /  ALT  28  /  AlkPhos  76  12-18    PT/INR - ( 18 Dec 2020 05:00 )   PT: 15.20 sec;   INR: 1.32 ratio         PTT - ( 18 Dec 2020 05:00 )  PTT:36.6 sec      CARDIAC MARKERS ( 18 Dec 2020 17:33 )  x     / 0.03 ng/mL / x     / x     / x          D-Dimer Assay, Quantitative: 785 ng/mL DDU (12-18-20 @ 17:33)    Serum Pro-Brain Natriuretic Peptide: 52876 pg/mL (12-18-20 @ 17:33)      Procalcitonin, Serum: 0.08 ng/mL (12-16-20 @ 16:55)        12-17-20 @ 07:01  -  12-18-20 @ 07:00  --------------------------------------------------------  IN: 1420 mL / OUT: 2015 mL / NET: -595 mL    12-18-20 @ 07:01  -  12-19-20 @ 06:07  --------------------------------------------------------  IN: 820 mL / OUT: 2895 mL / NET: -2075 mL        MICROBIOLOGY:      MEDICATIONS  (STANDING):  ALBUTerol    90 MICROgram(s) HFA Inhaler 2 Puff(s) Inhalation every 6 hours  aspirin 325 milliGRAM(s) Oral daily  atorvastatin 80 milliGRAM(s) Oral at bedtime  chlorhexidine 4% Liquid 1 Application(s) Topical <User Schedule>  dexAMETHasone  Injectable 2 milliGRAM(s) IV Push daily  dexMEDEtomidine Infusion 0.2 MICROgram(s)/kG/Hr (3.69 mL/Hr) IV Continuous <Continuous>  dextrose 5%. 1000 milliLiter(s) (60 mL/Hr) IV Continuous <Continuous>  dextrose 5%. 1000 milliLiter(s) (60 mL/Hr) IV Continuous <Continuous>  enoxaparin Injectable 40 milliGRAM(s) SubCutaneous daily  famotidine Injectable 20 milliGRAM(s) IV Push every 12 hours  folic acid 1 milliGRAM(s) Enteral Tube daily  furosemide   Injectable 40 milliGRAM(s) IV Push daily  ipratropium 17 MICROgram(s) HFA Inhaler 1 Puff(s) Inhalation every 6 hours  metoprolol tartrate Injectable 2.5 milliGRAM(s) IV Push every 12 hours  norepinephrine Infusion 0.054 MICROgram(s)/kG/Min (7.5 mL/Hr) IV Continuous <Continuous>  senna 2 Tablet(s) Oral at bedtime  thiamine Injectable 100 milliGRAM(s) IV Push daily    MEDICATIONS  (PRN):  acetaminophen   Tablet .. 650 milliGRAM(s) Oral every 6 hours PRN Temp greater or equal to 38C (100.4F)  polyethylene glycol 3350 17 Gram(s) Oral daily PRN Constipation      RADIOLOGY & ADDITIONAL STUDIES:

## 2020-12-19 NOTE — PROGRESS NOTE ADULT - ASSESSMENT
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     PULMONARY:  HOB @ 45 degrees.  Vent changes as follows: bipap 14/10, 100% Trial HHFNC    CARDIOVASCULAR:  Target MAP-60. Wean levo. Avoid volume overload. lasix daily    GI: GI prophylaxis.  Feeding.  Bowel regimen     RENAL:  Follow up lytes.  Correct as needed. FUP CMp  INFECTIOUS DISEASE: per id    HEMATOLOGICAL:  DVT prophylaxis. LE doppler -    ENDOCRINE:  Follow up FS.  Insulin protocol if needed.    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     PULMONARY:  HOB @ 45 degrees.  Vent changes as follows: bipap 14/10, 100% Trial HHFNC    CARDIOVASCULAR:  Target MAP-60. Wean levo. Avoid volume overload. lasix daily    GI: GI prophylaxis.  Feeding.  Bowel regimen     RENAL:  Follow up lytes.  Correct as needed. FUP CMp  INFECTIOUS DISEASE: per id    HEMATOLOGICAL:  DVT prophylaxis. LE doppler -    ENDOCRINE:  Follow up FS.  Insulin protocol if needed.    MUSCULOSKELETAL: bedrest    ed3   poor prognosis

## 2020-12-19 NOTE — PROGRESS NOTE ADULT - SUBJECTIVE AND OBJECTIVE BOX
Pt seen and examined at bedside. Currently on bipap says he is fine, unable to do full ros. Pt squeezing nursing staff hand and following commands of Rn. refused to follow my commands     PAST MEDICAL & SURGICAL HISTORY:  Mild dementia    Chronic right-sided heart failure    Depression    Liver cirrhosis, alcoholic    Borderline diabetes mellitus    HTN (hypertension)    Afib    Presence of Watchman left atrial appendage closure device    AICD (automatic cardioverter/defibrillator) present    History of cholecystectomy    History of appendectomy    History of knee surgery    Deviated nasal septum        VITAL SIGNS (Last 24 hrs):  T(C): 37.1 (12-19-20 @ 16:31), Max: 37.1 (12-19-20 @ 16:31)  HR: 66 (12-19-20 @ 16:31) (59 - 66)  BP: 113/57 (12-19-20 @ 16:31) (113/57 - 133/67)  RR: 26 (12-19-20 @ 12:33) (18 - 26)  SpO2: 100% (12-19-20 @ 16:31) (98% - 100%)  Wt(kg): --  Daily     Daily     I&O's Summary    18 Dec 2020 07:01  -  19 Dec 2020 07:00  --------------------------------------------------------  IN: 820 mL / OUT: 2895 mL / NET: -2075 mL    19 Dec 2020 07:01  -  19 Dec 2020 18:40  --------------------------------------------------------  IN: 0 mL / OUT: 645 mL / NET: -645 mL        PHYSICAL EXAM:  GENERAL: NAD, cachectic   HEAD:  Atraumatic, Normocephalic  EYES: EOMI, PERRLA, conjunctiva and sclera clear  NECK: Supple, No JVD  CHEST/LUNG: Clear to auscultation bilaterally; No wheeze  HEART: Regular rate and rhythm; No murmurs, rubs, or gallops  ABDOMEN: Soft, Nontender, Nondistended; Bowel sounds present  EXTREMITIES:  2+ Peripheral Pulses, No clubbing, cyanosis, or edema  PSYCH: alert on bipap  NEUROLOGY: non-focal  SKIN: No rashes or lesions    Labs Reviewed  Spoke to patient in regards to abnormal labs.    CBC Full  -  ( 19 Dec 2020 05:00 )  WBC Count : 11.01 K/uL  Hemoglobin : 9.8 g/dL  Hematocrit : 32.8 %  Platelet Count - Automated : 40 K/uL  Mean Cell Volume : 94.8 fL  Mean Cell Hemoglobin : 28.3 pg  Mean Cell Hemoglobin Concentration : 29.9 g/dL  Auto Neutrophil # : 9.57 K/uL  Auto Lymphocyte # : 0.20 K/uL  Auto Monocyte # : 1.15 K/uL  Auto Eosinophil # : 0.02 K/uL  Auto Basophil # : 0.01 K/uL  Auto Neutrophil % : 87.0 %  Auto Lymphocyte % : 1.8 %  Auto Monocyte % : 10.4 %  Auto Eosinophil % : 0.2 %  Auto Basophil % : 0.1 %    BMP:    12-19 @ 05:00    Blood Urea Nitrogen - 38  Calcium - 8.9  Carbond Dioxide - 31  Chloride - 110  Creatinine - 1.2  Glucose - 138  Potassium - 3.5  Sodium - 149      Hemoglobin A1c -   PT/INR - ( 19 Dec 2020 05:00 )   PT: 15.40 sec;   INR: 1.34 ratio         PTT - ( 19 Dec 2020 05:00 )  PTT:34.5 sec  Urine Culture:          Imaging reviewed:   < from: Xray Chest 1 View- PORTABLE-Routine (Xray Chest 1 View- PORTABLE-Routine in AM.) (12.15.20 @ 05:12) >  Impression:    Stable bilateral opacities. No pneumothorax.    Support devices as above.    < end of copied text >    COVID Labs  C-Reactive Protein, Serum: 1.92 mg/dL (12-18-20 @ 17:33)  C-Reactive Protein, Serum: 0.62 mg/dL (12-15-20 @ 17:20)  C-Reactive Protein, Serum: 1.39 mg/dL (12-13-20 @ 20:32)    Procalcitonin, Serum: 0.08 ng/mL (12-16-20 @ 16:55)  Procalcitonin, Serum: 0.16 ng/mL (12-12-20 @ 17:00)  Procalcitonin, Serum: 0.06 ng/mL (12-09-20 @ 06:48)  Procalcitonin, Serum: 0.08 ng/mL (12-08-20 @ 00:58)    D-Dimer:  785 ng/mL DDU (12-18-20 @ 17:33)  555 ng/mL DDU (12-15-20 @ 17:20)  587 ng/mL DDU (12-13-20 @ 20:32)  695 ng/mL DDU (12-12-20 @ 17:00)  456 ng/mL DDU (12-10-20 @ 07:16)    Ferritin, Serum: 187 ng/mL (12-18-20 @ 17:33)  Ferritin, Serum: 98 ng/mL (12-15-20 @ 17:20)  Ferritin, Serum: 101 ng/mL (12-13-20 @ 20:32)  Ferritin, Serum: 107 ng/mL (12-12-20 @ 17:00)  Ferritin, Serum: 81 ng/mL (12-10-20 @ 07:16)  Ferritin, Serum: 54 ng/mL (12-09-20 @ 06:48)  Ferritin, Serum: 56 ng/mL (12-08-20 @ 00:58)      MEDICATIONS  (STANDING):  ALBUTerol    90 MICROgram(s) HFA Inhaler 2 Puff(s) Inhalation every 6 hours  aspirin 325 milliGRAM(s) Oral daily  atorvastatin 80 milliGRAM(s) Oral at bedtime  chlorhexidine 4% Liquid 1 Application(s) Topical <User Schedule>  dexAMETHasone  Injectable 2 milliGRAM(s) IV Push daily  dextrose 5%. 1000 milliLiter(s) (60 mL/Hr) IV Continuous <Continuous>  dextrose 5%. 1000 milliLiter(s) (60 mL/Hr) IV Continuous <Continuous>  famotidine Injectable 20 milliGRAM(s) IV Push every 12 hours  folic acid 1 milliGRAM(s) Enteral Tube daily  furosemide   Injectable 40 milliGRAM(s) IV Push daily  ipratropium 17 MICROgram(s) HFA Inhaler 1 Puff(s) Inhalation every 6 hours  metoprolol tartrate Injectable 2.5 milliGRAM(s) IV Push every 12 hours  senna 2 Tablet(s) Oral at bedtime    MEDICATIONS  (PRN):  acetaminophen   Tablet .. 650 milliGRAM(s) Oral every 6 hours PRN Temp greater or equal to 38C (100.4F)  polyethylene glycol 3350 17 Gram(s) Oral daily PRN Constipation

## 2020-12-20 NOTE — PROGRESS NOTE ADULT - ASSESSMENT
#Acute hypoxic respiratory failure  2/2 COVID19+ pneumonia s/p intubation now extubated on BIPAP  #pleural/ moderate pericardial  effusion, no signs of tamponade    -covid labs as above stable for now  - LE Duplex neg   - taper decadron to 4mg  to 2 mg   - cont Cefepime 1g Q12 hrs  - cardiology consulted in ICU, outpt follow up, monitor for tamponade   - Worsening effusions on cxr- will give lasix 40 mg x1 intravenous today       #hypernatremia  -on D5W- follow up BMP in evening   -trend Na level  -his Na level is downtrending from 150-->148-->150  -continue d5 today     #metabolic alkalosis- resolved  -pH 7.5 32 bicarb    #constipation  -continue BM regimen     # Tropeniemia, resolved  - Trop 0.04 on admission, repeat 0.03, 0.01 this am, EKG no acute ST changes, no CP or sxs  - likely demand ischemia. monitor for now    # QT prolongation:  - QTc at admission was 616 ms  - repeat EKG showed QTc of 599 on 12/15, repeat today 12/19    #CHFpEF / Afib s/p watchman  - CT shows b/l effusions, R heart reflux, small pericardial effusion  - Repeat 2D echo showed normal LVEF, decreased RVEF ,  moderate pericardial effusion,  moderate pulm hypertension. spoke to CT surgery on phone, they wont do any intervention on the patient unless its tamponade or pt is on pressors  - right side pleural effusion, s/p thoracentesis, 1.5L removed. post procedure CXR showed trace pneumothorax x2, now resolved, Fluid is transudative according to light's criteria  - restarted lasix 40 mg intravenous today, x1 pt on free water as well   - cont 2.5mg lopressor bid intravenous while on bipap when stable will switch     #HTN  - hold losartan for possible angioedema/swollen lips in ICU  - BP stable     #HLD- c/w atorvastatin 80  #Dementia- c/w donepezil     #Incidental L renal lesion- outpt renal US follow-up    #thrombocytopenic- hold lovenox, plt 48, will send hit panel , SCD for now.     #Progress Note Handoff  Pending (specify): follow up ekg and bmp  Family discussion: spoke to son Will and updated on plan at [phone].  Disposition: Home___/SNF___/Other___/Unknown at this time__x_  Pt seen during COVID 19 Pandemic.

## 2020-12-20 NOTE — PROGRESS NOTE ADULT - ASSESSMENT
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     PULMONARY:  HOB @ 45 degrees.  Vent changes as follows: bipap 14/10, 100% Trial HHFNC    CARDIOVASCULAR:  Target MAP-60. Wean levo. Avoid volume overload. lasix daily    GI: GI prophylaxis.  Feeding.  Bowel regimen     RENAL:  Follow up lytes.  Correct as needed. FUP CMp  INFECTIOUS DISEASE: per id    HEMATOLOGICAL:  DVT prophylaxis. LE doppler -    ENDOCRINE:  Follow up FS.  Insulin protocol if needed.    MUSCULOSKELETAL: bedrest  Palliative care eval  ed3   poor prognosis

## 2020-12-20 NOTE — PROGRESS NOTE ADULT - SUBJECTIVE AND OBJECTIVE BOX
OVERNIGHT EVENTS: events noted on BIPAP, dec plt, afebrile    Vital Signs Last 24 Hrs  T(C): 36.1 (20 Dec 2020 04:33), Max: 37.1 (19 Dec 2020 16:31)  T(F): 97 (20 Dec 2020 04:33), Max: 98.8 (19 Dec 2020 16:31)  HR: 74 (20 Dec 2020 04:33) (60 - 74)  BP: 119/61 (20 Dec 2020 04:33) (107/63 - 120/60)  RR: 26 (19 Dec 2020 20:24) (18 - 26)  SpO2: 97% (20 Dec 2020 04:33) (96% - 100%)    PHYSICAL EXAMINATION:    GENERAL: ill looking, tachypneic    HEENT: Head is normocephalic and atraumatic    NECK: Supple.    LUNGS: dec bs both bases    HEART: BALDEV 3/6    ABDOMEN: Soft, nontender, and nondistended.      EXTREMITIES: Without any cyanosis, clubbing, rash, lesions or edema.    NEUROLOGIC: NO FOCAL    SKIN: No ulceration or induration present.      LABS:                        9.8    11.01 )-----------( 40       ( 19 Dec 2020 05:00 )             32.8     12-19    147<H>  |  109  |  35<H>  ----------------------------<  140<H>  3.4<L>   |  33<H>  |  1.1    Ca    8.7      19 Dec 2020 20:27  Mg     2.1     12-19    TPro  5.8<L>  /  Alb  2.7<L>  /  TBili  2.7<H>  /  DBili  x   /  AST  25  /  ALT  27  /  AlkPhos  77  12-19    PT/INR - ( 20 Dec 2020 06:10 )   PT: 16.50 sec;   INR: 1.43 ratio         PTT - ( 20 Dec 2020 06:10 )  PTT:33.8 sec      CARDIAC MARKERS ( 18 Dec 2020 17:33 )  x     / 0.03 ng/mL / x     / x     / x            Serum Pro-Brain Natriuretic Peptide: 11742 pg/mL (12-18-20 @ 17:33)            12-19-20 @ 07:01  -  12-20-20 @ 07:00  --------------------------------------------------------  IN: 0 mL / OUT: 1000 mL / NET: -1000 mL        MICROBIOLOGY:      MEDICATIONS  (STANDING):  ALBUTerol    90 MICROgram(s) HFA Inhaler 2 Puff(s) Inhalation every 6 hours  aspirin 325 milliGRAM(s) Oral daily  atorvastatin 80 milliGRAM(s) Oral at bedtime  chlorhexidine 4% Liquid 1 Application(s) Topical <User Schedule>  dextrose 5%. 1000 milliLiter(s) (60 mL/Hr) IV Continuous <Continuous>  dextrose 5%. 1000 milliLiter(s) (60 mL/Hr) IV Continuous <Continuous>  famotidine Injectable 20 milliGRAM(s) IV Push every 12 hours  folic acid 1 milliGRAM(s) Enteral Tube daily  ipratropium 17 MICROgram(s) HFA Inhaler 1 Puff(s) Inhalation every 6 hours  metoprolol tartrate Injectable 2.5 milliGRAM(s) IV Push every 12 hours  senna 2 Tablet(s) Oral at bedtime    MEDICATIONS  (PRN):  acetaminophen   Tablet .. 650 milliGRAM(s) Oral every 6 hours PRN Temp greater or equal to 38C (100.4F)  polyethylene glycol 3350 17 Gram(s) Oral daily PRN Constipation      RADIOLOGY & ADDITIONAL STUDIES:

## 2020-12-20 NOTE — PROGRESS NOTE ADULT - SUBJECTIVE AND OBJECTIVE BOX
Pt seen and examined at bedside. No CP. Pt on bipap. Was agitated  this am trying to climb out of bed. Did not respond well to ros this am.     PAST MEDICAL & SURGICAL HISTORY:  Mild dementia    Chronic right-sided heart failure    Depression    Liver cirrhosis, alcoholic    Borderline diabetes mellitus    HTN (hypertension)    Afib    Presence of Watchman left atrial appendage closure device    AICD (automatic cardioverter/defibrillator) present    History of cholecystectomy    History of appendectomy    History of knee surgery    Deviated nasal septum        VITAL SIGNS (Last 24 hrs):  T(C): 36.7 (12-20-20 @ 12:36), Max: 37.1 (12-19-20 @ 16:31)  HR: 62 (12-20-20 @ 12:36) (62 - 74)  BP: 123/68 (12-20-20 @ 12:36) (107/63 - 130/64)  RR: 26 (12-19-20 @ 20:24) (26 - 26)  SpO2: 92% (12-20-20 @ 12:36) (92% - 100%)  Wt(kg): --  Daily     Daily     I&O's Summary    19 Dec 2020 07:01  -  20 Dec 2020 07:00  --------------------------------------------------------  IN: 60 mL / OUT: 1175 mL / NET: -1115 mL    20 Dec 2020 07:01  -  20 Dec 2020 15:33  --------------------------------------------------------  IN: 680 mL / OUT: 725 mL / NET: -45 mL        PHYSICAL EXAM:  GENERAL: agitaed on bipap.   HEAD:  Atraumatic, Normocephalic  EYES: EOMI, PERRLA, conjunctiva and sclera clear  NECK: Supple, No JVD  CHEST/LUNG: decreased air entry in lower bases.   HEART: Regular rate and rhythm; No murmurs, rubs, or gallops  ABDOMEN: Soft, Nontender, Nondistended; Bowel sounds present  EXTREMITIES:  2+ Peripheral Pulses, No clubbing, cyanosis, or edema  PSYCH: alert but lethargic   NEUROLOGY: non-focal  SKIN: No rashes or lesions    Labs Reviewed  Spoke to patient in regards to abnormal labs.    CBC Full  -  ( 20 Dec 2020 06:10 )  WBC Count : 9.59 K/uL  Hemoglobin : 9.3 g/dL  Hematocrit : 31.7 %  Platelet Count - Automated : 48 K/uL  Mean Cell Volume : 95.5 fL  Mean Cell Hemoglobin : 28.0 pg  Mean Cell Hemoglobin Concentration : 29.3 g/dL  Auto Neutrophil # : 7.97 K/uL  Auto Lymphocyte # : 0.20 K/uL  Auto Monocyte # : 1.35 K/uL  Auto Eosinophil # : 0.01 K/uL  Auto Basophil # : 0.01 K/uL  Auto Neutrophil % : 83.1 %  Auto Lymphocyte % : 2.1 %  Auto Monocyte % : 14.1 %  Auto Eosinophil % : 0.1 %  Auto Basophil % : 0.1 %    BMP:    12-20 @ 06:10    Blood Urea Nitrogen - 35  Calcium - 9.2  Carbond Dioxide - 31  Chloride - 111  Creatinine - 1.0  Glucose - 144  Potassium - 3.4  Sodium - 150      Hemoglobin A1c -   PT/INR - ( 20 Dec 2020 06:10 )   PT: 16.50 sec;   INR: 1.43 ratio         PTT - ( 20 Dec 2020 06:10 )  PTT:33.8 sec  Urine Culture:        Imaging reviewed:   < from: Xray Chest 1 View- PORTABLE-Routine (Xray Chest 1 View- PORTABLE-Routine .) (12.20.20 @ 12:16) >  Impression:    Diffuse bilateral parenchymal opacities and bilateral pleural effusions, increasing.    No definite pneumothorax visualized.    < end of copied text >      MEDICATIONS  (STANDING):  ALBUTerol    90 MICROgram(s) HFA Inhaler 2 Puff(s) Inhalation every 6 hours  aspirin 325 milliGRAM(s) Oral daily  atorvastatin 80 milliGRAM(s) Oral at bedtime  chlorhexidine 4% Liquid 1 Application(s) Topical <User Schedule>  dextrose 5%. 1000 milliLiter(s) (100 mL/Hr) IV Continuous <Continuous>  famotidine Injectable 20 milliGRAM(s) IV Push every 12 hours  folic acid 1 milliGRAM(s) Enteral Tube daily  ipratropium 17 MICROgram(s) HFA Inhaler 1 Puff(s) Inhalation every 6 hours  metoprolol tartrate Injectable 2.5 milliGRAM(s) IV Push every 12 hours  potassium chloride  20 mEq/100 mL IVPB 20 milliEquivalent(s) IV Intermittent every 2 hours  senna 2 Tablet(s) Oral at bedtime    MEDICATIONS  (PRN):  acetaminophen   Tablet .. 650 milliGRAM(s) Oral every 6 hours PRN Temp greater or equal to 38C (100.4F)  polyethylene glycol 3350 17 Gram(s) Oral daily PRN Constipation

## 2020-12-20 NOTE — PROGRESS NOTE ADULT - SUBJECTIVE AND OBJECTIVE BOX
WILLIAM ARAIZA 86y Male  MRN#: [id number]   Hospital Day: 13d    SUBJECTIVE  Patient is a 86y old Male who presents with a chief complaint of COVID19+ (20 Dec 2020 15:33)  Currently admitted to medicine with the primary diagnosis of COVID-19      INTERVAL HPI AND OVERNIGHT EVENTS:  Patient was examined and seen at bedside. This morning he is resting comfortably in bed and reports no issues or overnight events.    REVIEW OF SYMPTOMS:  CONSTITUTIONAL: No weakness, fevers or chills; No headaches  EYES: No visual changes, eye pain, or discharge  ENT: No vertigo; No ear pain or change in hearing; No sore throat or difficulty swallowing  NECK: No pain or stiffness  RESPIRATORY: No cough, wheezing, or hemoptysis; No shortness of breath  CARDIOVASCULAR: No chest pain or palpitations  GASTROINTESTINAL: No abdominal or epigastric pain; No nausea, vomiting, or hematemesis; No diarrhea or constipation; No melena or hematochezia  GENITOURINARY: No dysuria, frequency or hematuria  MUSCULOSKELETAL: No joint pain, no muscle pain, no weakness  NEUROLOGICAL: No numbness or weakness  SKIN: No itching or rashes    OBJECTIVE  PAST MEDICAL & SURGICAL HISTORY  Mild dementia    Chronic right-sided heart failure    Depression    Liver cirrhosis, alcoholic    Borderline diabetes mellitus    HTN (hypertension)    Afib    Presence of Watchman left atrial appendage closure device    AICD (automatic cardioverter/defibrillator) present    History of cholecystectomy    History of appendectomy    History of knee surgery    Deviated nasal septum      ALLERGIES:  No Known Allergies    MEDICATIONS:  STANDING MEDICATIONS  ALBUTerol    90 MICROgram(s) HFA Inhaler 2 Puff(s) Inhalation every 6 hours  aspirin 325 milliGRAM(s) Oral daily  atorvastatin 80 milliGRAM(s) Oral at bedtime  chlorhexidine 4% Liquid 1 Application(s) Topical <User Schedule>  dextrose 5%. 1000 milliLiter(s) IV Continuous <Continuous>  famotidine Injectable 20 milliGRAM(s) IV Push every 12 hours  folic acid 1 milliGRAM(s) Enteral Tube daily  ipratropium 17 MICROgram(s) HFA Inhaler 1 Puff(s) Inhalation every 6 hours  metoprolol tartrate Injectable 2.5 milliGRAM(s) IV Push every 12 hours  senna 2 Tablet(s) Oral at bedtime    PRN MEDICATIONS  acetaminophen   Tablet .. 650 milliGRAM(s) Oral every 6 hours PRN  polyethylene glycol 3350 17 Gram(s) Oral daily PRN      VITAL SIGNS: Last 24 Hours  T(C): 36.6 (20 Dec 2020 15:47), Max: 36.7 (20 Dec 2020 00:11)  T(F): 97.9 (20 Dec 2020 15:47), Max: 98.1 (20 Dec 2020 12:36)  HR: 66 (20 Dec 2020 15:47) (62 - 74)  BP: 135/69 (20 Dec 2020 15:47) (107/63 - 135/69)  BP(mean): --  RR: 20 (20 Dec 2020 15:47) (20 - 20)  SpO2: 97% (20 Dec 2020 15:47) (92% - 98%)    LABS:                        9.3    9.59  )-----------( 48       ( 20 Dec 2020 06:10 )             31.7     12-20    146  |  110  |  34<H>  ----------------------------<  203<H>  4.1   |  26  |  1.0    Ca    9.1      20 Dec 2020 16:36  Mg     2.2     12-20    TPro  6.1  /  Alb  2.6<L>  /  TBili  2.9<H>  /  DBili  x   /  AST  20  /  ALT  24  /  AlkPhos  73  12-20    PT/INR - ( 20 Dec 2020 06:10 )   PT: 16.50 sec;   INR: 1.43 ratio         PTT - ( 20 Dec 2020 06:10 )  PTT:33.8 sec              RADIOLOGY:  < from: Xray Chest 1 View- PORTABLE-Routine (Xray Chest 1 View- PORTABLE-Routine .) (12.20.20 @ 12:16) >  Impression:    Diffuse bilateral parenchymal opacities and bilateral pleural effusions, increasing.    No definite pneumothorax visualized.    < end of copied text >      PHYSICAL EXAM:  CONSTITUTIONAL: No acute distress, well-developed, well-groomed, AAOx3  HEAD: Atraumatic, normocephalic  EYES: EOM intact, PERRLA, conjunctiva and sclera clear  ENT: Supple, no masses, no thyromegaly, no bruits, no JVD; moist mucous membranes  PULMONARY: Clear to auscultation bilaterally; no wheezes, rales, or rhonchi  CARDIOVASCULAR: Regular rate and rhythm; no murmurs, rubs, or gallops  GASTROINTESTINAL: Soft, non-tender, non-distended; bowel sounds present  MUSCULOSKELETAL: 2+ peripheral pulses; no clubbing, no cyanosis, no edema  NEUROLOGY: non-focal  SKIN: No rashes or lesions; warm and dry    ASSESSMENT & PLAN:  Patient is an 85yo male with PMH of HFrEF, severe MR, severe pulmonary hypertension, atrial fibrillation s/p Watchman 2019, hypertension, chronic anemia, who presented to the hospital for cyanosis and shortness of breath. Patient was known to be COVID-19 positive as of 1 week prior to admission, but symptoms worsened over the days prior to presentation. Patient was intubated and then extubated for acute hypoxemic respiratory failure.    #Acute hypoxemic respiratory failure secondary to COVID-19 pneumonia s/p intubation  #Pleural effusion and moderate pericardial effusion, no signs of tamponade s/p thoracentesis  - COVID-19 PCR positive  - Isolation precautions (contact, droplet, airborne)  - Patient is saturating 97% on BiPAP  - C-Reactive Protein, Serum: 1.92 mg/dL (12-18-20 @ 17:33)  - D-Dimer Assay, Quantitative: 785 ng/mL DDU (12-18-20 @ 17:33)  - Ferritin, Serum: 187 ng/mL (12-18-20 @ 17:33)  - Lactate Dehydrogenase, Fluid: 107 U/L (12-08-20 @ 10:37)  - Repeat inflammatory markers (D-dimer, CRP, ferritin) Q48-72H if clinically worsening  - Taper dexamethasone  - Lower extremity duplex negative  - Completed course of cefepime  - Incentive spirometry at bedside  - Titrate supplemental O2 to maintain SpO2 92-96%    #Hypernatremia  - Sodium today: 150->146  - Increased D5W to 100mL/hr  - Furosemide on hold for now    #Metabolic alkalosis, resolved  - Most recent pH: 7.5  - Bicarbonate: 26     #Constipation  - Continue with bowel regimen    #Elevated troponin, resolved  - Troponin: 0.04-?0.03->0.03->0.01  - No acute ST-T changes on ECG  - Patient denies chest pain or symptoms  - Likely type 2 demand ischemia    #Prolonged QTc  - QTc on admission: 616  - QTc 12/19/2020: 575  - Will need repeat ECG in AM    #Heart failure with preserved ejection fraction  - Echo 12/9/2020: EF 50-55%, basal inferoseptal and basal inferior segment wall motion abnormalities, severely enlarged RA, severely enlarged RV, moderate pericardial effusion, mild-moderate MR, severe TR, moderate pulmonary hypertension  - Discussed case with CT surgery on the phone: no acute intervention unless patient is in tamponade or requires pressors  - Right sided pleural effusion s/p thoracenthesis (removed 1.5L). Post procedure CXR showed trace pneumothorax x2, now resolved, Fluid is transudative according to light's criteria  - Continue with metoprolol tartrate 2.5mg IV Q12H    #Hypertension  - Hold losartan for possible angioedema/swollen lips in ICU  - Continue with metoprolol tartrate 2.5mg IV Q12H    #Hyperlipidemia  - Continue with atorvastatin 80mg PO at bedtime     #Dementia  - Donepezil being held for now    #Incidental left renal lesion  - Follow outpatient with renal US    #Thrombocytopenia  - Lovenox on hold. SCDs for now  - Follow HIT panel    #Suspected folate deficiency  - Continue with folic acid 1mg PO once daily     #Misc  - DVT Prophylaxis: SCDs  - GI Prophylaxis: famotidine 20mg IV Q12H  - Diet: NPO  - Activity: increase as tolerated  - IV Fluids: D5W at 100mL/hr  - Code Status: DNR/DNI    Dispo: acute

## 2020-12-21 NOTE — PROGRESS NOTE ADULT - SUBJECTIVE AND OBJECTIVE BOX
Pt seen and examined at bedside. Pt remains on bipap. Able to mentate on bipap.     PAST MEDICAL & SURGICAL HISTORY:  Mild dementia    Chronic right-sided heart failure    Depression    Liver cirrhosis, alcoholic    Borderline diabetes mellitus    HTN (hypertension)    Afib    Presence of Watchman left atrial appendage closure device    AICD (automatic cardioverter/defibrillator) present    History of cholecystectomy    History of appendectomy    History of knee surgery    Deviated nasal septum        VITAL SIGNS (Last 24 hrs):  T(C): 36.3 (12-21-20 @ 08:05), Max: 36.7 (12-21-20 @ 02:23)  HR: 60 (12-21-20 @ 12:00) (59 - 68)  BP: 119/64 (12-21-20 @ 12:00) (116/64 - 137/69)  RR: 22 (12-21-20 @ 12:00) (19 - 22)  SpO2: 100% (12-21-20 @ 12:00) (96% - 100%)  Wt(kg): --  Daily     Daily     I&O's Summary    20 Dec 2020 07:01  -  21 Dec 2020 07:00  --------------------------------------------------------  IN: 1280 mL / OUT: 1600 mL / NET: -320 mL        PHYSICAL EXAM:  GENERAL: NAD, on bipap  HEAD:  Atraumatic, Normocephalic  EYES: EOMI, PERRLA, conjunctiva and sclera clear  NECK: Supple, No JVD  CHEST/LUNG: Clear to auscultation bilaterally; No wheeze  HEART: Regular rate and rhythm; No murmurs, rubs, or gallops  ABDOMEN: Soft, Nontender, Nondistended; Bowel sounds present  EXTREMITIES:  2+ Peripheral Pulses, No clubbing, cyanosis, or edema, restrained   PSYCH: Alert   NEUROLOGY: non-focal  SKIN: No rashes or lesions    Labs Reviewed  Spoke to patient in regards to abnormal labs.    CBC Full  -  ( 21 Dec 2020 06:56 )  WBC Count : 11.34 K/uL  Hemoglobin : 10.0 g/dL  Hematocrit : 33.6 %  Platelet Count - Automated : 51 K/uL  Mean Cell Volume : 95.5 fL  Mean Cell Hemoglobin : 28.4 pg  Mean Cell Hemoglobin Concentration : 29.8 g/dL  Auto Neutrophil # : 9.66 K/uL  Auto Lymphocyte # : 0.22 K/uL  Auto Monocyte # : 1.37 K/uL  Auto Eosinophil # : 0.00 K/uL  Auto Basophil # : 0.01 K/uL  Auto Neutrophil % : 85.2 %  Auto Lymphocyte % : 1.9 %  Auto Monocyte % : 12.1 %  Auto Eosinophil % : 0.0 %  Auto Basophil % : 0.1 %    BMP:    12-21 @ 06:56    Blood Urea Nitrogen - 35  Calcium - 9.2  Carbond Dioxide - 27  Chloride - 108  Creatinine - 1.0  Glucose - 139  Potassium - 4.0  Sodium - 146      Hemoglobin A1c -   PT/INR - ( 21 Dec 2020 06:56 )   PT: 15.60 sec;   INR: 1.36 ratio         PTT - ( 21 Dec 2020 06:56 )  PTT:32.4 sec  Urine Culture:        Imaging reviewed:     < from: Xray Chest 1 View- PORTABLE-Routine (Xray Chest 1 View- PORTABLE-Routine in AM.) (12.21.20 @ 06:04) >    Unchanged bilateralopacities and effusions.    < end of copied text >    MEDICATIONS  (STANDING):  ALBUTerol    90 MICROgram(s) HFA Inhaler 2 Puff(s) Inhalation every 6 hours  aspirin 325 milliGRAM(s) Oral daily  atorvastatin 80 milliGRAM(s) Oral at bedtime  chlorhexidine 4% Liquid 1 Application(s) Topical <User Schedule>  dextrose 5%. 1000 milliLiter(s) (100 mL/Hr) IV Continuous <Continuous>  famotidine Injectable 20 milliGRAM(s) IV Push every 12 hours  folic acid 1 milliGRAM(s) Enteral Tube daily  ipratropium 17 MICROgram(s) HFA Inhaler 1 Puff(s) Inhalation every 6 hours  metoprolol tartrate Injectable 2.5 milliGRAM(s) IV Push every 12 hours  senna 2 Tablet(s) Oral at bedtime    MEDICATIONS  (PRN):  acetaminophen   Tablet .. 650 milliGRAM(s) Oral every 6 hours PRN Temp greater or equal to 38C (100.4F)  polyethylene glycol 3350 17 Gram(s) Oral daily PRN Constipation

## 2020-12-21 NOTE — CONSULT NOTE ADULT - SUBJECTIVE AND OBJECTIVE BOX
REQUESTED OF: DR. 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 Pt was known COVID+ for 1 week and had increased sxs over past few days but can't really give details. He also states that for 1mo he hasn't felt well, general sense of 'malaise' and notes that his lips have been extremely chapped, mildly swollen, and 'blue'. He saw his PCP who didn't know what it was and suggested coming to ED. Denied CP, palpitations, leg pains, nausea/vomiting/diarrhea. Has some mild abd pain in LLL crampy. Pt overall is poor historian for his sxs.     In ED: T96.9, HR 58, /62, RR18, SpO2 86% on RA > 92% on 3L NC.  CT a/p no acute pathology but noted small L renal lesion & b/l effusions with signs of RH dysfcn.   Labs showed leukopenia/lymphopenia, thrombocytopenia. Trop 0.04, EKG ventricularly paced.  CXR diffuse infiltrates. (07 Dec 2020 23:15)    Interim Hx:   Thoracentesis in ED  Intubated 12/12  extubated 12/17  GOC- made DNR/DNI 12/17  + Thrombocytopenia  Ongoing AMS  On continuous Bipap  c/s for TPN/PPN    PAST MEDICAL & SURGICAL HISTORY:  Mild dementia    Chronic right-sided heart failure    Depression    Liver cirrhosis, alcoholic    Borderline diabetes mellitus    HTN (hypertension)    Afib    Presence of Watchman left atrial appendage closure device    AICD (automatic cardioverter/defibrillator) present    History of cholecystectomy    History of appendectomy    History of knee surgery    Deviated nasal septum        Subjective and Objective:  Today,  Discussed with     Focused Palliative Care Evaluation:                   Symptoms:                                      Pain                                     Dyspnea                                     N/V                                     Appetite                                     Anxiety                                     Other _____________________                     Support Devices:              PHYSICAL EXAM:      Constitutional:    Eyes:    ENMT:    Neck:    Breasts:    Back:    Respiratory:    Cardiovascular:    Gastrointestinal:    Genitourinary:    Rectal:    Extremities:    Vascular:    Neurological:    Skin:    Lymph Nodes:    Musculoskeletal:    Psychiatric:        T(C): 36.3, Max: 36.7 (12:36)  HR: 60 (59 - 68)  BP: 116/64 (116/64 - 137/69)  RR: 21 (19 - 22)  SpO2: 100% (92% - 100%)      LABS/STUDIES:  12-21    146  |  108  |  35<H>  ----------------------------<  139<H>  4.0   |  27  |  1.0    Ca    9.2      21 Dec 2020 06:56  Mg     2.2     12-21    TPro  6.2  /  Alb  2.7<L>  /  TBili  2.6<H>  /  DBili  x   /  AST  22  /  ALT  25  /  AlkPhos  74  12-21                            10.0   11.34 )-----------( 51       ( 21 Dec 2020 06:56 )             33.6       MEDICATIONS  (STANDING):  ALBUTerol    90 MICROgram(s) HFA Inhaler 2 Puff(s) Inhalation every 6 hours  aspirin 325 milliGRAM(s) Oral daily  atorvastatin 80 milliGRAM(s) Oral at bedtime  chlorhexidine 4% Liquid 1 Application(s) Topical <User Schedule>  dextrose 5%. 1000 milliLiter(s) (100 mL/Hr) IV Continuous <Continuous>  famotidine Injectable 20 milliGRAM(s) IV Push every 12 hours  folic acid 1 milliGRAM(s) Enteral Tube daily  ipratropium 17 MICROgram(s) HFA Inhaler 1 Puff(s) Inhalation every 6 hours  metoprolol tartrate Injectable 2.5 milliGRAM(s) IV Push every 12 hours  senna 2 Tablet(s) Oral at bedtime    MEDICATIONS  (PRN):  acetaminophen   Tablet .. 650 milliGRAM(s) Oral every 6 hours PRN Temp greater or equal to 38C (100.4F)  polyethylene glycol 3350 17 Gram(s) Oral daily PRN Constipation          iStop:         PPS  Level    __________       Note PPS = Palliative Performance Scale; (c)2001, [facility] Society       Range from 100% meaning Full ambulation/self-care/intake/Level of Consicous                                                                              to        10% meaning Bedbound/Unable to do any activity/extensive disease /Total Care/ No PO intake/ LOC=Full/drowsy/+/-confusion        (0% = death)                     Prior to acute illness, patient's functionality reportedly                                                                                                                                                                                    REQUESTED OF: DR. 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 Pt was known COVID+ for 1 week and had increased sxs over past few days but can't really give details. He also states that for 1mo he hasn't felt well, general sense of 'malaise' and notes that his lips have been extremely chapped, mildly swollen, and 'blue'. He saw his PCP who didn't know what it was and suggested coming to ED. Denied CP, palpitations, leg pains, nausea/vomiting/diarrhea. Has some mild abd pain in LLL crampy. Pt overall is poor historian for his sxs.     In ED: T96.9, HR 58, /62, RR18, SpO2 86% on RA > 92% on 3L NC.  CT a/p no acute pathology but noted small L renal lesion & b/l effusions with signs of RH dysfcn.   Labs showed leukopenia/lymphopenia, thrombocytopenia. Trop 0.04, EKG ventricularly paced.  CXR diffuse infiltrates. (07 Dec 2020 23:15)    Interim Hx:   Thoracentesis in ED  Intubated 12/12  extubated 12/17  GOC- made DNR/DNI 12/17  + Thrombocytopenia  Ongoing AMS  On continuous Bipap  c/s for TPN/PPN    PAST MEDICAL & SURGICAL HISTORY:  Mild dementia    Chronic right-sided heart failure    Depression    Liver cirrhosis, alcoholic    Borderline diabetes mellitus    HTN (hypertension)    Afib    Presence of Watchman left atrial appendage closure device    AICD (automatic cardioverter/defibrillator) present    History of cholecystectomy    History of appendectomy    History of knee surgery    Deviated nasal septum        Subjective and Objective:  Today,  Discussed with Dr. Robles. Patient is on continuous Bipap, and desats when taken off. He is unable to take in food PO due to being on Bipap, so consult was placed for possible TPN/PPN. Patient is weak, unable to converse/meaningfully communicate.     Focused Palliative Care Evaluation: unable to assess due to mental status/COVID precautions                  Symptoms:                                      Pain                                     Dyspnea                                     N/V                                     Appetite                                     Anxiety                                     Other _____________________                     Support Devices: Bipap              PHYSICAL EXAM: Deferred due to COVID precautions, referred to most recent PE with primary team (from 12/21/2020).     Constitutional:     Eyes:    ENMT:    Neck:    Breasts:    Back:    Respiratory:    Cardiovascular:    Gastrointestinal:    Genitourinary:    Rectal:    Extremities:    Vascular:    Neurological:    Skin:    Lymph Nodes:    Musculoskeletal:    Psychiatric:        T(C): 36.3, Max: 36.7 (12:36)  HR: 60 (59 - 68)  BP: 116/64 (116/64 - 137/69)  RR: 21 (19 - 22)  SpO2: 100% (92% - 100%)      LABS/STUDIES:  12-21    146  |  108  |  35<H>  ----------------------------<  139<H>  4.0   |  27  |  1.0    Ca    9.2      21 Dec 2020 06:56  Mg     2.2     12-21    TPro  6.2  /  Alb  2.7<L>  /  TBili  2.6<H>  /  DBili  x   /  AST  22  /  ALT  25  /  AlkPhos  74  12-21                            10.0   11.34 )-----------( 51       ( 21 Dec 2020 06:56 )             33.6       MEDICATIONS  (STANDING):  ALBUTerol    90 MICROgram(s) HFA Inhaler 2 Puff(s) Inhalation every 6 hours  aspirin 325 milliGRAM(s) Oral daily  atorvastatin 80 milliGRAM(s) Oral at bedtime  chlorhexidine 4% Liquid 1 Application(s) Topical <User Schedule>  dextrose 5%. 1000 milliLiter(s) (100 mL/Hr) IV Continuous <Continuous>  famotidine Injectable 20 milliGRAM(s) IV Push every 12 hours  folic acid 1 milliGRAM(s) Enteral Tube daily  ipratropium 17 MICROgram(s) HFA Inhaler 1 Puff(s) Inhalation every 6 hours  metoprolol tartrate Injectable 2.5 milliGRAM(s) IV Push every 12 hours  senna 2 Tablet(s) Oral at bedtime    MEDICATIONS  (PRN):  acetaminophen   Tablet .. 650 milliGRAM(s) Oral every 6 hours PRN Temp greater or equal to 38C (100.4F)  polyethylene glycol 3350 17 Gram(s) Oral daily PRN Constipation          iStop:         PPS  Level   10%       Note PPS = Palliative Performance Scale; (c)2001, [facility] Hospice Society       Range from 100% meaning Full ambulation/self-care/intake/Level of Consicous                                                                              to        10% meaning Bedbound/Unable to do any activity/extensive disease /Total Care/ No PO intake/ LOC=Full/drowsy/+/-confusion        (0% = death)                     Prior to acute illness, patient's functionality reportedly A & O X 4 (mild dementia), lives at home with his wife.                                                                                                                                                                                     REQUESTED OF: DR. 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 Pt was known COVID+ for 1 week and had increased sxs over past few days but can't really give details. He also states that for 1mo he hasn't felt well, general sense of 'malaise' and notes that his lips have been extremely chapped, mildly swollen, and 'blue'. He saw his PCP who didn't know what it was and suggested coming to ED. Denied CP, palpitations, leg pains, nausea/vomiting/diarrhea. Has some mild abd pain in LLL crampy. Pt overall is poor historian for his sxs.     In ED: T96.9, HR 58, /62, RR18, SpO2 86% on RA > 92% on 3L NC.  CT a/p no acute pathology but noted small L renal lesion & b/l effusions with signs of RH dysfcn.   Labs showed leukopenia/lymphopenia, thrombocytopenia. Trop 0.04, EKG ventricularly paced.  CXR diffuse infiltrates. (07 Dec 2020 23:15)    Interim Hx:   Thoracentesis in ED  Intubated 12/12  extubated 12/17  GOC- made DNR/DNI 12/17  + Thrombocytopenia  Ongoing AMS  On continuous Bipap  c/s for TPN/PPN    PAST MEDICAL & SURGICAL HISTORY:  Mild dementia    Chronic right-sided heart failure    Depression    Liver cirrhosis, alcoholic    Borderline diabetes mellitus    HTN (hypertension)    Afib    Presence of Watchman left atrial appendage closure device    AICD (automatic cardioverter/defibrillator) present    History of cholecystectomy    History of appendectomy    History of knee surgery    Deviated nasal septum        Subjective and Objective:  Today,  Discussed with Dr. Robles. Patient is on continuous Bipap, and desats when taken off. He is unable to take in food PO due to being on Bipap, so consult was placed for possible TPN/PPN. Patient is weak, unable to converse/meaningfully communicate.     Focused Palliative Care Evaluation: unable to assess due to mental status/COVID precautions                  Symptoms:                                      Pain                                     Dyspnea                                     N/V                                     Appetite                                     Anxiety                                     Other _____________________                     Support Devices: Bipap              PHYSICAL EXAM: Deferred due to COVID precautions, referred to most recent PE with primary team (from 12/21/2020).     Constitutional:     Eyes:    ENMT:    Neck:    Breasts:    Back:    Respiratory:    Cardiovascular:    Gastrointestinal:    Genitourinary:    Rectal:    Extremities:    Vascular:    Neurological:    Skin:    Lymph Nodes:    Musculoskeletal:    Psychiatric:        T(C): 36.3, Max: 36.7 (12:36)  HR: 60 (59 - 68)  BP: 116/64 (116/64 - 137/69)  RR: 21 (19 - 22)  SpO2: 100% (92% - 100%)      LABS/STUDIES:  12-21    146  |  108  |  35<H>  ----------------------------<  139<H>  4.0   |  27  |  1.0    Ca    9.2      21 Dec 2020 06:56  Mg     2.2     12-21    TPro  6.2  /  Alb  2.7<L>  /  TBili  2.6<H>  /  DBili  x   /  AST  22  /  ALT  25  /  AlkPhos  74  12-21                            10.0   11.34 )-----------( 51       ( 21 Dec 2020 06:56 )             33.6       MEDICATIONS  (STANDING):  ALBUTerol    90 MICROgram(s) HFA Inhaler 2 Puff(s) Inhalation every 6 hours  aspirin 325 milliGRAM(s) Oral daily  atorvastatin 80 milliGRAM(s) Oral at bedtime  chlorhexidine 4% Liquid 1 Application(s) Topical <User Schedule>  dextrose 5%. 1000 milliLiter(s) (100 mL/Hr) IV Continuous <Continuous>  famotidine Injectable 20 milliGRAM(s) IV Push every 12 hours  folic acid 1 milliGRAM(s) Enteral Tube daily  ipratropium 17 MICROgram(s) HFA Inhaler 1 Puff(s) Inhalation every 6 hours  metoprolol tartrate Injectable 2.5 milliGRAM(s) IV Push every 12 hours  senna 2 Tablet(s) Oral at bedtime    MEDICATIONS  (PRN):  acetaminophen   Tablet .. 650 milliGRAM(s) Oral every 6 hours PRN Temp greater or equal to 38C (100.4F)  polyethylene glycol 3350 17 Gram(s) Oral daily PRN Constipation          iStop: This report was requested by: Melida Gray | Reference #: [id number]   Patient Name: Will Araiza   YOB: 1934   Address: [address] [place] [address]   Sex: Male   Rx Written	Rx Dispensed	Drug	Quantity	Days Supply	Prescriber Name	Payment Method	Dispenser  02/03/2020	02/03/2020	tramadol hcl 50 mg tablet 	120	30	Radha Wheeler PA-C	Medicare	[id number]  Patient Name: Will Araiza   YOB: 1934   Address: [address] [place] [address]   Sex: Male   Rx Written	Rx Dispensed	Drug	Quantity	Days Supply	Prescriber Name	Payment Method	Dispenser  06/01/2020	06/01/2020	tramadol hcl 50 mg tablet 	28	7	Radha Wheeler PA-C	Medicare	[id number]  * - Drugs marked with an asterisk are compound drugs. If the compound drug is made up of more than one controlled substance, then each         PPS  Level   10%       Note PPS = Palliative Performance Scale; (c)2001, [facility]ice Society       Range from 100% meaning Full ambulation/self-care/intake/Level of Consicous                                                                              to        10% meaning Bedbound/Unable to do any activity/extensive disease /Total Care/ No PO intake/ LOC=Full/drowsy/+/-confusion        (0% = death)                     Prior to acute illness, patient's functionality reportedly A & O X 4 (mild dementia), lives at home with his wife.                                                                                                                                                                                     REQUESTED OF: DR. 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 Pt was known COVID+ for 1 week and had increased sxs over past few days but can't really give details. He also states that for 1mo he hasn't felt well, general sense of 'malaise' and notes that his lips have been extremely chapped, mildly swollen, and 'blue'. He saw his PCP who didn't know what it was and suggested coming to ED. Denied CP, palpitations, leg pains, nausea/vomiting/diarrhea. Has some mild abd pain in LLL crampy. Pt overall is poor historian for his sxs.     In ED: T96.9, HR 58, /62, RR18, SpO2 86% on RA > 92% on 3L NC.  CT a/p no acute pathology but noted small L renal lesion & b/l effusions with signs of RH dysfcn.   Labs showed leukopenia/lymphopenia, thrombocytopenia. Trop 0.04, EKG ventricularly paced.  CXR diffuse infiltrates. (07 Dec 2020 23:15)    Interim Hx:   Thoracentesis in ED  Intubated 12/12  extubated 12/17  GOC- made DNR/DNI 12/17  Ongoing AMS- required restraints on 12/20  Hypernatremia improving  + Thrombocytopenia  On continuous Bipap  c/s for TPN/PPN    PAST MEDICAL & SURGICAL HISTORY:  Mild dementia    Chronic right-sided heart failure    Depression    Liver cirrhosis, alcoholic    Borderline diabetes mellitus    HTN (hypertension)    Afib    Presence of Watchman left atrial appendage closure device    AICD (automatic cardioverter/defibrillator) present    History of cholecystectomy    History of appendectomy    History of knee surgery    Deviated nasal septum        Subjective and Objective:  Today,  Discussed with Dr. Robles. Patient is on continuous Bipap, and becomes hypoxic immediately when taken off. He is unable to take in food PO due to being on Bipap, so consult was placed for possible TPN/PPN. Patient is weak/frail, unable to converse/meaningfully communicate.     Focused Palliative Care Evaluation: unable to assess due to mental status/COVID precautions                  Symptoms:                                      Pain                                     Dyspnea                                     N/V                                     Appetite                                     Anxiety                                     Other _____________________                     Support Devices: Bipap              PHYSICAL EXAM: Deferred due to COVID precautions, referred to most recent PE with primary team (from 12/21/2020).     Constitutional:     Eyes:    ENMT:    Neck:    Breasts:    Back:    Respiratory:    Cardiovascular:    Gastrointestinal:    Genitourinary:    Rectal:    Extremities:    Vascular:    Neurological:    Skin:    Lymph Nodes:    Musculoskeletal:    Psychiatric:        T(C): 36.3, Max: 36.7 (12:36)  HR: 60 (59 - 68)  BP: 116/64 (116/64 - 137/69)  RR: 21 (19 - 22)  SpO2: 100% (92% - 100%)      LABS/STUDIES:  12-21    146  |  108  |  35<H>  ----------------------------<  139<H>  4.0   |  27  |  1.0    Ca    9.2      21 Dec 2020 06:56  Mg     2.2     12-21    TPro  6.2  /  Alb  2.7<L>  /  TBili  2.6<H>  /  DBili  x   /  AST  22  /  ALT  25  /  AlkPhos  74  12-21                            10.0   11.34 )-----------( 51       ( 21 Dec 2020 06:56 )             33.6       MEDICATIONS  (STANDING):  ALBUTerol    90 MICROgram(s) HFA Inhaler 2 Puff(s) Inhalation every 6 hours  aspirin 325 milliGRAM(s) Oral daily  atorvastatin 80 milliGRAM(s) Oral at bedtime  chlorhexidine 4% Liquid 1 Application(s) Topical <User Schedule>  dextrose 5%. 1000 milliLiter(s) (100 mL/Hr) IV Continuous <Continuous>  famotidine Injectable 20 milliGRAM(s) IV Push every 12 hours  folic acid 1 milliGRAM(s) Enteral Tube daily  ipratropium 17 MICROgram(s) HFA Inhaler 1 Puff(s) Inhalation every 6 hours  metoprolol tartrate Injectable 2.5 milliGRAM(s) IV Push every 12 hours  senna 2 Tablet(s) Oral at bedtime    MEDICATIONS  (PRN):  acetaminophen   Tablet .. 650 milliGRAM(s) Oral every 6 hours PRN Temp greater or equal to 38C (100.4F)  polyethylene glycol 3350 17 Gram(s) Oral daily PRN Constipation          iStop: This report was requested by: Melida Gray | Reference #: [id number]   Patient Name: Will Araiza   YOB: 1934   Address: [address] [place] [address]   Sex: Male   Rx Written	Rx Dispensed	Drug	Quantity	Days Supply	Prescriber Name	Payment Method	Dispenser  02/03/2020	02/03/2020	tramadol hcl 50 mg tablet 	120	30	Radha Wheeler-C	Medicare	[id number]  Patient Name: Will Araiza   YOB: 1934   Address: [address] [place] [address]   Sex: Male   Rx Written	Rx Dispensed	Drug	Quantity	Days Supply	Prescriber Name	Payment Method	Dispenser  06/01/2020	06/01/2020	tramadol hcl 50 mg tablet 	28	7	Radha WheelerC	Medicare	[id number]  * - Drugs marked with an asterisk are compound drugs. If the compound drug is made up of more than one controlled substance, then each         PPS  Level   10%       Note PPS = Palliative Performance Scale; (c)2001, [facility]ice Society       Range from 100% meaning Full ambulation/self-care/intake/Level of Consicous                                                                              to        10% meaning Bedbound/Unable to do any activity/extensive disease /Total Care/ No PO intake/ LOC=Full/drowsy/+/-confusion        (0% = death)                     Prior to acute illness, patient's functionality reportedly A & O X 4 (mild dementia), lives at home with his wife.                                                                                                                                                                                     REQUESTED OF: DR. 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 Pt was known COVID+ for 1 week and had increased sxs over past few days but can't really give details. He also states that for 1mo he hasn't felt well, general sense of 'malaise' and notes that his lips have been extremely chapped, mildly swollen, and 'blue'. He saw his PCP who didn't know what it was and suggested coming to ED. Denied CP, palpitations, leg pains, nausea/vomiting/diarrhea. Has some mild abd pain in LLL crampy. Pt overall is poor historian for his sxs.     In ED: T96.9, HR 58, /62, RR18, SpO2 86% on RA > 92% on 3L NC.  CT a/p no acute pathology but noted small L renal lesion & b/l effusions with signs of RH dysfcn.   Labs showed leukopenia/lymphopenia, thrombocytopenia. Trop 0.04, EKG ventricularly paced.  CXR diffuse infiltrates. (07 Dec 2020 23:15)    Interim Hx:   Thoracentesis in ED  Intubated 12/12  extubated 12/17  GOC- made DNR/DNI 12/17  Ongoing AMS- required restraints on 12/20  Hypernatremia improving  + Thrombocytopenia  On continuous Bipap  c/s for TPN/PPN    PAST MEDICAL & SURGICAL HISTORY:  Mild dementia    Chronic right-sided heart failure    Depression    Liver cirrhosis, alcoholic    Borderline diabetes mellitus    HTN (hypertension)    Afib    Presence of Watchman left atrial appendage closure device    AICD (automatic cardioverter/defibrillator) present    History of cholecystectomy    History of appendectomy    History of knee surgery    Deviated nasal septum        Subjective and Objective:  Today,  Discussed with Dr. Robles. Patient is on continuous Bipap, and becomes hypoxic immediately when taken off. He is unable to take in food PO due to being on Bipap, so consult was placed for possible TPN/PPN. Patient is weak/frail, unable to converse/meaningfully communicate.   Called    Focused Palliative Care Evaluation: unable to assess due to mental status/COVID precautions                  Symptoms:                                      Pain                                     Dyspnea                                     N/V                                     Appetite                                     Anxiety                                     Other _____________________                     Support Devices: Bipap              PHYSICAL EXAM: Deferred due to COVID precautions, referred to most recent PE with primary team (from 12/21/2020).     Constitutional:     Eyes:    ENMT:    Neck:    Breasts:    Back:    Respiratory:    Cardiovascular:    Gastrointestinal:    Genitourinary:    Rectal:    Extremities:    Vascular:    Neurological:    Skin:    Lymph Nodes:    Musculoskeletal:    Psychiatric:        T(C): 36.3, Max: 36.7 (12:36)  HR: 60 (59 - 68)  BP: 116/64 (116/64 - 137/69)  RR: 21 (19 - 22)  SpO2: 100% (92% - 100%)      LABS/STUDIES:  12-21    146  |  108  |  35<H>  ----------------------------<  139<H>  4.0   |  27  |  1.0    Ca    9.2      21 Dec 2020 06:56  Mg     2.2     12-21    TPro  6.2  /  Alb  2.7<L>  /  TBili  2.6<H>  /  DBili  x   /  AST  22  /  ALT  25  /  AlkPhos  74  12-21                            10.0   11.34 )-----------( 51       ( 21 Dec 2020 06:56 )             33.6       MEDICATIONS  (STANDING):  ALBUTerol    90 MICROgram(s) HFA Inhaler 2 Puff(s) Inhalation every 6 hours  aspirin 325 milliGRAM(s) Oral daily  atorvastatin 80 milliGRAM(s) Oral at bedtime  chlorhexidine 4% Liquid 1 Application(s) Topical <User Schedule>  dextrose 5%. 1000 milliLiter(s) (100 mL/Hr) IV Continuous <Continuous>  famotidine Injectable 20 milliGRAM(s) IV Push every 12 hours  folic acid 1 milliGRAM(s) Enteral Tube daily  ipratropium 17 MICROgram(s) HFA Inhaler 1 Puff(s) Inhalation every 6 hours  metoprolol tartrate Injectable 2.5 milliGRAM(s) IV Push every 12 hours  senna 2 Tablet(s) Oral at bedtime    MEDICATIONS  (PRN):  acetaminophen   Tablet .. 650 milliGRAM(s) Oral every 6 hours PRN Temp greater or equal to 38C (100.4F)  polyethylene glycol 3350 17 Gram(s) Oral daily PRN Constipation          iStop: This report was requested by: Melida Gray | Reference #: [id number]   Patient Name: Will Araiza   YOB: 1934   Address: [address] [place] [address]   Sex: Male   Rx Written	Rx Dispensed	Drug	Quantity	Days Supply	Prescriber Name	Payment Method	Dispenser  02/03/2020	02/03/2020	tramadol hcl 50 mg tablet 	120	30	Radha Wheeler PA-C	Medicare	[id number]  Patient Name: Will Araiza   YOB: 1934   Address: [address] [place] [address]   Sex: Male   Rx Written	Rx Dispensed	Drug	Quantity	Days Supply	Prescriber Name	Payment Method	Dispenser  06/01/2020	06/01/2020	tramadol hcl 50 mg tablet 	28	7	Radha WheelerC	Medicare	[id number]  * - Drugs marked with an asterisk are compound drugs. If the compound drug is made up of more than one controlled substance, then each         PPS  Level   10%       Note PPS = Palliative Performance Scale; (c)2001, [facility] Society       Range from 100% meaning Full ambulation/self-care/intake/Level of Consicous                                                                              to        10% meaning Bedbound/Unable to do any activity/extensive disease /Total Care/ No PO intake/ LOC=Full/drowsy/+/-confusion        (0% = death)                     Prior to acute illness, patient's functionality reportedly A & O X 4 (mild dementia), lives at home with his wife.                                                                                                                                                                                     REQUESTED OF: DR. GARCIA    Chart reviewed, Hospital Day 15    WILL ARAIZA 86yMale  HPI:  85y/o M with PMH of CHFrEF & severe MR/pulm HTN, Afib s/p watchman 2019 not on AC, HTN, hx of chronic anemia presents to ED with 'blue lips' and SOB. Pt was known COVID+ for 1 week and had increased sxs over past few days but can't really give details. He also states that for 1mo he hasn't felt well, general sense of 'malaise' and notes that his lips have been extremely chapped, mildly swollen, and 'blue'. He saw his PCP who didn't know what it was and suggested coming to ED. Denied CP, palpitations, leg pains, nausea/vomiting/diarrhea. Has some mild abd pain in LLL crampy. Pt overall is poor historian for his sxs.     In ED: T96.9, HR 58, /62, RR18, SpO2 86% on RA > 92% on 3L NC.  CT a/p no acute pathology but noted small L renal lesion & b/l effusions with signs of RH dysfcn.   Labs showed leukopenia/lymphopenia, thrombocytopenia. Trop 0.04, EKG ventricularly paced.  CXR diffuse infiltrates. (07 Dec 2020 23:15)    Interim Hx:   Thoracentesis in ED  Intubated 12/12  extubated 12/17  GOC- made DNR/DNI 12/17  Ongoing AMS- required restraints on 12/20  Hypernatremia improving  + Thrombocytopenia  On continuous Bipap  c/s for TPN/PPN    PAST MEDICAL & SURGICAL HISTORY:  Mild dementia    Chronic right-sided heart failure    Depression    Liver cirrhosis, alcoholic    Borderline diabetes mellitus    HTN (hypertension)    Afib    Presence of Watchman left atrial appendage closure device    AICD (automatic cardioverter/defibrillator) present    History of cholecystectomy    History of appendectomy    History of knee surgery    Deviated nasal septum        Subjective and Objective:  Today,  Discussed with Dr. Robles. Patient is on continuous Bipap, and becomes hypoxic immediately when taken off. He is unable to take in food PO due to being on Bipap, so consult was placed for possible TPN/PPN. Patient is weak/frail, unable to converse/meaningfully communicate.   Called patient's son and introduced palliative care. GOC as below.    Focused Palliative Care Evaluation: unable to assess due to mental status/COVID precautions                  Symptoms:                                      Pain                                     Dyspnea                                     N/V                                     Appetite                                     Anxiety                                     Other _____________________                     Support Devices: Bipap              PHYSICAL EXAM: Deferred due to COVID precautions, referred to most recent PE with primary team (from 12/21/2020).       T(C): 36.3, Max: 36.7 (12:36)  HR: 60 (59 - 68)  BP: 116/64 (116/64 - 137/69)  RR: 21 (19 - 22)  SpO2: 100% (92% - 100%)      LABS/STUDIES:  12-21    146  |  108  |  35<H>  ----------------------------<  139<H>  4.0   |  27  |  1.0    Ca    9.2      21 Dec 2020 06:56  Mg     2.2     12-21    TPro  6.2  /  Alb  2.7<L>  /  TBili  2.6<H>  /  DBili  x   /  AST  22  /  ALT  25  /  AlkPhos  74  12-21                            10.0   11.34 )-----------( 51       ( 21 Dec 2020 06:56 )             33.6       MEDICATIONS  (STANDING):  ALBUTerol    90 MICROgram(s) HFA Inhaler 2 Puff(s) Inhalation every 6 hours  aspirin 325 milliGRAM(s) Oral daily  atorvastatin 80 milliGRAM(s) Oral at bedtime  chlorhexidine 4% Liquid 1 Application(s) Topical <User Schedule>  dextrose 5%. 1000 milliLiter(s) (100 mL/Hr) IV Continuous <Continuous>  famotidine Injectable 20 milliGRAM(s) IV Push every 12 hours  folic acid 1 milliGRAM(s) Enteral Tube daily  ipratropium 17 MICROgram(s) HFA Inhaler 1 Puff(s) Inhalation every 6 hours  metoprolol tartrate Injectable 2.5 milliGRAM(s) IV Push every 12 hours  senna 2 Tablet(s) Oral at bedtime    MEDICATIONS  (PRN):  acetaminophen   Tablet .. 650 milliGRAM(s) Oral every 6 hours PRN Temp greater or equal to 38C (100.4F)  polyethylene glycol 3350 17 Gram(s) Oral daily PRN Constipation          iStop: This report was requested by: Melida Gray | Reference #: [id number]   Patient Name: Will Araiza   YOB: 1934   Address: [address] [place] [address]   Sex: Male   Rx Written	Rx Dispensed	Drug	Quantity	Days Supply	Prescriber Name	Payment Method	Dispenser  02/03/2020	02/03/2020	tramadol hcl 50 mg tablet 	120	30	Radha Wheeler PA-C	Medicare	[id number]  Patient Name: Will Araiza   YOB: 1934   Address: [address] [place] [address]   Sex: Male   Rx Written	Rx Dispensed	Drug	Quantity	Days Supply	Prescriber Name	Payment Method	Dispenser  06/01/2020	06/01/2020	tramadol hcl 50 mg tablet 	28	7	Radha Wheeler PA-C	Medicare	[id number]  * - Drugs marked with an asterisk are compound drugs. If the compound drug is made up of more than one controlled substance, then each         PPS  Level   10%       Note PPS = Palliative Performance Scale; (c)2001, [facility] Society       Range from 100% meaning Full ambulation/self-care/intake/Level of Consicous                                                                              to        10% meaning Bedbound/Unable to do any activity/extensive disease /Total Care/ No PO intake/ LOC=Full/drowsy/+/-confusion        (0% = death)                     Prior to acute illness, patient's functionality reportedly A & O X 4 (mild dementia), lives at home with his wife.

## 2020-12-21 NOTE — CHART NOTE - NSCHARTNOTEFT_GEN_A_CORE
Registered Dietitian Follow-Up     Patient Profile Reviewed                           Yes [c]   No []     Nutrition History Previously Obtained        Yes []  No [x]       Pertinent Subjective Information: Unable to conduct in-person nutrition interview in setting of contact precautions r/t COVID. No response from emergency contact number at this time.     Pertinent Medical Interventions: Acute hypoxemic respiratory failure 2/2 COVID-19 pneumonia s/p intubation. Intubated on 12/12, extubated on 12/16.     Diet order: NPO as of 12/17.     Anthropometrics:  - Ht. 175.3 cm.  - Wt. 71.2 kg (12/17) vs 72.1 kg (12/16) vs 70 kg (12/15) vs 70.6 kg (12/14) vs 80 kg (12/12) vs 81.6 kg (12/8, stated wt). Dosing wt 73.7 kg (12/12) noted. ? etiology of wt changes; of note, pt is on lasix this admit. Will continue to monitor.  - BMI 24.0 (using dosing wt 73.7 kg)  - IBW 72.7 kg.     Pertinent Lab Data: 12/21: RBC-3.52, H/H-10.0/33.6, BUN-35, gluc-139, POCT gluc-129 (17:21) vs 136 (11:28) vs 130 (8:18) vs 124 (6:55)     Pertinent Meds: 1 L dextrose 5% solution (170 kcal total), lasix, famotidine, metoprolol, folic acid, miralax, senna, atorvastatin     Physical Findings:  - Appearance: 1+ generalized edema.  - GI function: Last BM 12/12 per flowsheets. LIP notified.  - Tubes: no feeding tubes  - Oral/Mouth cavity: difficulty swallowing reported per RN  - Skin: stage II pressure ulcer to sacrum     Nutrition Requirements  Weight Used: 73.7 kg dosing wt     Estimated Energy Needs    Continue []  Adjust [x]  0595-7825 kcal/day (25-30 kcal/kg ABW)        Estimated Protein Needs    Continue []  Adjust [x]  74-88 g/day (1-1.2 g/kg ABW)        Estimated Fluid Needs        Continue []  Adjust [x]  1 mL/kcal or per LIP.     Nutrient Intake: Current NPO at this time.        [x] Previous Nutrition Diagnosis: Inadequate protein-energy intake            [x] Ongoing          [] Resolved    Nutrition Intervention: Coordination of Care     Goal/Expected Outcome: Nutrition regimen determined & initiated as medically feasible; ideally to demonstrate tolerance if diet advance to po, with at least 75% po intake over next 3 days.     Indicator/Monitoring: Diet order, energy intake, glucose profile, renal profile, nutrition focused physical findings, body composition.    Recommendation: If plan to advance diet, consult SLP services to evaluate swallow function in recently extubated pt. Diet order texture/consistency per SLP. Review with LIP via spectra transferred by .

## 2020-12-21 NOTE — PROGRESS NOTE ADULT - SUBJECTIVE AND OBJECTIVE BOX
WILLIAM ARAIZA  86y, Male    All available historical data reviewed    OVERNIGHT EVENTS:  no fevers  lethargic  on bipap  no pressors  does not follow commands    ROS:  unable to obtain history secondary to patient's mental status and/or sedation    VITALS:  T(F): 97.4, Max: 98.1 (12-20-20 @ 12:36)  HR: 60  BP: 116/64  RR: 21Vital Signs Last 24 Hrs  T(C): 36.3 (21 Dec 2020 08:05), Max: 36.7 (20 Dec 2020 12:36)  T(F): 97.4 (21 Dec 2020 08:05), Max: 98.1 (20 Dec 2020 12:36)  HR: 60 (21 Dec 2020 08:05) (59 - 68)  BP: 116/64 (21 Dec 2020 08:05) (116/64 - 137/69)  BP(mean): --  RR: 21 (21 Dec 2020 08:05) (19 - 22)  SpO2: 100% (21 Dec 2020 08:05) (92% - 100%)    TESTS & MEASUREMENTS:                        10.0   11.34 )-----------( 51       ( 21 Dec 2020 06:56 )             33.6     12-21    146  |  108  |  35<H>  ----------------------------<  139<H>  4.0   |  27  |  1.0    Ca    9.2      21 Dec 2020 06:56  Mg     2.2     12-21    TPro  6.2  /  Alb  2.7<L>  /  TBili  2.6<H>  /  DBili  x   /  AST  22  /  ALT  25  /  AlkPhos  74  12-21    LIVER FUNCTIONS - ( 21 Dec 2020 06:56 )  Alb: 2.7 g/dL / Pro: 6.2 g/dL / ALK PHOS: 74 U/L / ALT: 25 U/L / AST: 22 U/L / GGT: x                   RADIOLOGY & ADDITIONAL TESTS:  Personal review of radiological diagnostics performed  Echo and EKG results noted when applicable.     MEDICATIONS:  acetaminophen   Tablet .. 650 milliGRAM(s) Oral every 6 hours PRN  ALBUTerol    90 MICROgram(s) HFA Inhaler 2 Puff(s) Inhalation every 6 hours  aspirin 325 milliGRAM(s) Oral daily  atorvastatin 80 milliGRAM(s) Oral at bedtime  chlorhexidine 4% Liquid 1 Application(s) Topical <User Schedule>  dextrose 5%. 1000 milliLiter(s) IV Continuous <Continuous>  famotidine Injectable 20 milliGRAM(s) IV Push every 12 hours  folic acid 1 milliGRAM(s) Enteral Tube daily  ipratropium 17 MICROgram(s) HFA Inhaler 1 Puff(s) Inhalation every 6 hours  metoprolol tartrate Injectable 2.5 milliGRAM(s) IV Push every 12 hours  polyethylene glycol 3350 17 Gram(s) Oral daily PRN  senna 2 Tablet(s) Oral at bedtime      ANTIBIOTICS:

## 2020-12-21 NOTE — PROGRESS NOTE ADULT - SUBJECTIVE AND OBJECTIVE BOX
OVERNIGHT EVENTS: events noted, still on BIPAP, tachypneic    Vital Signs Last 24 Hrs  T(C): 36.6 (21 Dec 2020 06:30), Max: 36.7 (20 Dec 2020 07:59)  T(F): 97.8 (21 Dec 2020 06:30), Max: 98.1 (20 Dec 2020 12:36)  HR: 67 (21 Dec 2020 06:30) (59 - 68)  BP: 121/63 (21 Dec 2020 06:30) (121/63 - 137/69)  RR: 21 (21 Dec 2020 06:30) (19 - 22)  SpO2: 100% (21 Dec 2020 06:30) (92% - 100%)    PHYSICAL EXAMINATION:    GENERAL: ill looking     HEENT: Head is normocephalic and atraumatic.    NECK: Supple.    LUNGS: bl crackles    HEART: BALDEV 3/6    ABDOMEN: Soft, nontender, and nondistended.      EXTREMITIES: Without any cyanosis, clubbing, rash, lesions or edema.    NEUROLOGIC: Grossly intact.    SKIN: No ulceration or induration present.      LABS:                        9.3    9.59  )-----------( 48       ( 20 Dec 2020 06:10 )             31.7     12-20    146  |  110  |  34<H>  ----------------------------<  203<H>  4.1   |  26  |  1.0    Ca    9.1      20 Dec 2020 16:36  Mg     2.2     12-20    TPro  6.1  /  Alb  2.6<L>  /  TBili  2.9<H>  /  DBili  x   /  AST  20  /  ALT  24  /  AlkPhos  73  12-20    PT/INR - ( 20 Dec 2020 06:10 )   PT: 16.50 sec;   INR: 1.43 ratio         PTT - ( 20 Dec 2020 06:10 )  PTT:33.8 sec            Serum Pro-Brain Natriuretic Peptide: 08290 pg/mL (12-18-20 @ 17:33)            12-20-20 @ 07:01  -  12-21-20 @ 07:00  --------------------------------------------------------  IN: 1280 mL / OUT: 1600 mL / NET: -320 mL        MICROBIOLOGY:      MEDICATIONS  (STANDING):  ALBUTerol    90 MICROgram(s) HFA Inhaler 2 Puff(s) Inhalation every 6 hours  aspirin 325 milliGRAM(s) Oral daily  atorvastatin 80 milliGRAM(s) Oral at bedtime  chlorhexidine 4% Liquid 1 Application(s) Topical <User Schedule>  dextrose 5%. 1000 milliLiter(s) (100 mL/Hr) IV Continuous <Continuous>  famotidine Injectable 20 milliGRAM(s) IV Push every 12 hours  folic acid 1 milliGRAM(s) Enteral Tube daily  ipratropium 17 MICROgram(s) HFA Inhaler 1 Puff(s) Inhalation every 6 hours  metoprolol tartrate Injectable 2.5 milliGRAM(s) IV Push every 12 hours  senna 2 Tablet(s) Oral at bedtime    MEDICATIONS  (PRN):  acetaminophen   Tablet .. 650 milliGRAM(s) Oral every 6 hours PRN Temp greater or equal to 38C (100.4F)  polyethylene glycol 3350 17 Gram(s) Oral daily PRN Constipation      RADIOLOGY & ADDITIONAL STUDIES:

## 2020-12-21 NOTE — PROGRESS NOTE ADULT - ASSESSMENT
· Assessment	  87y/o M with PMH of CHFrEF & severe MR/pulm HTN, Afib s/p watchman 2019 not on AC, HTN, hx of chronic anemia presents to ED with 'blue lips' and SOB. Pt was known COVID+ for 1 week and had increased sxs over past few days but can't really give details. He also states that for 1mo he hasn't felt well, general sense of 'malaise' and notes that his lips have been extremely chapped, mildly swollen, and 'blue'. He saw his PCP who didn't know what it was and suggested coming to ED. Denied CP, palpitations, leg pains, nausea/vomiting/diarrhea. Has some mild abd pain in LLL crampy. Pt overall is poor historian for his sxs.    IMPRESSION;  COVID 19 with severe illness > critical illness with ARDS which is resolving.   S/p extubation 12/16  On BIPAP.   Pt is in the late inflammatory response phase ot the illness based on the onset of symptoms. ( possibly one month )  CT with right pleural effusion with opacities b/l  s/p rigth thoracocentesis 12/8 with 1500 ccs fluid. Cultures NG  Possible pericardial effusion  Ddimers 807>456>695>555>785  Ferritin 56>81>101>98>187  procal 0.08>0.16>0.08  CRP 0.76>1.39>0.62>1.92  SOB secondary to right pleural effusion /pneumo/pericardial effusion  Possibly RUP bacterial PNA  WBC 11.3  BCx 12/8,9  NG    RECOMMENDATIONS;  NO NEED TO REPEAT INFLAMMATORY MARKERS  s/p Dexamethasone 6 mg iv q24h for 10 days   Prognosis is very poor

## 2020-12-21 NOTE — ED ADULT NURSE REASSESSMENT NOTE - NS ED NURSE REASSESS COMMENT FT1
post medication, pt was more relaxed, asleep and not tossing as before. appears more comfortable. IVF D5W continues. pt cleaned and repositioned over the night for comfort.
Received pt in bed, dyspneic and SOB, non verbal and on BiPAP at this time. IPAP 14, EPAP 10, RR 16, FiO2 80%, sat'ting at 96% and BP stable. occasionally bradys to 50s when asleep.  Blanco in place draining yellow urine, 2 IV access in place.  pt does not tolerate coming off BiPAP. NPO.

## 2020-12-21 NOTE — PROGRESS NOTE ADULT - ASSESSMENT
#Acute hypoxic respiratory failure  2/2 COVID19+ pneumonia s/p intubation now extubated on BIPAP  #pleural/ moderate pericardial  effusion, no signs of tamponade    -covid labs as above stable for now  - LE Duplex neg   - taper decadron to 4mg  to 2 mg   - cont Cefepime 1g Q12 hrs  - cardiology consulted in ICU, outpt follow up, monitor for tamponade   - lasix 40 mg daily, keep neg for now   - palliative care consulted as pt mentation not improving and needed to be restrained in evening as he was ripping off equipement and bipap      #hypernatremia-improving   -on D5W- follow up BMP in evening   -trend Na level  -his Na level is downtrending from 150-->148-->150-->146  -continue d5 today     #metabolic alkalosis- resolved  -pH 7.5 32 bicarb    #constipation  -continue BM regimen     # Tropeniemia, resolved  - Trop 0.04 on admission, repeat 0.03, 0.01 this am, EKG no acute ST changes, no CP or sxs  - likely demand ischemia. monitor for now    # QT prolongation: pt has ppm with AICD  - QTc at admission was 616 ms  - repeat EKG showed QTc of 599 on 12/15, 12/19 606  - EP consulted, qtc maybe too prolonged     #CHFpEF / Afib s/p watchman  - CT shows b/l effusions, R heart reflux, small pericardial effusion  - Repeat 2D echo showed normal LVEF, decreased RVEF ,  moderate pericardial effusion,  moderate pulm hypertension. spoke to CT surgery on phone, they wont do any intervention on the patient unless its tamponade or pt is on pressors  - right side pleural effusion, s/p thoracentesis, 1.5L removed. post procedure CXR showed trace pneumothorax x2, now resolved, Fluid is transudative according to light's criteria  - restarted lasix 40 mg intravenous today, x1 pt on free water as well   - cont 2.5mg lopressor bid intravenous while on bipap when stable will switch     #HTN  - hold losartan for possible angioedema/swollen lips in ICU  - BP stable     #HLD- c/w atorvastatin 80  #Dementia- c/w donepezil     #Incidental L renal lesion- outpt renal US follow-up    #thrombocytopenic- hold lovenox, plt 51, will send hit panel , will place on argatroban  for now. lizet 2/2 covid     #Progress Note Handoff  Pending (specify): pending improvement  Family discussion: Will mulligan [phone]. Palliative to dw son today  Disposition: Home___/SNF___/Other___/Unknown at this time__x_  Pt seen during COVID 19 Pandemic.

## 2020-12-21 NOTE — PROGRESS NOTE ADULT - ASSESSMENT
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     PULMONARY:  HOB @ 45 degrees.  Vent changes as follows: bipap 14/10, 100% Trial HHFNC    CARDIOVASCULAR:  Target MAP-60. Avoid volume overload. lasix daily    GI: GI prophylaxis.  Feeding.  Bowel regimen     RENAL:  Follow up lytes.  Correct as needed. FUP CMp  INFECTIOUS DISEASE: per id    HEMATOLOGICAL:  DVT prophylaxis. LE doppler -    ENDOCRINE:  Follow up FS.  Insulin protocol if needed.    MUSCULOSKELETAL: bedrest  Palliative care eval  ed3   poor prognosis

## 2020-12-21 NOTE — CONSULT NOTE ADULT - ASSESSMENT
Consult Summary    Patient is a 86 yr old male with PMH including CHFrEF, afib, AICD, MR/pulm HTN, HTN, chronic anemia admitted with COVID pneumonia, now s/p extubation on continuous Bipap. Patient has ongoing AMS, not improving, and yesterday became agitated, required restraints due to pulling off Bipap, etc.     Spoke with sonWill, today. [phone]. Introduced palliative care role. He is aware of his father's poor prognosis and current acute situation, being stuck on continuous Bipap. Re-affirmed DNR/DNI per children and wife's decision. Family given our # and will touch base again tomorrow to discuss GOC. He requested an update from primary team.       Morphine Equivalent Daily Dose (MEDD): 0      30 minutes spent discussing Advance Care Planning.     Recommendations:  DNR/DNI  Ongoing medical management  For agitation- if needed can try 0.5 mg Ativan IVP Q 6 H PRN (avoid Haldol due to prolonged qtc)  Will re-address GOC tomorrow with sonWill   Will follow     * Plan discussed with resident physician      Please Call [phone] PRN Consult Summary    Patient is a 86 yr old male with PMH including CHFrEF, afib, AICD, MR/pulm HTN, HTN, chronic anemia admitted with COVID pneumonia, now s/p extubation on continuous Bipap. Patient has ongoing AMS, not improving, and yesterday became agitated, required restraints due to pulling off Bipap, etc.     Spoke with sonWill, today. [phone]. Introduced palliative care role. He is aware of his father's poor prognosis and current acute situation, being stuck on continuous Bipap. Re-affirmed DNR/DNI per children and wife's decision. Family given our # and will touch base again tomorrow to discuss GOC, per families request. He requested an update from primary team today, which was communicated to the resident following.       Morphine Equivalent Daily Dose (MEDD): 0      30 minutes spent discussing Advance Care Planning.     Recommendations:  DNR/DNI  Ongoing medical management  For agitation- if needed can do 0.5 mg Ativan IVP Q 6 H PRN (avoid Haldol due to prolonged qtc)  Will re-address GOC tomorrow with sonWill  Will follow     * Plan discussed with resident physician      Please Call [phone] PRN Consult Summary    Patient is a 86 yr old male with PMH including CHFrEF, afib, AICD, MR/pulm HTN, HTN, chronic anemia admitted with COVID pneumonia, now s/p extubation on continuous Bipap. Patient has ongoing AMS, not improving, and yesterday became agitated, required restraints due to pulling off Bipap, etc.     GOC as above.     Morphine Equivalent Daily Dose (MEDD): 0      30 minutes spent discussing Advance Care Planning.     Recommendations:  DNR/DNI  Ongoing medical management  For agitation- if needed can do 0.5 mg Ativan IVP Q 6 H PRN (avoid Haldol due to prolonged qtc)  Will re-address GOC tomorrow with son, Will  Will follow     * Plan discussed with resident physician      Please Call [phone] PRN

## 2020-12-21 NOTE — PROGRESS NOTE ADULT - SUBJECTIVE AND OBJECTIVE BOX
Hospital Day:  14d    Subjective:    Patient is a 86y old Male who presents with a chief complaint of COVID19+.  This morning patient is lying in bed comfortably on BiPAP. He is very weak and cannot talk. When asked he shakes his head to: SOB, chest pain, abdominal pain, nausea, vomiting.      Past Medical Hx:   Mild dementia    Chronic right-sided heart failure    Depression    Liver cirrhosis, alcoholic    Presence of cardiac pacemaker    AICD (automatic cardioverter/defibrillator) present    Borderline diabetes mellitus    HTN (hypertension)    Afib      Past Sx:  Presence of Watchman left atrial appendage closure device    AICD (automatic cardioverter/defibrillator) present    AICD present, double chamber    History of cholecystectomy    History of appendectomy    History of knee surgery    Deviated nasal septum      Allergies:  No Known Allergies    Current Meds:   Standng Meds:  ALBUTerol    90 MICROgram(s) HFA Inhaler 2 Puff(s) Inhalation every 6 hours  aspirin 325 milliGRAM(s) Oral daily  atorvastatin 80 milliGRAM(s) Oral at bedtime  chlorhexidine 4% Liquid 1 Application(s) Topical <User Schedule>  dextrose 5%. 1000 milliLiter(s) (100 mL/Hr) IV Continuous <Continuous>  famotidine Injectable 20 milliGRAM(s) IV Push every 12 hours  folic acid 1 milliGRAM(s) Enteral Tube daily  ipratropium 17 MICROgram(s) HFA Inhaler 1 Puff(s) Inhalation every 6 hours  metoprolol tartrate Injectable 2.5 milliGRAM(s) IV Push every 12 hours  senna 2 Tablet(s) Oral at bedtime    PRN Meds:  acetaminophen   Tablet .. 650 milliGRAM(s) Oral every 6 hours PRN Temp greater or equal to 38C (100.4F)  polyethylene glycol 3350 17 Gram(s) Oral daily PRN Constipation    HOME MEDICATIONS:  aspirin 325 mg oral tablet: 1 tab(s) orally once a day  clopidogrel 75 mg oral tablet: 1 tab(s) orally once a day  donepezil 10 mg oral tablet: 1 tab(s) orally once a day (at bedtime)  ferrous fumarate 325 mg (106 mg elemental iron) oral tablet: 1 tab(s) orally once a day  losartan 100 mg oral tablet: 1 tab(s) orally once a day  metoprolol succinate 25 mg oral tablet, extended release: 1 tab(s) orally once a day  mirtazapine 30 mg oral tablet: 1 tab(s) orally once a day (at bedtime)  spironolactone 50 mg oral tablet: 1 tab(s) orally once a day      Vital Signs:   T(F): 97.4 (12-21-20 @ 08:05), Max: 98.1 (12-21-20 @ 02:23)  HR: 60 (12-21-20 @ 12:00) (59 - 68)  BP: 119/64 (12-21-20 @ 12:00) (116/64 - 137/69)  RR: 22 (12-21-20 @ 12:00) (19 - 22)  SpO2: 100% (12-21-20 @ 12:00) (96% - 100%)      12-20-20 @ 07:01  -  12-21-20 @ 07:00  --------------------------------------------------------  IN: 1280 mL / OUT: 1600 mL / NET: -320 mL        Physical Exam:   GENERAL: NAD  HEENT: NCAT  CHEST/LUNG: B/l crackles  HEART: Regular rate and rhythm; s1 s2 appreciated, No murmurs, rubs, or gallops  ABDOMEN: Soft, Nontender, Nondistended; Bowel sounds present  EXTREMITIES: No LE edema b/l  SKIN: no rashes, no new lesions          Labs:                         10.0   11.34 )-----------( 51       ( 21 Dec 2020 06:56 )             33.6     Neutophil% 85.2, Lymphocyte% 1.9, Monocyte% 12.1, Bands% 0.7 12-21-20 @ 06:56    21 Dec 2020 06:56    146    |  108    |  35     ----------------------------<  139    4.0     |  27     |  1.0      Ca    9.2        21 Dec 2020 06:56  Mg     2.2       21 Dec 2020 06:56    TPro  6.2    /  Alb  2.7    /  TBili  2.6    /  DBili  x      /  AST  22     /  ALT  25     /  AlkPhos  74     21 Dec 2020 06:56       pTT    32.4             ----< 1.36 INR  (12-21-20 @ 06:56)    15.60        PT        Serum Pro-Brain Natriuretic Peptide: 72439 pg/mL (12-18-20 @ 17:33)  Serum Pro-Brain Natriuretic Peptide: 2707 pg/mL (12-15-20 @ 17:20)  Serum Pro-Brain Natriuretic Peptide: 5587 pg/mL (12-13-20 @ 20:32)    Troponin 0.03, CKMB --, CK -- 12-18-20 @ 17:33    Iron --, TIBC --, %Sat -- Ferritin 187 12-18-20 @ 17:33                  Assessment and Plan:    Patient is an 85yo male with PMH of HFrEF, severe MR, severe pulmonary hypertension, atrial fibrillation s/p Watchman 2019, hypertension, chronic anemia, who presented to the hospital for cyanosis and shortness of breath. Patient was known to be COVID-19 positive as of 1 week prior to admission, but symptoms worsened over the days prior to presentation. Patient was intubated and then extubated for acute hypoxemic respiratory failure.    #Acute hypoxemic respiratory failure secondary to COVID-19 pneumonia s/p intubation  #Pleural effusion and moderate pericardial effusion, no signs of tamponade s/p thoracentesis  - Intubated on 12/12, extubated on 12/16  - Patient is saturating 97% on 100% BiPAP  - No further need to trend inflammatory markers per ID  - Dexamethasone course complete  - Lower extremity duplex negative  - Completed course of cefepime  - Incentive spirometry at bedside  - Lasix 40 today (congestion on xray)    #Hypernatremia  - Sodium today: 150->146  - Continue D5W at 100mL/hr    #Prolonged QTc  - QTc on admission: 616  - QTc 12/19/2020: 575  - EP consulted     #Constipation  - Continue with bowel regimen    #Heart failure with preserved ejection fraction  - Echo 12/9/2020: EF 50-55%, basal inferoseptal and basal inferior segment wall motion abnormalities, severely enlarged RA, severely enlarged RV, moderate pericardial effusion, mild-moderate MR, severe TR, moderate pulmonary hypertension  - Discussed case with CT surgery on the phone: no acute intervention unless patient is in tamponade or requires pressors  - Right sided pleural effusion s/p thoracenthesis (removed 1.5L). Post procedure CXR showed trace pneumothorax x2, now resolved, Fluid is transudative according to light's criteria  - Continue with metoprolol tartrate 2.5mg IV Q12H    #Hypertension  - Hold losartan for possible angioedema/swollen lips in ICU  - Continue with metoprolol tartrate 2.5mg IV Q12H    #Hyperlipidemia  - Continue with atorvastatin 80mg PO at bedtime     #Dementia  - Donepezil being held for now    #Incidental left renal lesion  - Follow outpatient with renal US    #Thrombocytopenia  - Lovenox on hold. SCDs for now  - Follow HIT panel    #Suspected folate deficiency  - Continue with folic acid 1mg PO once daily     #Misc  - DVT Prophylaxis: SCDs  - GI Prophylaxis: famotidine 20mg IV Q12H  - Diet: NPO. Nutrition consulted  - Activity: increase as tolerated  - IV Fluids: D5W at 100mL/hr  - Code Status: DNR/DNI Hospital Day:  14d    Subjective:    Patient is a 86y old Male who presents with a chief complaint of COVID19+.  This morning patient is lying in bed comfortably on BiPAP. He is very weak and cannot talk. When asked he shakes his head to: SOB, chest pain, abdominal pain, nausea, vomiting.      Past Medical Hx:   Mild dementia    Chronic right-sided heart failure    Depression    Liver cirrhosis, alcoholic    Presence of cardiac pacemaker    AICD (automatic cardioverter/defibrillator) present    Borderline diabetes mellitus    HTN (hypertension)    Afib      Past Sx:  Presence of Watchman left atrial appendage closure device    AICD (automatic cardioverter/defibrillator) present    AICD present, double chamber    History of cholecystectomy    History of appendectomy    History of knee surgery    Deviated nasal septum      Allergies:  No Known Allergies    Current Meds:   Standng Meds:  ALBUTerol    90 MICROgram(s) HFA Inhaler 2 Puff(s) Inhalation every 6 hours  aspirin 325 milliGRAM(s) Oral daily  atorvastatin 80 milliGRAM(s) Oral at bedtime  chlorhexidine 4% Liquid 1 Application(s) Topical <User Schedule>  dextrose 5%. 1000 milliLiter(s) (100 mL/Hr) IV Continuous <Continuous>  famotidine Injectable 20 milliGRAM(s) IV Push every 12 hours  folic acid 1 milliGRAM(s) Enteral Tube daily  ipratropium 17 MICROgram(s) HFA Inhaler 1 Puff(s) Inhalation every 6 hours  metoprolol tartrate Injectable 2.5 milliGRAM(s) IV Push every 12 hours  senna 2 Tablet(s) Oral at bedtime    PRN Meds:  acetaminophen   Tablet .. 650 milliGRAM(s) Oral every 6 hours PRN Temp greater or equal to 38C (100.4F)  polyethylene glycol 3350 17 Gram(s) Oral daily PRN Constipation    HOME MEDICATIONS:  aspirin 325 mg oral tablet: 1 tab(s) orally once a day  clopidogrel 75 mg oral tablet: 1 tab(s) orally once a day  donepezil 10 mg oral tablet: 1 tab(s) orally once a day (at bedtime)  ferrous fumarate 325 mg (106 mg elemental iron) oral tablet: 1 tab(s) orally once a day  losartan 100 mg oral tablet: 1 tab(s) orally once a day  metoprolol succinate 25 mg oral tablet, extended release: 1 tab(s) orally once a day  mirtazapine 30 mg oral tablet: 1 tab(s) orally once a day (at bedtime)  spironolactone 50 mg oral tablet: 1 tab(s) orally once a day      Vital Signs:   T(F): 97.4 (12-21-20 @ 08:05), Max: 98.1 (12-21-20 @ 02:23)  HR: 60 (12-21-20 @ 12:00) (59 - 68)  BP: 119/64 (12-21-20 @ 12:00) (116/64 - 137/69)  RR: 22 (12-21-20 @ 12:00) (19 - 22)  SpO2: 100% (12-21-20 @ 12:00) (96% - 100%)      12-20-20 @ 07:01  -  12-21-20 @ 07:00  --------------------------------------------------------  IN: 1280 mL / OUT: 1600 mL / NET: -320 mL        Physical Exam:   GENERAL: NAD  HEENT: NCAT  CHEST/LUNG: B/l crackles  HEART: Regular rate and rhythm; s1 s2 appreciated, No murmurs, rubs, or gallops  ABDOMEN: Soft, Nontender, Nondistended; Bowel sounds present  EXTREMITIES: No LE edema b/l  SKIN: no rashes, no new lesions          Labs:                         10.0   11.34 )-----------( 51       ( 21 Dec 2020 06:56 )             33.6     Neutophil% 85.2, Lymphocyte% 1.9, Monocyte% 12.1, Bands% 0.7 12-21-20 @ 06:56    21 Dec 2020 06:56    146    |  108    |  35     ----------------------------<  139    4.0     |  27     |  1.0      Ca    9.2        21 Dec 2020 06:56  Mg     2.2       21 Dec 2020 06:56    TPro  6.2    /  Alb  2.7    /  TBili  2.6    /  DBili  x      /  AST  22     /  ALT  25     /  AlkPhos  74     21 Dec 2020 06:56       pTT    32.4             ----< 1.36 INR  (12-21-20 @ 06:56)    15.60        PT        Serum Pro-Brain Natriuretic Peptide: 45672 pg/mL (12-18-20 @ 17:33)  Serum Pro-Brain Natriuretic Peptide: 2707 pg/mL (12-15-20 @ 17:20)  Serum Pro-Brain Natriuretic Peptide: 5587 pg/mL (12-13-20 @ 20:32)    Troponin 0.03, CKMB --, CK -- 12-18-20 @ 17:33    Iron --, TIBC --, %Sat -- Ferritin 187 12-18-20 @ 17:33                  Assessment and Plan:    Patient is an 85yo male with PMH of HFrEF, severe MR, severe pulmonary hypertension, atrial fibrillation s/p Watchman 2019, hypertension, chronic anemia, who presented to the hospital for cyanosis and shortness of breath. Patient was known to be COVID-19 positive as of 1 week prior to admission, but symptoms worsened over the days prior to presentation. Patient was intubated and then extubated for acute hypoxemic respiratory failure.    #Acute hypoxemic respiratory failure secondary to COVID-19 pneumonia s/p intubation  #Pleural effusion and moderate pericardial effusion, no signs of tamponade s/p thoracentesis  - Intubated on 12/12, extubated on 12/16  - Patient is saturating 97% on 100% BiPAP  - No further need to trend inflammatory markers per ID  - Dexamethasone course complete  - Lower extremity duplex negative  - Completed course of cefepime  - Incentive spirometry at bedside  - Lasix 40 today (congestion on xray)    #Hypernatremia  - Sodium today: 150->146  - Continue D5W at 100mL/hr    #Prolonged QTc  - QTc on admission: 616  - QTc 12/19/2020: 575  - EP consulted    # Thrombocytopenia  - PPX fondaparinux  - HIT antibody ordered     #Constipation  - Continue with bowel regimen    #Heart failure with preserved ejection fraction  - Echo 12/9/2020: EF 50-55%, basal inferoseptal and basal inferior segment wall motion abnormalities, severely enlarged RA, severely enlarged RV, moderate pericardial effusion, mild-moderate MR, severe TR, moderate pulmonary hypertension  - Discussed case with CT surgery on the phone: no acute intervention unless patient is in tamponade or requires pressors  - Right sided pleural effusion s/p thoracenthesis (removed 1.5L). Post procedure CXR showed trace pneumothorax x2, now resolved, Fluid is transudative according to light's criteria  - Continue with metoprolol tartrate 2.5mg IV Q12H    #Hypertension  - Hold losartan for possible angioedema/swollen lips in ICU  - Continue with metoprolol tartrate 2.5mg IV Q12H    #Hyperlipidemia  - Continue with atorvastatin 80mg PO at bedtime     #Dementia  - Donepezil being held for now    #Incidental left renal lesion  - Follow outpatient with renal US    #Thrombocytopenia  - Lovenox on hold. SCDs for now  - Follow HIT panel    #Suspected folate deficiency  - Continue with folic acid 1mg PO once daily     #Misc  - DVT Prophylaxis: SCDs  - GI Prophylaxis: famotidine 20mg IV Q12H  - Diet: NPO. Nutrition consulted  - Activity: increase as tolerated  - IV Fluids: D5W at 100mL/hr  - Code Status: DNR/DNI

## 2020-12-21 NOTE — CONSULT NOTE ADULT - ASSESSMENT
85y/o M with PMH of CHFrEF & severe MR/pulm HTN, Afib s/p watchman 2019 not on AC, ICD, HTN, hx of chronic anemia who was admitted to the hospital with COVID, required intubation, is now extubated on BIPAP.  EP was consulted for prolonged QT.  EKG shows paced rhythm.    Respiratory failure due to COVID  AFib s/p Watchman  ICD  HTN  CHF    Plan: 87y/o M with PMH of CHFrEF & severe MR/pulm HTN, Afib s/p watchman 2019 not on AC, ICD, HTN, hx of chronic anemia who was admitted to the hospital with COVID, required intubation, is now extubated on BIPAP.  EP was consulted for prolonged QT.  EKG shows paced rhythm.    Respiratory failure due to COVID  AFib s/p Watchman  ICD  HTN  CHF  Thrombocytopenia - being worked up for HIT    Plan: 87y/o M with PMH of CHFrEF & severe MR/pulm HTN, Afib s/p watchman 2019 not on AC, ICD, HTN, hx of chronic anemia who was admitted to the hospital with COVID, required intubation, is now extubated on BIPAP.  EP was consulted for prolonged QT.  EKG shows paced rhythm.    Respiratory failure due to COVID  AFib s/p Watchman  ICD  HTN  CHF  Thrombocytopenia - being worked up for HIT    Plan:  Pt is PPM dependent  EKG shows paced rhythm, qtc is not prolonged.  No intervention needed.   Avoid meds that could prolong qtc.  EKG reviewed with Dr. Do and plan discussed with him.  Please recall as needed

## 2020-12-21 NOTE — CONSULT NOTE ADULT - CONVERSATION DETAILS
Spoke with son, Will, today. [phone]. Introduced palliative care role. He is aware of his father's poor prognosis and current acute situation, being stuck on continuous Bipap. Re-affirmed DNR/DNI per children and wife's decision. We did discuss comfort care if patient does not improve. Family given our # and will touch base again tomorrow to discuss GOC, per families request. He requested an update from primary team today, which was communicated to the resident following.

## 2020-12-21 NOTE — CONSULT NOTE ADULT - SUBJECTIVE AND OBJECTIVE BOX
Patient is a 86y old  Male who presents with a chief complaint of COVID19+ (21 Dec 2020 12:48)    HPI:    87y/o M with PMH of CHFrEF & severe MR/pulm HTN, Afib s/p watchman 2019 not on AC, ICD, HTN, hx of chronic anemia presents to ED with 'blue lips' and SOB. Pt was known COVID+ for 1 week and had increased sxs over past few days but can't really give details. He also states that for 1mo he hasn't felt well, general sense of 'malaise' and notes that his lips have been extremely chapped, mildly swollen, and 'blue'. He saw his PCP who didn't know what it was and suggested coming to ED. Denied CP, palpitations, leg pains, nausea/vomiting/diarrhea. Has some mild abd pain in LLL crampy. Pt overall is poor historian for his sxs.     In ED: T96.9, HR 58, /62, RR18, SpO2 86% on RA > 92% on 3L NC.  CT a/p no acute pathology but noted small L renal lesion & b/l effusions with signs of RH dysfcn.   Labs showed leukopenia/lymphopenia, thrombocytopenia. Trop 0.04, EKG ventricularly paced.  CXR diffuse infiltrates. (07 Dec 2020 23:15)    Interim Hx:   Thoracentesis in ED  Intubated 12/12  extubated 12/17  GOC- made DNR/DNI 12/17  + Thrombocytopenia  Ongoing AMS  On continuous Bipap  c/s for TPN/PPN    EP has been consulted for prolonged QTC on EKG      PAST MEDICAL & SURGICAL HISTORY:  Mild dementia    Chronic right-sided heart failure    Depression    Liver cirrhosis, alcoholic    Borderline diabetes mellitus    HTN (hypertension)    Afib    Presence of Watchman left atrial appendage closure device    AICD (automatic cardioverter/defibrillator) present    History of cholecystectomy    History of appendectomy    History of knee surgery    Deviated nasal septum    PREVIOUS DIAGNOSTIC TESTING:      ECHO  FINDINGS:  < from: TTE Echo Complete w/o Contrast w/ Doppler (12.09.20 @ 13:51) >  Summary:   1. Left ventricular ejection fraction, by visual estimation, is 50 to 55%.   2. Basal inferoseptal segment and basal inferior segment are abnormal as described above.   3. Severely enlarged right atrium.   4. Moderately increased LV wall thickness.   5. Severely enlarged right ventricle.   6. Severely reduced RV systolic function.   7. Moderately enlarged left atrium.   8. Moderate pericardial effusion.   9. Mild to moderate mitral valve regurgitation.  10. Severe tricuspid regurgitation.  11. Sclerotic aortic valve with decreased opening.  12. Estimated pulmonary artery systolic pressure is 54.0 mmHg assuming a right atrial pressure of 15 mmHg, which is consistent with moderate pulmonary hypertension.    < end of copied text >  < from: TTE Echo Complete w/o Contrast w/ Doppler (12.09.20 @ 13:51) >  Summary:   1. Left ventricular ejection fraction, by visual estimation, is 50 to 55%.   2. Basal inferoseptal segment and basal inferior segment are abnormal as described above.   3. Severely enlarged right atrium.   4. Moderately increased LV wall thickness.   5. Severely enlarged right ventricle.   6. Severely reduced RV systolic function.   7. Moderately enlarged left atrium.   8. Moderate pericardial effusion.   9. Mild to moderate mitral valve regurgitation.  10. Severe tricuspid regurgitation.  11. Sclerotic aortic valve with decreased opening.  12. Estimated pulmonary artery systolic pressure is 54.0 mmHg assuming a right atrial pressure of 15 mmHg, which is consistent with moderate pulmonary hypertension.    MEDICATIONS  (STANDING):  ALBUTerol    90 MICROgram(s) HFA Inhaler 2 Puff(s) Inhalation every 6 hours  aspirin 325 milliGRAM(s) Oral daily  atorvastatin 80 milliGRAM(s) Oral at bedtime  chlorhexidine 4% Liquid 1 Application(s) Topical <User Schedule>  dextrose 5%. 1000 milliLiter(s) (100 mL/Hr) IV Continuous <Continuous>  famotidine Injectable 20 milliGRAM(s) IV Push every 12 hours  folic acid 1 milliGRAM(s) Enteral Tube daily  fondaparinux Injectable 2.5 milliGRAM(s) SubCutaneous daily  furosemide   Injectable 40 milliGRAM(s) IV Push daily  ipratropium 17 MICROgram(s) HFA Inhaler 1 Puff(s) Inhalation every 6 hours  metoprolol tartrate Injectable 2.5 milliGRAM(s) IV Push every 12 hours  senna 2 Tablet(s) Oral at bedtime    MEDICATIONS  (PRN):  acetaminophen   Tablet .. 650 milliGRAM(s) Oral every 6 hours PRN Temp greater or equal to 38C (100.4F)  polyethylene glycol 3350 17 Gram(s) Oral daily PRN Constipation      FAMILY HISTORY:  Family history of throat cancer (Sibling)    Family history of diabetes mellitus (Mother)      SOCIAL HISTORY:    CIGARETTES: denies    ALCOHOL: denies    Past Surgical History:    Allergies:    No Known Allergies      REVIEW OF SYSTEMS:    CONSTITUTIONAL: No fever, weight loss, chills, shakes, or fatigue  EYES: No eye pain, visual disturbances, or discharge  ENMT:  No difficulty hearing, tinnitus, vertigo; No sinus or throat pain  NECK: No pain or stiffness  BREASTS: No pain, masses, or nipple discharge  RESPIRATORY: No cough, wheezing, hemoptysis, or shortness of breath  CARDIOVASCULAR: No chest pain, dyspnea, palpitations, dizziness, syncope, paroxysmal nocturnal dyspnea, orthopnea, or arm or leg swelling  GASTROINTESTINAL: No abdominal  or epigastric pain, nausea, vomiting, hematemesis, diarrhea, constipation, melena or bright red blood.  GENITOURINARY: No dysuria, nocturia, hematuria, or urinary incontinence  NEUROLOGICAL: No headaches, memory loss, slurred speech, limb weakness, loss of strength, numbness, or tremors  SKIN: No itching, burning, rashes, or lesions   LYMPH NODES: No enlarged glands  ENDOCRINE: No heat or cold intolerance, or hair loss  MUSCULOSKELETAL: No joint pain or swelling, muscle, back, or extremity pain  PSYCHIATRIC: No depression, anxiety, or difficulty sleeping  HEME/LYMPH: No easy bruising or bleeding gums  ALLERY AND IMMUNOLOGIC: No hives or rash.      Vital Signs Last 24 Hrs  T(C): 36.3 (21 Dec 2020 08:05), Max: 36.7 (21 Dec 2020 02:23)  T(F): 97.4 (21 Dec 2020 08:05), Max: 98.1 (21 Dec 2020 02:23)  HR: 60 (21 Dec 2020 12:00) (59 - 68)  BP: 119/64 (21 Dec 2020 12:00) (116/64 - 137/69)  BP(mean): --  RR: 22 (21 Dec 2020 12:00) (19 - 22)  SpO2: 100% (21 Dec 2020 12:00) (96% - 100%)    PHYSICAL EXAM:        GENERAL: In no apparent distress, well nourished, and hydrated.  HEAD:  Atraumatic, Normocephalic  EYES: EOMI, PERRLA, conjunctiva and sclera clear  ENMT: No tonsillar erythema, exudates, or enlargements; ist mucous membranes, Good dentition, No lesions  NECK: Supple and normal thyroid.  No JVD or carotid bruit.  Carotid pulse is 2+ bilaterally.  HEART: Regular rate and rhythm; No murmurs, rubs, or gallops.  PULMONARY: Clear to auscultation and perfusion.  No rales, wheezing, or rhonchi bilaterally.  ABDOMEN: Soft, Nontender, Nondistended; Bowel sounds present  EXTREMITIES:  2+ Peripheral Pulses, No clubbing, cyanosis, or edema  LYMPH: No lymphadenopathy noted  NEUROLOGICAL: Grossly nonfocal      INTERPRETATION OF TELEMETRY:    ECG:    I&O's Detail    20 Dec 2020 07:01  -  21 Dec 2020 07:00  --------------------------------------------------------  IN:    dextrose 5%: 180 mL    dextrose 5%: 900 mL    IV PiggyBack: 200 mL  Total IN: 1280 mL    OUT:    Indwelling Catheter - Urethral (mL): 1600 mL  Total OUT: 1600 mL    Total NET: -320 mL          LABS:                        10.0   11.34 )-----------( 51       ( 21 Dec 2020 06:56 )             33.6     12-21    146  |  108  |  35<H>  ----------------------------<  139<H>  4.0   |  27  |  1.0    Ca    9.2      21 Dec 2020 06:56  Mg     2.2     12-21    TPro  6.2  /  Alb  2.7<L>  /  TBili  2.6<H>  /  DBili  x   /  AST  22  /  ALT  25  /  AlkPhos  74  12-21        PT/INR - ( 21 Dec 2020 06:56 )   PT: 15.60 sec;   INR: 1.36 ratio         PTT - ( 21 Dec 2020 06:56 )  PTT:32.4 sec        RADIOLOGY & ADDITIONAL STUDIES:  < from: Xray Chest 1 View- PORTABLE-Routine (Xray Chest 1 View- PORTABLE-Routine in AM.) (12.21.20 @ 06:04) >  INTERPRETATION:  Clinical History / Reason for exam: COVID.    Comparison : Chest radiograph 12/20/2020.    Technique/Positioning: Frontal portable.    Findings:    Support devices: Stable left ICD.    Cardiac/mediastinum/hilum: Unchanged cardiomegaly.    Lung parenchyma/Pleura: Unchanged bilateral opacities and effusions. No pneumothorax.    Skeleton/soft tissues: Stable.    Impression:    Unchanged bilateralopacities and effusions.

## 2020-12-22 NOTE — PROGRESS NOTE ADULT - ASSESSMENT
Patient is a 86 yr old male with PMH including CHFrEF, afib, AICD, MR/pulm HTN, HTN, chronic anemia admitted with COVID pneumonia, now s/p extubation and off bipap. Clinically improving     Recommendations:  DNR/DNI  Ongoing medical management    Palliative care will sign off, reconsult if needed.    * Plan discussed with resident physician      Please Call [phone] PRN

## 2020-12-22 NOTE — PROGRESS NOTE ADULT - ASSESSMENT
87y/o M with PMH of CHFrEF & severe MR/pulm HTN, Afib s/p watchman 2019 not on AC, HTN, hx of chronic anemia presents to ED with 'blue lips' and SOB. Pt was known COVID+ for 1 week and had increased sxs over past few days but can't really give details. He also states that for 1mo he hasn't felt well, general sense of 'malaise' and notes that his lips have been extremely chapped, mildly swollen, and 'blue'. He saw his PCP who didn't know what it was and suggested coming to ED. Denied CP, palpitations, leg pains, nausea/vomiting/diarrhea. Has some mild abd pain in LLL crampy. Pt overall is poor historian for his sxs.    IMPRESSION;  COVID 19 with severe illness > critical illness with ARDS which is resolving.   S/p extubation 12/16  On BIPAP.   Pt is in the late inflammatory response phase ot the illness based on the onset of symptoms. ( possibly one month )  CT with right pleural effusion with opacities b/l  s/p rigth thoracocentesis 12/8 with 1500 ccs fluid. Cultures NG  Possible pericardial effusion  Ddimers [id number]>456>695>555>785  Ferritin 56>81>101>98>187  procal 0.08>0.16>0.08  CRP 0.76>1.39>0.62>1.92  SOB secondary to right pleural effusion /pneumo/pericardial effusion  Possibly RUP bacterial PNA  WBC 11.3  BCx 12/8,9  NG    s/p Dexamethasone    RECOMMENDATIONS;  NO NEED TO REPEAT INFLAMMATORY MARKERS  Prognosis is very poor

## 2020-12-22 NOTE — PROGRESS NOTE ADULT - SUBJECTIVE AND OBJECTIVE BOX
WILLIAM ARAIZA  86y, Male    All available historical data reviewed    OVERNIGHT EVENTS:  no fevers  HFNC  weak    ROS:  General: Denies rigors, nightsweats  HEENT: Denies headache, rhinorrhea, sore throat, eye pain  CV: Denies CP, palpitations  PULM: Denies wheezing, hemoptysis  GI: Denies hematemesis, hematochezia, melena  : Denies discharge, hematuria  MSK: Denies arthralgias, myalgias  SKIN: Denies rash, lesions  NEURO: Denies paresthesias, weakness  PSYCH: Denies depression, anxiety    VITALS:  T(F): 97.6, Max: 98.3 (12-22-20 @ 00:30)  HR: 61  BP: 112/64  RR: 21Vital Signs Last 24 Hrs  T(C): 36.4 (22 Dec 2020 09:19), Max: 36.8 (22 Dec 2020 00:30)  T(F): 97.6 (22 Dec 2020 09:19), Max: 98.3 (22 Dec 2020 00:30)  HR: 61 (22 Dec 2020 09:19) (61 - 77)  BP: 112/64 (22 Dec 2020 09:19) (106/64 - 119/58)  BP(mean): --  RR: 21 (22 Dec 2020 09:19) (18 - 22)  SpO2: 97% (22 Dec 2020 09:19) (96% - 98%)    TESTS & MEASUREMENTS:                        9.2    8.91  )-----------( 68       ( 22 Dec 2020 04:30 )             31.1     12-22    140  |  104  |  30<H>  ----------------------------<  130<H>  3.7   |  31  |  0.8    Ca    8.8      22 Dec 2020 04:30  Mg     2.2     12-21    TPro  6.0  /  Alb  2.4<L>  /  TBili  3.0<H>  /  DBili  x   /  AST  23  /  ALT  25  /  AlkPhos  67  12-22    LIVER FUNCTIONS - ( 22 Dec 2020 04:30 )  Alb: 2.4 g/dL / Pro: 6.0 g/dL / ALK PHOS: 67 U/L / ALT: 25 U/L / AST: 23 U/L / GGT: x                   RADIOLOGY & ADDITIONAL TESTS:  Personal review of radiological diagnostics performed  Echo and EKG results noted when applicable.     MEDICATIONS:  acetaminophen   Tablet .. 650 milliGRAM(s) Oral every 6 hours PRN  ALBUTerol    90 MICROgram(s) HFA Inhaler 2 Puff(s) Inhalation every 6 hours  aspirin 325 milliGRAM(s) Oral daily  atorvastatin 80 milliGRAM(s) Oral at bedtime  chlorhexidine 4% Liquid 1 Application(s) Topical <User Schedule>  dextrose 5%. 1000 milliLiter(s) IV Continuous <Continuous>  famotidine Injectable 20 milliGRAM(s) IV Push every 12 hours  folic acid 1 milliGRAM(s) Enteral Tube daily  fondaparinux Injectable 2.5 milliGRAM(s) SubCutaneous daily  furosemide   Injectable 40 milliGRAM(s) IV Push daily  ipratropium 17 MICROgram(s) HFA Inhaler 1 Puff(s) Inhalation every 6 hours  metoprolol tartrate Injectable 2.5 milliGRAM(s) IV Push every 12 hours  polyethylene glycol 3350 17 Gram(s) Oral daily PRN  senna 2 Tablet(s) Oral at bedtime      ANTIBIOTICS:

## 2020-12-22 NOTE — PROGRESS NOTE ADULT - SUBJECTIVE AND OBJECTIVE BOX
86yMale with diagnosis: COVID-19    Patient discussed with medical resident. Now off bipap and communicating with the team. Dyspnea has improved.    PHYSICAL EXAM  Deferred due to covid     T(C): , Max: 36.8 (00:30)  T(F): 97.6  HR: 61 (60 - 77)  BP: 112/64 (106/64 - 119/64)  RR: 21 (18 - 22)  SpO2: 97% (96% - 100%)      LABS:                          9.2    8.91  )-----------( 68       ( 22 Dec 2020 04:30 )             31.1                                                                                      12-22    140  |  104  |  30<H>  ----------------------------<  130<H>  3.7   |  31  |  0.8    Ca    8.8      22 Dec 2020 04:30  Mg     2.2     12-21    TPro  6.0  /  Alb  2.4<L>  /  TBili  3.0<H>  /  DBili  x   /  AST  23  /  ALT  25  /  AlkPhos  67  12-22                                                      MEDICATIONS  (STANDING):  ALBUTerol    90 MICROgram(s) HFA Inhaler 2 Puff(s) Inhalation every 6 hours  aspirin 325 milliGRAM(s) Oral daily  atorvastatin 80 milliGRAM(s) Oral at bedtime  chlorhexidine 4% Liquid 1 Application(s) Topical <User Schedule>  dextrose 5%. 1000 milliLiter(s) (100 mL/Hr) IV Continuous <Continuous>  famotidine Injectable 20 milliGRAM(s) IV Push every 12 hours  folic acid 1 milliGRAM(s) Enteral Tube daily  fondaparinux Injectable 2.5 milliGRAM(s) SubCutaneous daily  furosemide   Injectable 40 milliGRAM(s) IV Push daily  ipratropium 17 MICROgram(s) HFA Inhaler 1 Puff(s) Inhalation every 6 hours  metoprolol tartrate Injectable 2.5 milliGRAM(s) IV Push every 12 hours  senna 2 Tablet(s) Oral at bedtime    MEDICATIONS  (PRN):  acetaminophen   Tablet .. 650 milliGRAM(s) Oral every 6 hours PRN Temp greater or equal to 38C (100.4F)  polyethylene glycol 3350 17 Gram(s) Oral daily PRN Constipation

## 2020-12-22 NOTE — SWALLOW BEDSIDE ASSESSMENT ADULT - SLP GENERAL OBSERVATIONS
Pt received laying in bed, repositioned upright. alert, able to verbally state his name only. +dysphonic. generalized weakness.

## 2020-12-22 NOTE — SWALLOW BEDSIDE ASSESSMENT ADULT - ORAL PHASE
some attempt at A-P transport w/ max cues, however still required removal of bolus from oral cavity 2' no attempts to swallow./Decreased anterior-posterior movement of the bolus

## 2020-12-22 NOTE — PROGRESS NOTE ADULT - SUBJECTIVE AND OBJECTIVE BOX
Hospital Day:  15d    Subjective:    Patient is a 86y old  Male who presents with a chief complaint of COVID19+ (22 Dec 2020 10:20)    This morning patient is lying in bed comfortably on high flow. He is A&Ox1. He reports no new complaints, including SOB, chest pain, abdominal pain, nausea, vomiting, dysuria, constipation, or diarrhea.      Past Medical Hx:   Mild dementia    Chronic right-sided heart failure    Depression    Liver cirrhosis, alcoholic    Presence of cardiac pacemaker    AICD (automatic cardioverter/defibrillator) present    Borderline diabetes mellitus    HTN (hypertension)    Afib      Past Sx:  Presence of Watchman left atrial appendage closure device    AICD (automatic cardioverter/defibrillator) present    AICD present, double chamber    History of cholecystectomy    History of appendectomy    History of knee surgery    Deviated nasal septum      Allergies:  No Known Allergies    Current Meds:   Standng Meds:  ALBUTerol    90 MICROgram(s) HFA Inhaler 2 Puff(s) Inhalation every 6 hours  aspirin 325 milliGRAM(s) Oral daily  atorvastatin 80 milliGRAM(s) Oral at bedtime  chlorhexidine 4% Liquid 1 Application(s) Topical <User Schedule>  dextrose 5%. 1000 milliLiter(s) (100 mL/Hr) IV Continuous <Continuous>  famotidine Injectable 20 milliGRAM(s) IV Push every 12 hours  folic acid 1 milliGRAM(s) Enteral Tube daily  fondaparinux Injectable 2.5 milliGRAM(s) SubCutaneous daily  furosemide   Injectable 40 milliGRAM(s) IV Push daily  ipratropium 17 MICROgram(s) HFA Inhaler 1 Puff(s) Inhalation every 6 hours  metoprolol tartrate Injectable 2.5 milliGRAM(s) IV Push every 12 hours  senna 2 Tablet(s) Oral at bedtime    PRN Meds:  acetaminophen   Tablet .. 650 milliGRAM(s) Oral every 6 hours PRN Temp greater or equal to 38C (100.4F)  polyethylene glycol 3350 17 Gram(s) Oral daily PRN Constipation    HOME MEDICATIONS:  aspirin 325 mg oral tablet: 1 tab(s) orally once a day  clopidogrel 75 mg oral tablet: 1 tab(s) orally once a day  donepezil 10 mg oral tablet: 1 tab(s) orally once a day (at bedtime)  ferrous fumarate 325 mg (106 mg elemental iron) oral tablet: 1 tab(s) orally once a day  losartan 100 mg oral tablet: 1 tab(s) orally once a day  metoprolol succinate 25 mg oral tablet, extended release: 1 tab(s) orally once a day  mirtazapine 30 mg oral tablet: 1 tab(s) orally once a day (at bedtime)  spironolactone 50 mg oral tablet: 1 tab(s) orally once a day      Vital Signs:   T(F): 97.6 (12-22-20 @ 09:19), Max: 98.3 (12-22-20 @ 00:30)  HR: 61 (12-22-20 @ 09:19) (60 - 77)  BP: 112/64 (12-22-20 @ 09:19) (106/64 - 119/64)  RR: 21 (12-22-20 @ 09:19) (18 - 22)  SpO2: 97% (12-22-20 @ 09:19) (96% - 100%)      12-21-20 @ 07:01  -  12-22-20 @ 07:00  --------------------------------------------------------  IN: 1700 mL / OUT: 1975 mL / NET: -275 mL    12-22-20 @ 07:01  -  12-22-20 @ 11:27  --------------------------------------------------------  IN: 400 mL / OUT: 750 mL / NET: -350 mL        Physical Exam:   GENERAL: NAD  HEENT: NCAT  CHEST/LUNG: CTAB  HEART: Regular rate and rhythm; s1 s2 appreciated, No murmurs, rubs, or gallops  ABDOMEN: Soft, Nontender, Nondistended; Bowel sounds present  EXTREMITIES: No LE edema b/l  SKIN: no rashes, no new lesions  NERVOUS SYSTEM:  Alert & Oriented X3  LINES/CATHETERS:        Labs:                         9.2    8.91  )-----------( 68       ( 22 Dec 2020 04:30 )             31.1     Neutophil% 85.4, Lymphocyte% 1.6, Monocyte% 12.3, Bands% 0.6 12-22-20 @ 04:30    22 Dec 2020 04:30    140    |  104    |  30     ----------------------------<  130    3.7     |  31     |  0.8      Ca    8.8        22 Dec 2020 04:30  Mg     2.2       21 Dec 2020 06:56    TPro  6.0    /  Alb  2.4    /  TBili  3.0    /  DBili  x      /  AST  23     /  ALT  25     /  AlkPhos  67     22 Dec 2020 04:30            Serum Pro-Brain Natriuretic Peptide: 40663 pg/mL (12-18-20 @ 17:33)  Serum Pro-Brain Natriuretic Peptide: 2707 pg/mL (12-15-20 @ 17:20)  Serum Pro-Brain Natriuretic Peptide: 5587 pg/mL (12-13-20 @ 20:32)    Troponin 0.03, CKMB --, CK -- 12-18-20 @ 17:33                    Assessment and Plan    Patient is an 87yo male with PMH of HFrEF, severe MR, severe pulmonary hypertension, atrial fibrillation s/p Watchman 2019, hypertension, chronic anemia, who presented to the hospital for cyanosis and shortness of breath. Patient was known to be COVID-19 positive as of 1 week prior to admission, but symptoms worsened over the days prior to presentation. Patient was intubated and then extubated for acute hypoxemic respiratory failure.    #Acute hypoxemic respiratory failure secondary to COVID-19 pneumonia s/p intubation  #Pleural effusion and moderate pericardial effusion, no signs of tamponade s/p thoracentesis  - Intubated on 12/12, extubated on 12/16  - Patient is saturating 100% on 100% high flow  - No further need to trend inflammatory markers per ID  - Dexamethasone course complete  - Lower extremity duplex negative  - Completed course of cefepime  - Incentive spirometry at bedside    #Hypernatremia resolved  - Decrease D5W to 50    #Prolonged QTc  - QTc on admission: 616  - QTc 12/19/2020: 575  - EP recs: Due to ventricular pacing. No interventions needed    # Thrombocytopenia  - PPX fondaparinux  - HIT antibody negative     #Constipation  - Continue with bowel regimen    #Heart failure with preserved ejection fraction  - Echo 12/9/2020: EF 50-55%, basal inferoseptal and basal inferior segment wall motion abnormalities, severely enlarged RA, severely enlarged RV, moderate pericardial effusion, mild-moderate MR, severe TR, moderate pulmonary hypertension  - Discussed case with CT surgery on the phone: no acute intervention unless patient is in tamponade or requires pressors  - Right sided pleural effusion s/p thoracenthesis (removed 1.5L). Post procedure CXR showed trace pneumothorax x2, now resolved, Fluid is transudative according to light's criteria  - Continue with metoprolol tartrate 2.5mg IV Q12H    #Hypertension  - Hold losartan for possible angioedema/swollen lips in ICU  - Continue with metoprolol tartrate 2.5mg IV Q12H    #Hyperlipidemia  - Continue with atorvastatin 80mg PO at bedtime     #Dementia  - Donepezil being held for now    #Incidental left renal lesion  - Follow outpatient with renal US    #Thrombocytopenia  - Lovenox on hold. SCDs for now  - Follow HIT panel    #Suspected folate deficiency  - Continue with folic acid 1mg PO once daily     #Misc  - DVT Prophylaxis: SCDs  - GI Prophylaxis: famotidine 20mg IV Q12H  - Diet: NPO. Swallow eval  - Activity: increase as tolerated  - IV Fluids: D5W at 100mL/hr  - Code Status: DNR/DNI     Hospital Day:  15d    Subjective:    Patient is a 86y old  Male who presents with a chief complaint of COVID19+ (22 Dec 2020 10:20)    This morning patient is lying in bed comfortably on high flow. He is A&Ox1. He reports no new complaints, including SOB, chest pain, abdominal pain, nausea, vomiting, dysuria, constipation, or diarrhea.    Attending Note: Pt seen and examined at bedside.  Pt more awake today and on HFNC       Past Medical Hx:   Mild dementia    Chronic right-sided heart failure    Depression    Liver cirrhosis, alcoholic    Presence of cardiac pacemaker    AICD (automatic cardioverter/defibrillator) present    Borderline diabetes mellitus    HTN (hypertension)    Afib      Past Sx:  Presence of Watchman left atrial appendage closure device    AICD (automatic cardioverter/defibrillator) present    AICD present, double chamber    History of cholecystectomy    History of appendectomy    History of knee surgery    Deviated nasal septum      Allergies:  No Known Allergies    Current Meds:   Standng Meds:  ALBUTerol    90 MICROgram(s) HFA Inhaler 2 Puff(s) Inhalation every 6 hours  aspirin 325 milliGRAM(s) Oral daily  atorvastatin 80 milliGRAM(s) Oral at bedtime  chlorhexidine 4% Liquid 1 Application(s) Topical <User Schedule>  dextrose 5%. 1000 milliLiter(s) (100 mL/Hr) IV Continuous <Continuous>  famotidine Injectable 20 milliGRAM(s) IV Push every 12 hours  folic acid 1 milliGRAM(s) Enteral Tube daily  fondaparinux Injectable 2.5 milliGRAM(s) SubCutaneous daily  furosemide   Injectable 40 milliGRAM(s) IV Push daily  ipratropium 17 MICROgram(s) HFA Inhaler 1 Puff(s) Inhalation every 6 hours  metoprolol tartrate Injectable 2.5 milliGRAM(s) IV Push every 12 hours  senna 2 Tablet(s) Oral at bedtime    PRN Meds:  acetaminophen   Tablet .. 650 milliGRAM(s) Oral every 6 hours PRN Temp greater or equal to 38C (100.4F)  polyethylene glycol 3350 17 Gram(s) Oral daily PRN Constipation    HOME MEDICATIONS:  aspirin 325 mg oral tablet: 1 tab(s) orally once a day  clopidogrel 75 mg oral tablet: 1 tab(s) orally once a day  donepezil 10 mg oral tablet: 1 tab(s) orally once a day (at bedtime)  ferrous fumarate 325 mg (106 mg elemental iron) oral tablet: 1 tab(s) orally once a day  losartan 100 mg oral tablet: 1 tab(s) orally once a day  metoprolol succinate 25 mg oral tablet, extended release: 1 tab(s) orally once a day  mirtazapine 30 mg oral tablet: 1 tab(s) orally once a day (at bedtime)  spironolactone 50 mg oral tablet: 1 tab(s) orally once a day      Vital Signs:   T(F): 97.6 (12-22-20 @ 09:19), Max: 98.3 (12-22-20 @ 00:30)  HR: 61 (12-22-20 @ 09:19) (60 - 77)  BP: 112/64 (12-22-20 @ 09:19) (106/64 - 119/64)  RR: 21 (12-22-20 @ 09:19) (18 - 22)  SpO2: 97% (12-22-20 @ 09:19) (96% - 100%)      12-21-20 @ 07:01  -  12-22-20 @ 07:00  --------------------------------------------------------  IN: 1700 mL / OUT: 1975 mL / NET: -275 mL    12-22-20 @ 07:01  -  12-22-20 @ 11:27  --------------------------------------------------------  IN: 400 mL / OUT: 750 mL / NET: -350 mL        Physical Exam:   GENERAL: NAD  HEENT: NCAT  CHEST/LUNG: CTAB  HEART: Regular rate and rhythm; s1 s2 appreciated, No murmurs, rubs, or gallops  ABDOMEN: Soft, Nontender, Nondistended; Bowel sounds present  EXTREMITIES: No LE edema b/l  SKIN: no rashes, no new lesions  NERVOUS SYSTEM:  Alert & Oriented X3  LINES/CATHETERS:        Labs:                         9.2    8.91  )-----------( 68       ( 22 Dec 2020 04:30 )             31.1     Neutophil% 85.4, Lymphocyte% 1.6, Monocyte% 12.3, Bands% 0.6 12-22-20 @ 04:30    22 Dec 2020 04:30    140    |  104    |  30     ----------------------------<  130    3.7     |  31     |  0.8      Ca    8.8        22 Dec 2020 04:30  Mg     2.2       21 Dec 2020 06:56    TPro  6.0    /  Alb  2.4    /  TBili  3.0    /  DBili  x      /  AST  23     /  ALT  25     /  AlkPhos  67     22 Dec 2020 04:30            Serum Pro-Brain Natriuretic Peptide: 53094 pg/mL (12-18-20 @ 17:33)  Serum Pro-Brain Natriuretic Peptide: 2707 pg/mL (12-15-20 @ 17:20)  Serum Pro-Brain Natriuretic Peptide: 5587 pg/mL (12-13-20 @ 20:32)    Troponin 0.03, CKMB --, CK -- 12-18-20 @ 17:33                    Assessment and Plan    Patient is an 85yo male with PMH of HFrEF, severe MR, severe pulmonary hypertension, atrial fibrillation s/p Watchman 2019, hypertension, chronic anemia, who presented to the hospital for cyanosis and shortness of breath. Patient was known to be COVID-19 positive as of 1 week prior to admission, but symptoms worsened over the days prior to presentation. Patient was intubated and then extubated for acute hypoxemic respiratory failure.    #Acute hypoxemic respiratory failure secondary to COVID-19 pneumonia s/p intubation  #Pleural effusion and moderate pericardial effusion, no signs of tamponade s/p thoracentesis  - Intubated on 12/12, extubated on 12/16  - Patient is saturating 100% on 100% high flow  - No further need to trend inflammatory markers per ID  - Dexamethasone course complete  - Lower extremity duplex negative  - Completed course of cefepime  - Incentive spirometry at bedside    #Hypernatremia resolved  - Decrease D5W to 50    #Prolonged QTc  - QTc on admission: 616  - QTc 12/19/2020: 575  - EP recs: Due to ventricular pacing. No interventions needed    # Thrombocytopenia  - PPX fondaparinux  - HIT antibody negative     #Constipation  - Continue with bowel regimen    #Heart failure with preserved ejection fraction  - Echo 12/9/2020: EF 50-55%, basal inferoseptal and basal inferior segment wall motion abnormalities, severely enlarged RA, severely enlarged RV, moderate pericardial effusion, mild-moderate MR, severe TR, moderate pulmonary hypertension  - Discussed case with CT surgery on the phone: no acute intervention unless patient is in tamponade or requires pressors  - Right sided pleural effusion s/p thoracenthesis (removed 1.5L). Post procedure CXR showed trace pneumothorax x2, now resolved, Fluid is transudative according to light's criteria  - Continue with metoprolol tartrate 2.5mg IV Q12H    #Hypertension  - Hold losartan for possible angioedema/swollen lips in ICU  - Continue with metoprolol tartrate 2.5mg IV Q12H    #Hyperlipidemia  - Continue with atorvastatin 80mg PO at bedtime     #Dementia  - Donepezil being held for now    #Incidental left renal lesion  - Follow outpatient with renal US    #Thrombocytopenia  - Lovenox on hold. SCDs for now  - Follow HIT panel    #Suspected folate deficiency  - Continue with folic acid 1mg PO once daily     #Misc  - DVT Prophylaxis: SCDs  - GI Prophylaxis: famotidine 20mg IV Q12H  - Diet: NPO. Swallow eval  - Activity: increase as tolerated  - IV Fluids: D5W at 100mL/hr  - Code Status: DNR/DNI

## 2020-12-22 NOTE — PROGRESS NOTE ADULT - ASSESSMENT
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     PULMONARY:  HOB @ 45 degrees.  Vent changes as follows: bipap 14/10, 100%  HHFNC    CARDIOVASCULAR:  Target MAP-60. Avoid volume overload. hold lasix today    GI: GI prophylaxis.  Feeding.  Bowel regimen , speech and swallow wval    RENAL:  Follow up lytes.  Correct as needed. FUP CMp  INFECTIOUS DISEASE: per id    HEMATOLOGICAL:  DVT prophylaxis.    ENDOCRINE:  Follow up FS.  Insulin protocol if needed.    MUSCULOSKELETAL: bedrest  ed3   poor prognosis

## 2020-12-22 NOTE — PROGRESS NOTE ADULT - SUBJECTIVE AND OBJECTIVE BOX
OVERNIGHT EVENTS: events noted, on HHFNC more awake    Vital Signs Last 24 Hrs  T(C): 36.7 (22 Dec 2020 04:36), Max: 36.8 (22 Dec 2020 00:30)  T(F): 98 (22 Dec 2020 04:36), Max: 98.3 (22 Dec 2020 00:30)  HR: 67 (22 Dec 2020 04:36) (60 - 77)  BP: 119/58 (22 Dec 2020 04:36) (106/64 - 119/64)  RR: 21 (22 Dec 2020 04:36) (18 - 22)  SpO2: 97% (22 Dec 2020 04:36) (96% - 100%)    PHYSICAL EXAMINATION:    GENERAL: ill looking     HEENT: Head is normocephalic and atraumatic.    NECK: Supple.    LUNGS: bl crackles    HEART: BALDEV 3/6    ABDOMEN: Soft, nontender, and nondistended.      EXTREMITIES: Without any cyanosis, clubbing, rash, lesions or edema.    NEUROLOGIC: non focal    SKIN: No ulceration or induration present.      LABS:                        10.0   11.34 )-----------( 51       ( 21 Dec 2020 06:56 )             33.6     12-21    146  |  108  |  35<H>  ----------------------------<  139<H>  4.0   |  27  |  1.0    Ca    9.2      21 Dec 2020 06:56  Mg     2.2     12-21    TPro  6.2  /  Alb  2.7<L>  /  TBili  2.6<H>  /  DBili  x   /  AST  22  /  ALT  25  /  AlkPhos  74  12-21    PT/INR - ( 21 Dec 2020 06:56 )   PT: 15.60 sec;   INR: 1.36 ratio         PTT - ( 21 Dec 2020 06:56 )  PTT:32.4 sec                      12-21-20 @ 07:01  -  12-22-20 @ 07:00  --------------------------------------------------------  IN: 1600 mL / OUT: 1975 mL / NET: -375 mL        MICROBIOLOGY:      MEDICATIONS  (STANDING):  ALBUTerol    90 MICROgram(s) HFA Inhaler 2 Puff(s) Inhalation every 6 hours  aspirin 325 milliGRAM(s) Oral daily  atorvastatin 80 milliGRAM(s) Oral at bedtime  chlorhexidine 4% Liquid 1 Application(s) Topical <User Schedule>  dextrose 5%. 1000 milliLiter(s) (100 mL/Hr) IV Continuous <Continuous>  famotidine Injectable 20 milliGRAM(s) IV Push every 12 hours  folic acid 1 milliGRAM(s) Enteral Tube daily  fondaparinux Injectable 2.5 milliGRAM(s) SubCutaneous daily  furosemide   Injectable 40 milliGRAM(s) IV Push daily  ipratropium 17 MICROgram(s) HFA Inhaler 1 Puff(s) Inhalation every 6 hours  metoprolol tartrate Injectable 2.5 milliGRAM(s) IV Push every 12 hours  senna 2 Tablet(s) Oral at bedtime    MEDICATIONS  (PRN):  acetaminophen   Tablet .. 650 milliGRAM(s) Oral every 6 hours PRN Temp greater or equal to 38C (100.4F)  polyethylene glycol 3350 17 Gram(s) Oral daily PRN Constipation      RADIOLOGY & ADDITIONAL STUDIES:

## 2020-12-22 NOTE — SWALLOW BEDSIDE ASSESSMENT ADULT - NS SPL SWALLOW CLINIC TRIAL FT
moderate to severe oral dysphagia for ice chips and puree, no attempts to swallow bolus. pt required removal of bolus w/ oral swab.

## 2020-12-22 NOTE — SWALLOW BEDSIDE ASSESSMENT ADULT - MUCOSAL QUALITY
dry oral mucosa, thick mucous layering lingual surface/soft palate. SLP provided oral care prior to PO.

## 2020-12-23 NOTE — CONSULT NOTE ADULT - ATTENDING COMMENTS
Attending Statement: I have personally performed a face to face diagnostic evaluation on this patient. The patient is suffering from respiratory failure from COVID -19.   I have reviewed the above note and agree with the history, exam and suggestions for care, except as I have noted in the text.
Seen and examined with the pulmonary fellow at the bed side.  Impression and plan as outlined above.
s/p iatrogenic carotid injury  no hematoma  no evidence of active bleed  follow as needed
Patient seen, case d/w cardiology fellow.  Patient with cardiac history as above, presented with Covid pneumonia.  Mild fluid overload initially, now improving.  Small pericardial effusion.  Agree with above plan.  Repeat ECHO and f/u as outpatient.  Continue PO Lasix.
Patient discussed with team, extubated and on continuous bipap.  Addressed GOC with son, please see above. He would like a medical update today.  Will continue to follow.

## 2020-12-23 NOTE — PROGRESS NOTE ADULT - SUBJECTIVE AND OBJECTIVE BOX
WILLIAM ARAIZA  86y, Male    All available historical data reviewed    OVERNIGHT EVENTS:  no fevers  HFNC  clinically no change    ROS:  not reliable    VITALS:  T(F): 98.6, Max: 98.9 (12-22-20 @ 23:59)  HR: 60  BP: 121/56  RR: 16Vital Signs Last 24 Hrs  T(C): 37 (23 Dec 2020 07:37), Max: 37.2 (22 Dec 2020 23:59)  T(F): 98.6 (23 Dec 2020 07:37), Max: 98.9 (22 Dec 2020 23:59)  HR: 60 (23 Dec 2020 12:00) (59 - 70)  BP: 121/56 (23 Dec 2020 12:00) (108/53 - 121/56)  BP(mean): 76 (23 Dec 2020 12:00) (76 - 76)  RR: 16 (23 Dec 2020 12:00) (16 - 21)  SpO2: 95% (23 Dec 2020 12:00) (92% - 96%)    TESTS & MEASUREMENTS:                        9.3    11.86 )-----------( 69       ( 23 Dec 2020 05:36 )             30.8     12-23    142  |  106  |  44<H>  ----------------------------<  159<H>  3.8   |  28  |  0.9    Ca    8.8      23 Dec 2020 05:36    TPro  5.9<L>  /  Alb  2.6<L>  /  TBili  3.7<H>  /  DBili  x   /  AST  22  /  ALT  23  /  AlkPhos  72  12-23    LIVER FUNCTIONS - ( 23 Dec 2020 05:36 )  Alb: 2.6 g/dL / Pro: 5.9 g/dL / ALK PHOS: 72 U/L / ALT: 23 U/L / AST: 22 U/L / GGT: x                   RADIOLOGY & ADDITIONAL TESTS:  Personal review of radiological diagnostics performed  Echo and EKG results noted when applicable.     MEDICATIONS:  acetaminophen   Tablet .. 650 milliGRAM(s) Oral every 6 hours PRN  ALBUTerol    90 MICROgram(s) HFA Inhaler 2 Puff(s) Inhalation every 6 hours  aspirin 325 milliGRAM(s) Oral daily  atorvastatin 80 milliGRAM(s) Oral at bedtime  chlorhexidine 4% Liquid 1 Application(s) Topical <User Schedule>  dextrose 5%. 1000 milliLiter(s) IV Continuous <Continuous>  enoxaparin Injectable 40 milliGRAM(s) SubCutaneous daily  famotidine Injectable 20 milliGRAM(s) IV Push every 12 hours  folic acid 1 milliGRAM(s) Enteral Tube daily  furosemide   Injectable 40 milliGRAM(s) IV Push daily  ipratropium 17 MICROgram(s) HFA Inhaler 1 Puff(s) Inhalation every 6 hours  metoprolol tartrate Injectable 2.5 milliGRAM(s) IV Push every 12 hours  Parenteral Nutrition - Adult 1 Each TPN Continuous <Continuous>  polyethylene glycol 3350 17 Gram(s) Oral daily PRN  potassium phosphate IVPB 15 milliMole(s) IV Intermittent once  senna 2 Tablet(s) Oral at bedtime      ANTIBIOTICS:

## 2020-12-23 NOTE — PROGRESS NOTE ADULT - ASSESSMENT
· Assessment	  87y/o M with PMH of CHFrEF & severe MR/pulm HTN, Afib s/p watchman 2019 not on AC, HTN, hx of chronic anemia presents to ED with 'blue lips' and SOB. Pt was known COVID+ for 1 week and had increased sxs over past few days but can't really give details. He also states that for 1mo he hasn't felt well, general sense of 'malaise' and notes that his lips have been extremely chapped, mildly swollen, and 'blue'. He saw his PCP who didn't know what it was and suggested coming to ED. Denied CP, palpitations, leg pains, nausea/vomiting/diarrhea. Has some mild abd pain in LLL crampy. Pt overall is poor historian for his sxs.    IMPRESSION;  COVID 19 with severe illness > critical illness with ARDS which is resolving.   S/p extubation 12/16  On BIPAP.   Pt is in the late inflammatory response phase ot the illness based on the onset of symptoms. ( possibly one month )  CT with right pleural effusion with opacities b/l  s/p rigth thoracocentesis 12/8 with 1500 ccs fluid. Cultures NG  Possible pericardial effusion  Ddimers [id number]  Ferritin 56>81>101>98>187  procal 0.08>0.16>0.08  CRP 0.76>1.39>0.62>1.92  SOB secondary to right pleural effusion /pneumo/pericardial effusion  Possibly RUP bacterial PNA  WBC 11.3  BCx 12/8,9  NG    s/p Dexamethasone    RECOMMENDATIONS;  NO NEED TO REPEAT INFLAMMATORY MARKERS  Prognosis is very poor  recall prn please

## 2020-12-23 NOTE — CONSULT NOTE ADULT - ASSESSMENT
Assessment  86M with PMH CHFrEF & severe MR/pulm HTN, Afib s/p watchman 2019 not on AC, HTN, hx of chronic anemia admitted HD 16 for COVID PNA, s/p attempt at R IJ central line complicated by pneumothorax expanding on repeat CXR, pt is also requiring BiPAP and having desaturation events to 85%    Plan  - s/p chest tube insertion, 200cc of serosanguinous output upon insertion, (+) AL  - placement confirmed with CXR, shows lung re-expansion with residual apical PTX  - chest tube to suction  - daily CXR  - monitor O2 sats  - Pain control as needed  - d/w attending on call and senior resident Assessment  86M with PMH CHFrEF & severe MR/pulm HTN, Afib s/p watchman 2019 not on AC, HTN, hx of chronic anemia admitted HD 16 for COVID PNA, s/p attempt at R IJ central line complicated by pneumothorax expanding on repeat CXR, pt is also requiring BiPAP and having desaturation events to 85%    Plan  - s/p chest tube insertion, 200cc of serosanguinous output upon insertion, (+) AL  - placement confirmed with CXR, shows lung re-expansion with residual apical PTX  - chest tube to suction  - daily CXR  - monitor O2 sats  - Pain control as needed  - d/w attending on call and senior resident    Senior Note  I have personally examined and evaluated the patient  I agree with the above plan and note, and I have edited where appropriate  Please refer to separate procedure note.   + Air leak  CXR reviewed, placement confirmed, shows lung re-expansion with residual apical PTX  Pt still critical from covid PNA, increasing O2 requirements  Thoracic will follow  Surgical Attending aware and agrees with plan

## 2020-12-23 NOTE — PROGRESS NOTE ADULT - SUBJECTIVE AND OBJECTIVE BOX
Hospital Day:  16d    Subjective:    Patient is a 86y old Male who presents with a chief complaint of COVID19+ (23 Dec 2020 07:44)  This morning patient is lying in bed comfortably on high flow. He is A&Ox1. He reports no new complaints, including SOB, chest pain, abdominal pain, nausea, vomiting, dysuria, constipation, or diarrhea.    Past Medical Hx:   Mild dementia    Chronic right-sided heart failure    Depression    Liver cirrhosis, alcoholic    Presence of cardiac pacemaker    AICD (automatic cardioverter/defibrillator) present    Borderline diabetes mellitus    HTN (hypertension)    Afib      Past Sx:  Presence of Watchman left atrial appendage closure device    AICD (automatic cardioverter/defibrillator) present    AICD present, double chamber    History of cholecystectomy    History of appendectomy    History of knee surgery    Deviated nasal septum      Allergies:  No Known Allergies    Current Meds:   Standng Meds:  ALBUTerol    90 MICROgram(s) HFA Inhaler 2 Puff(s) Inhalation every 6 hours  aspirin 325 milliGRAM(s) Oral daily  atorvastatin 80 milliGRAM(s) Oral at bedtime  chlorhexidine 4% Liquid 1 Application(s) Topical <User Schedule>  dextrose 5%. 1000 milliLiter(s) (50 mL/Hr) IV Continuous <Continuous>  famotidine Injectable 20 milliGRAM(s) IV Push every 12 hours  folic acid 1 milliGRAM(s) Enteral Tube daily  fondaparinux Injectable 2.5 milliGRAM(s) SubCutaneous daily  furosemide   Injectable 40 milliGRAM(s) IV Push daily  ipratropium 17 MICROgram(s) HFA Inhaler 1 Puff(s) Inhalation every 6 hours  metoprolol tartrate Injectable 2.5 milliGRAM(s) IV Push every 12 hours  senna 2 Tablet(s) Oral at bedtime    PRN Meds:  acetaminophen   Tablet .. 650 milliGRAM(s) Oral every 6 hours PRN Temp greater or equal to 38C (100.4F)  polyethylene glycol 3350 17 Gram(s) Oral daily PRN Constipation    HOME MEDICATIONS:  aspirin 325 mg oral tablet: 1 tab(s) orally once a day  clopidogrel 75 mg oral tablet: 1 tab(s) orally once a day  donepezil 10 mg oral tablet: 1 tab(s) orally once a day (at bedtime)  ferrous fumarate 325 mg (106 mg elemental iron) oral tablet: 1 tab(s) orally once a day  losartan 100 mg oral tablet: 1 tab(s) orally once a day  metoprolol succinate 25 mg oral tablet, extended release: 1 tab(s) orally once a day  mirtazapine 30 mg oral tablet: 1 tab(s) orally once a day (at bedtime)  spironolactone 50 mg oral tablet: 1 tab(s) orally once a day      Vital Signs:   T(F): 98.6 (12-23-20 @ 07:37), Max: 98.9 (12-22-20 @ 23:59)  HR: 59 (12-23-20 @ 07:37) (59 - 70)  BP: 110/56 (12-23-20 @ 07:37) (108/53 - 118/58)  RR: 20 (12-23-20 @ 07:37) (20 - 21)  SpO2: 96% (12-23-20 @ 07:37) (92% - 96%)      12-22-20 @ 07:01  -  12-23-20 @ 07:00  --------------------------------------------------------  IN: 1300 mL / OUT: 1775 mL / NET: -475 mL    12-23-20 @ 07:01  -  12-23-20 @ 10:26  --------------------------------------------------------  IN: 150 mL / OUT: 350 mL / NET: -200 mL        Physical Exam:   GENERAL: NAD  HEENT: NCAT  CHEST/LUNG: CTAB  HEART: Regular rate and rhythm; s1 s2 appreciated, No murmurs, rubs, or gallops  ABDOMEN: Soft, Nontender, Nondistended; Bowel sounds present  EXTREMITIES: No LE edema b/l  SKIN: no rashes, no new lesions  NERVOUS SYSTEM:  Alert & Oriented X1  LINES/CATHETERS:        Labs:                         9.3    11.86 )-----------( 69       ( 23 Dec 2020 05:36 )             30.8     Neutophil% 87.2, Lymphocyte% 2.1, Monocyte% 9.6, Bands% 1.0 12-23-20 @ 05:36    23 Dec 2020 05:36    142    |  106    |  44     ----------------------------<  159    3.8     |  28     |  0.9      Ca    8.8        23 Dec 2020 05:36    TPro  5.9    /  Alb  2.6    /  TBili  3.7    /  DBili  x      /  AST  22     /  ALT  23     /  AlkPhos  72     23 Dec 2020 05:36            Serum Pro-Brain Natriuretic Peptide: 47890 pg/mL (12-18-20 @ 17:33)  Serum Pro-Brain Natriuretic Peptide: 2707 pg/mL (12-15-20 @ 17:20)  Serum Pro-Brain Natriuretic Peptide: 5587 pg/mL (12-13-20 @ 20:32)                      Assessment and Plan:    Patient is an 87yo male with PMH of HFrEF, severe MR, severe pulmonary hypertension, atrial fibrillation s/p Watchman 2019, hypertension, chronic anemia, who presented to the hospital for cyanosis and shortness of breath. Patient was known to be COVID-19 positive as of 1 week prior to admission, but symptoms worsened over the days prior to presentation. Patient was intubated and then extubated for acute hypoxemic respiratory failure.    #Acute hypoxemic respiratory failure secondary to COVID-19 pneumonia s/p intubation  #Pleural effusion and moderate pericardial effusion, no signs of tamponade s/p thoracentesis  - Intubated on 12/12, extubated on 12/16  - Patient is saturating 96% on 80% high flow  - No further need to trend inflammatory markers per ID  - Dexamethasone course complete  - Lower extremity duplex negative  - Completed course of cefepime  - Incentive spirometry at bedside    #Hypernatremia resolved  - Decrease D5W to 50    #Prolonged QTc  - QTc on admission: 616  - QTc 12/19/2020: 575  - EP recs: Due to ventricular pacing. No interventions needed    # Thrombocytopenia  - PPX fondaparinux  - HIT antibody negative     #Constipation  - Continue with bowel regimen    #Heart failure with preserved ejection fraction  - Echo 12/9/2020: EF 50-55%, basal inferoseptal and basal inferior segment wall motion abnormalities, severely enlarged RA, severely enlarged RV, moderate pericardial effusion, mild-moderate MR, severe TR, moderate pulmonary hypertension  - Discussed case with CT surgery on the phone: no acute intervention unless patient is in tamponade or requires pressors  - Right sided pleural effusion s/p thoracenthesis (removed 1.5L). Post procedure CXR showed trace pneumothorax x2, now resolved, Fluid is transudative according to light's criteria  - Continue with metoprolol tartrate 2.5mg IV Q12H    #Hypertension  - Hold losartan for possible angioedema/swollen lips in ICU  - Continue with metoprolol tartrate 2.5mg IV Q12H    #Hyperlipidemia  - Continue with atorvastatin 80mg PO at bedtime     #Dementia  - Donepezil being held for now    #Incidental left renal lesion  - Follow outpatient with renal US    #Thrombocytopenia  - Lovenox on hold. SCDs for now  - Follow HIT panel    #Suspected folate deficiency  - Continue with folic acid 1mg PO once daily     #Misc  - DVT Prophylaxis: SCDs  - GI Prophylaxis: famotidine 20mg IV Q12H  - Diet: NPO. Swallow eval again. Dr Contreras consulted as well  - Activity: increase as tolerated  - Code Status: DNR/DNI Hospital Day:  16d    Subjective:    Patient is a 86y old Male who presents with a chief complaint of COVID19+ (23 Dec 2020 07:44)  This morning patient is lying in bed comfortably on high flow. He is A&Ox1. He reports no new complaints, including SOB, chest pain, abdominal pain, nausea, vomiting, dysuria, constipation, or diarrhea.    Past Medical Hx:   Mild dementia    Chronic right-sided heart failure    Depression    Liver cirrhosis, alcoholic    Presence of cardiac pacemaker    AICD (automatic cardioverter/defibrillator) present    Borderline diabetes mellitus    HTN (hypertension)    Afib      Past Sx:  Presence of Watchman left atrial appendage closure device    AICD (automatic cardioverter/defibrillator) present    AICD present, double chamber    History of cholecystectomy    History of appendectomy    History of knee surgery    Deviated nasal septum      Allergies:  No Known Allergies    Current Meds:   Standng Meds:  ALBUTerol    90 MICROgram(s) HFA Inhaler 2 Puff(s) Inhalation every 6 hours  aspirin 325 milliGRAM(s) Oral daily  atorvastatin 80 milliGRAM(s) Oral at bedtime  chlorhexidine 4% Liquid 1 Application(s) Topical <User Schedule>  dextrose 5%. 1000 milliLiter(s) (50 mL/Hr) IV Continuous <Continuous>  famotidine Injectable 20 milliGRAM(s) IV Push every 12 hours  folic acid 1 milliGRAM(s) Enteral Tube daily  fondaparinux Injectable 2.5 milliGRAM(s) SubCutaneous daily  furosemide   Injectable 40 milliGRAM(s) IV Push daily  ipratropium 17 MICROgram(s) HFA Inhaler 1 Puff(s) Inhalation every 6 hours  metoprolol tartrate Injectable 2.5 milliGRAM(s) IV Push every 12 hours  senna 2 Tablet(s) Oral at bedtime    PRN Meds:  acetaminophen   Tablet .. 650 milliGRAM(s) Oral every 6 hours PRN Temp greater or equal to 38C (100.4F)  polyethylene glycol 3350 17 Gram(s) Oral daily PRN Constipation    HOME MEDICATIONS:  aspirin 325 mg oral tablet: 1 tab(s) orally once a day  clopidogrel 75 mg oral tablet: 1 tab(s) orally once a day  donepezil 10 mg oral tablet: 1 tab(s) orally once a day (at bedtime)  ferrous fumarate 325 mg (106 mg elemental iron) oral tablet: 1 tab(s) orally once a day  losartan 100 mg oral tablet: 1 tab(s) orally once a day  metoprolol succinate 25 mg oral tablet, extended release: 1 tab(s) orally once a day  mirtazapine 30 mg oral tablet: 1 tab(s) orally once a day (at bedtime)  spironolactone 50 mg oral tablet: 1 tab(s) orally once a day      Vital Signs:   T(F): 98.6 (12-23-20 @ 07:37), Max: 98.9 (12-22-20 @ 23:59)  HR: 59 (12-23-20 @ 07:37) (59 - 70)  BP: 110/56 (12-23-20 @ 07:37) (108/53 - 118/58)  RR: 20 (12-23-20 @ 07:37) (20 - 21)  SpO2: 96% (12-23-20 @ 07:37) (92% - 96%)      12-22-20 @ 07:01  -  12-23-20 @ 07:00  --------------------------------------------------------  IN: 1300 mL / OUT: 1775 mL / NET: -475 mL    12-23-20 @ 07:01  -  12-23-20 @ 10:26  --------------------------------------------------------  IN: 150 mL / OUT: 350 mL / NET: -200 mL        Physical Exam:   GENERAL: NAD  HEENT: NCAT  CHEST/LUNG: CTAB  HEART: Regular rate and rhythm; s1 s2 appreciated, No murmurs, rubs, or gallops  ABDOMEN: Soft, Nontender, Nondistended; Bowel sounds present  EXTREMITIES: No LE edema b/l  SKIN: no rashes, no new lesions  NERVOUS SYSTEM:  Alert & Oriented X1  LINES/CATHETERS:        Labs:                         9.3    11.86 )-----------( 69       ( 23 Dec 2020 05:36 )             30.8     Neutophil% 87.2, Lymphocyte% 2.1, Monocyte% 9.6, Bands% 1.0 12-23-20 @ 05:36    23 Dec 2020 05:36    142    |  106    |  44     ----------------------------<  159    3.8     |  28     |  0.9      Ca    8.8        23 Dec 2020 05:36    TPro  5.9    /  Alb  2.6    /  TBili  3.7    /  DBili  x      /  AST  22     /  ALT  23     /  AlkPhos  72     23 Dec 2020 05:36            Serum Pro-Brain Natriuretic Peptide: 93030 pg/mL (12-18-20 @ 17:33)  Serum Pro-Brain Natriuretic Peptide: 2707 pg/mL (12-15-20 @ 17:20)  Serum Pro-Brain Natriuretic Peptide: 5587 pg/mL (12-13-20 @ 20:32)                      Assessment and Plan:    Patient is an 85yo male with PMH of HFrEF, severe MR, severe pulmonary hypertension, atrial fibrillation s/p Watchman 2019, hypertension, chronic anemia, who presented to the hospital for cyanosis and shortness of breath. Patient was known to be COVID-19 positive as of 1 week prior to admission, but symptoms worsened over the days prior to presentation. Patient was intubated and then extubated for acute hypoxemic respiratory failure.    #Acute hypoxemic respiratory failure secondary to COVID-19 pneumonia s/p intubation  #Pleural effusion and moderate pericardial effusion, no signs of tamponade s/p thoracentesis  - Intubated on 12/12, extubated on 12/16  - Patient is saturating 96% on 80% high flow  - No further need to trend inflammatory markers per ID  - Dexamethasone course complete  - Lower extremity duplex negative  - Completed course of cefepime  - Incentive spirometry at bedside    #Hypernatremia resolved  - Decrease D5W to 50    #Prolonged QTc  - QTc on admission: 616  - QTc 12/19/2020: 575  - EP recs: Due to ventricular pacing. No interventions needed    # Thrombocytopenia  - PPX lovenox  - HIT antibody negative     #Constipation  - Continue with bowel regimen    #Heart failure with preserved ejection fraction  - Echo 12/9/2020: EF 50-55%, basal inferoseptal and basal inferior segment wall motion abnormalities, severely enlarged RA, severely enlarged RV, moderate pericardial effusion, mild-moderate MR, severe TR, moderate pulmonary hypertension  - Discussed case with CT surgery on the phone: no acute intervention unless patient is in tamponade or requires pressors  - Right sided pleural effusion s/p thoracenthesis (removed 1.5L). Post procedure CXR showed trace pneumothorax x2, now resolved, Fluid is transudative according to light's criteria  - Continue with metoprolol tartrate 2.5mg IV Q12H    #Hypertension  - Hold losartan for possible angioedema/swollen lips in ICU  - Continue with metoprolol tartrate 2.5mg IV Q12H    #Hyperlipidemia  - Continue with atorvastatin 80mg PO at bedtime     #Dementia  - Donepezil being held for now    #Incidental left renal lesion  - Follow outpatient with renal US    #Thrombocytopenia  - Lovenox on hold. SCDs for now  - Follow HIT panel    #Suspected folate deficiency  - Continue with folic acid 1mg PO once daily     #Misc  - DVT Prophylaxis: lovenox  - GI Prophylaxis: famotidine 20mg IV Q12H  - Diet: NPO. Swallow eval again. Dr Contreras consulted as well  - Activity: increase as tolerated  - Code Status: DNR/DNI Hospital Day:  16d    Subjective:    Patient is a 86y old Male who presents with a chief complaint of COVID19+ (23 Dec 2020 07:44)  This morning patient is lying in bed comfortably on high flow. He is A&Ox1. He reports no new complaints, including SOB, chest pain, abdominal pain, nausea, vomiting, dysuria, constipation, or diarrhea.    Past Medical Hx:   Mild dementia    Chronic right-sided heart failure    Depression    Liver cirrhosis, alcoholic    Presence of cardiac pacemaker    AICD (automatic cardioverter/defibrillator) present    Borderline diabetes mellitus    HTN (hypertension)    Afib      Past Sx:  Presence of Watchman left atrial appendage closure device    AICD (automatic cardioverter/defibrillator) present    AICD present, double chamber    History of cholecystectomy    History of appendectomy    History of knee surgery    Deviated nasal septum      Allergies:  No Known Allergies    Current Meds:   Standng Meds:  ALBUTerol    90 MICROgram(s) HFA Inhaler 2 Puff(s) Inhalation every 6 hours  aspirin 325 milliGRAM(s) Oral daily  atorvastatin 80 milliGRAM(s) Oral at bedtime  chlorhexidine 4% Liquid 1 Application(s) Topical <User Schedule>  dextrose 5%. 1000 milliLiter(s) (50 mL/Hr) IV Continuous <Continuous>  famotidine Injectable 20 milliGRAM(s) IV Push every 12 hours  folic acid 1 milliGRAM(s) Enteral Tube daily  fondaparinux Injectable 2.5 milliGRAM(s) SubCutaneous daily  furosemide   Injectable 40 milliGRAM(s) IV Push daily  ipratropium 17 MICROgram(s) HFA Inhaler 1 Puff(s) Inhalation every 6 hours  metoprolol tartrate Injectable 2.5 milliGRAM(s) IV Push every 12 hours  senna 2 Tablet(s) Oral at bedtime    PRN Meds:  acetaminophen   Tablet .. 650 milliGRAM(s) Oral every 6 hours PRN Temp greater or equal to 38C (100.4F)  polyethylene glycol 3350 17 Gram(s) Oral daily PRN Constipation    HOME MEDICATIONS:  aspirin 325 mg oral tablet: 1 tab(s) orally once a day  clopidogrel 75 mg oral tablet: 1 tab(s) orally once a day  donepezil 10 mg oral tablet: 1 tab(s) orally once a day (at bedtime)  ferrous fumarate 325 mg (106 mg elemental iron) oral tablet: 1 tab(s) orally once a day  losartan 100 mg oral tablet: 1 tab(s) orally once a day  metoprolol succinate 25 mg oral tablet, extended release: 1 tab(s) orally once a day  mirtazapine 30 mg oral tablet: 1 tab(s) orally once a day (at bedtime)  spironolactone 50 mg oral tablet: 1 tab(s) orally once a day      Vital Signs:   T(F): 98.6 (12-23-20 @ 07:37), Max: 98.9 (12-22-20 @ 23:59)  HR: 59 (12-23-20 @ 07:37) (59 - 70)  BP: 110/56 (12-23-20 @ 07:37) (108/53 - 118/58)  RR: 20 (12-23-20 @ 07:37) (20 - 21)  SpO2: 96% (12-23-20 @ 07:37) (92% - 96%)      12-22-20 @ 07:01  -  12-23-20 @ 07:00  --------------------------------------------------------  IN: 1300 mL / OUT: 1775 mL / NET: -475 mL    12-23-20 @ 07:01  -  12-23-20 @ 10:26  --------------------------------------------------------  IN: 150 mL / OUT: 350 mL / NET: -200 mL        Physical Exam:   GENERAL: NAD  HEENT: NCAT  CHEST/LUNG: CTAB  HEART: Regular rate and rhythm; s1 s2 appreciated, No murmurs, rubs, or gallops  ABDOMEN: Soft, Nontender, Nondistended; Bowel sounds present  EXTREMITIES: No LE edema b/l  SKIN: no rashes, no new lesions  NERVOUS SYSTEM:  Alert & Oriented X1  LINES/CATHETERS:        Labs:                         9.3    11.86 )-----------( 69       ( 23 Dec 2020 05:36 )             30.8     Neutophil% 87.2, Lymphocyte% 2.1, Monocyte% 9.6, Bands% 1.0 12-23-20 @ 05:36    23 Dec 2020 05:36    142    |  106    |  44     ----------------------------<  159    3.8     |  28     |  0.9      Ca    8.8        23 Dec 2020 05:36    TPro  5.9    /  Alb  2.6    /  TBili  3.7    /  DBili  x      /  AST  22     /  ALT  23     /  AlkPhos  72     23 Dec 2020 05:36            Serum Pro-Brain Natriuretic Peptide: 45469 pg/mL (12-18-20 @ 17:33)  Serum Pro-Brain Natriuretic Peptide: 2707 pg/mL (12-15-20 @ 17:20)  Serum Pro-Brain Natriuretic Peptide: 5587 pg/mL (12-13-20 @ 20:32)                      Assessment and Plan:    Patient is an 85yo male with PMH of HFrEF, severe MR, severe pulmonary hypertension, atrial fibrillation s/p Watchman 2019, hypertension, chronic anemia, who presented to the hospital for cyanosis and shortness of breath. Patient was known to be COVID-19 positive as of 1 week prior to admission, but symptoms worsened over the days prior to presentation. Patient was intubated and then extubated for acute hypoxemic respiratory failure.    Event: IJ was attempted, after needle was in IJ and was about to pass guidewire, patient moved and suddenly blood started spurting out of needle. Needle was immediately removed and pressure was held for 10 minutes. Now there is no bleeding and no swelling. Vascular was consulted, xray and u/s ordered.      #Acute hypoxemic respiratory failure secondary to COVID-19 pneumonia s/p intubation  #Pleural effusion and moderate pericardial effusion, no signs of tamponade s/p thoracentesis  - Intubated on 12/12, extubated on 12/16  - Patient is saturating 96% on 80% high flow  - No further need to trend inflammatory markers per ID  - Dexamethasone course complete  - Lower extremity duplex negative  - Completed course of cefepime  - Incentive spirometry at bedside    #Hypernatremia resolved  - Decrease D5W to 50    #Prolonged QTc  - QTc on admission: 616  - QTc 12/19/2020: 575  - EP recs: Due to ventricular pacing. No interventions needed    # Thrombocytopenia  - PPX lovenox  - HIT antibody negative     #Constipation  - Continue with bowel regimen    #Heart failure with preserved ejection fraction  - Echo 12/9/2020: EF 50-55%, basal inferoseptal and basal inferior segment wall motion abnormalities, severely enlarged RA, severely enlarged RV, moderate pericardial effusion, mild-moderate MR, severe TR, moderate pulmonary hypertension  - Discussed case with CT surgery on the phone: no acute intervention unless patient is in tamponade or requires pressors  - Right sided pleural effusion s/p thoracenthesis (removed 1.5L). Post procedure CXR showed trace pneumothorax x2, now resolved, Fluid is transudative according to light's criteria  - Continue with metoprolol tartrate 2.5mg IV Q12H    #Hypertension  - Hold losartan for possible angioedema/swollen lips in ICU  - Continue with metoprolol tartrate 2.5mg IV Q12H    #Hyperlipidemia  - Continue with atorvastatin 80mg PO at bedtime     #Dementia  - Donepezil being held for now    #Incidental left renal lesion  - Follow outpatient with renal US    #Thrombocytopenia  - Lovenox on hold. SCDs for now  - Follow HIT panel    #Suspected folate deficiency  - Continue with folic acid 1mg PO once daily     #Misc  - DVT Prophylaxis: lovenox  - GI Prophylaxis: famotidine 20mg IV Q12H  - Diet: NPO. Picc line to be placed tomorrow for TPN  - Activity: increase as tolerated  - Code Status: DNR/DNI Hospital Day:  16d    Subjective:    Patient is a 86y old Male who presents with a chief complaint of COVID19+ (23 Dec 2020 07:44)  This morning patient is lying in bed comfortably on high flow. He is A&Ox1. He reports no new complaints, including SOB, chest pain, abdominal pain, nausea, vomiting, dysuria, constipation, or diarrhea.    Past Medical Hx:   Mild dementia    Chronic right-sided heart failure    Depression    Liver cirrhosis, alcoholic    Presence of cardiac pacemaker    AICD (automatic cardioverter/defibrillator) present    Borderline diabetes mellitus    HTN (hypertension)    Afib      Past Sx:  Presence of Watchman left atrial appendage closure device    AICD (automatic cardioverter/defibrillator) present    AICD present, double chamber    History of cholecystectomy    History of appendectomy    History of knee surgery    Deviated nasal septum      Allergies:  No Known Allergies    Current Meds:   Standng Meds:  ALBUTerol    90 MICROgram(s) HFA Inhaler 2 Puff(s) Inhalation every 6 hours  aspirin 325 milliGRAM(s) Oral daily  atorvastatin 80 milliGRAM(s) Oral at bedtime  chlorhexidine 4% Liquid 1 Application(s) Topical <User Schedule>  dextrose 5%. 1000 milliLiter(s) (50 mL/Hr) IV Continuous <Continuous>  famotidine Injectable 20 milliGRAM(s) IV Push every 12 hours  folic acid 1 milliGRAM(s) Enteral Tube daily  fondaparinux Injectable 2.5 milliGRAM(s) SubCutaneous daily  furosemide   Injectable 40 milliGRAM(s) IV Push daily  ipratropium 17 MICROgram(s) HFA Inhaler 1 Puff(s) Inhalation every 6 hours  metoprolol tartrate Injectable 2.5 milliGRAM(s) IV Push every 12 hours  senna 2 Tablet(s) Oral at bedtime    PRN Meds:  acetaminophen   Tablet .. 650 milliGRAM(s) Oral every 6 hours PRN Temp greater or equal to 38C (100.4F)  polyethylene glycol 3350 17 Gram(s) Oral daily PRN Constipation    HOME MEDICATIONS:  aspirin 325 mg oral tablet: 1 tab(s) orally once a day  clopidogrel 75 mg oral tablet: 1 tab(s) orally once a day  donepezil 10 mg oral tablet: 1 tab(s) orally once a day (at bedtime)  ferrous fumarate 325 mg (106 mg elemental iron) oral tablet: 1 tab(s) orally once a day  losartan 100 mg oral tablet: 1 tab(s) orally once a day  metoprolol succinate 25 mg oral tablet, extended release: 1 tab(s) orally once a day  mirtazapine 30 mg oral tablet: 1 tab(s) orally once a day (at bedtime)  spironolactone 50 mg oral tablet: 1 tab(s) orally once a day      Vital Signs:   T(F): 98.6 (12-23-20 @ 07:37), Max: 98.9 (12-22-20 @ 23:59)  HR: 59 (12-23-20 @ 07:37) (59 - 70)  BP: 110/56 (12-23-20 @ 07:37) (108/53 - 118/58)  RR: 20 (12-23-20 @ 07:37) (20 - 21)  SpO2: 96% (12-23-20 @ 07:37) (92% - 96%)      12-22-20 @ 07:01  -  12-23-20 @ 07:00  --------------------------------------------------------  IN: 1300 mL / OUT: 1775 mL / NET: -475 mL    12-23-20 @ 07:01  -  12-23-20 @ 10:26  --------------------------------------------------------  IN: 150 mL / OUT: 350 mL / NET: -200 mL        Physical Exam:   GENERAL: NAD  HEENT: NCAT  CHEST/LUNG: CTAB  HEART: Regular rate and rhythm; s1 s2 appreciated, No murmurs, rubs, or gallops  ABDOMEN: Soft, Nontender, Nondistended; Bowel sounds present  EXTREMITIES: No LE edema b/l  SKIN: no rashes, no new lesions  NERVOUS SYSTEM:  Alert & Oriented X1  LINES/CATHETERS:        Labs:                         9.3    11.86 )-----------( 69       ( 23 Dec 2020 05:36 )             30.8     Neutophil% 87.2, Lymphocyte% 2.1, Monocyte% 9.6, Bands% 1.0 12-23-20 @ 05:36    23 Dec 2020 05:36    142    |  106    |  44     ----------------------------<  159    3.8     |  28     |  0.9      Ca    8.8        23 Dec 2020 05:36    TPro  5.9    /  Alb  2.6    /  TBili  3.7    /  DBili  x      /  AST  22     /  ALT  23     /  AlkPhos  72     23 Dec 2020 05:36            Serum Pro-Brain Natriuretic Peptide: 01103 pg/mL (12-18-20 @ 17:33)  Serum Pro-Brain Natriuretic Peptide: 2707 pg/mL (12-15-20 @ 17:20)  Serum Pro-Brain Natriuretic Peptide: 5587 pg/mL (12-13-20 @ 20:32)                      Assessment and Plan:    Patient is an 85yo male with PMH of HFrEF, severe MR, severe pulmonary hypertension, atrial fibrillation s/p Watchman 2019, hypertension, chronic anemia, who presented to the hospital for cyanosis and shortness of breath. Patient was known to be COVID-19 positive as of 1 week prior to admission, but symptoms worsened over the days prior to presentation. Patient was intubated and then extubated for acute hypoxemic respiratory failure.    Event: IJ was attempted, after needle was in IJ and was about to pass guidewire, patient moved and suddenly blood started spurting out of needle. Needle was immediately removed and pressure was held for 10 minutes. Now there is no bleeding and no swelling. Vascular was consulted, xray and u/s ordered.  Xray showed right sided pneumo, surgery consulted for chest tube.      #Acute hypoxemic respiratory failure secondary to COVID-19 pneumonia s/p intubation  #Pleural effusion and moderate pericardial effusion, no signs of tamponade s/p thoracentesis  - Intubated on 12/12, extubated on 12/16  - Patient is saturating 96% on 80% high flow  - No further need to trend inflammatory markers per ID  - Dexamethasone course complete  - Lower extremity duplex negative  - Completed course of cefepime  - Incentive spirometry at bedside    #Hypernatremia resolved  - Decrease D5W to 50    #Prolonged QTc  - QTc on admission: 616  - QTc 12/19/2020: 575  - EP recs: Due to ventricular pacing. No interventions needed    # Thrombocytopenia  - PPX lovenox  - HIT antibody negative     #Constipation  - Continue with bowel regimen    #Heart failure with preserved ejection fraction  - Echo 12/9/2020: EF 50-55%, basal inferoseptal and basal inferior segment wall motion abnormalities, severely enlarged RA, severely enlarged RV, moderate pericardial effusion, mild-moderate MR, severe TR, moderate pulmonary hypertension  - Discussed case with CT surgery on the phone: no acute intervention unless patient is in tamponade or requires pressors  - Right sided pleural effusion s/p thoracenthesis (removed 1.5L). Post procedure CXR showed trace pneumothorax x2, now resolved, Fluid is transudative according to light's criteria  - Continue with metoprolol tartrate 2.5mg IV Q12H    #Hypertension  - Hold losartan for possible angioedema/swollen lips in ICU  - Continue with metoprolol tartrate 2.5mg IV Q12H    #Hyperlipidemia  - Continue with atorvastatin 80mg PO at bedtime     #Dementia  - Donepezil being held for now    #Incidental left renal lesion  - Follow outpatient with renal US    #Thrombocytopenia  - Lovenox on hold. SCDs for now  - Follow HIT panel    #Suspected folate deficiency  - Continue with folic acid 1mg PO once daily     #Misc  - DVT Prophylaxis: lovenox  - GI Prophylaxis: famotidine 20mg IV Q12H  - Diet: NPO. Picc line to be placed tomorrow for TPN  - Activity: increase as tolerated  - Code Status: DNR/DNI Hospital Day:  16d    Subjective:    Patient is a 86y old Male who presents with a chief complaint of COVID19+ (23 Dec 2020 07:44)  This morning patient is lying in bed comfortably on high flow. He is A&Ox1. He reports no new complaints, including SOB, chest pain, abdominal pain, nausea, vomiting, dysuria, constipation, or diarrhea.    Past Medical Hx:   Mild dementia    Chronic right-sided heart failure    Depression    Liver cirrhosis, alcoholic    Presence of cardiac pacemaker    AICD (automatic cardioverter/defibrillator) present    Borderline diabetes mellitus    HTN (hypertension)    Afib      Past Sx:  Presence of Watchman left atrial appendage closure device    AICD (automatic cardioverter/defibrillator) present    AICD present, double chamber    History of cholecystectomy    History of appendectomy    History of knee surgery    Deviated nasal septum      Allergies:  No Known Allergies    Current Meds:   Standng Meds:  ALBUTerol    90 MICROgram(s) HFA Inhaler 2 Puff(s) Inhalation every 6 hours  aspirin 325 milliGRAM(s) Oral daily  atorvastatin 80 milliGRAM(s) Oral at bedtime  chlorhexidine 4% Liquid 1 Application(s) Topical <User Schedule>  dextrose 5%. 1000 milliLiter(s) (50 mL/Hr) IV Continuous <Continuous>  famotidine Injectable 20 milliGRAM(s) IV Push every 12 hours  folic acid 1 milliGRAM(s) Enteral Tube daily  fondaparinux Injectable 2.5 milliGRAM(s) SubCutaneous daily  furosemide   Injectable 40 milliGRAM(s) IV Push daily  ipratropium 17 MICROgram(s) HFA Inhaler 1 Puff(s) Inhalation every 6 hours  metoprolol tartrate Injectable 2.5 milliGRAM(s) IV Push every 12 hours  senna 2 Tablet(s) Oral at bedtime    PRN Meds:  acetaminophen   Tablet .. 650 milliGRAM(s) Oral every 6 hours PRN Temp greater or equal to 38C (100.4F)  polyethylene glycol 3350 17 Gram(s) Oral daily PRN Constipation    HOME MEDICATIONS:  aspirin 325 mg oral tablet: 1 tab(s) orally once a day  clopidogrel 75 mg oral tablet: 1 tab(s) orally once a day  donepezil 10 mg oral tablet: 1 tab(s) orally once a day (at bedtime)  ferrous fumarate 325 mg (106 mg elemental iron) oral tablet: 1 tab(s) orally once a day  losartan 100 mg oral tablet: 1 tab(s) orally once a day  metoprolol succinate 25 mg oral tablet, extended release: 1 tab(s) orally once a day  mirtazapine 30 mg oral tablet: 1 tab(s) orally once a day (at bedtime)  spironolactone 50 mg oral tablet: 1 tab(s) orally once a day      Vital Signs:   T(F): 98.6 (12-23-20 @ 07:37), Max: 98.9 (12-22-20 @ 23:59)  HR: 59 (12-23-20 @ 07:37) (59 - 70)  BP: 110/56 (12-23-20 @ 07:37) (108/53 - 118/58)  RR: 20 (12-23-20 @ 07:37) (20 - 21)  SpO2: 96% (12-23-20 @ 07:37) (92% - 96%)      12-22-20 @ 07:01  -  12-23-20 @ 07:00  --------------------------------------------------------  IN: 1300 mL / OUT: 1775 mL / NET: -475 mL    12-23-20 @ 07:01  -  12-23-20 @ 10:26  --------------------------------------------------------  IN: 150 mL / OUT: 350 mL / NET: -200 mL        Physical Exam:   GENERAL: NAD  HEENT: NCAT  CHEST/LUNG: CTAB  HEART: Regular rate and rhythm; s1 s2 appreciated, No murmurs, rubs, or gallops  ABDOMEN: Soft, Nontender, Nondistended; Bowel sounds present  EXTREMITIES: No LE edema b/l  SKIN: no rashes, no new lesions  NERVOUS SYSTEM:  Alert & Oriented X1  LINES/CATHETERS:        Labs:                         9.3    11.86 )-----------( 69       ( 23 Dec 2020 05:36 )             30.8     Neutophil% 87.2, Lymphocyte% 2.1, Monocyte% 9.6, Bands% 1.0 12-23-20 @ 05:36    23 Dec 2020 05:36    142    |  106    |  44     ----------------------------<  159    3.8     |  28     |  0.9      Ca    8.8        23 Dec 2020 05:36    TPro  5.9    /  Alb  2.6    /  TBili  3.7    /  DBili  x      /  AST  22     /  ALT  23     /  AlkPhos  72     23 Dec 2020 05:36            Serum Pro-Brain Natriuretic Peptide: 25918 pg/mL (12-18-20 @ 17:33)  Serum Pro-Brain Natriuretic Peptide: 2707 pg/mL (12-15-20 @ 17:20)  Serum Pro-Brain Natriuretic Peptide: 5587 pg/mL (12-13-20 @ 20:32)                      Assessment and Plan:    Patient is an 87yo male with PMH of HFrEF, severe MR, severe pulmonary hypertension, atrial fibrillation s/p Watchman 2019, hypertension, chronic anemia, who presented to the hospital for cyanosis and shortness of breath. Patient was known to be COVID-19 positive as of 1 week prior to admission, but symptoms worsened over the days prior to presentation. Patient was intubated and then extubated for acute hypoxemic respiratory failure.    Event: IJ was attempted, after needle was in IJ and was about to pass guidewire, patient moved and suddenly blood started spurting out of needle. Needle was immediately removed and pressure was held for 10 minutes. Now there is no bleeding and no swelling. Vascular was consulted, xray and u/s ordered.  Update: Xray showed right sided pneumo, looks 1cm no mediastinal shift. Patient remains A&Ox1 and continues to report no pain. Vitals remain stable; he is saturating 70% on 60L/70% (same as this morning). Surgery consulted for chest tube.      #Acute hypoxemic respiratory failure secondary to COVID-19 pneumonia s/p intubation  #Pleural effusion and moderate pericardial effusion, no signs of tamponade s/p thoracentesis  - Intubated on 12/12, extubated on 12/16  - Patient is saturating 96% on 80% high flow  - No further need to trend inflammatory markers per ID  - Dexamethasone course complete  - Lower extremity duplex negative  - Completed course of cefepime  - Incentive spirometry at bedside    #Hypernatremia resolved  - Decrease D5W to 50    #Prolonged QTc  - QTc on admission: 616  - QTc 12/19/2020: 575  - EP recs: Due to ventricular pacing. No interventions needed    # Thrombocytopenia  - PPX lovenox  - HIT antibody negative     #Constipation  - Continue with bowel regimen    #Heart failure with preserved ejection fraction  - Echo 12/9/2020: EF 50-55%, basal inferoseptal and basal inferior segment wall motion abnormalities, severely enlarged RA, severely enlarged RV, moderate pericardial effusion, mild-moderate MR, severe TR, moderate pulmonary hypertension  - Discussed case with CT surgery on the phone: no acute intervention unless patient is in tamponade or requires pressors  - Right sided pleural effusion s/p thoracenthesis (removed 1.5L). Post procedure CXR showed trace pneumothorax x2, now resolved, Fluid is transudative according to light's criteria  - Continue with metoprolol tartrate 2.5mg IV Q12H    #Hypertension  - Hold losartan for possible angioedema/swollen lips in ICU  - Continue with metoprolol tartrate 2.5mg IV Q12H    #Hyperlipidemia  - Continue with atorvastatin 80mg PO at bedtime     #Dementia  - Donepezil being held for now    #Incidental left renal lesion  - Follow outpatient with renal US    #Thrombocytopenia  - Lovenox on hold. SCDs for now  - Follow HIT panel    #Suspected folate deficiency  - Continue with folic acid 1mg PO once daily     #Misc  - DVT Prophylaxis: lovenox  - GI Prophylaxis: famotidine 20mg IV Q12H  - Diet: NPO. Picc line to be placed tomorrow for TPN  - Activity: increase as tolerated  - Code Status: DNR/DNI

## 2020-12-23 NOTE — CONSULT NOTE ADULT - CONSULT REASON
Pneumothorax
carotid artery a-stick while putting central line
COVID, effusion
COVID-19
Patient is DNR/DNI, but now BiPAP dependent
Pericardial effusion
covid pna
NPO
prolonged QTC

## 2020-12-23 NOTE — CONSULT NOTE ADULT - PROVIDER SPECIALTY LIST ADULT
Vascular Surgery
Palliative Care
Surgery
Nutrition Support
Cardiology
Critical Care
Pulmonology
Electrophysiology
Infectious Disease

## 2020-12-23 NOTE — SWALLOW BEDSIDE ASSESSMENT ADULT - SLP GENERAL OBSERVATIONS
Pt received laying in bed, not verbally responsive, unable to follow commands. +HFNC 60lpm @ 80% w/ nonrebreather.

## 2020-12-23 NOTE — CONSULT NOTE ADULT - CONSULT REQUESTED DATE/TIME
08-Dec-2020 09:17
08-Dec-2020 10:13
09-Dec-2020 12:15
13-Dec-2020 11:14
23-Dec-2020
23-Dec-2020 21:05
21-Dec-2020 10:22
23-Dec-2020 18:44
21-Dec-2020 13:42

## 2020-12-23 NOTE — CONSULT NOTE ADULT - ASSESSMENT
Assessment  86M admitted with COVID PNA, vascular surgery consulted for carotid artery stick while putting in central line, pressure was held, on exam no hematoma,no pulsatile mass. No signs of hard signs of bleeding.    Plan  - No need for carotid duplex  - no surgical intervention  - monitor neck for expanding hematoma  - d/w vasc fellow Assessment  86M admitted with COVID PNA, vascular surgery consulted for carotid artery stick while putting in central line, pressure was held, on exam no hematoma,no pulsatile mass. No signs of hard signs of bleeding.    Plan  - No need for carotid duplex  - no vascular sx intervention needed  - monitor neck for expanding hematoma  - dIscussed with vasc fellow

## 2020-12-23 NOTE — CONSULT NOTE ADULT - SUBJECTIVE AND OBJECTIVE BOX
HPI:  85y/o M with PMH of CHFrEF & severe MR/pulm HTN, Afib s/p watchman 2019 not on AC, HTN, hx of chronic anemia presents to ED with 'blue lips' and SOB. Pt was known COVID+ for 1 week and had increased sxs over past few days but can't really give details. He also states that for 1mo he hasn't felt well, general sense of 'malaise' and notes that his lips have been extremely chapped, mildly swollen, and 'bue'. He saw his PCP who didn't know what it was and suggested coming to ED. Denied CP, palpitations, leg pains, nausea/vomiting/diarrhea. Has some mild abd pain in LLL crampy. Pt overall is poor historian for his sxs.    In ED: T96.9, HR 58, /62, RR18, SpO2 86% on RA > 92% on 3L NC.  CT a/p no acute pathology but noted small L renal lesion & b/l effusions with signs of RH dysfcn.   Labs showed leukopenia/lymphopenia, thrombocytopenia. Trop 0.04, EKG ventricularly paced.  CXR diffuse infiltrates. (07 Dec 2020 23:15)    PAST MEDICAL & SURGICAL HISTORY:  Mild dementia  Chronic right-sided heart failure  Depression  Liver cirrhosis, alcoholic  Borderline diabetes mellitus  HTN (hypertension)  Afib  Presence of Watchman left atrial appendage closure device  AICD (automatic cardioverter/defibrillator) present  History of cholecystectomy  History of appendectomy  History of knee surgery  Deviated nasal septum    FAMILY HISTORY:  Family history of throat cancer (Sibling)  Family history of diabetes mellitus (Mother)    ICU Vital Signs Last 24 Hrs  T(C): 37 (23 Dec 2020 07:37), Max: 37.2 (22 Dec 2020 23:59)  T(F): 98.6 (23 Dec 2020 07:37), Max: 98.9 (22 Dec 2020 23:59)  HR: 59 (23 Dec 2020 07:37) (59 - 70)  BP: 110/56 (23 Dec 2020 07:37) (108/53 - 118/58)  RR: 20 (23 Dec 2020 07:37) (20 - 21)  SpO2: 96% (23 Dec 2020 07:37) (92% - 96%)  Height (cm): 175.3 (12-12-20 @ 15:00)  Weight (kg): 73.7 (12-12-20 @ 15:00)  BMI (kg/m2): 24 (12-12-20 @ 15:00)  BSA (m2): 1.89 (12-12-20 @ 15:00)    I&O's Detail    22 Dec 2020 07:01  -  23 Dec 2020 07:00  --------------------------------------------------------  IN:    dextrose 5%: 1300 mL  Total IN: 1300 mL    OUT:    Indwelling Catheter - Urethral (mL): 1775 mL  Total OUT: 1775 mL    Total NET: -475 mL      23 Dec 2020 07:01  -  23 Dec 2020 11:06  --------------------------------------------------------  IN:    dextrose 5%: 150 mL  Total IN: 150 mL    OUT:    Indwelling Catheter - Urethral (mL): 350 mL  Total OUT: 350 mL    Total NET: -200 mL  PHYSICAL EXAM:  GENERAL: on high flow oxygen   on NR mask, cachetic , temporal wasting  HEENT:  Moist mucous membranes,  ABDOMEN: Soft, Nontender, Nondistende  EXTREMITIES:  no edema, decrease in LBM  SKIN: No lesions  IV ACCESS: peripheral IV   FEEDING ACCESS: npo    MEDICATIONS  (STANDING):  ALBUTerol    90 MICROgram(s) HFA Inhaler 2 Puff(s) Inhalation every 6 hours  aspirin 325 milliGRAM(s) Oral daily  atorvastatin 80 milliGRAM(s) Oral at bedtime  chlorhexidine 4% Liquid 1 Application(s) Topical <User Schedule>  dextrose 5%. 1000 milliLiter(s) (50 mL/Hr) IV Continuous <Continuous>  famotidine Injectable 20 milliGRAM(s) IV Push every 12 hours  folic acid 1 milliGRAM(s) Enteral Tube daily  fondaparinux Injectable 2.5 milliGRAM(s) SubCutaneous daily  furosemide   Injectable 40 milliGRAM(s) IV Push daily  ipratropium 17 MICROgram(s) HFA Inhaler 1 Puff(s) Inhalation every 6 hours  metoprolol tartrate Injectable 2.5 milliGRAM(s) IV Push every 12 hours  senna 2 Tablet(s) Oral at bedtime    MEDICATIONS  (PRN):  acetaminophen   Tablet .. 650 milliGRAM(s) Oral every 6 hours PRN Temp greater or equal to 38C (100.4F)  polyethylene glycol 3350 17 Gram(s) Oral daily PRN Constipation    Allergies:NKDA    LABS:    12-23    142  |  106  |  44<H>  ----------------------------<  159<H>  3.8   |  28  |  0.9    Ca    8.8      23 Dec 2020 05:36    TPro  5.9<L>  /  Alb  2.6<L>  /  TBili  3.7<H>  /  DBili  x   /  AST  22  /  ALT  23  /  AlkPhos  72  12-23                            9.3    11.86 )-----------( 69       ( 23 Dec 2020 05:36 )             30.8     CAPILLARY BLOOD GLUCOSE  POCT Blood Glucose.: 159 mg/dL (23 Dec 2020 11:04)  RADIOLOGY:  < from: Xray Chest 1 View- PORTABLE-Routine (Xray Chest 1 View- PORTABLE-Routine .) (12.22.20 @ 10:54) >  Impression:  No significant change in bilateral opacities and effusions.   HPI:  87y/o M with PMH of CHFrEF & severe MR/pulm HTN, Afib s/p watchman 2019 not on AC, HTN, hx of chronic anemia presents to ED with 'blue lips' and SOB. Pt was known COVID+ for 1 week and had increased sxs over past few days but can't really give details. He also states that for 1mo he hasn't felt well, general sense of 'malaise' and notes that his lips have been extremely chapped, mildly swollen, and 'bue'. He saw his PCP who didn't know what it was and suggested coming to ED. Denied CP, palpitations, leg pains, nausea/vomiting/diarrhea. Has some mild abd pain in LLL crampy. Pt overall is poor historian for his sxs.    In ED: T96.9, HR 58, /62, RR18, SpO2 86% on RA > 92% on 3L NC.  CT a/p no acute pathology but noted small L renal lesion & b/l effusions with signs of RH dysfcn.   Labs showed leukopenia/lymphopenia, thrombocytopenia. Trop 0.04, EKG ventricularly paced.  CXR diffuse infiltrates. (07 Dec 2020 23:15)    PAST MEDICAL & SURGICAL HISTORY:  Mild dementia  Chronic right-sided heart failure  Depression  Liver cirrhosis, alcoholic  Borderline diabetes mellitus  HTN (hypertension)  Afib  Presence of Watchman left atrial appendage closure device  AICD (automatic cardioverter/defibrillator) present  History of cholecystectomy  History of appendectomy  History of knee surgery  Deviated nasal septum    FAMILY HISTORY:  Family history of throat cancer (Sibling)  Family history of diabetes mellitus (Mother)    ICU Vital Signs Last 24 Hrs  T(C): 37 (23 Dec 2020 07:37), Max: 37.2 (22 Dec 2020 23:59)  T(F): 98.6 (23 Dec 2020 07:37), Max: 98.9 (22 Dec 2020 23:59)  HR: 59 (23 Dec 2020 07:37) (59 - 70)  BP: 110/56 (23 Dec 2020 07:37) (108/53 - 118/58)  RR: 20 (23 Dec 2020 07:37) (20 - 21)  SpO2: 96% (23 Dec 2020 07:37) (92% - 96%)  Height (cm): 175.3 (12-12-20 @ 15:00)  Weight (kg): 73.7 (12-12-20 @ 15:00)/today's weight 12/23/20=67kg  BMI (kg/m2): 24 (12-12-20 @ 15:00)  BSA (m2): 1.89 (12-12-20 @ 15:00)    I&O's Detail    22 Dec 2020 07:01  -  23 Dec 2020 07:00  --------------------------------------------------------  IN:    dextrose 5%: 1300 mL  Total IN: 1300 mL    OUT:    Indwelling Catheter - Urethral (mL): 1775 mL  Total OUT: 1775 mL    Total NET: -475 mL      23 Dec 2020 07:01  -  23 Dec 2020 11:06  --------------------------------------------------------  IN:    dextrose 5%: 150 mL  Total IN: 150 mL    OUT:    Indwelling Catheter - Urethral (mL): 350 mL  Total OUT: 350 mL    Total NET: -200 mL  PHYSICAL EXAM:  GENERAL: on high flow oxygen   on NR mask, cachetic , temporal wasting  HEENT:  Moist mucous membranes,  ABDOMEN: Soft, Nontender, Nondistende  EXTREMITIES:  no edema, decrease in LBM  SKIN: No lesions  IV ACCESS: peripheral IV   FEEDING ACCESS: npo    MEDICATIONS  (STANDING):  ALBUTerol    90 MICROgram(s) HFA Inhaler 2 Puff(s) Inhalation every 6 hours  aspirin 325 milliGRAM(s) Oral daily  atorvastatin 80 milliGRAM(s) Oral at bedtime  chlorhexidine 4% Liquid 1 Application(s) Topical <User Schedule>  dextrose 5%. 1000 milliLiter(s) (50 mL/Hr) IV Continuous <Continuous>  famotidine Injectable 20 milliGRAM(s) IV Push every 12 hours  folic acid 1 milliGRAM(s) Enteral Tube daily  fondaparinux Injectable 2.5 milliGRAM(s) SubCutaneous daily  furosemide   Injectable 40 milliGRAM(s) IV Push daily  ipratropium 17 MICROgram(s) HFA Inhaler 1 Puff(s) Inhalation every 6 hours  metoprolol tartrate Injectable 2.5 milliGRAM(s) IV Push every 12 hours  senna 2 Tablet(s) Oral at bedtime    MEDICATIONS  (PRN):  acetaminophen   Tablet .. 650 milliGRAM(s) Oral every 6 hours PRN Temp greater or equal to 38C (100.4F)  polyethylene glycol 3350 17 Gram(s) Oral daily PRN Constipation    Allergies:NKDA    LABS:    12-23    142  |  106  |  44<H>  ----------------------------<  159<H>  3.8   |  28  |  0.9    Ca    8.8      23 Dec 2020 05:36    TPro  5.9<L>  /  Alb  2.6<L>  /  TBili  3.7<H>  /  DBili  x   /  AST  22  /  ALT  23  /  AlkPhos  72  12-23                            9.3    11.86 )-----------( 69       ( 23 Dec 2020 05:36 )             30.8  Triglycerides, Serum (12.18.20 @ 17:33)   Triglycerides, Serum: 104 mg/dL    CAPILLARY BLOOD GLUCOSE  POCT Blood Glucose.: 159 mg/dL (23 Dec 2020 11:04)  RADIOLOGY:  < from: Xray Chest 1 View- PORTABLE-Routine (Xray Chest 1 View- PORTABLE-Routine .) (12.22.20 @ 10:54) >  Impression:  No significant change in bilateral opacities and effusions.   HPI:  85y/o M with PMH of CHFrEF & severe MR/pulm HTN, Afib s/p watchman 2019 not on AC, HTN, hx of chronic anemia presents to ED with 'blue lips' and SOB. Pt was known COVID+ for 1 week and had increased sxs over past few days but can't really give details. He also states that for 1mo he hasn't felt well, general sense of 'malaise' and notes that his lips have been extremely chapped, mildly swollen, and 'bue'. He saw his PCP who didn't know what it was and suggested coming to ED. Denied CP, palpitations, leg pains, nausea/vomiting/diarrhea. Has some mild abd pain in LLL crampy. Pt overall is poor historian for his sxs.    In ED: T96.9, HR 58, /62, RR18, SpO2 86% on RA > 92% on 3L NC.  CT a/p no acute pathology but noted small L renal lesion & b/l effusions with signs of RH dysfcn.   Labs showed leukopenia/lymphopenia, thrombocytopenia. Trop 0.04, EKG ventricularly paced.  CXR diffuse infiltrates. (07 Dec 2020 23:15)  Hospital course: given po meals 12/8 to 11, but likely ate poorly;  NPO 12/12-14;  tube fed 12/14-16; NPO again 12/16 through today 12/23.  pt intubated 12/12 to 16, on High flow O2 now  See speech eval - pt too sluggish to take po food safely, at the present time.    PAST MEDICAL & SURGICAL HISTORY:  Mild dementia  Chronic right-sided heart failure  Depression  Liver cirrhosis, alcoholic  Borderline diabetes mellitus  HTN (hypertension)  Afib  Presence of Watchman left atrial appendage closure device  AICD (automatic cardioverter/defibrillator) present  History of cholecystectomy  History of appendectomy  History of knee surgery  Deviated nasal septum    FAMILY HISTORY:  Family history of throat cancer (Sibling)  Family history of diabetes mellitus (Mother)    ICU Vital Signs Last 24 Hrs  T(C): 37 (23 Dec 2020 07:37), Max: 37.2 (22 Dec 2020 23:59)  T(F): 98.6 (23 Dec 2020 07:37), Max: 98.9 (22 Dec 2020 23:59)  HR: 59 (23 Dec 2020 07:37) (59 - 70)  BP: 110/56 (23 Dec 2020 07:37) (108/53 - 118/58)  RR: 20 (23 Dec 2020 07:37) (20 - 21)  SpO2: 96% (23 Dec 2020 07:37) (92% - 96%)  Height (cm): 175.3 (12-12-20 @ 15:00)  Weight (kg): 73.7 (12-12-20 @ 15:00) - ? estimated - note on adm pt stated he thought he was 81 kg /today's weight 12/23/20=67kg  BMI (kg/m2): 24 (12-12-20 @ 15:00)  BSA (m2): 1.89 (12-12-20 @ 15:00)    I&O's Detail    22 Dec 2020 07:01  -  23 Dec 2020 07:00  --------------------------------------------------------  IN:    dextrose 5%: 1300 mL  Total IN: 1300 mL    OUT:    Indwelling Catheter - Urethral (mL): 1775 mL  Total OUT: 1775 mL    Total NET: -475 mL      23 Dec 2020 07:01  -  23 Dec 2020 11:06  --------------------------------------------------------  IN:    dextrose 5%: 150 mL  Total IN: 150 mL    OUT:    Indwelling Catheter - Urethral (mL): 350 mL  Total OUT: 350 mL    Total NET: -200 mL  PHYSICAL EXAM:  GENERAL: on high flow oxygen   on NR mask, cachetic , temporal wasting  HEENT:  Moist mucous membranes,  ABDOMEN: Soft, Nontender, Nondistended  EXTREMITIES:  no edema, decrease in LBM  SKIN: No lesions  IV ACCESS: peripheral IV   FEEDING ACCESS: npo    MEDICATIONS  (STANDING):  ALBUTerol    90 MICROgram(s) HFA Inhaler 2 Puff(s) Inhalation every 6 hours  aspirin 325 milliGRAM(s) Oral daily  atorvastatin 80 milliGRAM(s) Oral at bedtime  chlorhexidine 4% Liquid 1 Application(s) Topical <User Schedule>  dextrose 5%. 1000 milliLiter(s) (50 mL/Hr) IV Continuous <Continuous>  famotidine Injectable 20 milliGRAM(s) IV Push every 12 hours  folic acid 1 milliGRAM(s) Enteral Tube daily  fondaparinux Injectable 2.5 milliGRAM(s) SubCutaneous daily  furosemide   Injectable 40 milliGRAM(s) IV Push daily  ipratropium 17 MICROgram(s) HFA Inhaler 1 Puff(s) Inhalation every 6 hours  metoprolol tartrate Injectable 2.5 milliGRAM(s) IV Push every 12 hours  senna 2 Tablet(s) Oral at bedtime    MEDICATIONS  (PRN):  acetaminophen   Tablet .. 650 milliGRAM(s) Oral every 6 hours PRN Temp greater or equal to 38C (100.4F)  polyethylene glycol 3350 17 Gram(s) Oral daily PRN Constipation    Allergies:NKDA    LABS:    12-23    142  |  106  |  44<H>  ----------------------------<  159<H>  3.8   |  28  |  0.9    Ca    8.8      23 Dec 2020 05:36    TPro  5.9<L>  /  Alb  2.6<L>  /  TBili  3.7<H>  /  DBili  x   /  AST  22  /  ALT  23  /  AlkPhos  72  12-23                            9.3    11.86 )-----------( 69       ( 23 Dec 2020 05:36 )             30.8  Triglycerides, Serum (12.18.20 @ 17:33)   Triglycerides, Serum: 104 mg/dL    CAPILLARY BLOOD GLUCOSE  POCT Blood Glucose.: 159 mg/dL (23 Dec 2020 11:04)  RADIOLOGY:  < from: Xray Chest 1 View- PORTABLE-Routine (Xray Chest 1 View- PORTABLE-Routine .) (12.22.20 @ 10:54) >  Impression:  No significant change in bilateral opacities and effusions.

## 2020-12-23 NOTE — PROGRESS NOTE ADULT - ASSESSMENT
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     PULMONARY:  HOB @ 45 degrees.  Vent changes as follows: bipap 14/10, 100%  HHFNC KeEP sao2 92 to 96%    CARDIOVASCULAR:  Target MAP-60. Avoid volume overload.    GI: GI prophylaxis.  Feeding.  Bowel regimen ,    RENAL:  Follow up lytes.  Correct as needed. FUP CMp  INFECTIOUS DISEASE: per id    HEMATOLOGICAL:  DVT prophylaxis.    ENDOCRINE:  Follow up FS.  Insulin protocol if needed.    MUSCULOSKELETAL: bedrest  ed3   poor prognosis

## 2020-12-23 NOTE — SWALLOW BEDSIDE ASSESSMENT ADULT - SWALLOW EVAL: DIAGNOSIS
14-Aug-2017
moderate to severe oral dysphagia for ice chips and puree, no attempts to swallow bolus. pt required removal of bolus w/ oral swab.
pt not appropriate for PO trials on this date 2' poor respiratory status and poor mental status.

## 2020-12-23 NOTE — CONSULT NOTE ADULT - SUBJECTIVE AND OBJECTIVE BOX
WILLIAM ARAIZA [id number]  86y Male  16d    HPI:  85y/o M with PMH of CHFrEF & severe MR/pulm HTN, Afib s/p watchman 2019 not on AC, HTN, hx of chronic anemia presented to ED with 'blue lips' and SOB, currently on HD 16 s/p intubation and extubation 12/16, has necessitated BiPAP since that point. Today Right IJ central line was attempted however unsuccessful due to carotid artery stick instead of IJ stick. On follow up imaging, CXR patient was found to have a pneumothorax initially 0.7cm, which on repeat CXR has now expanded to 1.5cm. Patient remains on BiPAP with desaturation events to 85%. General surgery consulted for possible placement of chest tube given expanding chest tube. Consent obtained from Son, and in chart.    PAST MEDICAL & SURGICAL HISTORY:  Mild dementia    Chronic right-sided heart failure    Depression    Liver cirrhosis, alcoholic    Borderline diabetes mellitus    HTN (hypertension)    Afib    Presence of Watchman left atrial appendage closure device    AICD (automatic cardioverter/defibrillator) present    History of cholecystectomy    History of appendectomy    History of knee surgery    Deviated nasal septum          MEDICATIONS  (STANDING):  ALBUTerol    90 MICROgram(s) HFA Inhaler 2 Puff(s) Inhalation every 6 hours  aspirin 325 milliGRAM(s) Oral daily  atorvastatin 80 milliGRAM(s) Oral at bedtime  chlorhexidine 4% Liquid 1 Application(s) Topical <User Schedule>  dextrose 5%. 1000 milliLiter(s) (50 mL/Hr) IV Continuous <Continuous>  enoxaparin Injectable 40 milliGRAM(s) SubCutaneous daily  furosemide   Injectable 40 milliGRAM(s) IV Push daily  ipratropium 17 MICROgram(s) HFA Inhaler 1 Puff(s) Inhalation every 6 hours  metoprolol tartrate Injectable 2.5 milliGRAM(s) IV Push every 12 hours  Parenteral Nutrition - Adult 1 Each (55 mL/Hr) TPN Continuous <Continuous>  senna 2 Tablet(s) Oral at bedtime    MEDICATIONS  (PRN):  acetaminophen   Tablet .. 650 milliGRAM(s) Oral every 6 hours PRN Temp greater or equal to 38C (100.4F)  polyethylene glycol 3350 17 Gram(s) Oral daily PRN Constipation      Allergies    No Known Allergies    Intolerances        REVIEW OF SYSTEMS    [x ] A ten-point review of systems was otherwise negative except as noted.  [ ] Due to altered mental status/intubation, subjective information were not able to be obtained from the patient. History was obtained, to the extent possible, from review of the chart and collateral sources of information.      Vital Signs Last 24 Hrs  T(C): 37 (23 Dec 2020 07:37), Max: 37.2 (22 Dec 2020 23:59)  T(F): 98.6 (23 Dec 2020 07:37), Max: 98.9 (22 Dec 2020 23:59)  HR: 64 (23 Dec 2020 20:27) (59 - 64)  BP: 121/56 (23 Dec 2020 12:00) (110/56 - 121/56)  BP(mean): 76 (23 Dec 2020 12:00) (76 - 76)  RR: 16 (23 Dec 2020 12:00) (16 - 21)  SpO2: 98% (23 Dec 2020 20:27) (92% - 99%)    PHYSICAL EXAM:  GENERAL:   CHEST/LUNG: Clear to auscultation bilaterally  HEART: Regular rate and rhythm  ABDOMEN: Soft, Nontender, Nondistended;   EXTREMITIES:  No clubbing, cyanosis, or edema      LABS:  Labs:  CAPILLARY BLOOD GLUCOSE      POCT Blood Glucose.: 138 mg/dL (23 Dec 2020 17:27)  POCT Blood Glucose.: 159 mg/dL (23 Dec 2020 11:04)                          9.5    11.93 )-----------( 78       ( 23 Dec 2020 17:40 )             31.3       Auto Neutrophil %: 87.2 % (12-23-20 @ 05:36)  Auto Immature Granulocyte %: 1.0 % (12-23-20 @ 05:36)    12-23    142  |  106  |  44<H>  ----------------------------<  159<H>  3.8   |  28  |  0.9      Calcium, Total Serum: 8.8 mg/dL (12-23-20 @ 05:36)      LFTs:             5.9  | 3.7  | 22       ------------------[72      ( 23 Dec 2020 05:36 )  2.6  | x    | 23          Lipase:x      Amylase:x             Coags:     17.10  ----< 1.49    ( 23 Dec 2020 17:40 )     38.1            Serum Pro-Brain Natriuretic Peptide: 51596 pg/mL (12-18-20 @ 17:33)  Serum Pro-Brain Natriuretic Peptide: 2707 pg/mL (12-15-20 @ 17:20)              RADIOLOGY & ADDITIONAL STUDIES:   WILLIAM ARAIZA [id number]  86y Male  16d    HPI:  87y/o M with PMH of CHFrEF & severe MR/pulm HTN, Afib s/p watchman 2019 not on AC, HTN, hx of chronic anemia presented to ED with 'blue lips' and SOB, currently on HD 16 s/p intubation and extubation 12/16, has necessitated BiPAP since that point. Today Right IJ central line was attempted however unsuccessful due to carotid artery stick instead of IJ stick. Vascular Surgery consulted, no surgical intervention for carotid artery stick. On follow up imaging, CXR patient was found to have a pneumothorax initially 0.7cm, which on repeat CXR has now expanded to 1.5cm. Patient remains on BiPAP with desaturation events to 85%. General surgery consulted for possible placement of chest tube given expanding chest tube. Consent obtained from Son, and in chart.    PAST MEDICAL & SURGICAL HISTORY:  Mild dementia    Chronic right-sided heart failure    Depression    Liver cirrhosis, alcoholic    Borderline diabetes mellitus    HTN (hypertension)    Afib    Presence of Watchman left atrial appendage closure device    AICD (automatic cardioverter/defibrillator) present    History of cholecystectomy    History of appendectomy    History of knee surgery    Deviated nasal septum          MEDICATIONS  (STANDING):  ALBUTerol    90 MICROgram(s) HFA Inhaler 2 Puff(s) Inhalation every 6 hours  aspirin 325 milliGRAM(s) Oral daily  atorvastatin 80 milliGRAM(s) Oral at bedtime  chlorhexidine 4% Liquid 1 Application(s) Topical <User Schedule>  dextrose 5%. 1000 milliLiter(s) (50 mL/Hr) IV Continuous <Continuous>  enoxaparin Injectable 40 milliGRAM(s) SubCutaneous daily  furosemide   Injectable 40 milliGRAM(s) IV Push daily  ipratropium 17 MICROgram(s) HFA Inhaler 1 Puff(s) Inhalation every 6 hours  metoprolol tartrate Injectable 2.5 milliGRAM(s) IV Push every 12 hours  Parenteral Nutrition - Adult 1 Each (55 mL/Hr) TPN Continuous <Continuous>  senna 2 Tablet(s) Oral at bedtime    MEDICATIONS  (PRN):  acetaminophen   Tablet .. 650 milliGRAM(s) Oral every 6 hours PRN Temp greater or equal to 38C (100.4F)  polyethylene glycol 3350 17 Gram(s) Oral daily PRN Constipation      Allergies    No Known Allergies    Intolerances        REVIEW OF SYSTEMS    [x ] A ten-point review of systems was otherwise negative except as noted.  [ ] Due to altered mental status/intubation, subjective information were not able to be obtained from the patient. History was obtained, to the extent possible, from review of the chart and collateral sources of information.      Vital Signs Last 24 Hrs  T(C): 37 (23 Dec 2020 07:37), Max: 37.2 (22 Dec 2020 23:59)  T(F): 98.6 (23 Dec 2020 07:37), Max: 98.9 (22 Dec 2020 23:59)  HR: 64 (23 Dec 2020 20:27) (59 - 64)  BP: 121/56 (23 Dec 2020 12:00) (110/56 - 121/56)  BP(mean): 76 (23 Dec 2020 12:00) (76 - 76)  RR: 16 (23 Dec 2020 12:00) (16 - 21)  SpO2: 98% (23 Dec 2020 20:27) (92% - 99%)    PHYSICAL EXAM:  GENERAL:   CHEST/LUNG: Clear to auscultation bilaterally  HEART: Regular rate and rhythm  ABDOMEN: Soft, Nontender, Nondistended;   EXTREMITIES:  No clubbing, cyanosis, or edema      LABS:  Labs:  CAPILLARY BLOOD GLUCOSE      POCT Blood Glucose.: 138 mg/dL (23 Dec 2020 17:27)  POCT Blood Glucose.: 159 mg/dL (23 Dec 2020 11:04)                          9.5    11.93 )-----------( 78       ( 23 Dec 2020 17:40 )             31.3       Auto Neutrophil %: 87.2 % (12-23-20 @ 05:36)  Auto Immature Granulocyte %: 1.0 % (12-23-20 @ 05:36)    12-23    142  |  106  |  44<H>  ----------------------------<  159<H>  3.8   |  28  |  0.9      Calcium, Total Serum: 8.8 mg/dL (12-23-20 @ 05:36)      LFTs:             5.9  | 3.7  | 22       ------------------[72      ( 23 Dec 2020 05:36 )  2.6  | x    | 23          Lipase:x      Amylase:x             Coags:     17.10  ----< 1.49    ( 23 Dec 2020 17:40 )     38.1            Serum Pro-Brain Natriuretic Peptide: 80773 pg/mL (12-18-20 @ 17:33)  Serum Pro-Brain Natriuretic Peptide: 2707 pg/mL (12-15-20 @ 17:20)              RADIOLOGY & ADDITIONAL STUDIES:  < from: Xray Chest 1 View- PORTABLE-Urgent (Xray Chest 1 View- PORTABLE-Urgent .) (12.23.20 @ 15:46) >  Impression:    New lucency in the right lateral hemithorax, possibly representing a pneumothorax - limited evaluation due to suboptimal positioning. Recommend repeat chest radiographic evaluation.    Diffuse bilateral pulmonary opacities and effusions similar to prior.    A call back was placed at the time of dictation.    < end of copied text >     WILLIAM ARAIZA [id number]  86y Male  16d    HPI:  85y/o M with PMH of CHFrEF & severe MR/pulm HTN, Afib s/p watchman 2019 not on AC, HTN, hx of chronic anemia presented to ED with 'blue lips' and SOB, currently on HD 16 s/p intubation and extubation 12/16, has necessitated BiPAP since that point. Today Right IJ central line was attempted however unsuccessful due to carotid artery stick instead of IJ stick. Vascular Surgery consulted, no surgical intervention for carotid artery stick. On follow up imaging, CXR patient was found to have a pneumothorax initially 0.7cm, which on repeat CXR has now expanded to 1.5cm. Patient remains on BiPAP with desaturation events to 85%. General surgery consulted for possible placement of chest tube given expanding chest tube. Consent obtained from Son, and in chart.    PAST MEDICAL & SURGICAL HISTORY:  Mild dementia    Chronic right-sided heart failure    Depression    Liver cirrhosis, alcoholic    Borderline diabetes mellitus    HTN (hypertension)    Afib    Presence of Watchman left atrial appendage closure device    AICD (automatic cardioverter/defibrillator) present    History of cholecystectomy    History of appendectomy    History of knee surgery    Deviated nasal septum      MEDICATIONS  (STANDING):  ALBUTerol    90 MICROgram(s) HFA Inhaler 2 Puff(s) Inhalation every 6 hours  aspirin 325 milliGRAM(s) Oral daily  atorvastatin 80 milliGRAM(s) Oral at bedtime  chlorhexidine 4% Liquid 1 Application(s) Topical <User Schedule>  dextrose 5%. 1000 milliLiter(s) (50 mL/Hr) IV Continuous <Continuous>  enoxaparin Injectable 40 milliGRAM(s) SubCutaneous daily  furosemide   Injectable 40 milliGRAM(s) IV Push daily  ipratropium 17 MICROgram(s) HFA Inhaler 1 Puff(s) Inhalation every 6 hours  metoprolol tartrate Injectable 2.5 milliGRAM(s) IV Push every 12 hours  Parenteral Nutrition - Adult 1 Each (55 mL/Hr) TPN Continuous <Continuous>  senna 2 Tablet(s) Oral at bedtime    MEDICATIONS  (PRN):  acetaminophen   Tablet .. 650 milliGRAM(s) Oral every 6 hours PRN Temp greater or equal to 38C (100.4F)  polyethylene glycol 3350 17 Gram(s) Oral daily PRN Constipation      Allergies    No Known Allergies      REVIEW OF SYSTEMS    [x ] A ten-point review of systems was otherwise negative except as noted.  [ ] Due to altered mental status/intubation, subjective information were not able to be obtained from the patient. History was obtained, to the extent possible, from review of the chart and collateral sources of information.      Vital Signs Last 24 Hrs  T(C): 37 (23 Dec 2020 07:37), Max: 37.2 (22 Dec 2020 23:59)  T(F): 98.6 (23 Dec 2020 07:37), Max: 98.9 (22 Dec 2020 23:59)  HR: 64 (23 Dec 2020 20:27) (59 - 64)  BP: 121/56 (23 Dec 2020 12:00) (110/56 - 121/56)  BP(mean): 76 (23 Dec 2020 12:00) (76 - 76)  RR: 16 (23 Dec 2020 12:00) (16 - 21)  SpO2: 98% (23 Dec 2020 20:27) (92% - 99%)    PHYSICAL EXAM:  GENERAL: Mouthbreathing, in moderate distress  CHEST/LUNG: BS decreased on the R. Thin, barrel chested. + accessory muscle use, NRB and hi-flow in place, desat to high 80s  HEART: Regular rate and rhythm  ABDOMEN: Soft, Nontender, Nondistended;   EXTREMITIES:  No clubbing, cyanosis, or edema      LABS:  Labs:  CAPILLARY BLOOD GLUCOSE      POCT Blood Glucose.: 138 mg/dL (23 Dec 2020 17:27)  POCT Blood Glucose.: 159 mg/dL (23 Dec 2020 11:04)                          9.5    11.93 )-----------( 78       ( 23 Dec 2020 17:40 )             31.3       Auto Neutrophil %: 87.2 % (12-23-20 @ 05:36)  Auto Immature Granulocyte %: 1.0 % (12-23-20 @ 05:36)    12-23    142  |  106  |  44<H>  ----------------------------<  159<H>  3.8   |  28  |  0.9      Calcium, Total Serum: 8.8 mg/dL (12-23-20 @ 05:36)      LFTs:             5.9  | 3.7  | 22       ------------------[72      ( 23 Dec 2020 05:36 )  2.6  | x    | 23          Lipase:x      Amylase:x             Coags:     17.10  ----< 1.49    ( 23 Dec 2020 17:40 )     38.1        Serum Pro-Brain Natriuretic Peptide: 33015 pg/mL (12-18-20 @ 17:33)  Serum Pro-Brain Natriuretic Peptide: 2707 pg/mL (12-15-20 @ 17:20)        RADIOLOGY & ADDITIONAL STUDIES:  < from: Xray Chest 1 View- PORTABLE-Urgent (Xray Chest 1 View- PORTABLE-Urgent .) (12.23.20 @ 15:46) >  Impression:    New lucency in the right lateral hemithorax, possibly representing a pneumothorax - limited evaluation due to suboptimal positioning. Recommend repeat chest radiographic evaluation.    Diffuse bilateral pulmonary opacities and effusions similar to prior.    A call back was placed at the time of dictation.    < end of copied text >

## 2020-12-23 NOTE — SWALLOW BEDSIDE ASSESSMENT ADULT - SLP PERTINENT HISTORY OF CURRENT PROBLEM
87 y/o M presented for cyanosis and SOB. Pt COVID-19+ x1 week PTA, with worsening symptoms. Pt intubated 12/12, extubated 12/16. Pt currently requiring high flow nasal cannula.
85 y/o M presented for cyanosis and SOB. Pt COVID-19+ x1 week PTA, with worsening symptoms. Pt intubated 12/12, extubated 12/16. Pt currently requiring high flow nasal cannula.

## 2020-12-23 NOTE — PROGRESS NOTE ADULT - SUBJECTIVE AND OBJECTIVE BOX
OVERNIGHT EVENTS: events noted, still requiring high FIO2    Vital Signs Last 24 Hrs  T(C): 37 (23 Dec 2020 07:37), Max: 37.2 (22 Dec 2020 23:59)  T(F): 98.6 (23 Dec 2020 07:37), Max: 98.9 (22 Dec 2020 23:59)  HR: 59 (23 Dec 2020 07:37) (59 - 70)  BP: 110/56 (23 Dec 2020 07:37) (108/53 - 118/58)  RR: 20 (23 Dec 2020 07:37) (20 - 22)  SpO2: 96% (23 Dec 2020 07:37) (92% - 97%)    PHYSICAL EXAMINATION:    GENERAL: ill looking, tachypneic    HEENT: Head is normocephalic and atraumatic.     NECK: Supple.    LUNGS: BALDEV 3/6    HEART: Regular rate and rhythm without murmur.    ABDOMEN: Soft, nontender, and nondistended.      EXTREMITIES: Without any cyanosis, clubbing, rash, lesions or edema.    NEUROLOGIC: Non focal    SKIN: No ulceration or induration present.      LABS:                        9.3    11.86 )-----------( 69       ( 23 Dec 2020 05:36 )             30.8     12-22    140  |  104  |  30<H>  ----------------------------<  130<H>  3.7   |  31  |  0.8    Ca    8.8      22 Dec 2020 04:30    TPro  6.0  /  Alb  2.4<L>  /  TBili  3.0<H>  /  DBili  x   /  AST  23  /  ALT  25  /  AlkPhos  67  12-22 12-22-20 @ 07:01  -  12-23-20 @ 07:00  --------------------------------------------------------  IN: 1300 mL / OUT: 1775 mL / NET: -475 mL        MICROBIOLOGY:      MEDICATIONS  (STANDING):  ALBUTerol    90 MICROgram(s) HFA Inhaler 2 Puff(s) Inhalation every 6 hours  aspirin 325 milliGRAM(s) Oral daily  atorvastatin 80 milliGRAM(s) Oral at bedtime  chlorhexidine 4% Liquid 1 Application(s) Topical <User Schedule>  dextrose 5%. 1000 milliLiter(s) (50 mL/Hr) IV Continuous <Continuous>  famotidine Injectable 20 milliGRAM(s) IV Push every 12 hours  folic acid 1 milliGRAM(s) Enteral Tube daily  fondaparinux Injectable 2.5 milliGRAM(s) SubCutaneous daily  furosemide   Injectable 40 milliGRAM(s) IV Push daily  ipratropium 17 MICROgram(s) HFA Inhaler 1 Puff(s) Inhalation every 6 hours  metoprolol tartrate Injectable 2.5 milliGRAM(s) IV Push every 12 hours  senna 2 Tablet(s) Oral at bedtime    MEDICATIONS  (PRN):  acetaminophen   Tablet .. 650 milliGRAM(s) Oral every 6 hours PRN Temp greater or equal to 38C (100.4F)  polyethylene glycol 3350 17 Gram(s) Oral daily PRN Constipation      RADIOLOGY & ADDITIONAL STUDIES:

## 2020-12-23 NOTE — CONSULT NOTE ADULT - ASSESSMENT
ASSESSMENT  Patient is an 87yo male with PMH of HFrEF, severe MR, severe pulmonary hypertension, atrial fibrillation s/p Watchman 2019, hypertension, chronic anemia, who presented to the hospital for cyanosis and shortness of breath. Patient was known to be COVID-19 positive as of 1 week prior to admission, but symptoms worsened over the days prior to presentation. Patient was intubated and then extubated for acute hypoxemic respiratory failure.  Acute hypoxemic respiratory failure secondary to COVID-19 pneumonia s/p intubation  Pleural effusion and moderate pericardial effusion, no signs of tamponade s/p thoracentesis  - Intubated on 12/12, extubated on 12/16   Thrombocytopenia  Constipation  Heart failure with preserved ejection fraction  - Echo 12/9/2020: EF 50-55%,  Hypertension  -Hyperlipidemia  Dementia  - Incidental left renal lesion  -Thrombocytopenia  - Code Status: DNR/DNI  PLAN  check triglyceride level  check vitamin D and ZINC levels  suggest an NGT feed  it     ASSESSMENT  Patient is an 87yo male with PMH of HFrEF, severe MR, severe pulmonary hypertension, atrial fibrillation s/p Watchman 2019, hypertension, chronic anemia, who presented to the hospital for cyanosis and shortness of breath. Patient was known to be COVID-19 positive as of 1 week prior to admission, but symptoms worsened over the days prior to presentation. Patient was intubated and then extubated for acute hypoxemic respiratory failure.  Acute hypoxemic respiratory failure secondary to COVID-19 pneumonia s/p intubation  Pleural effusion and moderate pericardial effusion, no signs of tamponade s/p thoracentesis  - Intubated on 12/12, extubated on 12/16  -swallow evaluation noted to keep pt NPO   Thrombocytopenia  Constipation  Heart failure with preserved ejection fraction  - Echo 12/9/2020: EF 50-55%,  Hypertension  -Hyperlipidemia  Dementia  - Incidental left renal lesion  -Thrombocytopenia  - Code Status: DNR/DNI  PLAN  check vitamin D and ZINC levels  TPN ordered for tonight   spoke with ED3 team regarding a central access for TPN        ASSESSMENT  Patient is an 87yo male with PMH of HFrEF, severe MR, severe pulmonary hypertension, atrial fibrillation s/p Watchman 2019, hypertension, dementia, chronic anemia, who presented to the hospital for cyanosis and shortness of breath. Patient was known to be COVID-19 positive as of 1 week prior to admission, but symptoms worsened over the days prior to presentation. Patient was intubated and then extubated for acute hypoxemic respiratory failure.    - Acute hypoxemic respiratory failure secondary to COVID-19 pneumonia s/p intubation  - Pleural effusion and moderate pericardial effusion, no signs of tamponade s/p thoracentesis  -swallow evaluation noted to keep pt NPO  - Thrombocytopenia  - Constipation  - severe malnutrition - physically cachectic, poor po intake for about a month, + wt loss documented here  - Code Status: DNR/DNI    PLAN  check 25oh vitamin D and Zinc levels  TPN ordered for tonight   spoke with ED3 team regarding a central access for TPN

## 2020-12-23 NOTE — CONSULT NOTE ADULT - SUBJECTIVE AND OBJECTIVE BOX
WILLIAM ARAIZA [id number]  86y Male  16d    HPI:  87y/o M with PMH of CHFrEF & severe MR/pulm HTN, Afib s/p watchman 2019 not on AC, HTN, hx of chronic anemia presents to ED with 'blue lips' and SOB. Pt was known COVID+ for 1 week and had increased sxs over past few days but can't really give details. He also states that for 1mo he hasn't felt well, general sense of 'malaise' and notes that his lips have been extremely chapped, mildly swollen, and 'blue'. He saw his PCP who didn't know what it was and suggested coming to ED. Denied CP, palpitations, leg pains, nausea/vomiting/diarrhea. Has some mild abd pain in LLL crampy. Pt overall is poor historian for his sxs.    Called by primary team today due to puncturing the carotid artery, pressure was held for 10-15 minutes and no active extravasation was seen, no hematoma, no pulsatile mass seen on exam.    PAST MEDICAL & SURGICAL HISTORY:  Mild dementia    Chronic right-sided heart failure    Depression    Liver cirrhosis, alcoholic    Borderline diabetes mellitus    HTN (hypertension)    Afib    Presence of Watchman left atrial appendage closure device    AICD (automatic cardioverter/defibrillator) present    History of cholecystectomy    History of appendectomy    History of knee surgery    Deviated nasal septum          MEDICATIONS  (STANDING):  ALBUTerol    90 MICROgram(s) HFA Inhaler 2 Puff(s) Inhalation every 6 hours  aspirin 325 milliGRAM(s) Oral daily  atorvastatin 80 milliGRAM(s) Oral at bedtime  chlorhexidine 4% Liquid 1 Application(s) Topical <User Schedule>  dextrose 5%. 1000 milliLiter(s) (50 mL/Hr) IV Continuous <Continuous>  enoxaparin Injectable 40 milliGRAM(s) SubCutaneous daily  furosemide   Injectable 40 milliGRAM(s) IV Push daily  ipratropium 17 MICROgram(s) HFA Inhaler 1 Puff(s) Inhalation every 6 hours  metoprolol tartrate Injectable 2.5 milliGRAM(s) IV Push every 12 hours  Parenteral Nutrition - Adult 1 Each (55 mL/Hr) TPN Continuous <Continuous>  senna 2 Tablet(s) Oral at bedtime    MEDICATIONS  (PRN):  acetaminophen   Tablet .. 650 milliGRAM(s) Oral every 6 hours PRN Temp greater or equal to 38C (100.4F)  polyethylene glycol 3350 17 Gram(s) Oral daily PRN Constipation      Allergies    No Known Allergies    Intolerances        REVIEW OF SYSTEMS    [x ] A ten-point review of systems was otherwise negative except as noted.  [ ] Due to altered mental status/intubation, subjective information were not able to be obtained from the patient. History was obtained, to the extent possible, from review of the chart and collateral sources of information.      Vital Signs Last 24 Hrs  T(C): 37 (23 Dec 2020 07:37), Max: 37.2 (22 Dec 2020 23:59)  T(F): 98.6 (23 Dec 2020 07:37), Max: 98.9 (22 Dec 2020 23:59)  HR: 60 (23 Dec 2020 12:00) (59 - 62)  BP: 121/56 (23 Dec 2020 12:00) (110/56 - 121/56)  BP(mean): 76 (23 Dec 2020 12:00) (76 - 76)  RR: 16 (23 Dec 2020 12:00) (16 - 21)  SpO2: 99% (23 Dec 2020 15:21) (92% - 99%)    PHYSICAL EXAM:  GENERAL: NAD, well-appearing  CHEST/LUNG: Clear to auscultation bilaterally  HEART: Regular rate and rhythm  ABDOMEN: Soft, Nontender, Nondistended;   EXTREMITIES:  No clubbing, cyanosis, or edema      LABS:  Labs:  CAPILLARY BLOOD GLUCOSE      POCT Blood Glucose.: 138 mg/dL (23 Dec 2020 17:27)  POCT Blood Glucose.: 159 mg/dL (23 Dec 2020 11:04)                          9.5    11.93 )-----------( 78       ( 23 Dec 2020 17:40 )             31.3       Auto Neutrophil %: 87.2 % (12-23-20 @ 05:36)  Auto Immature Granulocyte %: 1.0 % (12-23-20 @ 05:36)    12-23    142  |  106  |  44<H>  ----------------------------<  159<H>  3.8   |  28  |  0.9      Calcium, Total Serum: 8.8 mg/dL (12-23-20 @ 05:36)      LFTs:             5.9  | 3.7  | 22       ------------------[72      ( 23 Dec 2020 05:36 )  2.6  | x    | 23          Lipase:x      Amylase:x             Coags:     17.10  ----< 1.49    ( 23 Dec 2020 17:40 )     38.1            Serum Pro-Brain Natriuretic Peptide: 07049 pg/mL (12-18-20 @ 17:33)  Serum Pro-Brain Natriuretic Peptide: 2707 pg/mL (12-15-20 @ 17:20)  Serum Pro-Brain Natriuretic Peptide: 5587 pg/mL (12-13-20 @ 20:32      RADIOLOGY & ADDITIONAL STUDIES:

## 2020-12-24 NOTE — PROGRESS NOTE ADULT - ASSESSMENT
Assessment:  86y Male patient admitted for Covid PNA has right pneumothorax secondary to right IJ central line placement.   Patient seen and examined at bedside. On high flow with Non-rebreather mask    Plan:      -Monitor chest tube output on dry suction -40  - Monitor vitals  - Monitor labs and replete as necessary  - Monitor for bowel function  - Continue Pain Medications if necessary  - Continue Antibiotics if necessary  - Monitor urine output  - DVT and GI Prophylaxis  - Contact social work/case management for necessary patient needs.           Date/Time: 12-24-20 @ 08:53

## 2020-12-24 NOTE — PROCEDURE NOTE - NSPOSTPRCRAD_GEN_A_CORE
chest radiograph/depth of insertion/fluoroscopy/line adjusted to depth of insertion/line in appropriate postion/postion of catheter/ultrasound
chest tube in correct position
post-procedure radiography performed

## 2020-12-24 NOTE — PROGRESS NOTE ADULT - ASSESSMENT
IMPRESSION:    COVID-19 PNA  Large right sided pleural effusion s/p thoracentesis 12/08 transudate, R SIDE PTX?  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     PULMONARY:  HOB @ 45 degrees.  NRM, CT suction, CT sx f/up  CARDIOVASCULAR:  Target MAP-60. Avoid volume overload.    GI: GI prophylaxis.  Feeding.  Bowel regimen     RENAL:  Follow up lytes.  Correct as needed. FUP CMp  INFECTIOUS DISEASE: per id    HEMATOLOGICAL:  DVT prophylaxis.    ENDOCRINE:  Follow up FS.  Insulin protocol if needed.    MUSCULOSKELETAL: bedrest  ed3   poor prognosis

## 2020-12-24 NOTE — PROCEDURE NOTE - NSINFORMCONSENT_GEN_A_CORE
From Son/Benefits, risks, and possible complications of procedure explained to patient/caregiver who verbalized understanding and gave written consent.
Benefits, risks, and possible complications of procedure explained to patient/caregiver who verbalized understanding and gave written consent.
Benefits, risks, and possible complications of procedure explained to patient/caregiver who verbalized understanding and gave written consent.

## 2020-12-24 NOTE — PROCEDURE NOTE - ADDITIONAL PROCEDURE DETAILS
6-Japanese, 42 cm dual lumen PICC enters from right upper extremity with tip in the superior vena cava.  The catheter works well and is ready to use.

## 2020-12-24 NOTE — PROGRESS NOTE ADULT - SUBJECTIVE AND OBJECTIVE BOX
OVERNIGHT EVENTS: events noted, ?? PTX, sp r sided CT SP, still on high FIO2    Vital Signs Last 24 Hrs  T(C): 37.1 (24 Dec 2020 08:18), Max: 37.2 (23 Dec 2020 20:00)  T(F): 98.7 (24 Dec 2020 08:18), Max: 99 (23 Dec 2020 20:00)  HR: 64 (24 Dec 2020 08:18) (60 - 65)  BP: 99/55 (24 Dec 2020 08:18) (99/55 - 153/60)  BP(mean): 76 (23 Dec 2020 12:00) (76 - 76)  RR: 22 (24 Dec 2020 08:18) (16 - 24)  SpO2: 92% (24 Dec 2020 08:18) (91% - 99%)    PHYSICAL EXAMINATION:    GENERAL: ill looking    HEENT: Head is normocephalic and atraumatic.     NECK: Supple.    LUNGS: dec bs both bases, r side CT    HEART: Regular rate and rhythm without murmur.    ABDOMEN: Soft, nontender, and nondistended.      EXTREMITIES: Without any cyanosis, clubbing, rash, lesions or edema.    NEUROLOGIC: Grossly intact.    SKIN: No ulceration or induration present.      LABS:                        9.1    14.23 )-----------( 95       ( 24 Dec 2020 05:49 )             29.4     12-24    146  |  107  |  48<H>  ----------------------------<  159<H>  3.7   |  28  |  1.2    Ca    8.6      24 Dec 2020 05:49  Phos  2.9     12-24  Mg     1.8     12-24    TPro  6.1  /  Alb  2.5<L>  /  TBili  3.8<H>  /  DBili  x   /  AST  23  /  ALT  22  /  AlkPhos  72  12-24    PT/INR - ( 23 Dec 2020 17:40 )   PT: 17.10 sec;   INR: 1.49 ratio         PTT - ( 23 Dec 2020 17:40 )  PTT:38.1 sec                      12-23-20 @ 07:01  -  12-24-20 @ 07:00  --------------------------------------------------------  IN: 950 mL / OUT: 1950 mL / NET: -1000 mL        MICROBIOLOGY:      MEDICATIONS  (STANDING):  ALBUTerol    90 MICROgram(s) HFA Inhaler 2 Puff(s) Inhalation every 6 hours  aspirin 325 milliGRAM(s) Oral daily  atorvastatin 80 milliGRAM(s) Oral at bedtime  chlorhexidine 4% Liquid 1 Application(s) Topical <User Schedule>  dextrose 5% + sodium chloride 0.45%. 1000 milliLiter(s) (60 mL/Hr) IV Continuous <Continuous>  enoxaparin Injectable 40 milliGRAM(s) SubCutaneous daily  ipratropium 17 MICROgram(s) HFA Inhaler 1 Puff(s) Inhalation every 6 hours  metoprolol tartrate Injectable 2.5 milliGRAM(s) IV Push every 12 hours  Parenteral Nutrition - Adult 1 Each (55 mL/Hr) TPN Continuous <Continuous>  senna 2 Tablet(s) Oral at bedtime    MEDICATIONS  (PRN):  acetaminophen   Tablet .. 650 milliGRAM(s) Oral every 6 hours PRN Temp greater or equal to 38C (100.4F)  polyethylene glycol 3350 17 Gram(s) Oral daily PRN Constipation      RADIOLOGY & ADDITIONAL STUDIES:

## 2020-12-24 NOTE — PROGRESS NOTE ADULT - ASSESSMENT
ASSESSMENT/PLAN  Patient is an 87yo male with PMH of HFrEF, severe MR, severe pulmonary hypertension, atrial fibrillation s/p Watchman 2019, hypertension, dementia, chronic anemia, who presented to the hospital for cyanosis and shortness of breath. Patient was known to be COVID-19 positive as of 1 week prior to admission, but symptoms worsened over the days prior to presentation. Patient was intubated and then extubated for acute hypoxemic respiratory failure.    - Acute hypoxemic respiratory failure secondary to COVID-19 pneumonia s/p intubation  - Pleural effusion and moderate pericardial effusion, no signs of tamponade s/p thoracentesis  -swallow evaluation noted to keep pt NPO  - Thrombocytopenia  - Constipation  - severe malnutrition - physically cachectic, poor po intake for about a month, + wt loss documented here  - Code Status: DNR/DNI    PLAN  check 25oh vitamin D and Zinc levels  TPN ordered for tonight   check bmp/phos/mg and correct lytes   ASSESSMENT/PLAN  Patient is an 85yo male with PMH of HFrEF, severe MR, severe pulmonary hypertension, atrial fibrillation s/p Watchman 2019, hypertension, dementia, chronic anemia, who presented to the hospital for cyanosis and shortness of breath. Patient was known to be COVID-19 positive as of 1 week prior to admission, but symptoms worsened over the days prior to presentation. Patient was intubated and then extubated for acute hypoxemic respiratory failure.    - Acute hypoxemic respiratory failure secondary to COVID-19 pneumonia s/p intubation  - Pleural effusion and moderate pericardial effusion, no signs of tamponade s/p thoracentesis  -swallow evaluation noted to keep pt NPO  - Thrombocytopenia  - Constipation  - severe malnutrition - physically cachectic, poor po intake for about a month, + wt loss documented here  - Code Status: DNR/DNI    PLAN  check 25oh vitamin D and Zinc levels  TPN ordered for tonight   check bmp/phos/mg and correct lytes  follow glucose closely  d/w hospitalist

## 2020-12-24 NOTE — PROCEDURE NOTE - NSPROCDETAILS_GEN_ALL_CORE
location identified, draped/prepped, sterile technique used, needle inserted/introduced/connection to syringe
location identified, draped/prepped, sterile technique used/sterile dressing applied/sterile technique, catheter placed/supine position/ultrasound guidance
secured in place

## 2020-12-24 NOTE — PROCEDURE NOTE - NSPOSTCAREGUIDE_GEN_A_CORE
Verbal/written post procedure instructions were given to patient/caregiver/Instructed patient/caregiver to follow-up with primary care physician/Instructed patient/caregiver regarding signs and symptoms of infection/Keep the cast/splint/dressing clean and dry/Care for catheter as per unit/ICU protocols
Instructed patient/caregiver regarding signs and symptoms of infection/Verbal/written post procedure instructions were given to patient/caregiver

## 2020-12-24 NOTE — PROCEDURE NOTE - NSSITEPREP_SKIN_A_CORE
chlorhexidine/Adherence to aseptic technique: hand hygiene prior to donning barriers (gown, gloves), don cap and mask, sterile drape over patient
chlorhexidine
chlorhexidine/povidone iodine (if allergic to chlorhexidine)

## 2020-12-24 NOTE — CHART NOTE - NSCHARTNOTEFT_GEN_A_CORE
Called bedside due to saturation of chest tube dressing.  Patient had apparently swung his leg which was caught on the chest tube. Upon arriving, patient was undergoing repeat CXR which showed reaccumulation of PTX. Chest tube circuit evaluated. Minimal venous oozing noted around occlusive dressing, pressure was held and hemostasis achieved. Chest tube secured in place at the same location as previously noted during chest tube insertion. All connections were in tact and suction remained in place. Visually, serosangenous output was tidaling in the chest tube and the sentinel hole was seen within the thoracic cavity at this time. Our concern was the patients airleak was greater than the suction pressure, therefore the decision was made to convert to dry suction canister. Patients suction was turned up to 40 mmHg which showed slight re-expansion of his lung. During these events, patients oxygen requirements decreased from time of chest tube insertion from 60/70 to 60/60 (PEEP/FiO2) on HiFlow NC and NRB. Saturations remain >95% with intermittent desaturation to 88-89 nonsustained.    Plan  - repeat AM CXR  - monitor for re-expansion  - monitor oxygen requirements  - f/u chest tube output

## 2020-12-24 NOTE — PROGRESS NOTE ADULT - SUBJECTIVE AND OBJECTIVE BOX
Patient is a 86y old  Male who presents with a chief complaint of COVID19+ (24 Dec 2020 11:39)  pt seen and evaluated   npo   awaiting for Picc placement     ICU Vital Signs Last 24 Hrs  T(C): 37.1 (24 Dec 2020 08:18), Max: 37.2 (23 Dec 2020 20:00)  T(F): 98.7 (24 Dec 2020 08:18), Max: 99 (23 Dec 2020 20:00)  HR: 64 (24 Dec 2020 08:18) (62 - 65)  BP: 99/55 (24 Dec 2020 08:18) (99/55 - 153/60)  RR: 22 (24 Dec 2020 08:18) (22 - 24)  SpO2: 93% (24 Dec 2020 13:12) (91% - 99%)      Drug Dosing Weight  Height (cm): 175.3 (12 Dec 2020 15:00)  Weight (kg): 73.7 (12 Dec 2020 15:00)  BMI (kg/m2): 24 (12 Dec 2020 15:00)  BSA (m2): 1.89 (12 Dec 2020 15:00)    I&O's Detail    23 Dec 2020 07:01  -  24 Dec 2020 07:00  --------------------------------------------------------  IN:    dextrose 5%: 950 mL  Total IN: 950 mL    OUT:    Chest Tube (mL): 1200 mL    Indwelling Catheter - Urethral (mL): 750 mL  Total OUT: 1950 mL    Total NET: -1000 mL      PHYSICAL EXAM:  Constitutional:  remain on high flow oxygen  Gastrointestinal:  soft n/d  MEDICATIONS  (STANDING):  ALBUTerol    90 MICROgram(s) HFA Inhaler 2 Puff(s) Inhalation every 6 hours  aspirin 325 milliGRAM(s) Oral daily  atorvastatin 80 milliGRAM(s) Oral at bedtime  chlorhexidine 4% Liquid 1 Application(s) Topical <User Schedule>  dextrose 5% + sodium chloride 0.45%. 1000 milliLiter(s) (60 mL/Hr) IV Continuous <Continuous>  enoxaparin Injectable 40 milliGRAM(s) SubCutaneous daily  ipratropium 17 MICROgram(s) HFA Inhaler 1 Puff(s) Inhalation every 6 hours  metoprolol tartrate Injectable 2.5 milliGRAM(s) IV Push every 12 hours  Parenteral Nutrition - Adult 1 Each (55 mL/Hr) TPN Continuous <Continuous>  Parenteral Nutrition - Adult 1 Each (55 mL/Hr) TPN Continuous <Continuous>  senna 2 Tablet(s) Oral at bedtime      Diet, NPO (12-17-20 @ 01:36)      LABS  12-24    146  |  107  |  48<H>  ----------------------------<  159<H>  3.7   |  28  |  1.2    Ca    8.6      24 Dec 2020 05:49  Phos  2.9     12-24  Mg     1.8     12-24    TPro  6.1  /  Alb  2.5<L>  /  TBili  3.8<H>  /  DBili  x   /  AST  23  /  ALT  22  /  AlkPhos  72  12-24                          9.1    14.23 )-----------( 95       ( 24 Dec 2020 05:49 )             29.4     CAPILLARY BLOOD GLUCOSE  POCT Blood Glucose.: 169 mg/dL (24 Dec 2020 11:58)  POCT Blood Glucose.: 145 mg/dL (23 Dec 2020 21:42)  POCT Blood Glucose.: 138 mg/dL (23 Dec 2020 17:27)   RADIOLOGY STUDIES  < from: Xray Chest 1 View-PORTABLE IMMEDIATE (Xray Chest 1 View-PORTABLE IMMEDIATE .) (12.24.20 @ 04:27) >  IMPRESSION:  Decreased right pneumothorax.         Patient is a 86y old  Male who presents with a chief complaint of COVID19+ (24 Dec 2020 11:39)  pt seen and evaluated   npo   awaiting for PICC placement - d/w IR     ICU Vital Signs Last 24 Hrs  T(C): 37.1 (24 Dec 2020 08:18), Max: 37.2 (23 Dec 2020 20:00)  T(F): 98.7 (24 Dec 2020 08:18), Max: 99 (23 Dec 2020 20:00)  HR: 64 (24 Dec 2020 08:18) (62 - 65)  BP: 99/55 (24 Dec 2020 08:18) (99/55 - 153/60)  RR: 22 (24 Dec 2020 08:18) (22 - 24)  SpO2: 93% (24 Dec 2020 13:12) (91% - 99%)    Drug Dosing Weight  Height (cm): 175.3 (12 Dec 2020 15:00)  Weight (kg): 73.7 (12 Dec 2020 15:00)  BMI (kg/m2): 24 (12 Dec 2020 15:00)  BSA (m2): 1.89 (12 Dec 2020 15:00)    I&O's Detail    23 Dec 2020 07:01  -  24 Dec 2020 07:00  --------------------------------------------------------  IN:    dextrose 5%: 950 mL  Total IN: 950 mL    OUT:    Chest Tube (mL): 1200 mL    Indwelling Catheter - Urethral (mL): 750 mL  Total OUT: 1950 mL    Total NET: -1000 mL      PHYSICAL EXAM:  Constitutional:  remain on high flow oxygen  Gastrointestinal:  soft n/d    MEDICATIONS  (STANDING):  ALBUTerol    90 MICROgram(s) HFA Inhaler 2 Puff(s) Inhalation every 6 hours  aspirin 325 milliGRAM(s) Oral daily  atorvastatin 80 milliGRAM(s) Oral at bedtime  chlorhexidine 4% Liquid 1 Application(s) Topical <User Schedule>  dextrose 5% + sodium chloride 0.45%. 1000 milliLiter(s) (60 mL/Hr) IV Continuous <Continuous>  enoxaparin Injectable 40 milliGRAM(s) SubCutaneous daily  ipratropium 17 MICROgram(s) HFA Inhaler 1 Puff(s) Inhalation every 6 hours  metoprolol tartrate Injectable 2.5 milliGRAM(s) IV Push every 12 hours  Parenteral Nutrition - Adult 1 Each (55 mL/Hr) TPN Continuous <Continuous>  Parenteral Nutrition - Adult 1 Each (55 mL/Hr) TPN Continuous <Continuous>  senna 2 Tablet(s) Oral at bedtime      Diet, NPO (12-17-20 @ 01:36)      LABS  12-24    146  |  107  |  48<H>  ----------------------------<  159<H>  3.7   |  28  |  1.2    Ca    8.6      24 Dec 2020 05:49  Phos  2.9     12-24  Mg     1.8     12-24    TPro  6.1  /  Alb  2.5<L>  /  TBili  3.8<H>  /  DBili  x   /  AST  23  /  ALT  22  /  AlkPhos  72  12-24                          9.1    14.23 )-----------( 95       ( 24 Dec 2020 05:49 )             29.4     CAPILLARY BLOOD GLUCOSE  POCT Blood Glucose.: 169 mg/dL (24 Dec 2020 11:58)  POCT Blood Glucose.: 145 mg/dL (23 Dec 2020 21:42)  POCT Blood Glucose.: 138 mg/dL (23 Dec 2020 17:27)   RADIOLOGY STUDIES  < from: Xray Chest 1 View-PORTABLE IMMEDIATE (Xray Chest 1 View-PORTABLE IMMEDIATE .) (12.24.20 @ 04:27) >  IMPRESSION:  Decreased right pneumothorax.

## 2020-12-24 NOTE — PROGRESS NOTE ADULT - SUBJECTIVE AND OBJECTIVE BOX
Progress Note: General Surgery  Patient: WILLIAM ARAIZA , 86y (27-May-1934)Male   MRN: [id number]  Location: [facility] (ED[facility]) [address]  Visit: 12-07-20 Inpatient  Date: 12-24-20 @ 08:53    Admit Diagnosis/Chief Complaint: Covid PNA    Procedure/Diagnosis:  S/P Chest tube insertion on right side        Events/ 24h: chest tube dressing soiled from outputs, suction changed to dry suction @ -40. Pain controlled.    Vitals: T(F): 98.7 (12-24-20 @ 08:18), Max: 99 (12-23-20 @ 20:00)  HR: 64 (12-24-20 @ 08:18)  BP: 99/55 (12-24-20 @ 08:18) (99/55 - 153/60)  RR: 22 (12-24-20 @ 08:18)  SpO2: 92% (12-24-20 @ 08:18)    In:   12-23-20 @ 07:01  -  12-24-20 @ 07:00  --------------------------------------------------------  IN: 950 mL      Out:   12-23-20 @ 07:01  -  12-24-20 @ 07:00  --------------------------------------------------------  OUT:    Chest Tube (mL): 1200 mL    Indwelling Catheter - Urethral (mL): 750 mL  Total OUT: 1950 mL        Net:   12-23-20 @ 07:01  -  12-24-20 @ 07:00  --------------------------------------------------------  NET: -1000 mL        Diet: Diet, NPO (12-17-20 @ 01:36)    IV Fluids: dextrose 5%. 1000 milliLiter(s) (50 mL/Hr) IV Continuous <Continuous>  Parenteral Nutrition - Adult 1 Each (55 mL/Hr) TPN Continuous <Continuous>      PHYSICAL EXAM:  GENERAL: Mouthbreathing, in moderate distress  CHEST/LUNG: BS decreased on the R. Thin, barrel chested. + accessory muscle use, NRB and hi-flow in place, right sided chest tube  HEART: Regular rate and rhythm  ABDOMEN: Soft, Nontender, Nondistended;   EXTREMITIES:  No clubbing, cyanosis, or edema        Medications: [Standing]  ALBUTerol    90 MICROgram(s) HFA Inhaler 2 Puff(s) Inhalation every 6 hours  aspirin 325 milliGRAM(s) Oral daily  atorvastatin 80 milliGRAM(s) Oral at bedtime  chlorhexidine 4% Liquid 1 Application(s) Topical <User Schedule>  dextrose 5%. 1000 milliLiter(s) (50 mL/Hr) IV Continuous <Continuous>  enoxaparin Injectable 40 milliGRAM(s) SubCutaneous daily  furosemide   Injectable 40 milliGRAM(s) IV Push daily  ipratropium 17 MICROgram(s) HFA Inhaler 1 Puff(s) Inhalation every 6 hours  metoprolol tartrate Injectable 2.5 milliGRAM(s) IV Push every 12 hours  Parenteral Nutrition - Adult 1 Each (55 mL/Hr) TPN Continuous <Continuous>  senna 2 Tablet(s) Oral at bedtime    DVT Prophylaxis: enoxaparin Injectable 40 milliGRAM(s) SubCutaneous daily    GI Prophylaxis:   Antibiotics:   Anticoagulation:   Medications:[PRN]  acetaminophen   Tablet .. 650 milliGRAM(s) Oral every 6 hours PRN  polyethylene glycol 3350 17 Gram(s) Oral daily PRN      Labs:                        9.1    14.23 )-----------( 95       ( 24 Dec 2020 05:49 )             29.4     12-24    146  |  107  |  48<H>  ----------------------------<  159<H>  3.7   |  28  |  1.2    Ca    8.6      24 Dec 2020 05:49  Phos  2.9     12-24  Mg     1.8     12-24    TPro  6.1  /  Alb  2.5<L>  /  TBili  3.8<H>  /  DBili  x   /  AST  23  /  ALT  22  /  AlkPhos  72  12-24    LIVER FUNCTIONS - ( 24 Dec 2020 05:49 )  Alb: 2.5 g/dL / Pro: 6.1 g/dL / ALK PHOS: 72 U/L / ALT: 22 U/L / AST: 23 U/L / GGT: x           PT/INR - ( 23 Dec 2020 17:40 )   PT: 17.10 sec;   INR: 1.49 ratio         PTT - ( 23 Dec 2020 17:40 )  PTT:38.1 sec          Urine/Micro:        Imaging:     Xray Chest 1 View- PORTABLE-Urgent:       Impression:    New lucency in the right lateral hemithorax, possibly representing a pneumothorax - limited evaluation due to suboptimal positioning. Recommend repeat chest radiographic evaluation.    Diffuse bilateral pulmonary opacities and effusions similar to prior.    A call back was placed at the time of dictation.      ***Please see the addendum at the top of this report. It may contain additional important information or changes.****        NEVILLE MILLER MD; Attending Radiologist  This document has been electronically signed. Dec 23 2020  4:40PM  Addend:NEVILLE MILLER MD; Attending Radiologist  This addendum was electronically signed on: Dec 23 2020  4:45PM. (12-23-20 @ 15:46)

## 2020-12-24 NOTE — PROGRESS NOTE ADULT - SUBJECTIVE AND OBJECTIVE BOX
Hospital Day:  17d    Subjective:    Patient is a 86y old Male who presents with a chief complaint of COVID19+ (23 Dec 2020 07:44)  This morning patient is lying in bed comfortably on high flow. He is A&Ox1. He reports no new complaints, including SOB, chest pain, abdominal pain, nausea, vomiting, dysuria, constipation, or diarrhea.      Past Medical Hx:   Mild dementia    Chronic right-sided heart failure    Depression    Liver cirrhosis, alcoholic    Presence of cardiac pacemaker    AICD (automatic cardioverter/defibrillator) present    Borderline diabetes mellitus    HTN (hypertension)    Afib      Past Sx:  Presence of Watchman left atrial appendage closure device    AICD (automatic cardioverter/defibrillator) present    AICD present, double chamber    History of cholecystectomy    History of appendectomy    History of knee surgery    Deviated nasal septum      Allergies:  No Known Allergies    Current Meds:   Standng Meds:  ALBUTerol    90 MICROgram(s) HFA Inhaler 2 Puff(s) Inhalation every 6 hours  aspirin 325 milliGRAM(s) Oral daily  atorvastatin 80 milliGRAM(s) Oral at bedtime  chlorhexidine 4% Liquid 1 Application(s) Topical <User Schedule>  dextrose 5% + sodium chloride 0.45%. 1000 milliLiter(s) (60 mL/Hr) IV Continuous <Continuous>  enoxaparin Injectable 40 milliGRAM(s) SubCutaneous daily  ipratropium 17 MICROgram(s) HFA Inhaler 1 Puff(s) Inhalation every 6 hours  metoprolol tartrate Injectable 2.5 milliGRAM(s) IV Push every 12 hours  Parenteral Nutrition - Adult 1 Each (55 mL/Hr) TPN Continuous <Continuous>  senna 2 Tablet(s) Oral at bedtime    PRN Meds:  acetaminophen   Tablet .. 650 milliGRAM(s) Oral every 6 hours PRN Temp greater or equal to 38C (100.4F)  polyethylene glycol 3350 17 Gram(s) Oral daily PRN Constipation    HOME MEDICATIONS:  aspirin 325 mg oral tablet: 1 tab(s) orally once a day  clopidogrel 75 mg oral tablet: 1 tab(s) orally once a day  donepezil 10 mg oral tablet: 1 tab(s) orally once a day (at bedtime)  ferrous fumarate 325 mg (106 mg elemental iron) oral tablet: 1 tab(s) orally once a day  losartan 100 mg oral tablet: 1 tab(s) orally once a day  metoprolol succinate 25 mg oral tablet, extended release: 1 tab(s) orally once a day  mirtazapine 30 mg oral tablet: 1 tab(s) orally once a day (at bedtime)  spironolactone 50 mg oral tablet: 1 tab(s) orally once a day      Vital Signs:   T(F): 98.7 (12-24-20 @ 08:18), Max: 99 (12-23-20 @ 20:00)  HR: 64 (12-24-20 @ 08:18) (60 - 65)  BP: 99/55 (12-24-20 @ 08:18) (99/55 - 153/60)  RR: 22 (12-24-20 @ 08:18) (16 - 24)  SpO2: 92% (12-24-20 @ 08:18) (91% - 99%)      12-23-20 @ 07:01  -  12-24-20 @ 07:00  --------------------------------------------------------  IN: 950 mL / OUT: 1950 mL / NET: -1000 mL        Physical Exam:   GENERAL: NAD  HEENT: NCAT  CHEST/LUNG: decreased right breath sounds  HEART: Regular rate and rhythm; s1 s2 appreciated, No murmurs, rubs, or gallops  ABDOMEN: Soft, Nontender, Nondistended; Bowel sounds present  EXTREMITIES: No LE edema b/l  SKIN: no rashes, no new lesions  NERVOUS SYSTEM:  Alert & Oriented X1  LINES/CATHETERS:        Labs:                         9.1    14.23 )-----------( 95       ( 24 Dec 2020 05:49 )             29.4     Neutophil% 89.8, Lymphocyte% 1.8, Monocyte% 7.3, Bands% 1.0 12-24-20 @ 05:49    24 Dec 2020 05:49    146    |  107    |  48     ----------------------------<  159    3.7     |  28     |  1.2      Ca    8.6        24 Dec 2020 05:49  Phos  2.9       24 Dec 2020 05:49  Mg     1.8       24 Dec 2020 05:49    TPro  6.1    /  Alb  2.5    /  TBili  3.8    /  DBili  x      /  AST  23     /  ALT  22     /  AlkPhos  72     24 Dec 2020 05:49       pTT    38.1             ----< 1.49 INR  (12-23-20 @ 17:40)    17.10        PT  Chol 86, LDL --, HDL 26, VLDL --, TRG 89 12-24-20 @ 05:49        Serum Pro-Brain Natriuretic Peptide: 14587 pg/mL (12-18-20 @ 17:33)  Serum Pro-Brain Natriuretic Peptide: 2707 pg/mL (12-15-20 @ 17:20)                      Assessment and Plan:    Patient is an 87yo male with PMH of HFrEF, severe MR, severe pulmonary hypertension, atrial fibrillation s/p Watchman 2019, hypertension, chronic anemia, who presented to the hospital for cyanosis and shortness of breath. Patient was known to be COVID-19 positive as of 1 week prior to admission, but symptoms worsened over the days prior to presentation. Patient was intubated and then extubated for acute hypoxemic respiratory failure.        #Acute hypoxemic respiratory failure secondary to COVID-19 pneumonia s/p intubation  #Pleural effusion and moderate pericardial effusion, no signs of tamponade s/p thoracentesis  - Intubated on 12/12, extubated on 12/16  - Patient is saturating 94% on 60% high flow  - No further need to trend inflammatory markers per ID  - Dexamethasone course complete  - Lower extremity duplex negative  - Completed course of cefepime  - Incentive spirometry at bedside    #Iatrogenic pneuomothorax  - 1.2 cm pneumo seen on xray  - Has chest tube, being followed by surgery  - daily chest xrays    #Hypernatremia resolved  - Decrease D5W to 50    #Prolonged QTc  - QTc on admission: 616  - QTc 12/19/2020: 575  - EP recs: Due to ventricular pacing. No interventions needed    #Thrombocytopenia  - PPX lovenox  - HIT antibody negative     #Constipation  - Continue with bowel regimen    #Heart failure with preserved ejection fraction  - Echo 12/9/2020: EF 50-55%, basal inferoseptal and basal inferior segment wall motion abnormalities, severely enlarged RA, severely enlarged RV, moderate pericardial effusion, mild-moderate MR, severe TR, moderate pulmonary hypertension  - Discussed case with CT surgery on the phone: no acute intervention unless patient is in tamponade or requires pressors  - Right sided pleural effusion s/p thoracenthesis (removed 1.5L). Post procedure CXR showed trace pneumothorax x2, now resolved, Fluid is transudative according to light's criteria  - Continue with metoprolol tartrate 2.5mg IV Q12H    #Hypertension  - Hold losartan for possible angioedema/swollen lips in ICU  - Continue with metoprolol tartrate 2.5mg IV Q12H    #Hyperlipidemia  - Continue with atorvastatin 80mg PO at bedtime     #Dementia  - Donepezil being held for now    #Incidental left renal lesion  - Follow outpatient with renal US    #Thrombocytopenia  - Lovenox on hold. SCDs for now  - Follow HIT panel    #Suspected folate deficiency  - Continue with folic acid 1mg PO once daily     #Misc  - DVT Prophylaxis: lovenox  - GI Prophylaxis: famotidine 20mg IV Q12H  - Diet: NPO. Picc line to be placed for TPN  - Activity: increase as tolerated  - Code Status: DNR/DNI         Hospital Day:  17d    Subjective:    Patient is a 86y old Male who presents with a chief complaint of COVID19+ (23 Dec 2020 07:44)  This morning patient is lying in bed comfortably on high flow. He is A&Ox1. He reports no new complaints, including SOB, chest pain, abdominal pain, nausea, vomiting, dysuria, constipation, or diarrhea.      Past Medical Hx:   Mild dementia    Chronic right-sided heart failure    Depression    Liver cirrhosis, alcoholic    Presence of cardiac pacemaker    AICD (automatic cardioverter/defibrillator) present    Borderline diabetes mellitus    HTN (hypertension)    Afib      Past Sx:  Presence of Watchman left atrial appendage closure device    AICD (automatic cardioverter/defibrillator) present    AICD present, double chamber    History of cholecystectomy    History of appendectomy    History of knee surgery    Deviated nasal septum      Allergies:  No Known Allergies    Current Meds:   Standng Meds:  ALBUTerol    90 MICROgram(s) HFA Inhaler 2 Puff(s) Inhalation every 6 hours  aspirin 325 milliGRAM(s) Oral daily  atorvastatin 80 milliGRAM(s) Oral at bedtime  chlorhexidine 4% Liquid 1 Application(s) Topical <User Schedule>  dextrose 5% + sodium chloride 0.45%. 1000 milliLiter(s) (60 mL/Hr) IV Continuous <Continuous>  enoxaparin Injectable 40 milliGRAM(s) SubCutaneous daily  ipratropium 17 MICROgram(s) HFA Inhaler 1 Puff(s) Inhalation every 6 hours  metoprolol tartrate Injectable 2.5 milliGRAM(s) IV Push every 12 hours  Parenteral Nutrition - Adult 1 Each (55 mL/Hr) TPN Continuous <Continuous>  senna 2 Tablet(s) Oral at bedtime    PRN Meds:  acetaminophen   Tablet .. 650 milliGRAM(s) Oral every 6 hours PRN Temp greater or equal to 38C (100.4F)  polyethylene glycol 3350 17 Gram(s) Oral daily PRN Constipation    HOME MEDICATIONS:  aspirin 325 mg oral tablet: 1 tab(s) orally once a day  clopidogrel 75 mg oral tablet: 1 tab(s) orally once a day  donepezil 10 mg oral tablet: 1 tab(s) orally once a day (at bedtime)  ferrous fumarate 325 mg (106 mg elemental iron) oral tablet: 1 tab(s) orally once a day  losartan 100 mg oral tablet: 1 tab(s) orally once a day  metoprolol succinate 25 mg oral tablet, extended release: 1 tab(s) orally once a day  mirtazapine 30 mg oral tablet: 1 tab(s) orally once a day (at bedtime)  spironolactone 50 mg oral tablet: 1 tab(s) orally once a day      Vital Signs:   T(F): 98.7 (12-24-20 @ 08:18), Max: 99 (12-23-20 @ 20:00)  HR: 64 (12-24-20 @ 08:18) (60 - 65)  BP: 99/55 (12-24-20 @ 08:18) (99/55 - 153/60)  RR: 22 (12-24-20 @ 08:18) (16 - 24)  SpO2: 92% (12-24-20 @ 08:18) (91% - 99%)      12-23-20 @ 07:01  -  12-24-20 @ 07:00  --------------------------------------------------------  IN: 950 mL / OUT: 1950 mL / NET: -1000 mL        Physical Exam:   GENERAL: NAD  HEENT: NCAT  CHEST/LUNG: decreased right breath sounds  HEART: Regular rate and rhythm; s1 s2 appreciated, No murmurs, rubs, or gallops  ABDOMEN: Soft, Nontender, Nondistended; Bowel sounds present  EXTREMITIES: No LE edema b/l  SKIN: no rashes, no new lesions  NERVOUS SYSTEM:  Alert & Oriented X1  LINES/CATHETERS:        Labs:                         9.1    14.23 )-----------( 95       ( 24 Dec 2020 05:49 )             29.4     Neutophil% 89.8, Lymphocyte% 1.8, Monocyte% 7.3, Bands% 1.0 12-24-20 @ 05:49    24 Dec 2020 05:49    146    |  107    |  48     ----------------------------<  159    3.7     |  28     |  1.2      Ca    8.6        24 Dec 2020 05:49  Phos  2.9       24 Dec 2020 05:49  Mg     1.8       24 Dec 2020 05:49    TPro  6.1    /  Alb  2.5    /  TBili  3.8    /  DBili  x      /  AST  23     /  ALT  22     /  AlkPhos  72     24 Dec 2020 05:49       pTT    38.1             ----< 1.49 INR  (12-23-20 @ 17:40)    17.10        PT  Chol 86, LDL --, HDL 26, VLDL --, TRG 89 12-24-20 @ 05:49        Serum Pro-Brain Natriuretic Peptide: 50648 pg/mL (12-18-20 @ 17:33)  Serum Pro-Brain Natriuretic Peptide: 2707 pg/mL (12-15-20 @ 17:20)                      Assessment and Plan:    Patient is an 87yo male with PMH of HFrEF, severe MR, severe pulmonary hypertension, atrial fibrillation s/p Watchman 2019, hypertension, chronic anemia, who presented to the hospital for cyanosis and shortness of breath. Patient was known to be COVID-19 positive as of 1 week prior to admission, but symptoms worsened over the days prior to presentation. Patient was intubated and then extubated for acute hypoxemic respiratory failure.        #Acute hypoxemic respiratory failure secondary to COVID-19 pneumonia s/p intubation  #Pleural effusion and moderate pericardial effusion, no signs of tamponade s/p thoracentesis  - Intubated on 12/12, extubated on 12/16  - Patient is saturating 94% on 60% high flow  - No further need to trend inflammatory markers per ID  - Dexamethasone course complete  - Lower extremity duplex negative  - Completed course of cefepime  - Incentive spirometry at bedside    #Pneuomothorax  - Right 1.4 cm pneumo seen on xray  - Has chest tube, being followed by surgery  - daily chest xrays    #Hypernatremia resolved  - Decrease D5W to 50    #Prolonged QTc  - QTc on admission: 616  - QTc 12/19/2020: 575  - EP recs: Due to ventricular pacing. No interventions needed    #Thrombocytopenia  - PPX lovenox  - HIT antibody negative     #Constipation  - Continue with bowel regimen    #Heart failure with preserved ejection fraction  - Echo 12/9/2020: EF 50-55%, basal inferoseptal and basal inferior segment wall motion abnormalities, severely enlarged RA, severely enlarged RV, moderate pericardial effusion, mild-moderate MR, severe TR, moderate pulmonary hypertension  - Discussed case with CT surgery on the phone: no acute intervention unless patient is in tamponade or requires pressors  - Right sided pleural effusion s/p thoracenthesis (removed 1.5L). Post procedure CXR showed trace pneumothorax x2, now resolved, Fluid is transudative according to light's criteria  - Continue with metoprolol tartrate 2.5mg IV Q12H    #Hypertension  - Hold losartan for possible angioedema/swollen lips in ICU  - Continue with metoprolol tartrate 2.5mg IV Q12H    #Hyperlipidemia  - Continue with atorvastatin 80mg PO at bedtime     #Dementia  - Donepezil being held for now    #Incidental left renal lesion  - Follow outpatient with renal US    #Thrombocytopenia  - Lovenox on hold. SCDs for now  - Follow HIT panel    #Suspected folate deficiency  - Continue with folic acid 1mg PO once daily     #Misc  - DVT Prophylaxis: lovenox  - GI Prophylaxis: famotidine 20mg IV Q12H  - Diet: NPO. Picc line to be placed for TPN  - Activity: increase as tolerated  - Code Status: DNR/DNI

## 2020-12-25 NOTE — PROGRESS NOTE ADULT - SUBJECTIVE AND OBJECTIVE BOX
WILLIAM ARAIZA  86y Male   [id number]    Procedure/dx: s/p chest tube placement for pneumothorax    Patient is a 86y old  Male who presents with a chief complaint of COVID19+ (24 Dec 2020 13:22)    PAST MEDICAL & SURGICAL HISTORY:  Mild dementia    Chronic right-sided heart failure    Depression    Liver cirrhosis, alcoholic    Borderline diabetes mellitus    HTN (hypertension)    Afib    Presence of Watchman left atrial appendage closure device    AICD (automatic cardioverter/defibrillator) present    History of cholecystectomy    History of appendectomy    History of knee surgery    Deviated nasal septum    Vital Signs Last 24 Hrs  T(C): 36.8 (24 Dec 2020 23:59), Max: 37.1 (24 Dec 2020 08:18)  T(F): 98.2 (24 Dec 2020 23:59), Max: 98.7 (24 Dec 2020 08:18)  HR: 67 (24 Dec 2020 23:59) (62 - 67)  BP: 101/52 (24 Dec 2020 23:59) (98/49 - 153/60)  BP(mean): --  RR: 26 (24 Dec 2020 20:12) (22 - 26)  SpO2: 94% (24 Dec 2020 23:59) (92% - 96%)        Diet, NPO (12-17-20 @ 01:36)      I&O's Detail    23 Dec 2020 07:01  -  24 Dec 2020 07:00  --------------------------------------------------------  IN:    dextrose 5%: 950 mL  Total IN: 950 mL    OUT:    Chest Tube (mL): 1200 mL    Indwelling Catheter - Urethral (mL): 750 mL  Total OUT: 1950 mL    Total NET: -1000 mL    MEDICATIONS  (STANDING):  ALBUTerol    90 MICROgram(s) HFA Inhaler 2 Puff(s) Inhalation every 6 hours  aspirin 325 milliGRAM(s) Oral daily  atorvastatin 80 milliGRAM(s) Oral at bedtime  chlorhexidine 4% Liquid 1 Application(s) Topical <User Schedule>  enoxaparin Injectable 40 milliGRAM(s) SubCutaneous daily  ipratropium 17 MICROgram(s) HFA Inhaler 1 Puff(s) Inhalation every 6 hours  metoprolol tartrate Injectable 2.5 milliGRAM(s) IV Push every 12 hours  Parenteral Nutrition - Adult 1 Each (55 mL/Hr) TPN Continuous <Continuous>  Parenteral Nutrition - Adult 1 Each (55 mL/Hr) TPN Continuous <Continuous>  senna 2 Tablet(s) Oral at bedtime    MEDICATIONS  (PRN):  acetaminophen   Tablet .. 650 milliGRAM(s) Oral every 6 hours PRN Temp greater or equal to 38C (100.4F)  polyethylene glycol 3350 17 Gram(s) Oral daily PRN Constipation      PHYSICAL EXAM:  GENERAL: NAD, on vent mask  CHEST/LUNG: Clear to auscultation bilaterally; right chest tube to dry suction. no airleak appreciated   HEART: S1 and S2 noted    LABS:                         9.1    14.23 )-----------( 95       ( 24 Dec 2020 05:49 )             29.4        12-24    146  |  107  |  48<H>  ----------------------------<  159<H>  3.7   |  28  |  1.2    Ca    8.6      24 Dec 2020 05:49  Phos  2.9     12-24  Mg     1.8     12-24    TPro  6.1  /  Alb  2.5<L>  /  TBili  3.8<H>  /  DBili  x   /  AST  23  /  ALT  22  /  AlkPhos  72  12-24    LIVER FUNCTIONS - ( 24 Dec 2020 05:49 )  Alb: 2.5 g/dL / Pro: 6.1 g/dL / ALK PHOS: 72 U/L / ALT: 22 U/L / AST: 23 U/L / GGT: x           PT/INR - ( 23 Dec 2020 17:40 )   PT: 17.10 sec;   INR: 1.49 ratio         PTT - ( 23 Dec 2020 17:40 )  PTT:38.1 sec    IMAGING:  < from: Xray Chest 1 View- PORTABLE-Routine (Xray Chest 1 View- PORTABLE-Routine .) (12.24.20 @ 12:23) >  Impression:    No significant radiographic change on frontal view.    < end of copied text >

## 2020-12-25 NOTE — PROGRESS NOTE ADULT - SUBJECTIVE AND OBJECTIVE BOX
pt seen and examined. not communicating , confused.   Vital Signs Last 24 Hrs  T(C): 36.6 (25 Dec 2020 08:20), Max: 36.8 (24 Dec 2020 23:59)  T(F): 97.8 (25 Dec 2020 08:20), Max: 98.2 (24 Dec 2020 23:59)  HR: 57 (25 Dec 2020 08:20) (57 - 67)  BP: 102/50 (25 Dec 2020 08:20) (98/49 - 102/50)  RR: 20 (25 Dec 2020 08:20) (20 - 26)  SpO2: 99% (25 Dec 2020 08:20) (93% - 99%)  Physical exam:   constitutional NAD, lethargic and confused, today he is less restless, Respiratory  lungs CTA, CVS heart RRR, GI: abdomen Soft NT, ND, BS+, skin: intact  neuro exam non focal.   chest tube in place  picc placed yesterday                           9.1    17.60 )-----------( 108      ( 25 Dec 2020 06:16 )             29.9   12-25    145  |  106  |  61<HH>  ----------------------------<  183<H>  3.9   |  27  |  1.4    Ca    8.7      25 Dec 2020 06:16  Phos  3.7     12-25  Mg     2.0     12-25    TPro  5.9<L>  /  Alb  2.2<L>  /  TBili  3.7<H>  /  DBili  x   /  AST  20  /  ALT  21  /  AlkPhos  82  12-25    < from: Xray Chest 1 View- PORTABLE-Routine (Xray Chest 1 View- PORTABLE-Routine in AM.) (12.25.20 @ 06:45) >    Right pneumothorax with air gap of 1.6 cm, unchanged.    Diffuse bilateral parenchymal opacities, unchanged.    < end of copied text >    D-Dimer Assay, Quantitative: 785 ng/mL DDU (12-18-20 @ 17:33)  Ferritin, Serum: 187 ng/mL (12-18-20 @ 17:33)  Procalcitonin, Serum: 0.08 ng/mL (12-16-20 @ 16:55)  D-Dimer Assay, Quantitative: 555 ng/mL DDU (12-15-20 @ 17:20)  Ferritin, Serum: 98 ng/mL (12-15-20 @ 17:20)  D-Dimer Assay, Quantitative: 587 ng/mL DDU (12-13-20 @ 20:32)  Ferritin, Serum: 101 ng/mL (12-13-20 @ 20:32)  Ferritin, Serum: 107 ng/mL (12-12-20 @ 17:00)  D-Dimer Assay, Quantitative: 695 ng/mL DDU (12-12-20 @ 17:00)  Procalcitonin, Serum: 0.16 ng/mL (12-12-20 @ 17:00)  D-Dimer Assay, Quantitative: 456 ng/mL DDU (12-10-20 @ 07:16)  Ferritin, Serum: 81 ng/mL (12-10-20 @ 07:16)  Procalcitonin, Serum: 0.06 ng/mL (12-09-20 @ 06:48)  D-Dimer Assay, Quantitative: 517 ng/mL DDU (12-09-20 @ 06:48)  Ferritin, Serum: 54 ng/mL (12-09-20 @ 06:48)  D-Dimer Assay, Quantitative: 807 ng/mL DDU (12-08-20 @ 00:58)  Procalcitonin, Serum: 0.08 ng/mL (12-08-20 @ 00:58)  Ferritin, Serum: 56 ng/mL (12-08-20 @ 00:58)        a/p  #acute hypoxic resp failure, on high flow o2  #pneumothorax has chest tube, thoracic surgery team managing the tube    # on tpn , picc placed 12/24      PAST MEDICAL & SURGICAL HISTORY:  Mild dementia  Chronic right-sided heart failure  Depression  Liver cirrhosis, alcoholic  Borderline diabetes mellitus  HTN (hypertension)  Afib  Presence of Watchman left atrial appendage closure device  AICD (automatic cardioverter/defibrillator) present  History of cholecystectomy  History of appendectomy  History of knee surgery  Deviated nasal septum    poor prognosis   DNR DNI

## 2020-12-25 NOTE — PROGRESS NOTE ADULT - ASSESSMENT
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     PULMONARY:  HOB @ 45 degrees.  NRM, CT suction, CT sx f/up  CARDIOVASCULAR:  Target MAP-60. Avoid volume overload.    GI: GI prophylaxis.  Feeding.  Bowel regimen     RENAL:  Follow up lytes.  Correct as needed.   INFECTIOUS DISEASE: per id    HEMATOLOGICAL:  DVT prophylaxis.    ENDOCRINE:  Follow up FS.  Insulin protocol if needed.    MUSCULOSKELETAL: bedrest  ed3   poor prognosis

## 2020-12-25 NOTE — PROGRESS NOTE ADULT - SUBJECTIVE AND OBJECTIVE BOX
OVERNIGHT EVENTS: events noted, on NRM, Chest tube present no suction??, CXR reviewed    Vital Signs Last 24 Hrs  T(C): 36.8 (24 Dec 2020 23:59), Max: 37.1 (24 Dec 2020 08:18)  T(F): 98.2 (24 Dec 2020 23:59), Max: 98.7 (24 Dec 2020 08:18)  HR: 67 (24 Dec 2020 23:59) (63 - 67)  BP: 101/52 (24 Dec 2020 23:59) (98/49 - 101/52)  RR: 26 (24 Dec 2020 20:12) (22 - 26)  SpO2: 95% (25 Dec 2020 04:30) (92% - 95%)    PHYSICAL EXAMINATION:    GENERAL: ill looking, uncomfortable    HEENT: Head is normocephalic and atraumatic.     NECK: Supple.    LUNGS: bl crackles    HEART: BALDEV 3/6    ABDOMEN: Soft, nontender, and nondistended.      EXTREMITIES: Without any cyanosis, clubbing, rash, lesions or edema.    NEUROLOGIC: non focal    SKIN: No ulceration or induration present.      LABS:                        9.1    14.23 )-----------( 95       ( 24 Dec 2020 05:49 )             29.4     12-24    146  |  107  |  48<H>  ----------------------------<  159<H>  3.7   |  28  |  1.2    Ca    8.6      24 Dec 2020 05:49  Phos  2.9     12-24  Mg     1.8     12-24    TPro  6.1  /  Alb  2.5<L>  /  TBili  3.8<H>  /  DBili  x   /  AST  23  /  ALT  22  /  AlkPhos  72  12-24    PT/INR - ( 23 Dec 2020 17:40 )   PT: 17.10 sec;   INR: 1.49 ratio         PTT - ( 23 Dec 2020 17:40 )  PTT:38.1 sec                      12-24-20 @ 07:01  -  12-25-20 @ 07:00  --------------------------------------------------------  IN: 385 mL / OUT: 400 mL / NET: -15 mL        MICROBIOLOGY:      MEDICATIONS  (STANDING):  ALBUTerol    90 MICROgram(s) HFA Inhaler 2 Puff(s) Inhalation every 6 hours  aspirin 325 milliGRAM(s) Oral daily  atorvastatin 80 milliGRAM(s) Oral at bedtime  chlorhexidine 4% Liquid 1 Application(s) Topical <User Schedule>  enoxaparin Injectable 40 milliGRAM(s) SubCutaneous daily  ipratropium 17 MICROgram(s) HFA Inhaler 1 Puff(s) Inhalation every 6 hours  metoprolol tartrate Injectable 2.5 milliGRAM(s) IV Push every 12 hours  Parenteral Nutrition - Adult 1 Each (55 mL/Hr) TPN Continuous <Continuous>  senna 2 Tablet(s) Oral at bedtime    MEDICATIONS  (PRN):  acetaminophen   Tablet .. 650 milliGRAM(s) Oral every 6 hours PRN Temp greater or equal to 38C (100.4F)  polyethylene glycol 3350 17 Gram(s) Oral daily PRN Constipation      RADIOLOGY & ADDITIONAL STUDIES:

## 2020-12-25 NOTE — PROGRESS NOTE ADULT - ASSESSMENT
87 y/o male s/p chest tube placement for pneumothorax    plan:   Chest tube to dry suction at -40  Daily AM cxr  follow up Chest tube output  monitor for airleak  monitor O2 sat  incentive spirometry  pain control

## 2020-12-25 NOTE — PROGRESS NOTE ADULT - SUBJECTIVE AND OBJECTIVE BOX
Hospital Day:  18d    Subjective:    Patient is a 86y old  Male who presents with a chief complaint of COVID19+ (25 Dec 2020 11:42)  This morning patient is lying in bed comfortably on high flow. He is A&Ox1. He reports no new complaints, including SOB, chest pain, abdominal pain, nausea, vomiting, dysuria, constipation, or diarrhea.      Past Medical Hx:   Mild dementia    Chronic right-sided heart failure    Depression    Liver cirrhosis, alcoholic    Presence of cardiac pacemaker    AICD (automatic cardioverter/defibrillator) present    Borderline diabetes mellitus    HTN (hypertension)    Afib      Past Sx:  Presence of Watchman left atrial appendage closure device    AICD (automatic cardioverter/defibrillator) present    AICD present, double chamber    History of cholecystectomy    History of appendectomy    History of knee surgery    Deviated nasal septum      Allergies:  No Known Allergies    Current Meds:   Standng Meds:  ALBUTerol    90 MICROgram(s) HFA Inhaler 2 Puff(s) Inhalation every 6 hours  aspirin 325 milliGRAM(s) Oral daily  atorvastatin 80 milliGRAM(s) Oral at bedtime  chlorhexidine 4% Liquid 1 Application(s) Topical <User Schedule>  enoxaparin Injectable 40 milliGRAM(s) SubCutaneous daily  fat emulsion (Fish Oil and Plant Based) 20% Infusion 0.6784 Gm/kG/Day (20.8 mL/Hr) IV Continuous <Continuous>  ipratropium 17 MICROgram(s) HFA Inhaler 1 Puff(s) Inhalation every 6 hours  metoprolol tartrate Injectable 2.5 milliGRAM(s) IV Push every 12 hours  Parenteral Nutrition - Adult 1 Each (65 mL/Hr) TPN Continuous <Continuous>  Parenteral Nutrition - Adult 1 Each (55 mL/Hr) TPN Continuous <Continuous>  senna 2 Tablet(s) Oral at bedtime    PRN Meds:  acetaminophen   Tablet .. 650 milliGRAM(s) Oral every 6 hours PRN Temp greater or equal to 38C (100.4F)  polyethylene glycol 3350 17 Gram(s) Oral daily PRN Constipation    HOME MEDICATIONS:  aspirin 325 mg oral tablet: 1 tab(s) orally once a day  clopidogrel 75 mg oral tablet: 1 tab(s) orally once a day  donepezil 10 mg oral tablet: 1 tab(s) orally once a day (at bedtime)  ferrous fumarate 325 mg (106 mg elemental iron) oral tablet: 1 tab(s) orally once a day  losartan 100 mg oral tablet: 1 tab(s) orally once a day  metoprolol succinate 25 mg oral tablet, extended release: 1 tab(s) orally once a day  mirtazapine 30 mg oral tablet: 1 tab(s) orally once a day (at bedtime)  spironolactone 50 mg oral tablet: 1 tab(s) orally once a day      Vital Signs:   T(F): 97.8 (12-25-20 @ 08:20), Max: 98.2 (12-24-20 @ 23:59)  HR: 57 (12-25-20 @ 08:20) (57 - 67)  BP: 102/50 (12-25-20 @ 08:20) (98/49 - 102/50)  RR: 20 (12-25-20 @ 08:20) (20 - 26)  SpO2: 99% (12-25-20 @ 08:20) (93% - 99%)      12-24-20 @ 07:01  -  12-25-20 @ 07:00  --------------------------------------------------------  IN: 385 mL / OUT: 400 mL / NET: -15 mL    12-25-20 @ 07:01  -  12-25-20 @ 11:51  --------------------------------------------------------  IN: 110 mL / OUT: 190 mL / NET: -80 mL        Physical Exam:   GENERAL: NAD  HEENT: NCAT  CHEST/LUNG: CTAB  HEART: Regular rate and rhythm; s1 s2 appreciated, No murmurs, rubs, or gallops  ABDOMEN: Soft, Nontender, Nondistended; Bowel sounds present  EXTREMITIES: No LE edema b/l  SKIN: no rashes, no new lesions  NERVOUS SYSTEM:  Alert & Oriented X1  LINES/CATHETERS:        Labs:                         9.1    17.60 )-----------( 108      ( 25 Dec 2020 06:16 )             29.9     Neutophil% 91.9, Lymphocyte% 2.0, Monocyte% 5.3, Bands% 0.7 12-25-20 @ 06:16    25 Dec 2020 06:16    145    |  106    |  61     ----------------------------<  183    3.9     |  27     |  1.4      Ca    8.7        25 Dec 2020 06:16  Phos  3.7       25 Dec 2020 06:16  Mg     2.0       25 Dec 2020 06:16    TPro  5.9    /  Alb  2.2    /  TBili  3.7    /  DBili  x      /  AST  20     /  ALT  21     /  AlkPhos  82     25 Dec 2020 06:16            Serum Pro-Brain Natriuretic Peptide: 07789 pg/mL (12-18-20 @ 17:33)  Serum Pro-Brain Natriuretic Peptide: 2707 pg/mL (12-15-20 @ 17:20)                      Assessment and Plan:      Patient is an 87yo male with PMH of HFrEF, severe MR, severe pulmonary hypertension, atrial fibrillation s/p Watchman 2019, hypertension, chronic anemia, who presented to the hospital for cyanosis and shortness of breath. Patient was known to be COVID-19 positive as of 1 week prior to admission, but symptoms worsened over the days prior to presentation. Patient was intubated and then extubated for acute hypoxemic respiratory failure.        #Acute hypoxemic respiratory failure secondary to COVID-19 pneumonia s/p intubation  #Pleural effusion and moderate pericardial effusion, no signs of tamponade s/p thoracentesis  - Intubated on 12/12, extubated on 12/16  - Patient is saturating 94% on 60% high flow  - No further need to trend inflammatory markers per ID  - Dexamethasone course complete  - Lower extremity duplex negative  - Completed course of cefepime  - Incentive spirometry at bedside    # Leukocytosis  - Blood cx, urine cx, procal    #Pneuomothorax  - Right 1.4 cm pneumo seen on xray  - Has chest tube, being followed by surgery  - daily chest xrays, looks better today    #Hypernatremia resolved  - Decrease D5W to 50    #Prolonged QTc  - QTc on admission: 616  - QTc 12/19/2020: 575  - EP recs: Due to ventricular pacing. No interventions needed    #Thrombocytopenia  - PPX lovenox  - HIT antibody negative     #Constipation  - Continue with bowel regimen    #Heart failure with preserved ejection fraction  - Echo 12/9/2020: EF 50-55%, basal inferoseptal and basal inferior segment wall motion abnormalities, severely enlarged RA, severely enlarged RV, moderate pericardial effusion, mild-moderate MR, severe TR, moderate pulmonary hypertension  - Discussed case with CT surgery on the phone: no acute intervention unless patient is in tamponade or requires pressors  - Right sided pleural effusion s/p thoracenthesis (removed 1.5L). Post procedure CXR showed trace pneumothorax x2, now resolved, Fluid is transudative according to light's criteria  - Continue with metoprolol tartrate 2.5mg IV Q12H    #Hypertension  - Hold losartan for possible angioedema/swollen lips in ICU  - Continue with metoprolol tartrate 2.5mg IV Q12H    #Hyperlipidemia  - Continue with atorvastatin 80mg PO at bedtime     #Dementia  - Donepezil being held for now    #Incidental left renal lesion  - Follow outpatient with renal US    #Thrombocytopenia  - Lovenox on hold. SCDs for now  - Follow HIT panel    #Suspected folate deficiency  - Continue with folic acid 1mg PO once daily     #Misc  - DVT Prophylaxis: lovenox  - GI Prophylaxis: famotidine 20mg IV Q12H  - Diet: NPO. TPN  - Activity: increase as tolerated  - Code Status: DNR/DNI

## 2020-12-26 NOTE — PROGRESS NOTE ADULT - ATTENDING COMMENTS
#Progress Note Handoff  Pending (specify):  improvement   Family discussion: house staff updated family   Disposition: Home___/SNF___/Other___/Unknown at this time___x  Pt seen during COVID 19 Pandemic.
Patient seen and examined independently. Agree with resident note/ history / physical exam and plan of care with following exceptions/additions/updates. Case discussed with patient/pt decision maker, house-staff and nursing.     pt has pneumothorax and chest tube is in place.   pt is restless. not verbally communicating  dw pulm, cont current measurements, repeat cxr daily, monitor pneumothorax  picc today to start tpn
Patient seen and examined independently. Agree with resident note/ history / physical exam and plan of care with following exceptions/additions/updates. Case discussed with patient/pt decision maker, house-staff and nursing.     pt is restless, confused, pulls his mask off frequently  Vital Signs Last 24 Hrs  T(C): 35.8 (11 Dec 2020 05:13), Max: 35.8 (11 Dec 2020 05:13)  T(F): 96.4 (11 Dec 2020 05:13), Max: 96.4 (11 Dec 2020 05:13)  HR: 73 (11 Dec 2020 10:23) (60 - 110)  BP: 136/67 (11 Dec 2020 10:23) (108/68 - 139/80)  RR: 94 (11 Dec 2020 10:23) (20 - 94)  SpO2: 95% (11 Dec 2020 08:10) (93% - 95%)  Physical exam:   constitutional NAD, AA disoriented, Respiratory  lungs bilat crakles bilat, decreased breath sounds bilat, , CVS heart RRR, GI: abdomen Soft NT, ND, BS+,   neuro exam non focal.                        10.4   8.65  )-----------( 103      ( 11 Dec 2020 06:33 )             34.1   12-11    144  |  108  |  44<H>  ----------------------------<  132<H>  3.8   |  23  |  1.3    Ca    8.8      11 Dec 2020 06:33  Mg     2.0     12-11    < from: Xray Chest 1 View- PORTABLE-Routine (Xray Chest 1 View- PORTABLE-Routine .) (12.10.20 @ 11:57) >    Bilateral opacities and bilateral pleural effusions no change. No air leak.    < end of copied text >    < from: 12 Lead ECG (12.07.20 @ 16:22) >    QTC Calculation(Bazett) 616 ms    < end of copied text >      a/p  #acute hypoxic resp failure, multifactorial, chf HFpEF, pulm hypertension, pleural effusion, cont lasix but change to iv, monitor labs  # ams, partly delirium due to hospitalization, may use small dose of benzo, avoid haldol in view of prolonged QT, EP to see  # PAST MEDICAL & SURGICAL HISTORY:  Mild dementia  Chronic right-sided heart failure  Depression  Liver cirrhosis, alcoholic  Borderline diabetes mellitus  HTN (hypertension)  Afib  Presence of Watchman left atrial appendage closure device  AICD (automatic cardioverter/defibrillator) present    poor prognosis
Patient seen and examined independently. Agree with resident note/ history / physical exam and plan of care with following exceptions/additions/updates. Case discussed with patient/pt decision maker, house-staff and nursing.     pt is sleepy. not in acute distress. on 100% nrb but saturating 96% , changed the o2 to nc . tolerating well   case discussed with cardio, echo is approved. follow up the echo  pneumothorax is minimal, fu pulmonary   pl effusion, sp thoracentesis    full code  resident spoke with family and updated them on the status and plan
Patient seen and examined independently. Agree with resident note/ history / physical exam and plan of care with following exceptions/additions/updates. Case discussed with patient/pt decision maker, house-staff and nursing.     still on high flow .   prognosis guarded
Seen and examined with the pulmonary fellow at the bed side.  Impression and plan as outlined above.
family does not want any further CT and planning to withdraw care.
very critical. leave CT in.

## 2020-12-26 NOTE — PROGRESS NOTE ADULT - ASSESSMENT
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     PULMONARY:  HOB @ 45 degrees.  NRM,  CT sx f/up adjust CT  CARDIOVASCULAR:  Target MAP-60. Avoid volume overload. keep equal balance    GI: GI prophylaxis.  Feeding.  Bowel regimen     RENAL:  Follow up lytes.  Correct as needed.   INFECTIOUS DISEASE: per id    HEMATOLOGICAL:  DVT prophylaxis.    ENDOCRINE:  Follow up FS.  Insulin protocol if needed.    MUSCULOSKELETAL: bedrest  ed3   poor prognosis

## 2020-12-26 NOTE — PROGRESS NOTE ADULT - SUBJECTIVE AND OBJECTIVE BOX
GENERAL SURGERY PROGRESS NOTE     WILLIAM ARAIZA  86y  Male  Hospital day :19d  POD:  Procedure: Chest tube insertion      OVERNIGHT EVENTS: no acute events overnight     T(F): 97.5 (20 @ 00:40), Max: 97.8 (20 @ 08:20)  HR: 69 (20 @ 00:40) (57 - 69)  BP: 111/59 (20 @ 00:40) (102/50 - 111/59)  RR: 21 (20 @ 00:40) (20 - 22)  SpO2: 94% (20 @ 00:40) (92% - 99%)    DIET/FLUIDS: fat emulsion (Fish Oil and Plant Based) 20% Infusion 0.6784 Gm/kG/Day IV Continuous <Continuous>  Parenteral Nutrition - Adult 1 Each TPN Continuous <Continuous>  Parenteral Nutrition - Adult 1 Each TPN Continuous <Continuous>    URINE:   20 @ 07:01  -  20 @ 07:00  --------------------------------------------------------  OUT: 250 mL       GI proph:    AC/ proph: aspirin 325 milliGRAM(s) Oral daily  enoxaparin Injectable 40 milliGRAM(s) SubCutaneous daily    ABx:     PHYSICAL EXAM:  GENERAL: on high glow and NRB  CHEST/LUNG: chest tube to suction, positive air leak      LABS  Labs:  CAPILLARY BLOOD GLUCOSE      POCT Blood Glucose.: 153 mg/dL (25 Dec 2020 21:17)                          9.1    17.60 )-----------( 108      ( 25 Dec 2020 06:16 )             29.9       Auto Neutrophil %: 91.9 % (20 @ 06:16)  Auto Immature Granulocyte %: 0.7 % (20 @ 06:16)        145  |  106  |  61<HH>  ----------------------------<  183<H>  3.9   |  27  |  1.4      Calcium, Total Serum: 8.7 mg/dL (20 @ 06:16)      LFTs:             5.9  | 3.7  | 20       ------------------[82      ( 25 Dec 2020 06:16 )  2.2  | x    | 21          Lipase:x      Amylase:x             Coags:        Serum Pro-Brain Natriuretic Peptide: 42755 pg/mL (20 @ 17:33)      Urinalysis Basic - ( 25 Dec 2020 20:44 )    Color: Yellow / Appearance: Slightly Turbid / S.017 / pH: x  Gluc: x / Ketone: Negative  / Bili: Negative / Urobili: 6 mg/dL   Blood: x / Protein: Trace / Nitrite: Negative   Leuk Esterase: Negative / RBC: 3 /HPF / WBC 1 /HPF   Sq Epi: x / Non Sq Epi: 0 /HPF / Bacteria: Negative

## 2020-12-26 NOTE — CHART NOTE - NSCHARTNOTEFT_GEN_A_CORE
SPoke to Dr Boo  Palliative care consulted for recommendations for comfort measures only. Attending MD note says family updated and pt is DNR/DNI doesn't mention comfort measures only. Pt desaturating on high flow oxygen and has gotten Morphine 2mg IV at 12 but still appears uncomfortable as per Dr Boo.  She will follow up with Dr Cha who spoke to family in last 1/2 hr. GRF 42   -Pt currently has 2 doses of Morphine 2mg PRN q4 hrs and q6hrs  would d/c and change to:  - Morphine 4mg IV Q 4 hrs PRN for respiratory distress or pain   - Ativan 1mg Q 4 hrs PRN for agitation/restlessness  call x [phone] for palliative care service

## 2020-12-26 NOTE — PROVIDER CONTACT NOTE (OTHER) - SITUATION
patient on NRB 15 L and high flow NC 70L/min with 100% FIO2 and patient saturation 85%. Patient DNR/DNI confirmed MOLST form in chart.

## 2020-12-26 NOTE — CHART NOTE - NSCHARTNOTEFT_GEN_A_CORE
I spoke with palliative NP Stella and attending Dr. Phelps - they spoke with the family who is aware of patient's poor prognosis and agrees for comfort care only.  Started morphine drip

## 2020-12-26 NOTE — PROGRESS NOTE ADULT - SUBJECTIVE AND OBJECTIVE BOX
OVERNIGHT EVENTS: events noted, desaturation overnight, on 100% NRM, CXR reviewed    Vital Signs Last 24 Hrs  T(C): 36.4 (26 Dec 2020 00:40), Max: 36.6 (25 Dec 2020 08:20)  T(F): 97.5 (26 Dec 2020 00:40), Max: 97.8 (25 Dec 2020 08:20)  HR: 69 (26 Dec 2020 00:40) (57 - 69)  BP: 111/59 (26 Dec 2020 00:40) (102/50 - 111/59)  RR: 21 (26 Dec 2020 00:40) (20 - 22)  SpO2: 94% (26 Dec 2020 00:40) (92% - 99%)    PHYSICAL EXAMINATION:    GENERAL: ill looking    HEENT: Head is normocephalic and atraumatic.     NECK: Supple.    LUNGS: bl crackles    HEART: BALDEV 3/6    ABDOMEN: Soft, nontender, and nondistended.      EXTREMITIES: Without any cyanosis, clubbing, rash, lesions or edema.    NEUROLOGIC: Grossly intact.    SKIN: No ulceration or induration present.      LABS:                        9.1    17.60 )-----------( 108      ( 25 Dec 2020 06:16 )             29.9     12-    145  |  106  |  61<HH>  ----------------------------<  183<H>  3.9   |  27  |  1.4    Ca    8.7      25 Dec 2020 06:16  Phos  3.7     12-  Mg     2.0         TPro  5.9<L>  /  Alb  2.2<L>  /  TBili  3.7<H>  /  DBili  x   /  AST  20  /  ALT  21  /  AlkPhos  82  12-      Urinalysis Basic - ( 25 Dec 2020 20:44 )    Color: Yellow / Appearance: Slightly Turbid / S.017 / pH: x  Gluc: x / Ketone: Negative  / Bili: Negative / Urobili: 6 mg/dL   Blood: x / Protein: Trace / Nitrite: Negative   Leuk Esterase: Negative / RBC: 3 /HPF / WBC 1 /HPF   Sq Epi: x / Non Sq Epi: 0 /HPF / Bacteria: Negative                        20 @ 07:01  -  20 @ 07:00  --------------------------------------------------------  IN: 440 mL / OUT: 765 mL / NET: -325 mL        MICROBIOLOGY:      MEDICATIONS  (STANDING):  ALBUTerol    90 MICROgram(s) HFA Inhaler 2 Puff(s) Inhalation every 6 hours  aspirin 325 milliGRAM(s) Oral daily  atorvastatin 80 milliGRAM(s) Oral at bedtime  chlorhexidine 4% Liquid 1 Application(s) Topical <User Schedule>  enoxaparin Injectable 40 milliGRAM(s) SubCutaneous daily  fat emulsion (Fish Oil and Plant Based) 20% Infusion 0.6784 Gm/kG/Day (20.8 mL/Hr) IV Continuous <Continuous>  ipratropium 17 MICROgram(s) HFA Inhaler 1 Puff(s) Inhalation every 6 hours  metoprolol tartrate Injectable 2.5 milliGRAM(s) IV Push every 12 hours  Parenteral Nutrition - Adult 1 Each (65 mL/Hr) TPN Continuous <Continuous>  senna 2 Tablet(s) Oral at bedtime    MEDICATIONS  (PRN):  acetaminophen   Tablet .. 650 milliGRAM(s) Oral every 6 hours PRN Temp greater or equal to 38C (100.4F)  morphine  - Injectable 2 milliGRAM(s) IV Push every 6 hours PRN dyspnea  morphine  - Injectable 2 milliGRAM(s) IV Push every 4 hours PRN dyspnea  polyethylene glycol 3350 17 Gram(s) Oral daily PRN Constipation      RADIOLOGY & ADDITIONAL STUDIES:

## 2020-12-26 NOTE — PROGRESS NOTE ADULT - SUBJECTIVE AND OBJECTIVE BOX
pt seen and examined.   not able to communicate verbally.   Vital Signs Last 24 Hrs  T(C): 36.2 (26 Dec 2020 09:29), Max: 36.6 (25 Dec 2020 16:02)  T(F): 97.2 (26 Dec 2020 09:29), Max: 97.8 (25 Dec 2020 16:02)  HR: 65 (26 Dec 2020 09:29) (60 - 69)  BP: 81/44 (26 Dec 2020 09:29) (81/44 - 111/59)  RR: 22 (26 Dec 2020 09:29) (20 - 22)  SpO2: 95% (26 Dec 2020 09:29) (92% - 99%)  Physical exam:    very confused and restless,  Respiratory  lungs CTA, CVS heart RRR, GI: abdomen Soft NT, ND, BS+, skin: intact, neuro exam moves ext but unable to participate exam.                         8.6    16.12 )-----------( 111      ( 26 Dec 2020 05:52 )             28.9   12-26    148<H>  |  109  |  75<HH>  ----------------------------<  204<H>  4.1   |  28  |  1.5    Ca    9.3      26 Dec 2020 05:52  Phos  3.8     12-26  Mg     2.4     12-26    TPro  5.7<L>  /  Alb  2.3<L>  /  TBili  3.7<H>  /  DBili  x   /  AST  21  /  ALT  21  /  AlkPhos  73  12-26    < from: Xray Chest 1 View-PORTABLE IMMEDIATE (Xray Chest 1 View-PORTABLE IMMEDIATE .) (12.26.20 @ 01:34) >  Unchanged right pneumothorax with a chest tube in place. approximately 1.7 cm air gap  Unchanged diffuse bilateral airspace opacities.  < end of copied text >    as per ct surgery team:   Chest tube to dry suction at -40    a/p    #acute hypoxic resp failure, on high flow o2, unable to put on bipap due to risk of worsening pneumothorax,   #pneumothorax has chest tube, thoracic surgery team managing the tube, to be on dry suction at -40 per ctu  # on tpn , picc placed 12/24      PAST MEDICAL & SURGICAL HISTORY:  Mild dementia  Chronic right-sided heart failure  Depression  Liver cirrhosis, alcoholic  Borderline diabetes mellitus  HTN (hypertension)  Afib  Presence of Watchman left atrial appendage closure device  AICD (automatic cardioverter/defibrillator) present  History of cholecystectomy  History of appendectomy  History of knee surgery  Deviated nasal septum    very poor prognosis   DNR DNI   called next of kin where Dr Delgadillo is listed as next of kin, left msg with her service. awaiting call back        pt seen and examined.   not able to communicate verbally.   Vital Signs Last 24 Hrs  T(C): 36.2 (26 Dec 2020 09:29), Max: 36.6 (25 Dec 2020 16:02)  T(F): 97.2 (26 Dec 2020 09:29), Max: 97.8 (25 Dec 2020 16:02)  HR: 65 (26 Dec 2020 09:29) (60 - 69)  BP: 81/44 (26 Dec 2020 09:29) (81/44 - 111/59)  RR: 22 (26 Dec 2020 09:29) (20 - 22)  SpO2: 95% (26 Dec 2020 09:29) (92% - 99%)  Physical exam:   pt very confused and restless, Respiratory  lungs bilat crackles, CVS heart RRR, GI: abdomen Soft NT, ND, BS+, skin: intact, neuro exam moves ext but unable to participate exam.                         8.6    16.12 )-----------( 111      ( 26 Dec 2020 05:52 )             28.9   12-26    148<H>  |  109  |  75<HH>  ----------------------------<  204<H>  4.1   |  28  |  1.5    Ca    9.3      26 Dec 2020 05:52  Phos  3.8     12-26  Mg     2.4     12-26    TPro  5.7<L>  /  Alb  2.3<L>  /  TBili  3.7<H>  /  DBili  x   /  AST  21  /  ALT  21  /  AlkPhos  73  12-26    < from: Xray Chest 1 View-PORTABLE IMMEDIATE (Xray Chest 1 View-PORTABLE IMMEDIATE .) (12.26.20 @ 01:34) >  Unchanged right pneumothorax with a chest tube in place. approximately 1.7 cm air gap  Unchanged diffuse bilateral airspace opacities.  < end of copied text >    as per ct surgery team:   Chest tube to dry suction at -40    MEDICATIONS  (STANDING):  ALBUTerol    90 MICROgram(s) HFA Inhaler 2 Puff(s) Inhalation every 6 hours  aspirin 325 milliGRAM(s) Oral daily  atorvastatin 80 milliGRAM(s) Oral at bedtime  chlorhexidine 4% Liquid 1 Application(s) Topical <User Schedule>  enoxaparin Injectable 40 milliGRAM(s) SubCutaneous daily  fat emulsion (Fish Oil and Plant Based) 20% Infusion 0.6784 Gm/kG/Day (20.8 mL/Hr) IV Continuous <Continuous>  ipratropium 17 MICROgram(s) HFA Inhaler 1 Puff(s) Inhalation every 6 hours  metoprolol tartrate Injectable 2.5 milliGRAM(s) IV Push every 12 hours  Parenteral Nutrition - Adult 1 Each (65 mL/Hr) TPN Continuous <Continuous>  Parenteral Nutrition - Adult 1 Each (65 mL/Hr) TPN Continuous <Continuous>  senna 2 Tablet(s) Oral at bedtime    MEDICATIONS  (PRN):  acetaminophen   Tablet .. 650 milliGRAM(s) Oral every 6 hours PRN Temp greater or equal to 38C (100.4F)  morphine  - Injectable 2 milliGRAM(s) IV Push every 6 hours PRN dyspnea  morphine  - Injectable 2 milliGRAM(s) IV Push every 4 hours PRN dyspnea  polyethylene glycol 3350 17 Gram(s) Oral daily PRN Constipation    a/p    #acute hypoxic resp failure, on high flow o2, unable to put on bipap due to risk of worsening pneumothorax,   #pneumothorax has chest tube, thoracic surgery team managing the tube, to be on dry suction at -40 per ctu  # on tpn , picc placed 12/24    PAST MEDICAL & SURGICAL HISTORY:  Mild dementia  Chronic right-sided heart failure  Depression  Liver cirrhosis, alcoholic  Borderline diabetes mellitus  HTN (hypertension)  Afib  Presence of Watchman left atrial appendage closure device  AICD (automatic cardioverter/defibrillator) present  History of cholecystectomy  History of appendectomy  History of knee surgery  Deviated nasal septum    very poor prognosis   DNR DNI   called next of kin where Dr Delgadillo is listed as next of kin, but she is not next of kin, called wife and she asked me to speak with the son.   I spoke with son, updated him and answered his questions  they are aware of the poor prognosis.        pt seen and examined.   not able to communicate verbally.   Vital Signs Last 24 Hrs  T(C): 36.2 (26 Dec 2020 09:29), Max: 36.6 (25 Dec 2020 16:02)  T(F): 97.2 (26 Dec 2020 09:29), Max: 97.8 (25 Dec 2020 16:02)  HR: 65 (26 Dec 2020 09:29) (60 - 69)  BP: 81/44 (26 Dec 2020 09:29) (81/44 - 111/59)  RR: 22 (26 Dec 2020 09:29) (20 - 22)  SpO2: 95% (26 Dec 2020 09:29) (92% - 99%)  Physical exam:   pt very confused and restless, Respiratory  lungs bilat crackles, CVS heart RRR, GI: abdomen Soft NT, ND, BS+, skin: intact, neuro exam moves ext but unable to participate exam.                         8.6    16.12 )-----------( 111      ( 26 Dec 2020 05:52 )             28.9   12-26    148<H>  |  109  |  75<HH>  ----------------------------<  204<H>  4.1   |  28  |  1.5    Ca    9.3      26 Dec 2020 05:52  Phos  3.8     12-26  Mg     2.4     12-26    TPro  5.7<L>  /  Alb  2.3<L>  /  TBili  3.7<H>  /  DBili  x   /  AST  21  /  ALT  21  /  AlkPhos  73  12-26    < from: Xray Chest 1 View-PORTABLE IMMEDIATE (Xray Chest 1 View-PORTABLE IMMEDIATE .) (12.26.20 @ 01:34) >  Unchanged right pneumothorax with a chest tube in place. approximately 1.7 cm air gap  Unchanged diffuse bilateral airspace opacities.  < end of copied text >    as per ct surgery team:   Chest tube to dry suction at -40    MEDICATIONS  (STANDING):  ALBUTerol    90 MICROgram(s) HFA Inhaler 2 Puff(s) Inhalation every 6 hours  aspirin 325 milliGRAM(s) Oral daily  atorvastatin 80 milliGRAM(s) Oral at bedtime  chlorhexidine 4% Liquid 1 Application(s) Topical <User Schedule>  enoxaparin Injectable 40 milliGRAM(s) SubCutaneous daily  fat emulsion (Fish Oil and Plant Based) 20% Infusion 0.6784 Gm/kG/Day (20.8 mL/Hr) IV Continuous <Continuous>  ipratropium 17 MICROgram(s) HFA Inhaler 1 Puff(s) Inhalation every 6 hours  metoprolol tartrate Injectable 2.5 milliGRAM(s) IV Push every 12 hours  Parenteral Nutrition - Adult 1 Each (65 mL/Hr) TPN Continuous <Continuous>  Parenteral Nutrition - Adult 1 Each (65 mL/Hr) TPN Continuous <Continuous>  senna 2 Tablet(s) Oral at bedtime    MEDICATIONS  (PRN):  acetaminophen   Tablet .. 650 milliGRAM(s) Oral every 6 hours PRN Temp greater or equal to 38C (100.4F)  morphine  - Injectable 2 milliGRAM(s) IV Push every 6 hours PRN dyspnea  morphine  - Injectable 2 milliGRAM(s) IV Push every 4 hours PRN dyspnea  polyethylene glycol 3350 17 Gram(s) Oral daily PRN Constipation    a/p    #acute hypoxic resp failure, on high flow o2, unable to put on bipap due to risk of worsening pneumothorax,   #pneumothorax has chest tube, thoracic surgery team managing the tube, to be on dry suction at -40 per ctu  # on tpn , picc placed 12/24  #hypernatremia, due to dehydration, will have to speak with dr perez to adjust the  fluid in tpn     PAST MEDICAL & SURGICAL HISTORY:  Mild dementia  Chronic right-sided heart failure  Depression  Liver cirrhosis, alcoholic  Borderline diabetes mellitus  HTN (hypertension)  Afib  Presence of Watchman left atrial appendage closure device  AICD (automatic cardioverter/defibrillator) present  History of cholecystectomy  History of appendectomy  History of knee surgery  Deviated nasal septum    very poor prognosis   DNR DNI   called next of kin where Dr Delgadillo is listed as next of kin, but she is not next of kin, called wife and she asked me to speak with the son.   I spoke with son, updated him and answered his questions  they are aware of the poor prognosis.

## 2020-12-26 NOTE — CHART NOTE - NSCHARTNOTEFT_GEN_A_CORE
Spoke to Dr Boo - case reviewed  Pt is an 86 yr old (+) COVID PNA with pneumothorax. Overall poor prognosis son made father DNR/DNI. Pt currently on NRB mask according to pulmonary attending am note. Resident will update family and call [phone] if we can assist with GOC or symptom management today as needed over the phone. Full consult to follow on 12/27/20 Spoke to Dr Boo - case reviewed  Pt is an 86 yr old (+) COVID PNA with pneumothorax. Overall poor prognosis son made father DNR/DNI. Pt currently on NRB mask according to pulmonary attending am note. Resident will update family and call [phone] if we can assist with GOC or symptom management today as needed over the phone. Full consult to follow on 12/28/20

## 2020-12-26 NOTE — CHART NOTE - NSCHARTNOTEFT_GEN_A_CORE
patient desaturated to low 70's while being on HFNC+NRT. Called his son Will and discussed the options available btw Keep him on HFNC or put him on bipap with his Existing Pneumothorax . All questions answered and case discussed thoroughly . he decided to keep him on HFNC , will place palliative care consult

## 2020-12-26 NOTE — CHART NOTE - NSCHARTNOTESELECT_GEN_ALL_CORE
Event Note
RD Limited/Event Note
f/u/Event Note
Event Note
Event Note/Intubation
Family Update/Event Note
Palliative Care NP/Event Note
Palliative Care NP/Event Note
Progress/Event Note
RD Follow-Up/Event Note
Upgrade/Transfer Note

## 2020-12-27 NOTE — PROGRESS NOTE ADULT - ASSESSMENT
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     PULMONARY:  HOB @ 45 degrees.  NRM,  CT sx f/up adjust CT  CARDIOVASCULAR:  keep equal    GI: GI prophylaxis.  Feeding.  Bowel regimen     RENAL:  Follow up lytes.  Correct as needed. poor candidate for HD  INFECTIOUS DISEASE: per id    HEMATOLOGICAL:  DVT prophylaxis.    ENDOCRINE:  Follow up FS.  Insulin protocol if needed.    DANR/I  ed3   poor prognosis

## 2020-12-27 NOTE — PROGRESS NOTE ADULT - SUBJECTIVE AND OBJECTIVE BOX
Hospital Day:  20d    Subjective:    Patient is a 86y old  Male who presents with a chief complaint of COVID19+ (27 Dec 2020 07:56)  This morning patient is lying in bed comfortably on high flow. He is A&Ox1. He reports no new complaints, including SOB, chest pain, abdominal pain, nausea, vomiting, dysuria, constipation, or diarrhea.      Past Medical Hx:   Mild dementia    Chronic right-sided heart failure    Depression    Liver cirrhosis, alcoholic    Presence of cardiac pacemaker    AICD (automatic cardioverter/defibrillator) present    Borderline diabetes mellitus    HTN (hypertension)    Afib      Past Sx:  Presence of Watchman left atrial appendage closure device    AICD (automatic cardioverter/defibrillator) present    AICD present, double chamber    History of cholecystectomy    History of appendectomy    History of knee surgery    Deviated nasal septum      Allergies:  No Known Allergies    Current Meds:   Standng Meds:  ALBUTerol    90 MICROgram(s) HFA Inhaler 2 Puff(s) Inhalation every 6 hours  aspirin 325 milliGRAM(s) Oral daily  atorvastatin 80 milliGRAM(s) Oral at bedtime  chlorhexidine 4% Liquid 1 Application(s) Topical <User Schedule>  enoxaparin Injectable 40 milliGRAM(s) SubCutaneous daily  ipratropium 17 MICROgram(s) HFA Inhaler 1 Puff(s) Inhalation every 6 hours  metoprolol tartrate Injectable 2.5 milliGRAM(s) IV Push every 12 hours  morphine  Infusion 1 mG/Hr (1 mL/Hr) IV Continuous <Continuous>  senna 2 Tablet(s) Oral at bedtime    PRN Meds:  acetaminophen   Tablet .. 650 milliGRAM(s) Oral every 6 hours PRN Temp greater or equal to 38C (100.4F)  LORazepam   Injectable 1 milliGRAM(s) IV Push every 4 hours PRN Agitation  morphine  - Injectable 2 milliGRAM(s) IV Push every 6 hours PRN dyspnea  morphine  - Injectable 2 milliGRAM(s) IV Push every 4 hours PRN dyspnea  morphine  - Injectable 4 milliGRAM(s) IV Push every 4 hours PRN Moderate Pain (4 - 6)  polyethylene glycol 3350 17 Gram(s) Oral daily PRN Constipation    HOME MEDICATIONS:  aspirin 325 mg oral tablet: 1 tab(s) orally once a day  clopidogrel 75 mg oral tablet: 1 tab(s) orally once a day  donepezil 10 mg oral tablet: 1 tab(s) orally once a day (at bedtime)  ferrous fumarate 325 mg (106 mg elemental iron) oral tablet: 1 tab(s) orally once a day  losartan 100 mg oral tablet: 1 tab(s) orally once a day  metoprolol succinate 25 mg oral tablet, extended release: 1 tab(s) orally once a day  mirtazapine 30 mg oral tablet: 1 tab(s) orally once a day (at bedtime)  spironolactone 50 mg oral tablet: 1 tab(s) orally once a day      Vital Signs:   T(F): 92 (20 @ 08:20), Max: 98.6 (20 @ 20:00)  HR: 60 (20 @ 08:20) (60 - 65)  BP: 61/28 (20 @ 08:20) (59/25 - 81/44)  RR: 16 (20 @ 08:20) (14 - 22)  SpO2: 84% (20 @ 08:20) (82% - 95%)      20 @ 07:01  -  20 @ 07:00  --------------------------------------------------------  IN: 40 mL / OUT: 105 mL / NET: -65 mL        Physical Exam:   GENERAL: NAD  HEENT: NCAT  CHEST/LUNG: CTAB  HEART: Regular rate and rhythm; s1 s2 appreciated, No murmurs, rubs, or gallops  ABDOMEN: Soft, Nontender, Nondistended; Bowel sounds present  EXTREMITIES: No LE edema b/l  SKIN: no rashes, no new lesions  NERVOUS SYSTEM:  Alert & Oriented X1        Labs:                         9.3    22.50 )-----------( 103      ( 27 Dec 2020 06:27 )             34.5     Neutophil% 91.1, Lymphocyte% 2.0, Monocyte% 5.1, Bands% 1.5 20 @ 06:27    27 Dec 2020 06:27    151    |  111    |  96     ----------------------------<  111    x       |  18     |  3.0      Ca    9.7        27 Dec 2020 06:27  Phos  11.2      27 Dec 2020 06:27  Mg     2.4       26 Dec 2020 05:52    TPro  5.7    /  Alb  2.0    /  TBili  4.5    /  DBili  x      /  AST  344    /  ALT  155    /  AlkPhos  76     27 Dec 2020 06:27            Serum Pro-Brain Natriuretic Peptide: 12272 pg/mL (20 @ 17:33)          Urinalysis Basic - ( 25 Dec 2020 20:44 )    Color: Yellow / Appearance: Slightly Turbid / S.017 / pH: x  Gluc: x / Ketone: Negative  / Bili: Negative / Urobili: 6 mg/dL   Blood: x / Protein: Trace / Nitrite: Negative   Leuk Esterase: Negative / RBC: 3 /HPF / WBC 1 /HPF   Sq Epi: x / Non Sq Epi: 0 /HPF / Bacteria: Negative                Assessment and Plan:    Patient is an 85yo male with PMH of HFrEF, severe MR, severe pulmonary hypertension, atrial fibrillation s/p Watchman , hypertension, chronic anemia, who presented to the hospital for cyanosis and shortness of breath. Patient was known to be COVID-19 positive as of 1 week prior to admission, but symptoms worsened over the days prior to presentation. Patient was intubated and then extubated for acute hypoxemic respiratory failure.  Was made CMO on .        #Acute hypoxemic respiratory failure secondary to COVID-19 pneumonia s/p intubation  #Pleural effusion and moderate pericardial effusion, no signs of tamponade s/p thoracentesis  - Currently CMO  - Intubated on , extubated on   - Patient is saturating 94% on 60% high flow  - No further need to trend inflammatory markers per ID  - Dexamethasone course complete  - Lower extremity duplex negative  - Completed course of cefepime  - Incentive spirometry at bedside    #Pneuomothorax  - Currently CMO  - Right 1.4 cm pneumo seen on xray    #Hypernatremia resolved  - Decrease D5W to 50    #Prolonged QTc  - QTc on admission: 616  - QTc 2020: 575  - EP recs: Due to ventricular pacing. No interventions needed    #Thrombocytopenia  - PPX lovenox  - HIT antibody negative     #Constipation  - Continue with bowel regimen    #Heart failure with preserved ejection fraction  - Echo 2020: EF 50-55%, basal inferoseptal and basal inferior segment wall motion abnormalities, severely enlarged RA, severely enlarged RV, moderate pericardial effusion, mild-moderate MR, severe TR, moderate pulmonary hypertension  - Discussed case with CT surgery on the phone: no acute intervention unless patient is in tamponade or requires pressors  - Right sided pleural effusion s/p thoracenthesis (removed 1.5L). Post procedure CXR showed trace pneumothorax x2, now resolved, Fluid is transudative according to light's criteria  - Continue with metoprolol tartrate 2.5mg IV Q12H    #Hypertension  - Hold losartan for possible angioedema/swollen lips in ICU  - Continue with metoprolol tartrate 2.5mg IV Q12H    #Hyperlipidemia  - Continue with atorvastatin 80mg PO at bedtime     #Dementia  - Donepezil being held for now    #Incidental left renal lesion  - Follow outpatient with renal US    #Thrombocytopenia  - Lovenox on hold. SCDs for now  - Follow HIT panel    #Suspected folate deficiency  - Continue with folic acid 1mg PO once daily     #Misc  - DVT Prophylaxis: lovenox  - GI Prophylaxis: famotidine 20mg IV Q12H  - Diet: NPO. TPN  - Activity: increase as tolerated  - Code Status: DNR/DNI

## 2020-12-27 NOTE — PROGRESS NOTE ADULT - REASON FOR ADMISSION
COVID19+

## 2020-12-27 NOTE — DISCHARGE NOTE FOR THE EXPIRED PATIENT - HOSPITAL COURSE
85y/o M with PMH of CHFrEF & severe MR/pulm HTN, Afib s/p watchman  not on AC, HTN, hx of chronic anemia presents to ED with 'blue lips' and SOB. Pt was known COVID+ for 1 week.  He completed course of dexamethasone and was not candidate for other COVID meds. He was intubated on  and extubated on  and DNR/DNI was signed by family. However his cognitive status never recovered and family signed CMO on . Hospital course was complicated by pleural effusion 2/2 CHF s/p thoracentesis, small pericardial effusion, and pneumothorax s/p chest tube. He  on

## 2020-12-27 NOTE — PROGRESS NOTE ADULT - ASSESSMENT
A/P:  WILLIAM DELPRIORE is a 86yMale s/p for Chest tube insertion, covid possitive. family does not want intervention    Plan:   continue current comfort measures

## 2020-12-27 NOTE — PROGRESS NOTE ADULT - SUBJECTIVE AND OBJECTIVE BOX
GENERAL SURGERY PROGRESS NOTE     WILLIAM ARAIZA  [address] [place] [address] day :20d  POD:  Procedure: Chest tube insertion      Surgical Attending: Aung Palma  Overnight events: patient DNR/DNI. son does not want any further intervention. becoming progressively more hypotensive overnight.    T(F): 97.5 (20 @ 00:07), Max: 98.6 (20 @ 20:00)  HR: 64 (20 @ 00:07) (60 - 69)  BP: 62/32 (20 @ 00:07) (62/32 - 111/59)  ABP: --  ABP(mean): --  RR: 20 (20 @ 00:07) (20 - 22)  SpO2: 87% (20 @ 00:07) (82% - 95%)      20 @ 07:01  -  20 @ 07:00  --------------------------------------------------------  IN:    TPN (Total Parenteral Nutrition): 440 mL  Total IN: 440 mL    OUT:    Chest Tube (mL): 90 mL    Indwelling Catheter - Urethral (mL): 675 mL  Total OUT: 765 mL    Total NET: -325 mL      URINE:   20 @ 07:01  -  20 @ 07:00  --------------------------------------------------------  OUT: 675 mL       GI proph:    AC/ proph: aspirin 325 milliGRAM(s) Oral daily  enoxaparin Injectable 40 milliGRAM(s) SubCutaneous daily    ABx:     GEN: in distress  HEENT: NC/AT, not intubated  CHEST: regular HR, hypotensive      LABS  Labs:  CAPILLARY BLOOD GLUCOSE      POCT Blood Glucose.: 150 mg/dL (26 Dec 2020 20:23)  POCT Blood Glucose.: 192 mg/dL (26 Dec 2020 12:00)  POCT Blood Glucose.: 184 mg/dL (26 Dec 2020 07:58)                          8.6    16.12 )-----------( 111      ( 26 Dec 2020 05:52 )             28.9       Auto Neutrophil %: 92.3 % (20 @ 05:52)  Auto Immature Granulocyte %: 0.9 % (20 @ 05:52)        148<H>  |  109  |  75<HH>  ----------------------------<  204<H>  4.1   |  28  |  1.5      Calcium, Total Serum: 9.3 mg/dL (20 @ 05:52)      LFTs:             5.7  | 3.7  | 21       ------------------[73      ( 26 Dec 2020 05:52 )  2.3  | x    | 21          Lipase:x      Amylase:x             Coags:        Serum Pro-Brain Natriuretic Peptide: 06433 pg/mL (20 @ 17:33)      Urinalysis Basic - ( 25 Dec 2020 20:44 )    Color: Yellow / Appearance: Slightly Turbid / S.017 / pH: x  Gluc: x / Ketone: Negative  / Bili: Negative / Urobili: 6 mg/dL   Blood: x / Protein: Trace / Nitrite: Negative   Leuk Esterase: Negative / RBC: 3 /HPF / WBC 1 /HPF   Sq Epi: x / Non Sq Epi: 0 /HPF / Bacteria: Negative            RADIOLOGY & ADDITIONAL TESTS:

## 2020-12-27 NOTE — PROGRESS NOTE ADULT - NSHPATTENDINGPLANDISCUSS_GEN_ALL_CORE
RN and primary team
resident
team
resident
team
resident
House staff
team
team
resident
team

## 2020-12-28 LAB
-  AMIKACIN: SIGNIFICANT CHANGE UP
-  AMOXICILLIN/CLAVULANIC ACID: SIGNIFICANT CHANGE UP
-  AMPICILLIN/SULBACTAM: SIGNIFICANT CHANGE UP
-  AMPICILLIN: SIGNIFICANT CHANGE UP
-  AMPICILLIN: SIGNIFICANT CHANGE UP
-  AZTREONAM: SIGNIFICANT CHANGE UP
-  CEFAZOLIN: SIGNIFICANT CHANGE UP
-  CEFEPIME: SIGNIFICANT CHANGE UP
-  CEFOXITIN: SIGNIFICANT CHANGE UP
-  CEFTRIAXONE: SIGNIFICANT CHANGE UP
-  CIPROFLOXACIN: SIGNIFICANT CHANGE UP
-  CIPROFLOXACIN: SIGNIFICANT CHANGE UP
-  ERTAPENEM: SIGNIFICANT CHANGE UP
-  GENTAMICIN: SIGNIFICANT CHANGE UP
-  IMIPENEM: SIGNIFICANT CHANGE UP
-  LEVOFLOXACIN: SIGNIFICANT CHANGE UP
-  LEVOFLOXACIN: SIGNIFICANT CHANGE UP
-  MEROPENEM: SIGNIFICANT CHANGE UP
-  NITROFURANTOIN: SIGNIFICANT CHANGE UP
-  NITROFURANTOIN: SIGNIFICANT CHANGE UP
-  PIPERACILLIN/TAZOBACTAM: SIGNIFICANT CHANGE UP
-  TETRACYCLINE: SIGNIFICANT CHANGE UP
-  TIGECYCLINE: SIGNIFICANT CHANGE UP
-  TOBRAMYCIN: SIGNIFICANT CHANGE UP
-  TRIMETHOPRIM/SULFAMETHOXAZOLE: SIGNIFICANT CHANGE UP
-  VANCOMYCIN: SIGNIFICANT CHANGE UP
CULTURE RESULTS: SIGNIFICANT CHANGE UP
GLUCOSE BLDC GLUCOMTR-MCNC: 152 MG/DL — HIGH (ref 70–99)
METHOD TYPE: SIGNIFICANT CHANGE UP
METHOD TYPE: SIGNIFICANT CHANGE UP
ORGANISM # SPEC MICROSCOPIC CNT: SIGNIFICANT CHANGE UP
SPECIMEN SOURCE: SIGNIFICANT CHANGE UP

## 2020-12-29 LAB — ZINC SERPL-MCNC: 49 UG/DL — LOW (ref 56–134)

## 2021-01-01 NOTE — OCCUPATIONAL THERAPY INITIAL EVALUATION ADULT - HOME MANAGEMENT SKILLS, PREVIOUS LEVEL OF FUNCTION, OT EVAL
There are no preventive care reminders to display for this patient.    Patient is up to date, no discussion needed.           independent

## 2021-01-05 DIAGNOSIS — I50.22 CHRONIC SYSTOLIC (CONGESTIVE) HEART FAILURE: ICD-10-CM

## 2021-01-05 DIAGNOSIS — E43 UNSPECIFIED SEVERE PROTEIN-CALORIE MALNUTRITION: ICD-10-CM

## 2021-01-05 DIAGNOSIS — I27.20 PULMONARY HYPERTENSION, UNSPECIFIED: ICD-10-CM

## 2021-01-05 DIAGNOSIS — K59.00 CONSTIPATION, UNSPECIFIED: ICD-10-CM

## 2021-01-05 DIAGNOSIS — E87.3 ALKALOSIS: ICD-10-CM

## 2021-01-05 DIAGNOSIS — N28.89 OTHER SPECIFIED DISORDERS OF KIDNEY AND URETER: ICD-10-CM

## 2021-01-05 DIAGNOSIS — D69.6 THROMBOCYTOPENIA, UNSPECIFIED: ICD-10-CM

## 2021-01-05 DIAGNOSIS — I24.8 OTHER FORMS OF ACUTE ISCHEMIC HEART DISEASE: ICD-10-CM

## 2021-01-05 DIAGNOSIS — R64 CACHEXIA: ICD-10-CM

## 2021-01-05 DIAGNOSIS — I50.812 CHRONIC RIGHT HEART FAILURE: ICD-10-CM

## 2021-01-05 DIAGNOSIS — Z87.891 PERSONAL HISTORY OF NICOTINE DEPENDENCE: ICD-10-CM

## 2021-01-05 DIAGNOSIS — U07.1 COVID-19: ICD-10-CM

## 2021-01-05 DIAGNOSIS — F32.9 MAJOR DEPRESSIVE DISORDER, SINGLE EPISODE, UNSPECIFIED: ICD-10-CM

## 2021-01-05 DIAGNOSIS — Z95.810 PRESENCE OF AUTOMATIC (IMPLANTABLE) CARDIAC DEFIBRILLATOR: ICD-10-CM

## 2021-01-05 DIAGNOSIS — Z79.02 LONG TERM (CURRENT) USE OF ANTITHROMBOTICS/ANTIPLATELETS: ICD-10-CM

## 2021-01-05 DIAGNOSIS — F03.90 UNSPECIFIED DEMENTIA WITHOUT BEHAVIORAL DISTURBANCE: ICD-10-CM

## 2021-01-05 DIAGNOSIS — J93.9 PNEUMOTHORAX, UNSPECIFIED: ICD-10-CM

## 2021-01-05 DIAGNOSIS — J12.89 OTHER VIRAL PNEUMONIA: ICD-10-CM

## 2021-01-05 DIAGNOSIS — I34.0 NONRHEUMATIC MITRAL (VALVE) INSUFFICIENCY: ICD-10-CM

## 2021-01-05 DIAGNOSIS — J80 ACUTE RESPIRATORY DISTRESS SYNDROME: ICD-10-CM

## 2021-01-05 DIAGNOSIS — Z66 DO NOT RESUSCITATE: ICD-10-CM

## 2021-01-05 DIAGNOSIS — K70.30 ALCOHOLIC CIRRHOSIS OF LIVER WITHOUT ASCITES: ICD-10-CM

## 2021-01-05 DIAGNOSIS — E87.0 HYPEROSMOLALITY AND HYPERNATREMIA: ICD-10-CM

## 2021-01-05 DIAGNOSIS — I11.0 HYPERTENSIVE HEART DISEASE WITH HEART FAILURE: ICD-10-CM

## 2021-01-05 DIAGNOSIS — Z79.82 LONG TERM (CURRENT) USE OF ASPIRIN: ICD-10-CM

## 2021-01-05 DIAGNOSIS — R94.31 ABNORMAL ELECTROCARDIOGRAM [ECG] [EKG]: ICD-10-CM

## 2021-01-05 DIAGNOSIS — D64.9 ANEMIA, UNSPECIFIED: ICD-10-CM

## 2021-01-05 DIAGNOSIS — I31.3 PERICARDIAL EFFUSION (NONINFLAMMATORY): ICD-10-CM

## 2021-01-05 DIAGNOSIS — J90 PLEURAL EFFUSION, NOT ELSEWHERE CLASSIFIED: ICD-10-CM

## 2021-01-05 DIAGNOSIS — Z51.5 ENCOUNTER FOR PALLIATIVE CARE: ICD-10-CM

## 2021-01-06 LAB
CULTURE RESULTS: SIGNIFICANT CHANGE UP
SPECIMEN SOURCE: SIGNIFICANT CHANGE UP

## 2021-01-21 ENCOUNTER — APPOINTMENT (OUTPATIENT)
Dept: CARDIOLOGY | Facility: CLINIC | Age: 86
End: 2021-01-21

## 2021-01-31 NOTE — DISCHARGE NOTE ADULT - CARE PROVIDER_API CALL
Christine Crum (DO), Internal Medicine  [address] [place] [address]  Phone: [phone]  Fax: [phone]    Ramsey Perez) Cardiologist  [address] [place] [address] [address]  [facility] [address]  Phone: [phone]  Fax: (   )    -
Patient is presenting to the Emergency Department with a request to have COVID-19 testing.  Denies symptoms, including cough, shortness of breath, fever, chills, bodyaches or sore throat.

## 2021-03-06 NOTE — OCCUPATIONAL THERAPY INITIAL EVALUATION ADULT - LEVEL OF CONSCIOUSNESS, OT EVAL
alert impairments found/functional limitations in following categories/risk reduction/prevention/rehab potential/therapy frequency/predicted duration of therapy intervention/anticipated equipment needs at discharge/anticipated discharge recommendation

## 2021-04-21 ENCOUNTER — APPOINTMENT (OUTPATIENT)
Dept: CARDIOLOGY | Facility: CLINIC | Age: 86
End: 2021-04-21

## 2023-11-14 NOTE — PROCEDURE NOTE - NSICDXPROCEDURE_GEN_ALL_CORE_FT
Detail Level: Zone Continue Regimen: Ammonium lactate cream Render In Strict Bullet Format?: No PROCEDURES:  Chest tube insertion 23-Dec-2020 22:20:40  [address]

## 2024-09-13 NOTE — PATIENT PROFILE ADULT - CENTRAL VENOUS CATHETER/PICC LINE
no Medical Necessity Information: It is in your best interest to select a reason for this procedure from the list below. All of these items fulfill various CMS LCD requirements except the new and changing color options. Medical Necessity Clause: This procedure was medically necessary because the lesion that was treated was: irritated Lab: [id number] Lab Facility: 0 Body Location Override (Optional - Billing Will Still Be Based On Selected Body Map Location If Applicable): right flank superior Detail Level: Detailed Was A Bandage Applied: Yes Size Of Lesion In Cm (Required): 2.4 Depth Of Shave: dermis Biopsy Method: Dermablade Anesthesia Type: 1% lidocaine with epinephrine Hemostasis: Aluminum Chloride Wound Care: Petrolatum Render Path Notes In Note?: No Consent was obtained from the patient. The risks and benefits to therapy were discussed in detail. Specifically, the risks of infection, scarring, bleeding, prolonged wound healing, incomplete removal, allergy to anesthesia, nerve injury and recurrence were addressed. Prior to the procedure, the treatment site was clearly identified and confirmed by the patient. All components of Universal Protocol/PAUSE Rule completed. Post-Care Instructions: I reviewed with the patient in detail post-care instructions. Patient is to keep the biopsy site dry overnight, and then apply bacitracin twice daily until healed. Patient may apply hydrogen peroxide soaks to remove any crusting. Notification Instructions: Patient will be notified of pathology results. However, patient instructed to call the office if not contacted within 2 weeks. Billing Type: Third-Party Bill Body Location Override (Optional - Billing Will Still Be Based On Selected Body Map Location If Applicable): right flank inferior Size Of Lesion In Cm (Required): 2.5